# Patient Record
Sex: MALE | Race: BLACK OR AFRICAN AMERICAN | NOT HISPANIC OR LATINO | Employment: UNEMPLOYED | URBAN - METROPOLITAN AREA
[De-identification: names, ages, dates, MRNs, and addresses within clinical notes are randomized per-mention and may not be internally consistent; named-entity substitution may affect disease eponyms.]

---

## 2018-01-25 ENCOUNTER — OFFICE VISIT (OUTPATIENT)
Dept: URGENT CARE | Facility: CLINIC | Age: 28
End: 2018-01-25
Payer: MEDICAID

## 2018-01-25 VITALS
OXYGEN SATURATION: 98 % | HEART RATE: 77 BPM | TEMPERATURE: 98 F | BODY MASS INDEX: 35.55 KG/M2 | WEIGHT: 240 LBS | SYSTOLIC BLOOD PRESSURE: 142 MMHG | HEIGHT: 69 IN | DIASTOLIC BLOOD PRESSURE: 96 MMHG | RESPIRATION RATE: 20 BRPM

## 2018-01-25 DIAGNOSIS — M54.50 ACUTE RIGHT-SIDED LOW BACK PAIN WITHOUT SCIATICA: ICD-10-CM

## 2018-01-25 DIAGNOSIS — J30.1 CHRONIC SEASONAL ALLERGIC RHINITIS DUE TO POLLEN: ICD-10-CM

## 2018-01-25 DIAGNOSIS — M25.562 POSTERIOR LEFT KNEE PAIN: Primary | ICD-10-CM

## 2018-01-25 DIAGNOSIS — J06.9 URI, ACUTE: ICD-10-CM

## 2018-01-25 LAB
CTP QC/QA: YES
FLUAV AG NPH QL: NEGATIVE
FLUBV AG NPH QL: NEGATIVE

## 2018-01-25 PROCEDURE — 87804 INFLUENZA ASSAY W/OPTIC: CPT | Mod: 59,QW,S$GLB, | Performed by: FAMILY MEDICINE

## 2018-01-25 PROCEDURE — 99203 OFFICE O/P NEW LOW 30 MIN: CPT | Mod: S$GLB,,, | Performed by: FAMILY MEDICINE

## 2018-01-25 RX ORDER — CYCLOBENZAPRINE HCL 10 MG
10 TABLET ORAL 3 TIMES DAILY PRN
Qty: 30 TABLET | Refills: 0 | Status: SHIPPED | OUTPATIENT
Start: 2018-01-25 | End: 2018-02-04

## 2018-01-25 RX ORDER — DICLOFENAC SODIUM 75 MG/1
75 TABLET, DELAYED RELEASE ORAL 2 TIMES DAILY PRN
Qty: 30 TABLET | Refills: 2 | Status: SHIPPED | OUTPATIENT
Start: 2018-01-25

## 2018-01-26 NOTE — PATIENT INSTRUCTIONS
General Neck and Back Pain    Both neck and back pain are usually caused by injury to the muscles or ligaments of the spine. Sometimes the disks that separate each bone of the spine may cause pain by pressing on a nearby nerve. Back and neck pain may appear after a sudden twisting or bending force (such as in a car accident), or sometimes after a simple awkward movement. In either case, muscle spasm is often present and adds to the pain.  Acute neck and back pain usually gets better in 1 to 2 weeks. Pain related to disk disease, arthritis in the spinal joints or spinal stenosis (narrowing of the spinal canal) can become chronic and last for months or years.  Back and neck pain are common problems. Most people feel better in 1 or 2 weeks, and most of the rest in 1 to 2 months. Most people can remain active.  People experience and describe pain differently.  · Pain can be sharp, stabbing, shooting, aching, cramping, or burning  · Movement, standing, bending, lifting, sitting, or walking may worsen the pain  · Pain can be localized to one spot or area, or it can be more generalized  · Pain can spread or radiate upwards, downwards, to the front, or go down your arms  · Muscle spasm may occur.  Most of the time mechanical problems with the muscles or spine cause the pain. it is usually caused by an injury, whether known or not, to the muscles or ligaments. While illnesses can cause back pain, it is usually not caused by a serious illness. Pain is usually related to physical activity, whether sports, exercise, work, or normal activity. Sometimes it can occur without an identifiable cause. This can happen simply by stretching or moving wrong, without noting pain at the time. Other causes include:  · Overexertion, lifting, pushing, pulling incorrectly or too aggressively.  · Sudden twisting, bending or stretching from an accident (car or fall), or accidental movement.  · Poor posture  · Poor conditioning, lack of regular  exercise  · Spinal disc disease or arthritis  · Stress  · Pregnancy, or illness like appendicitis, bladder or kidney infection, pelvic infections   Home care  · For neck pain: Use a comfortable pillow that supports the head and keeps the spine in a neutral position. The position of the head should not be tilted forward or backward.  · When in bed, try to find a position of comfort. A firm mattress is best. Try lying flat on your back with pillows under your knees. You can also try lying on your side with your knees bent up towards your chest and a pillow between your knees.  · At first, do not try to stretch out the sore spots. If there is a strain, it is not like the good soreness you get after exercising without an injury. In this case, stretching may make it worse.  · Avoid prolonged sitting, long car rides or travel. This puts more stress on the lower back than standing or walking.  · During the first 24 to 72 hours after an injury, apply an ice pack to the painful area for 20 minutes and then remove it for 20 minutes over a period of 60 to 90 minutes or several times a day.   · You can alternate ice and heat therapies. Talk with your healthcare provider about the best treatment for your back or neck pain. As a safety precaution, do not use a heating pad at bedtime. Sleeping with a heating pad can lead to skin burns or tissue damage.  · Therapeutic massage can help relax the back and neck muscles without stretching them.  · Be aware of safe lifting methods and do not lift anything over 15 pounds until all the pain is gone.  Medications  Talk to your healthcare provider before using medicine, especially if you have other medical problems or are taking other medicines.  · You may use over-the-counter medicine to control pain, unless another pain medicine was prescribed. If you have chronic conditions like diabetes, liver or kidney disease, stomach ulcers,  gastrointestinal bleeding, or are taking blood thinner  medicines.  · Be careful if you are given pain medicines, narcotics, or medicine for muscle spasm. They can cause drowsiness, and can affect your coordination, reflexes, and judgment. Do not drive or operate heavy machinery.  Follow-up care  Follow up with your healthcare provider, or as advised. Physical therapy or further tests may be needed.  If X-rays were taken, you will be notified of any new findings that may affect your care.  Call 911  Seek emergency medical care if any of the following occur:  · Trouble breathing  · Confusion  · Very drowsy or trouble awakening  · Fainting or loss of consciousness  · Rapid or very slow heart rate  · Loss of bowel or bladder control  When to seek medical advice  Call your healthcare provider right away if any of these occur:  · Pain becomes worse or spreads into your arms or legs  · Weakness, numbness or pain in one or both arms or legs  · Numbness in the groin area  · Difficulty walking  · Fever of 100.4ºF (38ºC) or higher, or as directed by your healthcare provider  Date Last Reviewed: 7/1/2016 © 2000-2017 [employer]. [address] [place] [address], [place] [address]. All rights reserved. This information is not intended as a substitute for professional medical care. Always follow your healthcare professional's instructions.

## 2018-01-26 NOTE — PROGRESS NOTES
"Subjective:       Patient ID: Willian Ron is a 27 y.o. male.    Vitals:  height is 5' 9" (1.753 m) and weight is 108.9 kg (240 lb). His temperature is 97.7 °F (36.5 °C). His blood pressure is 142/96 (abnormal) and his pulse is 77. His respiration is 20 and oxygen saturation is 98%.     Chief Complaint: Back Pain; Sore Throat; and Knee Pain (lt )    Pain   This is a new problem. The current episode started in the past 7 days. The problem occurs constantly. Associated symptoms include coughing, fatigue, nausea and vomiting. Pertinent negatives include no abdominal pain, chest pain, chills, congestion, fever, headaches, myalgias or sore throat. Nothing aggravates the symptoms. He has tried nothing for the symptoms.     Review of Systems   Constitution: Positive for fatigue and malaise/fatigue. Negative for chills and fever.   HENT: Negative for congestion, ear pain, hoarse voice and sore throat.    Eyes: Negative for discharge and redness.   Cardiovascular: Negative for chest pain, dyspnea on exertion and leg swelling.   Respiratory: Positive for cough. Negative for shortness of breath, sputum production and wheezing.    Musculoskeletal: Negative for myalgias.   Gastrointestinal: Positive for nausea and vomiting. Negative for abdominal pain.   Neurological: Negative for headaches.       Objective:      Physical Exam   Constitutional: He is oriented to person, place, and time. He appears well-developed and well-nourished. He is cooperative.  Non-toxic appearance. He does not appear ill. No distress.   HENT:   Head: Normocephalic and atraumatic.   Right Ear: Hearing, tympanic membrane, external ear and ear canal normal.   Left Ear: Hearing, tympanic membrane, external ear and ear canal normal.   Nose: Nose normal. No mucosal edema, rhinorrhea or nasal deformity. No epistaxis. Right sinus exhibits no maxillary sinus tenderness and no frontal sinus tenderness. Left sinus exhibits no maxillary sinus tenderness and no " frontal sinus tenderness.   Mouth/Throat: Uvula is midline, oropharynx is clear and moist and mucous membranes are normal. No trismus in the jaw. Normal dentition. No uvula swelling. No posterior oropharyngeal erythema.   Eyes: Conjunctivae and lids are normal. Right eye exhibits no discharge. Left eye exhibits no discharge. No scleral icterus.   Sclera clear bilat   Neck: Trachea normal, normal range of motion, full passive range of motion without pain and phonation normal. Neck supple.   Cardiovascular: Normal rate, regular rhythm, normal heart sounds, intact distal pulses and normal pulses.    Pulmonary/Chest: Effort normal and breath sounds normal. He has no wheezes. He has no rales.   Abdominal: Soft. Normal appearance and bowel sounds are normal. He exhibits no distension, no pulsatile midline mass and no mass. There is no tenderness.   Musculoskeletal: Normal range of motion. He exhibits no edema or deformity.   Left knee no swelling ,   Minimal tenderness at posterior tibial area     Lymphadenopathy:     He has no cervical adenopathy.   Neurological: He is alert and oriented to person, place, and time. He exhibits normal muscle tone. Coordination normal.   Skin: Skin is warm, dry and intact. He is not diaphoretic. No pallor.   Psychiatric: He has a normal mood and affect. His speech is normal and behavior is normal. Judgment and thought content normal. Cognition and memory are normal.   Nursing note and vitals reviewed.      Assessment:       1. Posterior left knee pain    2. URI, acute    3. Acute right-sided low back pain without sciatica    4. Chronic seasonal allergic rhinitis due to pollen        Plan:         Posterior left knee pain  -     X-Ray Knee 3 View Left; Future; Expected date: 01/25/2018  -     Ambulatory referral to Orthopedics    URI, acute  -     POCT Influenza A/B    Acute right-sided low back pain without sciatica  -     cyclobenzaprine (FLEXERIL) 10 MG tablet; Take 1 tablet (10 mg  total) by mouth 3 (three) times daily as needed for Muscle spasms.  Dispense: 30 tablet; Refill: 0  -     diclofenac (VOLTAREN) 75 MG EC tablet; Take 1 tablet (75 mg total) by mouth 2 (two) times daily as needed.  Dispense: 30 tablet; Refill: 2  -     Ambulatory referral to Orthopedics    Chronic seasonal allergic rhinitis due to pollen        claritin one daily

## 2019-06-04 ENCOUNTER — HOSPITAL ENCOUNTER (EMERGENCY)
Facility: HOSPITAL | Age: 29
Discharge: HOME | End: 2019-06-05
Attending: EMERGENCY MEDICINE

## 2019-06-04 DIAGNOSIS — S61.211A LACERATION OF LEFT INDEX FINGER WITHOUT FOREIGN BODY WITHOUT DAMAGE TO NAIL, INITIAL ENCOUNTER: Primary | ICD-10-CM

## 2019-06-04 PROCEDURE — 12001 RPR S/N/AX/GEN/TRNK 2.5CM/<: CPT

## 2019-06-04 PROCEDURE — 99284 EMERGENCY DEPT VISIT MOD MDM: CPT | Mod: 25

## 2019-06-04 RX ORDER — DOXYCYCLINE HYCLATE 100 MG
100 TABLET ORAL
Status: COMPLETED | OUTPATIENT
Start: 2019-06-05 | End: 2019-06-05

## 2019-06-05 VITALS
SYSTOLIC BLOOD PRESSURE: 144 MMHG | BODY MASS INDEX: 35 KG/M2 | DIASTOLIC BLOOD PRESSURE: 71 MMHG | HEIGHT: 71 IN | HEART RATE: 66 BPM | WEIGHT: 250 LBS | RESPIRATION RATE: 18 BRPM | OXYGEN SATURATION: 99 % | TEMPERATURE: 99 F

## 2019-06-05 PROCEDURE — 25000003 PHARM REV CODE 250: Performed by: PHYSICIAN ASSISTANT

## 2019-06-05 RX ORDER — LIDOCAINE HYDROCHLORIDE 10 MG/ML
10 INJECTION INFILTRATION; PERINEURAL
Status: COMPLETED | OUTPATIENT
Start: 2019-06-05 | End: 2019-06-05

## 2019-06-05 RX ORDER — OXYCODONE AND ACETAMINOPHEN 5; 325 MG/1; MG/1
1 TABLET ORAL EVERY 6 HOURS PRN
Qty: 10 TABLET | Refills: 0 | Status: SHIPPED | OUTPATIENT
Start: 2019-06-05

## 2019-06-05 RX ORDER — DOXYCYCLINE 100 MG/1
100 CAPSULE ORAL 2 TIMES DAILY
Qty: 20 CAPSULE | Refills: 0 | Status: SHIPPED | OUTPATIENT
Start: 2019-06-05 | End: 2019-06-15

## 2019-06-05 RX ORDER — OXYCODONE AND ACETAMINOPHEN 5; 325 MG/1; MG/1
1 TABLET ORAL
Status: COMPLETED | OUTPATIENT
Start: 2019-06-05 | End: 2019-06-05

## 2019-06-05 RX ORDER — IBUPROFEN 600 MG/1
600 TABLET ORAL EVERY 6 HOURS PRN
Qty: 20 TABLET | Refills: 0 | Status: SHIPPED | OUTPATIENT
Start: 2019-06-05

## 2019-06-05 RX ORDER — BACITRACIN ZINC 500 UNIT/G
OINTMENT (GRAM) TOPICAL 2 TIMES DAILY
Qty: 14 G | Refills: 0 | Status: SHIPPED | OUTPATIENT
Start: 2019-06-05

## 2019-06-05 RX ADMIN — OXYCODONE HYDROCHLORIDE AND ACETAMINOPHEN 1 TABLET: 5; 325 TABLET ORAL at 12:06

## 2019-06-05 RX ADMIN — LIDOCAINE HYDROCHLORIDE 10 ML: 10 INJECTION, SOLUTION INFILTRATION; PERINEURAL at 12:06

## 2019-06-05 RX ADMIN — DOXYCYCLINE HYCLATE 100 MG: 100 TABLET, COATED ORAL at 12:06

## 2019-06-05 NOTE — DISCHARGE INSTRUCTIONS
Keep wound clean.  Do not soak in water.  Do not get wound wet for 24 hr.  Keep your dressing in place until tomorrow evening.  After that, you may change dressings twice daily, apply antibiotic ointment with dressing changes.  Ibuprofen for pain.  Percocet for severe pain. Sutures need to be removed in 10-14 days.  Follow-up with your primary care provider for wound re-evaluation, for suture removal.  Please return to this emergency department if you experience worsening pain, if your finger becomes red, swollen, warm, if you notice foul-smelling drainage from finger, if any other problems occur.

## 2019-06-05 NOTE — ED PROVIDER NOTES
Encounter Date: 6/4/2019       History     Chief Complaint   Patient presents with    Laceration     LAC to LEFT index finger Pt reports being at work and  accidentally cut his finger on a underwater rail with sewage water.         28yo M with chief complaint laceration to L index finger sustained approx 1030am today. Pt states was at work, moving a rusted structure with a sharp corner. He states he was cut and fears he may have gotten sewage water in the wound. No uncontrolled bleeding. No radiculopathy or paresthesia. Does not suspect foreign body. No fever. Aching pain to digit. No loss of sensation or motor control. Symptoms acute, constant, severity 5/10.     He is R handed.         Review of patient's allergies indicates:   Allergen Reactions    Grass pollen-june grass standard      History reviewed. No pertinent past medical history.  History reviewed. No pertinent surgical history.  History reviewed. No pertinent family history.  Social History     Tobacco Use    Smoking status: Current Every Day Smoker     Packs/day: 0.25     Years: 7.00     Pack years: 1.75     Types: Cigarettes    Smokeless tobacco: Never Used   Substance Use Topics    Alcohol use: No    Drug use: No     Review of Systems   Constitutional: Negative for appetite change, chills and fever.   HENT: Negative for congestion and sore throat.    Respiratory: Negative for chest tightness and shortness of breath.    Cardiovascular: Negative for chest pain.   Gastrointestinal: Negative for abdominal pain, nausea and vomiting.   Genitourinary: Negative for dysuria.   Musculoskeletal: Negative for back pain, myalgias, neck pain and neck stiffness.   Skin: Positive for wound. Negative for rash.   Neurological: Negative for dizziness, weakness and light-headedness.   Hematological: Does not bruise/bleed easily.   All other systems reviewed and are negative.      Physical Exam     Initial Vitals [06/04/19 2214]   BP Pulse Resp Temp SpO2   (!)  164/84 67 17 98.6 °F (37 °C) 99 %      MAP       --         Physical Exam    Nursing note and vitals reviewed.  Constitutional: He appears well-developed and well-nourished. He is not diaphoretic. No distress.   HENT:   Head: Normocephalic and atraumatic.   Eyes: Conjunctivae and EOM are normal. Pupils are equal, round, and reactive to light.   Neck: Normal range of motion. Neck supple.   Cardiovascular: Intact distal pulses.   Pulmonary/Chest: No respiratory distress.   Abdominal: Soft. Bowel sounds are normal. He exhibits no distension. There is no tenderness.   Musculoskeletal: Normal range of motion.   L 2nd digit with radial aspect laceration to the DIP region No foreign body. No bony deformity. Cap refill wnl all digits. No uncontrolled bleeding. Full ROM without discomfort or difficulty.    Neurological: He is alert and oriented to person, place, and time. He has normal strength.   Skin: Skin is warm and dry. Capillary refill takes less than 2 seconds. No rash and no abscess noted. No erythema.   Psychiatric: He has a normal mood and affect. His behavior is normal. Judgment and thought content normal.         ED Course   Lac Repair  Date/Time: 6/5/2019 12:40 AM  Performed by: Johnnie Murphy PA-C  Authorized by: Tabitha Tineo MD   Body area: upper extremity  Location details: left index finger  Laceration length: 1 cm  Foreign bodies: no foreign bodies  Tendon involvement: none  Nerve involvement: none  Vascular damage: no  Anesthesia: digital block    Anesthesia:  Local Anesthetic: lidocaine 1% without epinephrine  Anesthetic total: 5 mL  Patient sedated: no  Preparation: Patient was prepped and draped in the usual sterile fashion.  Irrigation solution: saline  Irrigation method: syringe  Amount of cleaning: standard  Debridement: minimal  Degree of undermining: none  Skin closure: 4-0 nylon  Number of sutures: 2  Technique: simple and horizontal mattress  Approximation: loose  Approximation  difficulty: simple  Dressing: 4x4 sterile gauze  Patient tolerance: Patient tolerated the procedure well with no immediate complications  Comments: Gaping wound, simple interrupted with loose closure, horizontal mattress given gaping nature, to support closure        Labs Reviewed - No data to display       Imaging Results          X-Ray Finger 2 or More Views Left (Final result)  Result time 06/04/19 23:52:48    Final result by Thompson Murillo MD (06/04/19 23:52:48)                 Impression:      No fracture or malalignment or foreign body seen.      Electronically signed by: Thompson Murillo  Date:    06/04/2019  Time:    23:52             Narrative:    EXAMINATION:  XR FINGER 2 OR MORE VIEWS LEFT    CLINICAL HISTORY:  Left finger injury, evaluate for foreign body;    TECHNIQUE:  Frontal, oblique and lateral views.    COMPARISON:  None    FINDINGS:  Mineralization and joint space and alignment are normal.  No foreign body or soft tissue defect or focal swelling seen.  There is overlying dressing.  A single prominent trabecular interspaces seen on frontal view at the radial base of the 2nd distal phalanx, probably normal variation                                 Medical Decision Making:   Differential Diagnosis:   Fracture, contusion, infected wound, cellulitis, open fracture  Clinical Tests:   Radiological Study: Reviewed and Ordered  ED Management:  X-ray negative for acute fracture.  I will close the wound very loosely given possible contaminated water exposure.  Tetanus up-to-date.  Wound irrigated.  Tolerated without issue.  Systemic antibiotics, PCP follow-up, return precautions given.                      Clinical Impression:       ICD-10-CM ICD-9-CM   1. Laceration of left index finger without foreign body without damage to nail, initial encounter S61.211A 883.0         Disposition:   Disposition: Discharged  Condition: Stable                        Johnnie Murphy PA-C  06/05/19 0047

## 2020-01-07 ENCOUNTER — HOSPITAL ENCOUNTER (EMERGENCY)
Facility: HOSPITAL | Age: 30
Discharge: HOME | End: 2020-01-07
Attending: EMERGENCY MEDICINE

## 2020-01-07 VITALS
WEIGHT: 240 LBS | HEART RATE: 82 BPM | OXYGEN SATURATION: 97 % | RESPIRATION RATE: 18 BRPM | HEIGHT: 71 IN | BODY MASS INDEX: 33.6 KG/M2 | SYSTOLIC BLOOD PRESSURE: 146 MMHG | DIASTOLIC BLOOD PRESSURE: 83 MMHG | TEMPERATURE: 99 F

## 2020-01-07 DIAGNOSIS — J10.1 INFLUENZA B: Primary | ICD-10-CM

## 2020-01-07 LAB
CTP QC/QA: YES
POC MOLECULAR INFLUENZA A AGN: NEGATIVE
POC MOLECULAR INFLUENZA B AGN: POSITIVE

## 2020-01-07 PROCEDURE — 99284 EMERGENCY DEPT VISIT MOD MDM: CPT | Mod: 25

## 2020-01-07 PROCEDURE — 63600175 PHARM REV CODE 636 W HCPCS: Performed by: PHYSICIAN ASSISTANT

## 2020-01-07 PROCEDURE — 25000003 PHARM REV CODE 250: Performed by: PHYSICIAN ASSISTANT

## 2020-01-07 PROCEDURE — 96372 THER/PROPH/DIAG INJ SC/IM: CPT

## 2020-01-07 PROCEDURE — 87502 INFLUENZA DNA AMP PROBE: CPT

## 2020-01-07 RX ORDER — BENZONATATE 100 MG/1
100 CAPSULE ORAL
Status: COMPLETED | OUTPATIENT
Start: 2020-01-07 | End: 2020-01-07

## 2020-01-07 RX ORDER — DEXTROMETHORPHAN HYDROBROMIDE, GUAIFENESIN 5; 100 MG/5ML; MG/5ML
650 LIQUID ORAL EVERY 8 HOURS PRN
Qty: 30 TABLET | Refills: 0 | Status: SHIPPED | OUTPATIENT
Start: 2020-01-07

## 2020-01-07 RX ORDER — BENZONATATE 100 MG/1
100 CAPSULE ORAL 3 TIMES DAILY PRN
Qty: 20 CAPSULE | Refills: 0 | Status: SHIPPED | OUTPATIENT
Start: 2020-01-07 | End: 2020-01-17

## 2020-01-07 RX ORDER — KETOROLAC TROMETHAMINE 30 MG/ML
15 INJECTION, SOLUTION INTRAMUSCULAR; INTRAVENOUS
Status: COMPLETED | OUTPATIENT
Start: 2020-01-07 | End: 2020-01-07

## 2020-01-07 RX ORDER — OSELTAMIVIR PHOSPHATE 75 MG/1
75 CAPSULE ORAL 2 TIMES DAILY
Qty: 10 CAPSULE | Refills: 0 | Status: SHIPPED | OUTPATIENT
Start: 2020-01-07 | End: 2020-01-12

## 2020-01-07 RX ORDER — IBUPROFEN 600 MG/1
600 TABLET ORAL EVERY 6 HOURS PRN
Qty: 20 TABLET | Refills: 0 | Status: SHIPPED | OUTPATIENT
Start: 2020-01-07

## 2020-01-07 RX ADMIN — KETOROLAC TROMETHAMINE 15 MG: 30 INJECTION, SOLUTION INTRAMUSCULAR at 09:01

## 2020-01-07 RX ADMIN — BENZONATATE 100 MG: 100 CAPSULE ORAL at 09:01

## 2020-01-07 NOTE — ED TRIAGE NOTES
Pt. reports coughing for the past week. Pt. Reports cough is productive and yellowish in color. Pt. Denies any fever/chills with his symptoms.

## 2020-01-07 NOTE — ED PROVIDER NOTES
Encounter Date: 1/7/2020    SCRIBE #1 NOTE: IMell, am scribing for, and in the presence of,  Elmer Paniagua PA-C. I have scribed the following portions of the note - Other sections scribed: HPI,ROS,PE.       History     Chief Complaint   Patient presents with    Cough     Pt c/o c/c/c x 1 week. Denies fever. Pt reports pain with deep breathing. Daughter with same symptoms. RR unlabored.      CC: Cough    HPI: This is a 29 y.o.male patient, with no PMHx, presenting to the ED with a complaint of a productive cough, that began 4 days ago. Patient reports shortness of breath in between coughs. Patient reports associated congestion and sore throat. He reports attempting prior Tx with Mucinex and Robitussin, with no relief. Patient denies any fever, chills, or any other associated symptoms. No alleviating or aggravating factors. Allergic to Grass pollen.    The history is provided by the patient.     Review of patient's allergies indicates:   Allergen Reactions    Grass pollen-yuri grass standard      History reviewed. No pertinent past medical history.  History reviewed. No pertinent surgical history.  History reviewed. No pertinent family history.  Social History     Tobacco Use    Smoking status: Current Every Day Smoker     Packs/day: 0.50     Years: 7.00     Pack years: 3.50     Types: Cigarettes    Smokeless tobacco: Never Used   Substance Use Topics    Alcohol use: No    Drug use: No     Review of Systems   Constitutional: Negative for chills and fever.   HENT: Positive for congestion and sore throat. Negative for ear pain and rhinorrhea.    Eyes: Negative for pain and visual disturbance.   Respiratory: Positive for cough (productive) and shortness of breath (in between coughs).    Cardiovascular: Negative for chest pain.   Gastrointestinal: Negative for abdominal pain, diarrhea, nausea and vomiting.   Genitourinary: Negative for dysuria.   Musculoskeletal: Negative for back pain and neck pain.    Skin: Negative for rash.   Neurological: Negative for headaches.       Physical Exam     Initial Vitals [01/07/20 0847]   BP Pulse Resp Temp SpO2   (!) 140/80 90 18 99.1 °F (37.3 °C) 97 %      MAP       --         Physical Exam    Nursing note and vitals reviewed.  Constitutional: He appears well-developed and well-nourished. No distress.   HENT:   Head: Normocephalic and atraumatic.   Right Ear: Tympanic membrane normal.   Left Ear: Tympanic membrane normal.   Nose: Nose normal.   Mouth/Throat: Uvula is midline, oropharynx is clear and moist and mucous membranes are normal.   Eyes: EOM are normal. Pupils are equal, round, and reactive to light.   Neck: Normal range of motion. Neck supple.   Cardiovascular: Normal rate, regular rhythm and normal heart sounds. Exam reveals no gallop and no friction rub.    No murmur heard.  Pulmonary/Chest: Effort normal and breath sounds normal. No respiratory distress. He has no wheezes. He has no rhonchi. He has no rales.   Musculoskeletal: Normal range of motion.   Neurological: He is alert and oriented to person, place, and time.   Skin: Skin is warm and dry. Capillary refill takes less than 2 seconds.   Psychiatric: He has a normal mood and affect.         ED Course   Procedures  Labs Reviewed   POCT INFLUENZA A/B MOLECULAR - Abnormal; Notable for the following components:       Result Value    POC Molecular Influenza B Ag Positive (*)     All other components within normal limits          Imaging Results    None          Medical Decision Making:   ED Management:  29-year-old obese male with history and exam consistent with viral URI.  Influenza B positive.  No evidence of meningitis, pneumonia, strep pharyngitis, or sepsis.  Patient treated with supportive medications and Tamiflu, and discharged with instructions for continued p.o. hydration and follow up with PCP.            Scribe Attestation:   Scribe #1: I performed the above scribed service and the documentation  accurately describes the services I performed. I attest to the accuracy of the note.                          Clinical Impression:       ICD-10-CM ICD-9-CM   1. Influenza B J10.1 487.1            Scribe attestation: I, Elmer Paniagua, personally performed the services described in this documentation. All medical record entries made by the scribe were at my direction and in my presence.  I have reviewed the chart and agree that the record reflects my personal performance and is accurate and complete.                 Elmer Paniagua PA-C  01/07/20 0955

## 2024-08-14 ENCOUNTER — HOSPITAL ENCOUNTER (INPATIENT)
Facility: HOSPITAL | Age: 34
LOS: 13 days | Discharge: HOME | DRG: 871 | End: 2024-08-27
Attending: EMERGENCY MEDICINE | Admitting: STUDENT IN AN ORGANIZED HEALTH CARE EDUCATION/TRAINING PROGRAM
Payer: MEDICAID

## 2024-08-14 DIAGNOSIS — R07.9 CHEST PAIN: ICD-10-CM

## 2024-08-14 DIAGNOSIS — R14.0 ABDOMINAL BLOATING: ICD-10-CM

## 2024-08-14 DIAGNOSIS — R00.0 TACHYCARDIA: ICD-10-CM

## 2024-08-14 DIAGNOSIS — K35.33 ACUTE APPENDICITIS WITH PERFORATION, LOCALIZED PERITONITIS, AND ABSCESS, UNSPECIFIED WHETHER GANGRENE PRESENT: Primary | ICD-10-CM

## 2024-08-14 DIAGNOSIS — K35.211 ACUTE APPENDICITIS WITH PERFORATION, GENERALIZED PERITONITIS, AND ABSCESS, UNSPECIFIED WHETHER GANGRENE PRESENT: ICD-10-CM

## 2024-08-14 DIAGNOSIS — Z01.818 PRE-OP EVALUATION: ICD-10-CM

## 2024-08-14 DIAGNOSIS — K35.80 ACUTE APPENDICITIS: ICD-10-CM

## 2024-08-14 PROBLEM — D72.829 LEUKOCYTOSIS: Status: ACTIVE | Noted: 2024-08-14

## 2024-08-14 PROBLEM — K76.0 HEPATIC STEATOSIS: Status: ACTIVE | Noted: 2024-08-14

## 2024-08-14 PROBLEM — A41.9 SEPSIS: Status: ACTIVE | Noted: 2024-08-14

## 2024-08-14 PROBLEM — F17.200 TOBACCO DEPENDENCY: Status: ACTIVE | Noted: 2024-08-14

## 2024-08-14 PROBLEM — K56.600 PARTIAL SMALL BOWEL OBSTRUCTION: Status: ACTIVE | Noted: 2024-08-14

## 2024-08-14 PROBLEM — F12.90 MARIJUANA USE: Status: ACTIVE | Noted: 2024-08-14

## 2024-08-14 PROBLEM — E87.1 HYPONATREMIA: Status: ACTIVE | Noted: 2024-08-14

## 2024-08-14 LAB
ALBUMIN SERPL BCP-MCNC: 4 G/DL (ref 3.5–5.2)
ALP SERPL-CCNC: 85 U/L (ref 55–135)
ALT SERPL W/O P-5'-P-CCNC: 23 U/L (ref 10–44)
ANION GAP SERPL CALC-SCNC: 14 MMOL/L (ref 8–16)
AST SERPL-CCNC: 15 U/L (ref 10–40)
BASOPHILS # BLD AUTO: 0.04 K/UL (ref 0–0.2)
BASOPHILS NFR BLD: 0.2 % (ref 0–1.9)
BILIRUB SERPL-MCNC: 1.1 MG/DL (ref 0.1–1)
BILIRUB UR QL STRIP: NEGATIVE
BNP SERPL-MCNC: 49 PG/ML (ref 0–99)
BUN SERPL-MCNC: 10 MG/DL (ref 6–20)
CALCIUM SERPL-MCNC: 9.7 MG/DL (ref 8.7–10.5)
CHLORIDE SERPL-SCNC: 99 MMOL/L (ref 95–110)
CLARITY UR: CLEAR
CO2 SERPL-SCNC: 22 MMOL/L (ref 23–29)
COLOR UR: YELLOW
CREAT SERPL-MCNC: 1.1 MG/DL (ref 0.5–1.4)
DIFFERENTIAL METHOD BLD: ABNORMAL
EOSINOPHIL # BLD AUTO: 0 K/UL (ref 0–0.5)
EOSINOPHIL NFR BLD: 0 % (ref 0–8)
ERYTHROCYTE [DISTWIDTH] IN BLOOD BY AUTOMATED COUNT: 13 % (ref 11.5–14.5)
EST. GFR  (NO RACE VARIABLE): >60 ML/MIN/1.73 M^2
GLUCOSE SERPL-MCNC: 141 MG/DL (ref 70–110)
GLUCOSE UR QL STRIP: NEGATIVE
HCT VFR BLD AUTO: 50 % (ref 40–54)
HGB BLD-MCNC: 16.9 G/DL (ref 14–18)
HGB UR QL STRIP: NEGATIVE
IMM GRANULOCYTES # BLD AUTO: 0.16 K/UL (ref 0–0.04)
IMM GRANULOCYTES NFR BLD AUTO: 0.9 % (ref 0–0.5)
KETONES UR QL STRIP: ABNORMAL
LACTATE SERPL-SCNC: 1.9 MMOL/L (ref 0.5–2.2)
LEUKOCYTE ESTERASE UR QL STRIP: NEGATIVE
LIPASE SERPL-CCNC: 4 U/L (ref 4–60)
LYMPHOCYTES # BLD AUTO: 1.2 K/UL (ref 1–4.8)
LYMPHOCYTES NFR BLD: 6.4 % (ref 18–48)
MAGNESIUM SERPL-MCNC: 1.6 MG/DL (ref 1.6–2.6)
MCH RBC QN AUTO: 28.6 PG (ref 27–31)
MCHC RBC AUTO-ENTMCNC: 33.8 G/DL (ref 32–36)
MCV RBC AUTO: 85 FL (ref 82–98)
MONOCYTES # BLD AUTO: 2.2 K/UL (ref 0.3–1)
MONOCYTES NFR BLD: 12.1 % (ref 4–15)
NEUTROPHILS # BLD AUTO: 14.4 K/UL (ref 1.8–7.7)
NEUTROPHILS NFR BLD: 80.4 % (ref 38–73)
NITRITE UR QL STRIP: NEGATIVE
NRBC BLD-RTO: 0 /100 WBC
PH UR STRIP: 6 [PH] (ref 5–8)
PLATELET # BLD AUTO: 258 K/UL (ref 150–450)
PMV BLD AUTO: 11.2 FL (ref 9.2–12.9)
POTASSIUM SERPL-SCNC: 3.5 MMOL/L (ref 3.5–5.1)
PROT SERPL-MCNC: 7.6 G/DL (ref 6–8.4)
PROT UR QL STRIP: ABNORMAL
RBC # BLD AUTO: 5.9 M/UL (ref 4.6–6.2)
SODIUM SERPL-SCNC: 135 MMOL/L (ref 136–145)
SP GR UR STRIP: >1.03 (ref 1–1.03)
TROPONIN I SERPL DL<=0.01 NG/ML-MCNC: <0.006 NG/ML (ref 0–0.03)
URN SPEC COLLECT METH UR: ABNORMAL
UROBILINOGEN UR STRIP-ACNC: NEGATIVE EU/DL
WBC # BLD AUTO: 17.92 K/UL (ref 3.9–12.7)

## 2024-08-14 PROCEDURE — 93005 ELECTROCARDIOGRAM TRACING: CPT

## 2024-08-14 PROCEDURE — 99285 EMERGENCY DEPT VISIT HI MDM: CPT | Mod: 25

## 2024-08-14 PROCEDURE — 63600175 PHARM REV CODE 636 W HCPCS

## 2024-08-14 PROCEDURE — 85025 COMPLETE CBC W/AUTO DIFF WBC: CPT

## 2024-08-14 PROCEDURE — 81003 URINALYSIS AUTO W/O SCOPE: CPT

## 2024-08-14 PROCEDURE — 11000001 HC ACUTE MED/SURG PRIVATE ROOM

## 2024-08-14 PROCEDURE — 25500020 PHARM REV CODE 255: Performed by: EMERGENCY MEDICINE

## 2024-08-14 PROCEDURE — 83880 ASSAY OF NATRIURETIC PEPTIDE: CPT

## 2024-08-14 PROCEDURE — 96375 TX/PRO/DX INJ NEW DRUG ADDON: CPT

## 2024-08-14 PROCEDURE — 96361 HYDRATE IV INFUSION ADD-ON: CPT

## 2024-08-14 PROCEDURE — 63600175 PHARM REV CODE 636 W HCPCS: Performed by: STUDENT IN AN ORGANIZED HEALTH CARE EDUCATION/TRAINING PROGRAM

## 2024-08-14 PROCEDURE — 25000003 PHARM REV CODE 250

## 2024-08-14 PROCEDURE — 83690 ASSAY OF LIPASE: CPT

## 2024-08-14 PROCEDURE — 83605 ASSAY OF LACTIC ACID: CPT

## 2024-08-14 PROCEDURE — 87040 BLOOD CULTURE FOR BACTERIA: CPT | Mod: 59

## 2024-08-14 PROCEDURE — 83735 ASSAY OF MAGNESIUM: CPT

## 2024-08-14 PROCEDURE — 80053 COMPREHEN METABOLIC PANEL: CPT

## 2024-08-14 PROCEDURE — 84484 ASSAY OF TROPONIN QUANT: CPT

## 2024-08-14 PROCEDURE — 93010 ELECTROCARDIOGRAM REPORT: CPT | Mod: ,,, | Performed by: INTERNAL MEDICINE

## 2024-08-14 PROCEDURE — 96365 THER/PROPH/DIAG IV INF INIT: CPT

## 2024-08-14 PROCEDURE — 25000003 PHARM REV CODE 250: Performed by: STUDENT IN AN ORGANIZED HEALTH CARE EDUCATION/TRAINING PROGRAM

## 2024-08-14 RX ORDER — MORPHINE SULFATE 4 MG/ML
6 INJECTION, SOLUTION INTRAMUSCULAR; INTRAVENOUS
Status: COMPLETED | OUTPATIENT
Start: 2024-08-14 | End: 2024-08-14

## 2024-08-14 RX ORDER — ACETAMINOPHEN 325 MG/1
650 TABLET ORAL EVERY 8 HOURS PRN
Status: DISCONTINUED | OUTPATIENT
Start: 2024-08-14 | End: 2024-08-27 | Stop reason: HOSPADM

## 2024-08-14 RX ORDER — SODIUM CHLORIDE, SODIUM LACTATE, POTASSIUM CHLORIDE, CALCIUM CHLORIDE 600; 310; 30; 20 MG/100ML; MG/100ML; MG/100ML; MG/100ML
INJECTION, SOLUTION INTRAVENOUS CONTINUOUS
Status: DISCONTINUED | OUTPATIENT
Start: 2024-08-14 | End: 2024-08-21

## 2024-08-14 RX ORDER — ONDANSETRON HYDROCHLORIDE 2 MG/ML
4 INJECTION, SOLUTION INTRAVENOUS
Status: COMPLETED | OUTPATIENT
Start: 2024-08-14 | End: 2024-08-14

## 2024-08-14 RX ORDER — HYDROMORPHONE HYDROCHLORIDE 1 MG/ML
1 INJECTION, SOLUTION INTRAMUSCULAR; INTRAVENOUS; SUBCUTANEOUS EVERY 4 HOURS PRN
Status: DISCONTINUED | OUTPATIENT
Start: 2024-08-14 | End: 2024-08-27 | Stop reason: HOSPADM

## 2024-08-14 RX ORDER — ACETAMINOPHEN 325 MG/1
650 TABLET ORAL EVERY 4 HOURS PRN
Status: DISCONTINUED | OUTPATIENT
Start: 2024-08-14 | End: 2024-08-27 | Stop reason: HOSPADM

## 2024-08-14 RX ORDER — IBUPROFEN 200 MG
16 TABLET ORAL
Status: DISCONTINUED | OUTPATIENT
Start: 2024-08-14 | End: 2024-08-27 | Stop reason: HOSPADM

## 2024-08-14 RX ORDER — IBUPROFEN 200 MG
24 TABLET ORAL
Status: DISCONTINUED | OUTPATIENT
Start: 2024-08-14 | End: 2024-08-27 | Stop reason: HOSPADM

## 2024-08-14 RX ORDER — NALOXONE HCL 0.4 MG/ML
0.02 VIAL (ML) INJECTION
Status: DISCONTINUED | OUTPATIENT
Start: 2024-08-14 | End: 2024-08-27 | Stop reason: HOSPADM

## 2024-08-14 RX ORDER — MORPHINE SULFATE 4 MG/ML
8 INJECTION, SOLUTION INTRAMUSCULAR; INTRAVENOUS
Status: COMPLETED | OUTPATIENT
Start: 2024-08-14 | End: 2024-08-14

## 2024-08-14 RX ORDER — ONDANSETRON HYDROCHLORIDE 2 MG/ML
4 INJECTION, SOLUTION INTRAVENOUS EVERY 8 HOURS PRN
Status: DISCONTINUED | OUTPATIENT
Start: 2024-08-14 | End: 2024-08-27 | Stop reason: HOSPADM

## 2024-08-14 RX ORDER — SODIUM CHLORIDE 0.9 % (FLUSH) 0.9 %
10 SYRINGE (ML) INJECTION EVERY 12 HOURS PRN
Status: DISCONTINUED | OUTPATIENT
Start: 2024-08-14 | End: 2024-08-27 | Stop reason: HOSPADM

## 2024-08-14 RX ORDER — OXYCODONE HYDROCHLORIDE 5 MG/1
5 TABLET ORAL EVERY 6 HOURS PRN
Status: DISCONTINUED | OUTPATIENT
Start: 2024-08-14 | End: 2024-08-27 | Stop reason: HOSPADM

## 2024-08-14 RX ORDER — GLUCAGON 1 MG
1 KIT INJECTION
Status: DISCONTINUED | OUTPATIENT
Start: 2024-08-14 | End: 2024-08-27 | Stop reason: HOSPADM

## 2024-08-14 RX ORDER — ACETAMINOPHEN 500 MG
1000 TABLET ORAL
Status: COMPLETED | OUTPATIENT
Start: 2024-08-14 | End: 2024-08-14

## 2024-08-14 RX ADMIN — MORPHINE SULFATE 6 MG: 4 INJECTION, SOLUTION INTRAMUSCULAR; INTRAVENOUS at 05:08

## 2024-08-14 RX ADMIN — SODIUM CHLORIDE 1000 ML: 9 INJECTION, SOLUTION INTRAVENOUS at 03:08

## 2024-08-14 RX ADMIN — SODIUM CHLORIDE, POTASSIUM CHLORIDE, SODIUM LACTATE AND CALCIUM CHLORIDE: 600; 310; 30; 20 INJECTION, SOLUTION INTRAVENOUS at 07:08

## 2024-08-14 RX ADMIN — HYDROMORPHONE HYDROCHLORIDE 1 MG: 1 INJECTION, SOLUTION INTRAMUSCULAR; INTRAVENOUS; SUBCUTANEOUS at 11:08

## 2024-08-14 RX ADMIN — PIPERACILLIN AND TAZOBACTAM 4.5 G: 4; .5 INJECTION, POWDER, LYOPHILIZED, FOR SOLUTION INTRAVENOUS; PARENTERAL at 04:08

## 2024-08-14 RX ADMIN — PIPERACILLIN AND TAZOBACTAM 4.5 G: 4; .5 INJECTION, POWDER, LYOPHILIZED, FOR SOLUTION INTRAVENOUS; PARENTERAL at 11:08

## 2024-08-14 RX ADMIN — ACETAMINOPHEN 1000 MG: 500 TABLET ORAL at 06:08

## 2024-08-14 RX ADMIN — ONDANSETRON 4 MG: 2 INJECTION INTRAMUSCULAR; INTRAVENOUS at 03:08

## 2024-08-14 RX ADMIN — MORPHINE SULFATE 8 MG: 4 INJECTION, SOLUTION INTRAMUSCULAR; INTRAVENOUS at 03:08

## 2024-08-14 RX ADMIN — HYDROMORPHONE HYDROCHLORIDE 1 MG: 1 INJECTION, SOLUTION INTRAMUSCULAR; INTRAVENOUS; SUBCUTANEOUS at 07:08

## 2024-08-14 RX ADMIN — IOHEXOL 100 ML: 350 INJECTION, SOLUTION INTRAVENOUS at 03:08

## 2024-08-14 NOTE — HPI
Patient is a 34-year-old male with no PMH except for marijauna use who presented to  [facility] ER on 08/14/24 for further evaluation of generalised abdominal pain and subjective fever x 4 days associated with nausea and non bilious non bloody vomiting Last BM  days ago Unable to tolerate PO intake. Denied CP/SOB    During evaluation in the ER, patient was found to be hypertensive (182/100), tachycardic (126), T-max 100.5° F. labs revealed WBC 17.92, sodium 135, bicarb 22, creatinine 1.1, total bilirubin 1.1.  Lactic acid 1.9.  X-ray abdomen negative.  CT abdomen revealed findings concerning for perforated appendicitis with developing adjacent abscess.  Concern for partial SBO.  Hepatic steatosis.  Patient was given 1 L NS, 4 mg Zofran, 8 mg morphine, IV Zosyn.  General surgery was consulted in ED. admitted to hospital medicine for further management

## 2024-08-14 NOTE — ED PROVIDER NOTES
"Encounter Date: 8/14/2024    SCRIBE #1 NOTE: I, Margarito Blake, am scribing for, and in the presence of,  Deep Barraza PA-C.       History     Chief Complaint   Patient presents with    Vomiting     Pt c/o generalized abd pain and vomiting for "a couple of days" Denies fever. Clammy to touch      Willian Ron is a 34 y.o. male, with no past medical history, who presents to the emergency department with generalized abdominal pain that radiates to groin for x4 days. Patient reports associated diaphoresis, nausea, and emesis. Patient reports that he can not tolerate food by mouth stating he has not eaten in x3 days. He also reports that he has not had a bowel movement. He endorses contact with his wife who had similar symptoms. No other exacerbating or alleviating factors. Denies fever, diarrhea, urinary frequency, urinary urgency, dysuria, hematuria, penile swelling or other associated symptoms. Patient reports use of medical marijuana but states he has never experienced these symptoms previously. Denies use of daily medications.     The history is provided by the patient. No  was used.     Review of patient's allergies indicates:   Allergen Reactions    Grass pollen-june grass standard      History reviewed. No pertinent past medical history.  History reviewed. No pertinent surgical history.  No family history on file.  Social History     Tobacco Use    Smoking status: Every Day     Current packs/day: 0.50     Average packs/day: 0.5 packs/day for 7.0 years (3.5 ttl pk-yrs)     Types: Cigarettes    Smokeless tobacco: Never   Substance Use Topics    Alcohol use: No    Drug use: No     Review of Systems   Constitutional:  Positive for diaphoresis. Negative for fatigue and unexpected weight change.   HENT:  Negative for sinus pain and sore throat.    Eyes:  Negative for pain, redness and visual disturbance.   Respiratory:  Negative for cough, chest tightness, shortness of breath and " wheezing.    Cardiovascular:  Negative for chest pain and palpitations.   Gastrointestinal:  Positive for abdominal pain (epigastric), nausea and vomiting. Negative for blood in stool and diarrhea.   Endocrine: Negative for polydipsia, polyphagia and polyuria.   Genitourinary:  Negative for dysuria, frequency, penile swelling and urgency.   Musculoskeletal:  Negative for arthralgias, back pain and myalgias.   Skin:  Negative for rash.   Allergic/Immunologic: Negative for environmental allergies.   Neurological:  Negative for dizziness, syncope and headaches.   Psychiatric/Behavioral:  Negative for suicidal ideas.        Physical Exam     Initial Vitals [08/14/24 1347]   BP Pulse Resp Temp SpO2   (!) 182/100 (!) 126 18 98 °F (36.7 °C) 98 %      MAP       --         Physical Exam    Nursing note and vitals reviewed.  Constitutional: Vital signs are normal. He appears well-developed and well-nourished. He is cooperative. He appears ill. He appears distressed.   Appears moderately ill.  Moderate distress.   HENT:   Head: Normocephalic and atraumatic.   Right Ear: External ear normal.   Left Ear: External ear normal.   Nose: Nose normal.   Eyes: Conjunctivae and EOM are normal.   Neck: Phonation normal.   Normal range of motion.  Cardiovascular:  Normal rate and regular rhythm.           No murmur heard.  Tachycardic.  No murmur or friction rub.   Pulmonary/Chest: Effort normal. No respiratory distress.   Abdominal: Abdomen is flat. Bowel sounds are normal. He exhibits distension. There is generalized abdominal tenderness.   Mild abdominal distention.  Firm but not rigid to palpation.  Generalized tenderness, no focal tenderness.  Mildly tender to percussion.  Surgical abdomen. There is rebound and guarding.   Musculoskeletal:      Cervical back: Normal range of motion.     Neurological: He is alert and oriented to person, place, and time. GCS eye subscore is 4. GCS verbal subscore is 5. GCS motor subscore is 6.   Skin:  Skin is warm and dry. Capillary refill takes less than 2 seconds. No rash noted.   Skin is warm clammy.         ED Course   Critical Care    Date/Time: 8/14/2024 4:38 PM    Performed by: Deep Barraza PA-C  Authorized by: Natalie Martinez MD  Direct patient critical care time: 7 minutes  Additional history critical care time: 7 minutes  Ordering / reviewing critical care time: 7 minutes  Documentation critical care time: 7 minutes  Consulting other physicians critical care time: 7 minutes  Consult with family critical care time: 3 minutes  Other critical care time: 7 minutes  Total critical care time (exclusive of procedural time) : 45 minutes  Critical care time was exclusive of separately billable procedures and treating other patients and teaching time.  Critical care was necessary to treat or prevent imminent or life-threatening deterioration of the following conditions: sepsis.  Critical care was time spent personally by me on the following activities: evaluation of patient's response to treatment, examination of patient, discussions with consultants, obtaining history from patient or surrogate, ordering and performing treatments and interventions, ordering and review of laboratory studies, ordering and review of radiographic studies and re-evaluation of patient's condition.        Labs Reviewed   CBC W/ AUTO DIFFERENTIAL - Abnormal       Result Value    WBC 17.92 (*)     RBC 5.90      Hemoglobin 16.9      Hematocrit 50.0      MCV 85      MCH 28.6      MCHC 33.8      RDW 13.0      Platelets 258      MPV 11.2      Immature Granulocytes 0.9 (*)     Gran # (ANC) 14.4 (*)     Immature Grans (Abs) 0.16 (*)     Lymph # 1.2      Mono # 2.2 (*)     Eos # 0.0      Baso # 0.04      nRBC 0      Gran % 80.4 (*)     Lymph % 6.4 (*)     Mono % 12.1      Eosinophil % 0.0      Basophil % 0.2      Differential Method Automated     COMPREHENSIVE METABOLIC PANEL - Abnormal    Sodium 135 (*)     Potassium 3.5      Chloride  99      CO2 22 (*)     Glucose 141 (*)     BUN 10      Creatinine 1.1      Calcium 9.7      Total Protein 7.6      Albumin 4.0      Total Bilirubin 1.1 (*)     Alkaline Phosphatase 85      AST 15      ALT 23      eGFR >60      Anion Gap 14     URINALYSIS, REFLEX TO URINE CULTURE - Abnormal    Specimen UA Urine, Clean Catch      Color, UA Yellow      Appearance, UA Clear      pH, UA 6.0      Specific Gravity, UA >1.030 (*)     Protein, UA Trace (*)     Glucose, UA Negative      Ketones, UA Trace (*)     Bilirubin (UA) Negative      Occult Blood UA Negative      Nitrite, UA Negative      Urobilinogen, UA Negative      Leukocytes, UA Negative      Narrative:     Specimen Source->Urine   CULTURE, BLOOD   CULTURE, BLOOD   LIPASE    Lipase 4     MAGNESIUM    Magnesium 1.6     LACTIC ACID, PLASMA    Lactate (Lactic Acid) 1.9     TROPONIN I   B-TYPE NATRIURETIC PEPTIDE   DRUG SCREEN PANEL, URINE EMERGENCY   POCT GLUCOSE MONITORING CONTINUOUS   ISTAT CHEM8   POCT GLUCOSE MONITORING CONTINUOUS          Imaging Results              X-Ray Chest AP Portable (In process)                       CT Abdomen Pelvis With IV Contrast NO Oral Contrast (Final result)  Result time 08/14/24 16:10:25      Final result by Otis Eddy MD (08/14/24 16:10:25)                   Impression:      This report was flagged in Epic as abnormal.    1. Findings most concerning for perforated appendicitis noting be developing adjacent abscess.  Correlation is advised.  Surgical consultation recommended.  2. Inflammation associated with the appendiceal process results in tethering of a few adjacent small bowel loops concerning for at least partial small bowel obstruction.  Liquid stool throughout the large bowel suggests possible diarrheal component.  Correlation and follow-up is advised.  3. Findings suggest possible hepatic steatosis, correlation with LFTs recommended.  4. Please see above for several additional findings.      Electronically  signed by: Otis Eddy MD  Date:    08/14/2024  Time:    16:10               Narrative:    EXAMINATION:  CT ABDOMEN PELVIS WITH IV CONTRAST    CLINICAL HISTORY:  Abdominal pain, acute, nonlocalized;    TECHNIQUE:  Low dose axial images, sagittal and coronal reformations were obtained from the lung bases to the pubic symphysis following the IV administration of 100 mL of Omnipaque 350 .  Oral contrast was not given.    COMPARISON:  Radiograph 08/14/2024    FINDINGS:  Images of the lower thorax are remarkable for bilateral dependent atelectasis, mild.    The liver is somewhat hypoattenuating, possibly reflecting steatosis, correlation with LFTs recommended.  The spleen, pancreas, gallbladder and adrenal glands have a grossly unremarkable appearance.  The stomach is decompressed without wall thickening.  The portal vein, splenic vein, SMV, celiac axis and SMA all are patent.  No significant abdominal lymphadenopathy.    The kidneys enhance symmetrically without hydronephrosis.  There is left nonobstructive nephrolithiasis.  The bilateral ureters are unremarkable without calculi seen.  The urinary bladder is unremarkable.  The prostate is not enlarged.  There is a small amount of fluid in the pelvis.    There is liquid stool throughout the large bowel.  The terminal ileum is unremarkable.  There is wall thickening and distention of the appendix, the appendix measures up to 1.1 cm.  Along the posterior margin of the appendix, there is a ill-defined focus strand-like fluid and multiple punctate foci of free air.  There is tethering of a few adjacent bowel loops in the region.  Along the left lateral aspect of the region, there is a more focal fluid collection measuring 2.5 x 2.6 cm also with a small focus of air in the lumen.  There is slow flow through several distal small bowel loops.  There are a few scattered prominent mesenteric lymph nodes in the region.    There are degenerative changes of the spine.  No  significant inguinal lymphadenopathy.                                       X-Ray Abdomen Flat And Erect (Final result)  Result time 08/14/24 14:46:19      Final result by Terence Ivory III, MD (08/14/24 14:46:19)                   Impression:      No acute process seen.      Electronically signed by: Terence Ivory MD  Date:    08/14/2024  Time:    14:46               Narrative:    EXAMINATION:  XR ABDOMEN FLAT AND ERECT    CLINICAL HISTORY:  Abdominal distension (gaseous)    FINDINGS:  Lung bases are clear.  No obstruction, ileus, or perforation seen.                                       Medications   piperacillin-tazobactam (ZOSYN) 4.5 g in D5W 100 mL IVPB (MB+) (4.5 g Intravenous New Bag 8/14/24 1646)   morphine injection 6 mg (has no administration in time range)   acetaminophen tablet 1,000 mg (has no administration in time range)   piperacillin-tazobactam (ZOSYN) 4.5 g in D5W 100 mL IVPB (MB+) (has no administration in time range)   lactated ringers infusion (has no administration in time range)   sodium chloride 0.9% flush 10 mL (has no administration in time range)   naloxone 0.4 mg/mL injection 0.02 mg (has no administration in time range)   glucose chewable tablet 16 g (has no administration in time range)   glucose chewable tablet 24 g (has no administration in time range)   glucagon (human recombinant) injection 1 mg (has no administration in time range)   oxyCODONE immediate release tablet 5 mg (has no administration in time range)   acetaminophen tablet 650 mg (has no administration in time range)   HYDROmorphone injection 1 mg (has no administration in time range)   ondansetron injection 4 mg (has no administration in time range)   acetaminophen tablet 650 mg (has no administration in time range)   dextrose 10% bolus 125 mL 125 mL (has no administration in time range)   dextrose 10% bolus 250 mL 250 mL (has no administration in time range)   sodium chloride 0.9% bolus 1,000 mL 1,000 mL (0 mLs  Intravenous Stopped 8/14/24 1602)   ondansetron injection 4 mg (4 mg Intravenous Given 8/14/24 1506)   morphine injection 8 mg (8 mg Intravenous Given 8/14/24 1505)   iohexoL (OMNIPAQUE 350) injection 100 mL (100 mLs Intravenous Given 8/14/24 1548)     Medical Decision Making  34-year-old male presenting to the emergency department with a 3 day history of abdominal pain.  Started periumbilical, is now generalized.  Associated anorexia, nausea, vomiting, constipation.  Denies fever, urinary symptoms, chest pain, shortness of breath.  On exam, appears uncomfortable.  He was in some distress.  Afebrile, tachycardic.  Abdomen is firm to palpation, tender to percussion, generalized tenderness.  Cardiac and lung exams within normal limits.    Differential diagnosis is broad and includes but is not limited to gastritis, gastroenteritis, dehydration, appendicitis, food poisoning, pancreatitis, cholecystitis, diverticulitis, peritonitis, small-bowel obstruction, epiploic appendagitis, constipation, urinary tract infection including cystitis or pyelonephritis, musculoskeletal pain.     Examination concerning for surgical abdomen.  CBC with a leukocytosis.  CMP with no gross abnormalities.  Mag and lipase within normal limits.  Lactic 1.9.  UA high specific gravity, otherwise no gross abnormalities.  Blood cultures pending.  CT of the abdomen concerning for perforated appendicitis with likely developing adjacent abscess.  Also some findings concerning for partial SBO.  Surgery was contacted.  Patient will be admitted for appendectomy.    Acute management in the emergency department with IV fluids, Zofran, morphine.  Zosyn for empiric intra-abdominal infection coverage.  On reassessment, patient was resting comfortably.  He was changed into a gown.  EKG and chest x-ray obtained for preop clearance.  Case briefly discussed with Dr. Natalie Martinez who agrees with this plan.    Admitted to Medicine, Dr. Altman.    Amount and/or  Complexity of Data Reviewed  Labs: ordered. Decision-making details documented in ED Course.  Radiology: ordered. Decision-making details documented in ED Course.  ECG/medicine tests: ordered and independent interpretation performed. Decision-making details documented in ED Course.    Risk  Prescription drug management.  Decision regarding hospitalization.  Emergency major surgery.    Critical Care  Total time providing critical care: 45 minutes            Scribe Attestation:   Scribe #1: I performed the above scribed service and the documentation accurately describes the services I performed. I attest to the accuracy of the note.                               Clinical Impression:  Final diagnoses:  [R00.0] Tachycardia  [R14.0] Abdominal bloating  [Z01.818] Pre-op evaluation  [K35.211] Acute appendicitis with perforation, generalized peritonitis, and abscess, unspecified whether gangrene present (Primary)          ED Disposition Condition    Admit Stable               I, Deep Barraza PA-C, personally performed the services described in this documentation. All medical record entries made by the scribe were at my direction and in my presence. I have reviewed the chart and agree that the record reflects my personal performance and is accurate and complete.       Deep Barraza PA-C  08/14/24 0022       Deep Barraza PA-C  08/14/24 [id number]

## 2024-08-14 NOTE — ASSESSMENT & PLAN NOTE
Hyponatremia is likely due to Dehydration/hypovolemia. The patient's most recent sodium results are listed below.  Recent Labs     08/14/24  1453   *     Plan  Monitor with IVF

## 2024-08-14 NOTE — ASSESSMENT & PLAN NOTE
This patient does have evidence of infective focus  My overall impression is sepsis.  Source: Abdominal  Antibiotics given-   Antibiotics (72h ago, onward)      Start     Stop Route Frequency Ordered    08/15/24 0030  piperacillin-tazobactam (ZOSYN) 4.5 g in D5W 100 mL IVPB (MB+)         -- IV Every 8 hours (non-standard times) 08/14/24 1702    08/14/24 1630  piperacillin-tazobactam (ZOSYN) 4.5 g in D5W 100 mL IVPB (MB+)         08/15/24 0429 IV ED 1 Time 08/14/24 1616          Latest lactate reviewed-  Recent Labs   Lab 08/14/24  1453   LACTATE 1.9         Will Not start Pressors- Levophed for MAP of 65  Source control achieved by: Abx IVF

## 2024-08-14 NOTE — SUBJECTIVE & OBJECTIVE
History reviewed. No pertinent past medical history.    History reviewed. No pertinent surgical history.    Review of patient's allergies indicates:   Allergen Reactions    Grass pollen-june grass standard        No current facility-administered medications on file prior to encounter.     Current Outpatient Medications on File Prior to Encounter   Medication Sig    acetaminophen (TYLENOL) 650 MG TbSR Take 1 tablet (650 mg total) by mouth every 8 (eight) hours as needed.    bacitracin 500 unit/gram Oint Apply topically 2 (two) times daily.    diclofenac (VOLTAREN) 75 MG EC tablet Take 1 tablet (75 mg total) by mouth 2 (two) times daily as needed.    doxylamine-DM-acetaminophen (VICKS NYQUIL NIGHTTIME RELIEF) 6.25- mg/15 mL Liqd Take by mouth.    ibuprofen (ADVIL,MOTRIN) 600 MG tablet Take 1 tablet (600 mg total) by mouth every 6 (six) hours as needed for Pain.    ibuprofen (ADVIL,MOTRIN) 600 MG tablet Take 1 tablet (600 mg total) by mouth every 6 (six) hours as needed for Pain.    ondansetron (ZOFRAN) 4 MG tablet Take 1 tablet (4 mg total) by mouth every 6 (six) hours.    oxyCODONE-acetaminophen (PERCOCET) 5-325 mg per tablet Take 1 tablet by mouth every 6 (six) hours as needed (severe pain).     Family History    None       Tobacco Use    Smoking status: Every Day     Current packs/day: 0.50     Average packs/day: 0.5 packs/day for 7.0 years (3.5 ttl pk-yrs)     Types: Cigarettes    Smokeless tobacco: Never   Substance and Sexual Activity    Alcohol use: No    Drug use: No    Sexual activity: Yes     Partners: Female     Birth control/protection: Condom     Review of Systems   Constitutional:  Positive for fever.   Respiratory: Negative.     Cardiovascular: Negative.    Gastrointestinal:  Positive for abdominal distention, nausea and vomiting.   Genitourinary: Negative.    Neurological: Negative.      Objective:     Vital Signs (Most Recent):  Temp: (!) 100.5 °F (38.1 °C) (08/14/24 1623)  Pulse: 109 (08/14/24  1623)  Resp: 18 (08/14/24 1623)  BP: (!) 144/89 (08/14/24 1623)  SpO2: 97 % (08/14/24 1623) Vital Signs (24h Range):  Temp:  [98 °F (36.7 °C)-100.5 °F (38.1 °C)] 100.5 °F (38.1 °C)  Pulse:  [109-126] 109  Resp:  [18-20] 18  SpO2:  [97 %-98 %] 97 %  BP: (144-182)/() 144/89     Weight: 117.9 kg (260 lb)  Body mass index is 36.26 kg/m².     Physical Exam  Constitutional:       General: He is not in acute distress.     Appearance: He is normal weight. He is ill-appearing.   Cardiovascular:      Rate and Rhythm: Tachycardia present.      Pulses: Normal pulses.   Pulmonary:      Effort: No respiratory distress.      Breath sounds: No wheezing.   Abdominal:      General: There is distension.      Tenderness: There is abdominal tenderness.   Musculoskeletal:      Right lower leg: No edema.      Left lower leg: No edema.   Skin:     General: Skin is warm.   Neurological:      Mental Status: He is alert and oriented to person, place, and time. Mental status is at baseline.   Psychiatric:         Mood and Affect: Mood normal.                Significant Labs: All pertinent labs within the past 24 hours have been reviewed.    Significant Imaging: I have reviewed all pertinent imaging results/findings within the past 24 hours.

## 2024-08-14 NOTE — LETTER
August 22, 2024         Dimitri TREJO  [facility]  [place] [address]  Phone: [phone]  Fax: [phone]       Patient: Willian Ron   YOB: 1990  Date of Visit: 08/14/2025 - currently     To Whom It May Concern:    Neetu Ron  was admitted inpatient to [facility] on 08/14/2025. The patient  remains in hospital . If you have any questions or concerns, or if I can be of further assistance, please do not hesitate to contact me.    Sincerely,    ERIKA Breen-Anna T. DO [place] RN

## 2024-08-14 NOTE — Clinical Note
Right: Back.   Scrubbed with Chlorhexidine/Alcohol.    Hair: N/A.  Skin prep dry before draping.  Prepped by: Anderson Medina MD 8/15/2024 4:39 PM.

## 2024-08-14 NOTE — CONSULTS
[facility] - Emergency Dept  General Surgery  Consult Note    Inpatient consult to General Surgery  Consult performed by: Obed Ding MD  Consult ordered by: Viki Atlman MD      Subjective:     Chief Complaint/Reason for Admission: Acute abdominal pain    History of Present Illness:   Willian Ron is a 34 y.o. male with no PMHx, presenting to the hospital with acute abdominal pain, fever, nausea/vomiting onset 4-5 days ago. Pain initially generalized, progressively worsened with associated nausea and vomiting, subjective fever. Denies diarrhea, constipation, blood in stool/vomit, CP or SOB, or any other symptoms. CT abdomen with findings concerning for perforated appendicitis with developing adjacent abscess, partial SBO, hepatic steatosis. Started on IV Zosyn and admitted to medicine.    No surgical history. Does not take blood thinners. No known cardiac, lung or renal history. NKDA.    No current facility-administered medications on file prior to encounter.     Current Outpatient Medications on File Prior to Encounter   Medication Sig    [DISCONTINUED] acetaminophen (TYLENOL) 650 MG TbSR Take 1 tablet (650 mg total) by mouth every 8 (eight) hours as needed.    [DISCONTINUED] bacitracin 500 unit/gram Oint Apply topically 2 (two) times daily.    [DISCONTINUED] diclofenac (VOLTAREN) 75 MG EC tablet Take 1 tablet (75 mg total) by mouth 2 (two) times daily as needed.    [DISCONTINUED] doxylamine-DM-acetaminophen (VICKS NYQUIL NIGHTTIME RELIEF) 6.25- mg/15 mL Liqd Take by mouth.    [DISCONTINUED] ibuprofen (ADVIL,MOTRIN) 600 MG tablet Take 1 tablet (600 mg total) by mouth every 6 (six) hours as needed for Pain.    [DISCONTINUED] ibuprofen (ADVIL,MOTRIN) 600 MG tablet Take 1 tablet (600 mg total) by mouth every 6 (six) hours as needed for Pain.    [DISCONTINUED] ondansetron (ZOFRAN) 4 MG tablet Take 1 tablet (4 mg total) by mouth every 6 (six) hours.    [DISCONTINUED] oxyCODONE-acetaminophen  (PERCOCET) 5-325 mg per tablet Take 1 tablet by mouth every 6 (six) hours as needed (severe pain).       Review of patient's allergies indicates:   Allergen Reactions    Grass pollen-june grass standard        History reviewed. No pertinent past medical history.  History reviewed. No pertinent surgical history.  Family History    None       Tobacco Use    Smoking status: Every Day     Current packs/day: 0.50     Average packs/day: 0.5 packs/day for 7.0 years (3.5 ttl pk-yrs)     Types: Cigarettes    Smokeless tobacco: Never   Substance and Sexual Activity    Alcohol use: No    Drug use: No    Sexual activity: Yes     Partners: Female     Birth control/protection: Condom     Review of Systems  10 point ROS negative except noted in HPI    Objective:     Vital Signs (Most Recent):  Temp: (!) 100.5 °F (38.1 °C) (08/14/24 1623)  Pulse: 109 (08/14/24 1623)  Resp: 18 (08/14/24 1623)  BP: (!) 144/89 (08/14/24 1623)  SpO2: 97 % (08/14/24 1623) Vital Signs (24h Range):  Temp:  [98 °F (36.7 °C)-100.5 °F (38.1 °C)] 100.5 °F (38.1 °C)  Pulse:  [109-126] 109  Resp:  [18-20] 18  SpO2:  [97 %-98 %] 97 %  BP: (144-182)/() 144/89     Weight: 117.9 kg (260 lb)  Body mass index is 36.26 kg/m².    No intake or output data in the 24 hours ending 08/14/24 1712    Physical Exam  General: Awake and alert, in no acute distress, appears stated age, well-nourished.   HEENT: Atraumatic, normocephalic, MMM  Cardiac: Regular rate on monitor, well-perfused  Pulm: Breathing comfortably, symmetric chest rise, no respiratory distress. Satting well on RA  Abdomen: Soft, RLQ with severe point tenderness to palpation, +McBurney's point, negative Rosving, non-distended, no guarding or rebound  MSK: moving all extremities appropriately  Neuro: Aox4, CN 2-12 grossly intact    Significant Labs:  CBC:   Recent Labs   Lab 08/14/24  1453   WBC 17.92*   RBC 5.90   HGB 16.9   HCT 50.0      MCV 85   MCH 28.6   MCHC 33.8     CMP:   Recent Labs   Lab  08/14/24  1453   *   CALCIUM 9.7   ALBUMIN 4.0   PROT 7.6   *   K 3.5   CO2 22*   CL 99   BUN 10   CREATININE 1.1   ALKPHOS 85   ALT 23   AST 15   BILITOT 1.1*     Lactic Acid:   Recent Labs   Lab 08/14/24  1453   LACTATE 1.9     Lipase:   Recent Labs   Lab 08/14/24  1453   LIPASE 4       Significant Diagnostics:  CT A/P  Impression:     This report was flagged in Epic as abnormal.     1. Findings most concerning for perforated appendicitis noting be developing adjacent abscess.  Correlation is advised.  Surgical consultation recommended.  2. Inflammation associated with the appendiceal process results in tethering of a few adjacent small bowel loops concerning for at least partial small bowel obstruction.  Liquid stool throughout the large bowel suggests possible diarrheal component.  Correlation and follow-up is advised.  3. Findings suggest possible hepatic steatosis, correlation with LFTs recommended.  4. Please see above for several additional findings.    Assessment/Plan:     Active Diagnoses:    Diagnosis Date Noted POA    PRINCIPAL PROBLEM:  Acute appendicitis [K35.80] 08/14/2024 Yes    Leukocytosis [D72.829] 08/14/2024 Yes    Hyponatremia [E87.1] 08/14/2024 Yes    Partial small bowel obstruction [K56.600] 08/14/2024 Yes    Hepatic steatosis [K76.0] 08/14/2024 Yes    Preoperative clearance [Z01.818] 08/14/2024 Not Applicable    Marijuana use [F12.90] 08/14/2024 No    Sepsis [A41.9] 08/14/2024 Yes      Problems Resolved During this Admission:     Willian Ron is a 34 y.o. male with no PMHx, presenting to the hospital with acute appendicitis with perforation/local abscess formation. HDS, febrile, WBC elevated 17.92. CT abdomen with findings concerning for perforated appendicitis with developing adjacent abscess, partial SBO, hepatic steatosis. Since admission, pain with some improvement, no nausea/vomiting. Exam with localized tenderness, no peritonitis.    - Patient HDS. IR consult for perc  drain. If unable to place drain, will discuss surgical intervention  - Daily CBC/BMP  - Replete lytes prn  - MMPC per primary  - Diet: NPO at MN  - PPx: SCDs  - Abx: IV Zosyn  - Will continue to follow    Patient seen and case discussed with attending staff, Dr. Kinsey. Attestation to follow.    Obed Ding MD  General Surgery Resident  [facility]

## 2024-08-14 NOTE — LETTER
August 22, 2024         Dimitri TREJO  [facility]  [place] [address]  Phone: [phone]  Fax: [phone]       Patient: Willian Ron   YOB: 1990  Date of Visit: 08/14/2025 and remains in hospital today 08/22/2025    To Whom It May Concern:    Neetu Ron  was at [facility] on 08/14/2025 thru 08/22/2025 The patient  is currently in our facility. If you have any questions or concerns, or if I can be of further assistance, please do not hesitate to contact me.    Sincerely, Dr Madison Oneill, RN

## 2024-08-14 NOTE — ASSESSMENT & PLAN NOTE
CT revealed perforated appendicitis noting be developing adjacent abscess   Keep patient NPO  Initiate on IVF and empiric IV Zosyn.  Follow up on blood cultures  General surgery was consulted.  Pending further recommendations

## 2024-08-14 NOTE — H&P
"  [facility] Medicine  History & Physical    Patient Name: Willian Ron  MRN: [id number]  Patient Class: Emergency  Admission Date: 8/14/2024  Attending Physician: Natalie Martinez MD   Primary Care Provider: Mariah Fernandes NP         Patient information was obtained from patient, past medical records, and ER records.     Subjective:     Principal Problem:Acute appendicitis    Chief Complaint:   Chief Complaint   Patient presents with    Vomiting     Pt c/o generalized abd pain and vomiting for "a couple of days" Denies fever. Clammy to touch         HPI: Patient is a 34-year-old male with no PMH except for marijauna use who presented to  [facility] ER on 08/14/24 for further evaluation of generalised abdominal pain and subjective fever x 4 days associated with nausea and non bilious non bloody vomiting Last BM  days ago Unable to tolerate PO intake. Denied CP/SOB    During evaluation in the ER, patient was found to be hypertensive (182/100), tachycardic (126), T-max 100.5° F. labs revealed WBC 17.92, sodium 135, bicarb 22, creatinine 1.1, total bilirubin 1.1.  Lactic acid 1.9.  X-ray abdomen negative.  CT abdomen revealed findings concerning for perforated appendicitis with developing adjacent abscess.  Concern for partial SBO.  Hepatic steatosis.  Patient was given 1 L NS, 4 mg Zofran, 8 mg morphine, IV Zosyn.  General surgery was consulted in ED. admitted to hospital medicine for further management    History reviewed. No pertinent past medical history.    History reviewed. No pertinent surgical history.    Review of patient's allergies indicates:   Allergen Reactions    Grass pollen-june grass standard        No current facility-administered medications on file prior to encounter.     Current Outpatient Medications on File Prior to Encounter   Medication Sig    acetaminophen (TYLENOL) 650 MG TbSR Take 1 tablet (650 mg total) by mouth every 8 (eight) hours as needed.    bacitracin 500 " unit/gram Oint Apply topically 2 (two) times daily.    diclofenac (VOLTAREN) 75 MG EC tablet Take 1 tablet (75 mg total) by mouth 2 (two) times daily as needed.    doxylamine-DM-acetaminophen (VICKS NYQUIL NIGHTTIME RELIEF) 6.25- mg/15 mL Liqd Take by mouth.    ibuprofen (ADVIL,MOTRIN) 600 MG tablet Take 1 tablet (600 mg total) by mouth every 6 (six) hours as needed for Pain.    ibuprofen (ADVIL,MOTRIN) 600 MG tablet Take 1 tablet (600 mg total) by mouth every 6 (six) hours as needed for Pain.    ondansetron (ZOFRAN) 4 MG tablet Take 1 tablet (4 mg total) by mouth every 6 (six) hours.    oxyCODONE-acetaminophen (PERCOCET) 5-325 mg per tablet Take 1 tablet by mouth every 6 (six) hours as needed (severe pain).     Family History    None       Tobacco Use    Smoking status: Every Day     Current packs/day: 0.50     Average packs/day: 0.5 packs/day for 7.0 years (3.5 ttl pk-yrs)     Types: Cigarettes    Smokeless tobacco: Never   Substance and Sexual Activity    Alcohol use: No    Drug use: No    Sexual activity: Yes     Partners: Female     Birth control/protection: Condom     Review of Systems   Constitutional:  Positive for fever.   Respiratory: Negative.     Cardiovascular: Negative.    Gastrointestinal:  Positive for abdominal distention, nausea and vomiting.   Genitourinary: Negative.    Neurological: Negative.      Objective:     Vital Signs (Most Recent):  Temp: (!) 100.5 °F (38.1 °C) (08/14/24 1623)  Pulse: 109 (08/14/24 1623)  Resp: 18 (08/14/24 1623)  BP: (!) 144/89 (08/14/24 1623)  SpO2: 97 % (08/14/24 1623) Vital Signs (24h Range):  Temp:  [98 °F (36.7 °C)-100.5 °F (38.1 °C)] 100.5 °F (38.1 °C)  Pulse:  [109-126] 109  Resp:  [18-20] 18  SpO2:  [97 %-98 %] 97 %  BP: (144-182)/() 144/89     Weight: 117.9 kg (260 lb)  Body mass index is 36.26 kg/m².     Physical Exam  Constitutional:       General: He is not in acute distress.     Appearance: He is normal weight. He is ill-appearing.    Cardiovascular:      Rate and Rhythm: Tachycardia present.      Pulses: Normal pulses.   Pulmonary:      Effort: No respiratory distress.      Breath sounds: No wheezing.   Abdominal:      General: There is distension.      Tenderness: There is abdominal tenderness.   Musculoskeletal:      Right lower leg: No edema.      Left lower leg: No edema.   Skin:     General: Skin is warm.   Neurological:      Mental Status: He is alert and oriented to person, place, and time. Mental status is at baseline.   Psychiatric:         Mood and Affect: Mood normal.                Significant Labs: All pertinent labs within the past 24 hours have been reviewed.    Significant Imaging: I have reviewed all pertinent imaging results/findings within the past 24 hours.    Assessment/Plan:     * Acute appendicitis    CT revealed perforated appendicitis noting be developing adjacent abscess   Keep patient NPO  Initiate on IVF and empiric IV Zosyn.  Follow up on blood cultures  General surgery was consulted.  Pending further recommendations    Marijuana use    Counselled on cessation    Preoperative clearance  Modify RCRI index of 0 with class I risk and 3.9% risk of death/MI/cardiac arrest      Hepatic steatosis  CT evidence of hepatic steatosis.  LFTs WNL.    Partial small bowel obstruction    CT with possible SBO.  Keep patient NPO.  General surgery on board    Hyponatremia  Hyponatremia is likely due to Dehydration/hypovolemia. The patient's most recent sodium results are listed below.  Recent Labs     08/14/24  1453   *     Plan  Monitor with IVF    Leukocytosis    Monitor      VTE Risk Mitigation (From admission, onward)      None                            Viki Altman MD  Department of Hospital Medicine  [facility] - Emergency Dept

## 2024-08-15 PROBLEM — K56.600 PARTIAL SMALL BOWEL OBSTRUCTION: Status: RESOLVED | Noted: 2024-08-14 | Resolved: 2024-08-15

## 2024-08-15 LAB
ALBUMIN SERPL BCP-MCNC: 3.2 G/DL (ref 3.5–5.2)
ALP SERPL-CCNC: 73 U/L (ref 55–135)
ALT SERPL W/O P-5'-P-CCNC: 20 U/L (ref 10–44)
AMPHET+METHAMPHET UR QL: NEGATIVE
ANION GAP SERPL CALC-SCNC: 9 MMOL/L (ref 8–16)
AST SERPL-CCNC: 11 U/L (ref 10–40)
BARBITURATES UR QL SCN>200 NG/ML: NEGATIVE
BASOPHILS # BLD AUTO: 0.04 K/UL (ref 0–0.2)
BASOPHILS NFR BLD: 0.2 % (ref 0–1.9)
BENZODIAZ UR QL SCN>200 NG/ML: NEGATIVE
BILIRUB SERPL-MCNC: 1.4 MG/DL (ref 0.1–1)
BUN SERPL-MCNC: 9 MG/DL (ref 6–20)
BZE UR QL SCN: NEGATIVE
CALCIUM SERPL-MCNC: 9.3 MG/DL (ref 8.7–10.5)
CANNABINOIDS UR QL SCN: ABNORMAL
CHLORIDE SERPL-SCNC: 99 MMOL/L (ref 95–110)
CO2 SERPL-SCNC: 26 MMOL/L (ref 23–29)
CREAT SERPL-MCNC: 1 MG/DL (ref 0.5–1.4)
CREAT UR-MCNC: 308.3 MG/DL (ref 23–375)
DIFFERENTIAL METHOD BLD: ABNORMAL
EOSINOPHIL # BLD AUTO: 0 K/UL (ref 0–0.5)
EOSINOPHIL NFR BLD: 0 % (ref 0–8)
ERYTHROCYTE [DISTWIDTH] IN BLOOD BY AUTOMATED COUNT: 13.4 % (ref 11.5–14.5)
EST. GFR  (NO RACE VARIABLE): >60 ML/MIN/1.73 M^2
GLUCOSE SERPL-MCNC: 102 MG/DL (ref 70–110)
HCT VFR BLD AUTO: 46.8 % (ref 40–54)
HGB BLD-MCNC: 15.2 G/DL (ref 14–18)
IMM GRANULOCYTES # BLD AUTO: 0.15 K/UL (ref 0–0.04)
IMM GRANULOCYTES NFR BLD AUTO: 0.9 % (ref 0–0.5)
INR PPP: 1.2 (ref 0.8–1.2)
LYMPHOCYTES # BLD AUTO: 1.5 K/UL (ref 1–4.8)
LYMPHOCYTES NFR BLD: 8.8 % (ref 18–48)
MCH RBC QN AUTO: 28.4 PG (ref 27–31)
MCHC RBC AUTO-ENTMCNC: 32.5 G/DL (ref 32–36)
MCV RBC AUTO: 87 FL (ref 82–98)
METHADONE UR QL SCN>300 NG/ML: NEGATIVE
MONOCYTES # BLD AUTO: 2.2 K/UL (ref 0.3–1)
MONOCYTES NFR BLD: 13.3 % (ref 4–15)
NEUTROPHILS # BLD AUTO: 12.8 K/UL (ref 1.8–7.7)
NEUTROPHILS NFR BLD: 76.8 % (ref 38–73)
NRBC BLD-RTO: 0 /100 WBC
OHS QRS DURATION: 84 MS
OHS QTC CALCULATION: 435 MS
OPIATES UR QL SCN: ABNORMAL
PCP UR QL SCN>25 NG/ML: NEGATIVE
PLATELET # BLD AUTO: 204 K/UL (ref 150–450)
PMV BLD AUTO: 11.2 FL (ref 9.2–12.9)
POCT GLUCOSE: 93 MG/DL (ref 70–110)
POCT GLUCOSE: 94 MG/DL (ref 70–110)
POTASSIUM SERPL-SCNC: 3.5 MMOL/L (ref 3.5–5.1)
PROT SERPL-MCNC: 6.5 G/DL (ref 6–8.4)
PROTHROMBIN TIME: 13.2 SEC (ref 9–12.5)
RBC # BLD AUTO: 5.36 M/UL (ref 4.6–6.2)
SODIUM SERPL-SCNC: 134 MMOL/L (ref 136–145)
TOXICOLOGY INFORMATION: ABNORMAL
WBC # BLD AUTO: 16.72 K/UL (ref 3.9–12.7)

## 2024-08-15 PROCEDURE — 25000003 PHARM REV CODE 250: Performed by: STUDENT IN AN ORGANIZED HEALTH CARE EDUCATION/TRAINING PROGRAM

## 2024-08-15 PROCEDURE — 11000001 HC ACUTE MED/SURG PRIVATE ROOM

## 2024-08-15 PROCEDURE — 87075 CULTR BACTERIA EXCEPT BLOOD: CPT | Performed by: STUDENT IN AN ORGANIZED HEALTH CARE EDUCATION/TRAINING PROGRAM

## 2024-08-15 PROCEDURE — 63600175 PHARM REV CODE 636 W HCPCS: Performed by: STUDENT IN AN ORGANIZED HEALTH CARE EDUCATION/TRAINING PROGRAM

## 2024-08-15 PROCEDURE — 87186 SC STD MICRODIL/AGAR DIL: CPT | Performed by: STUDENT IN AN ORGANIZED HEALTH CARE EDUCATION/TRAINING PROGRAM

## 2024-08-15 PROCEDURE — 87076 CULTURE ANAEROBE IDENT EACH: CPT | Performed by: STUDENT IN AN ORGANIZED HEALTH CARE EDUCATION/TRAINING PROGRAM

## 2024-08-15 PROCEDURE — 36415 COLL VENOUS BLD VENIPUNCTURE: CPT | Performed by: STUDENT IN AN ORGANIZED HEALTH CARE EDUCATION/TRAINING PROGRAM

## 2024-08-15 PROCEDURE — 36415 COLL VENOUS BLD VENIPUNCTURE: CPT | Performed by: RADIOLOGY

## 2024-08-15 PROCEDURE — 80053 COMPREHEN METABOLIC PANEL: CPT | Performed by: STUDENT IN AN ORGANIZED HEALTH CARE EDUCATION/TRAINING PROGRAM

## 2024-08-15 PROCEDURE — 80307 DRUG TEST PRSMV CHEM ANLYZR: CPT | Performed by: STUDENT IN AN ORGANIZED HEALTH CARE EDUCATION/TRAINING PROGRAM

## 2024-08-15 PROCEDURE — 99223 1ST HOSP IP/OBS HIGH 75: CPT | Mod: ,,, | Performed by: SURGERY

## 2024-08-15 PROCEDURE — 85610 PROTHROMBIN TIME: CPT | Performed by: RADIOLOGY

## 2024-08-15 PROCEDURE — 87070 CULTURE OTHR SPECIMN AEROBIC: CPT | Performed by: STUDENT IN AN ORGANIZED HEALTH CARE EDUCATION/TRAINING PROGRAM

## 2024-08-15 PROCEDURE — 87205 SMEAR GRAM STAIN: CPT | Performed by: STUDENT IN AN ORGANIZED HEALTH CARE EDUCATION/TRAINING PROGRAM

## 2024-08-15 PROCEDURE — 99223 1ST HOSP IP/OBS HIGH 75: CPT | Mod: 25,,, | Performed by: RADIOLOGY

## 2024-08-15 PROCEDURE — 63600175 PHARM REV CODE 636 W HCPCS: Performed by: RADIOLOGY

## 2024-08-15 PROCEDURE — 0D9J3ZZ DRAINAGE OF APPENDIX, PERCUTANEOUS APPROACH: ICD-10-PCS | Performed by: STUDENT IN AN ORGANIZED HEALTH CARE EDUCATION/TRAINING PROGRAM

## 2024-08-15 PROCEDURE — 85025 COMPLETE CBC W/AUTO DIFF WBC: CPT | Performed by: STUDENT IN AN ORGANIZED HEALTH CARE EDUCATION/TRAINING PROGRAM

## 2024-08-15 PROCEDURE — 87102 FUNGUS ISOLATION CULTURE: CPT | Performed by: STUDENT IN AN ORGANIZED HEALTH CARE EDUCATION/TRAINING PROGRAM

## 2024-08-15 PROCEDURE — 25000003 PHARM REV CODE 250: Performed by: RADIOLOGY

## 2024-08-15 RX ORDER — MIDAZOLAM HYDROCHLORIDE 1 MG/ML
INJECTION, SOLUTION INTRAMUSCULAR; INTRAVENOUS
Status: COMPLETED | OUTPATIENT
Start: 2024-08-15 | End: 2024-08-15

## 2024-08-15 RX ORDER — LIDOCAINE HYDROCHLORIDE 10 MG/ML
INJECTION, SOLUTION INFILTRATION; PERINEURAL
Status: COMPLETED | OUTPATIENT
Start: 2024-08-15 | End: 2024-08-15

## 2024-08-15 RX ORDER — FENTANYL CITRATE 50 UG/ML
INJECTION, SOLUTION INTRAMUSCULAR; INTRAVENOUS
Status: COMPLETED | OUTPATIENT
Start: 2024-08-15 | End: 2024-08-15

## 2024-08-15 RX ADMIN — HYDROMORPHONE HYDROCHLORIDE 1 MG: 1 INJECTION, SOLUTION INTRAMUSCULAR; INTRAVENOUS; SUBCUTANEOUS at 05:08

## 2024-08-15 RX ADMIN — MIDAZOLAM 2 MG: 1 INJECTION INTRAMUSCULAR; INTRAVENOUS at 04:08

## 2024-08-15 RX ADMIN — PIPERACILLIN AND TAZOBACTAM 4.5 G: 4; .5 INJECTION, POWDER, LYOPHILIZED, FOR SOLUTION INTRAVENOUS; PARENTERAL at 06:08

## 2024-08-15 RX ADMIN — SODIUM CHLORIDE, POTASSIUM CHLORIDE, SODIUM LACTATE AND CALCIUM CHLORIDE: 600; 310; 30; 20 INJECTION, SOLUTION INTRAVENOUS at 02:08

## 2024-08-15 RX ADMIN — ACETAMINOPHEN 650 MG: 325 TABLET ORAL at 04:08

## 2024-08-15 RX ADMIN — PIPERACILLIN AND TAZOBACTAM 4.5 G: 4; .5 INJECTION, POWDER, LYOPHILIZED, FOR SOLUTION INTRAVENOUS; PARENTERAL at 08:08

## 2024-08-15 RX ADMIN — HYDROMORPHONE HYDROCHLORIDE 1 MG: 1 INJECTION, SOLUTION INTRAMUSCULAR; INTRAVENOUS; SUBCUTANEOUS at 09:08

## 2024-08-15 RX ADMIN — FENTANYL CITRATE 50 MCG: 50 INJECTION INTRAMUSCULAR; INTRAVENOUS at 04:08

## 2024-08-15 RX ADMIN — LIDOCAINE HYDROCHLORIDE 10 ML: 10 INJECTION, SOLUTION INFILTRATION; PERINEURAL at 04:08

## 2024-08-15 RX ADMIN — HYDROMORPHONE HYDROCHLORIDE 1 MG: 1 INJECTION, SOLUTION INTRAMUSCULAR; INTRAVENOUS; SUBCUTANEOUS at 01:08

## 2024-08-15 RX ADMIN — ACETAMINOPHEN 650 MG: 325 TABLET ORAL at 11:08

## 2024-08-15 RX ADMIN — HYDROMORPHONE HYDROCHLORIDE 1 MG: 1 INJECTION, SOLUTION INTRAMUSCULAR; INTRAVENOUS; SUBCUTANEOUS at 07:08

## 2024-08-15 RX ADMIN — HYDROMORPHONE HYDROCHLORIDE 1 MG: 1 INJECTION, SOLUTION INTRAMUSCULAR; INTRAVENOUS; SUBCUTANEOUS at 03:08

## 2024-08-15 NOTE — NURSING
[facility]  [place]urses [place] -- 4 Eyes      8/15/2024       Skin assessed on: Q Shift      [x] No Pressure Injuries Present    []Prevention Measures Documented    [] Yes LDA  for Pressure Injury Previously documented     [] Yes New Pressure Injury Discovered   [] LDA for New Pressure Injury Added      Attending RN:  Loida Munguia RN     Second RN:  BETH Schaffer

## 2024-08-15 NOTE — PROGRESS NOTES
[facility] Medicine  Progress Note    Patient Name: Willian Ron  MRN: [id number]  Patient Class: IP- Inpatient   Admission Date: 8/14/2024  Length of Stay: 1 days  Attending Physician: Viki Altman,*  Primary Care Provider: Mariah Fernandes NP        Subjective:     Principal Problem:Acute appendicitis        HPI:  Patient is a 34-year-old male with no PMH except for marijauna use who presented to  [facility] ER on 08/14/24 for further evaluation of generalised abdominal pain and subjective fever x 4 days associated with nausea and non bilious non bloody vomiting Last BM  days ago Unable to tolerate PO intake. Denied CP/SOB    During evaluation in the ER, patient was found to be hypertensive (182/100), tachycardic (126), T-max 100.5° F. labs revealed WBC 17.92, sodium 135, bicarb 22, creatinine 1.1, total bilirubin 1.1.  Lactic acid 1.9.  X-ray abdomen negative.  CT abdomen revealed findings concerning for perforated appendicitis with developing adjacent abscess.  Concern for partial SBO.  Hepatic steatosis.  Patient was given 1 L NS, 4 mg Zofran, 8 mg morphine, IV Zosyn.  General surgery was consulted in ED. admitted to hospital medicine for further management    Overview/Hospital Course:  34-year-old male with no PMH except for marijauna use who was admitted for sepsis secondary to acute perforated appendicitis with developing adjacent abscess as evidenced on CT.  Initiated on IV Zosyn.  General surgery on board.  Recommended IR guided drainage.  Consulted IR .  Blood cultures pending.  Leukocytosis improving    Interval History:       Review of Systems   Constitutional: Negative.    Respiratory: Negative.     Cardiovascular: Negative.    Gastrointestinal:  Positive for abdominal pain. Negative for nausea and vomiting.   Genitourinary: Negative.    Musculoskeletal: Negative.    Neurological:  Positive for weakness.     Objective:     Vital Signs (Most Recent):  Temp: 100.3 °F (37.9  °C) (08/15/24 1215)  Pulse: 106 (08/15/24 1114)  Resp: 18 (08/15/24 1114)  BP: (!) 143/67 (08/15/24 1114)  SpO2: 95 % (08/15/24 1114) Vital Signs (24h Range):  Temp:  [98 °F (36.7 °C)-102.7 °F (39.3 °C)] 100.3 °F (37.9 °C)  Pulse:  [101-126] 106  Resp:  [18-20] 18  SpO2:  [91 %-98 %] 95 %  BP: (129-182)/() 143/67     Weight: 120.7 kg (266 lb 1.5 oz)  Body mass index is 37.11 kg/m².    Intake/Output Summary (Last 24 hours) at 8/15/2024 1308  Last data filed at 8/15/2024 1223  Gross per 24 hour   Intake 1348.52 ml   Output 200 ml   Net 1148.52 ml         Physical Exam  Constitutional:       Appearance: He is obese. He is ill-appearing.   Cardiovascular:      Rate and Rhythm: Tachycardia present.      Pulses: Normal pulses.   Pulmonary:      Effort: No respiratory distress.      Breath sounds: Normal breath sounds. No wheezing.   Abdominal:      General: Bowel sounds are normal. There is no distension.      Palpations: Abdomen is soft.      Tenderness: There is abdominal tenderness.   Musculoskeletal:      Right lower leg: No edema.      Left lower leg: No edema.   Skin:     General: Skin is warm.   Neurological:      Mental Status: He is alert and oriented to person, place, and time. Mental status is at baseline.   Psychiatric:         Mood and Affect: Mood normal.             Significant Labs: All pertinent labs within the past 24 hours have been reviewed.    Significant Imaging: I have reviewed all pertinent imaging results/findings within the past 24 hours.    Assessment/Plan:      * Acute appendicitis  CT revealed perforated appendicitis noting be developing adjacent abscess   Initiate on IVF and empiric IV Zosyn.  Follow up on blood cultures  General surgery was consulted.  Consulted IR for abscess drainage.  Keep patient NPO    Sepsis  This patient does have evidence of infective focus  My overall impression is sepsis.  Source: Abdominal  Antibiotics given-   Antibiotics (72h ago, onward)      Start      Stop Route Frequency Ordered    08/15/24 0030  piperacillin-tazobactam (ZOSYN) 4.5 g in D5W 100 mL IVPB (MB+)         -- IV Every 8 hours (non-standard times) 08/14/24 1702    08/14/24 1630  piperacillin-tazobactam (ZOSYN) 4.5 g in D5W 100 mL IVPB (MB+)         08/15/24 0429 IV ED 1 Time 08/14/24 1616          Latest lactate reviewed-  Recent Labs   Lab 08/14/24  1453   LACTATE 1.9         Will Not start Pressors- Levophed for MAP of 65  Source control achieved by: Abx IVF    Marijuana use    Counselled on cessation    Preoperative clearance  Modify RCRI index of 0 with class I risk and 3.9% risk of death/MI/cardiac arrest      Hepatic steatosis  CT evidence of hepatic steatosis.  LFTs WNL.    Hyponatremia  Hyponatremia is likely due to Dehydration/hypovolemia. The patient's most recent sodium results are listed below.  Recent Labs     08/14/24  1453   *     Plan  Monitor with IVF    Leukocytosis    Monitor      VTE Risk Mitigation (From admission, onward)           Ordered     IP VTE HIGH RISK PATIENT  Once         08/14/24 1704     Place sequential compression device  Until discontinued         08/14/24 1704                    Discharge Planning   JANN:      Code Status: Full Code   Is the patient medically ready for discharge?:     Reason for patient still in hospital (select all that apply): Patient trending condition and Treatment                     Viki Altman MD  Department of Hospital Medicine   [facility] Surg

## 2024-08-15 NOTE — NURSING
[facility], [facility]  Nurses Note -- 4 Eyes      8/14/2024      Skin assessed on: Admit      [x] No Pressure Injuries Present    []Prevention Measures Documented    [] Yes LDA  for Pressure Injury Previously documented     [] Yes New Pressure Injury Discovered   [] LDA for New Pressure Injury Added      Attending RN:  Karime Dela Cruz RN     Second RN:  stacy hand

## 2024-08-15 NOTE — PLAN OF CARE
Case Management Assessment     PCP: No primary care provider on file.      Pharmacy:   [id number] [address] [facility] [address] [address]  Phone: [phone] Fax: [phone]      Patient Arrived From: Home  Existing Help at Home: Patient's mom    Barriers to Discharge: None    Discharge Plan:    A. Home with family   B. Home with family  Spoke with patient at bedside with his mother present.  Patient stated he is independent but lives with his mother who is able to assist him at home as needed.  Patient stated he currently does not have a PCP due to not having insurance; CM discussed with patient [facility], to which is was open to.  Patient also agreeable to being screened for Medicaid; CM to sent email to [facility].  Patient and his mother expressed concern about being discharged prior to receiving care due to patient not having insurance, claiming that he will not be seen because of it.  Assured patient and his mother that all patients are treated equally and to the fullest necessary extent, regardless of being insured or type of insurance a patient has.  Assured family and patient that he will not be discharged until medically stable to do so and required follow ups/procedures are scheduled or completed. CM to continue to assess for and until discharge.    08/15/24 1507   Discharge Assessment   Assessment Type Discharge Planning Assessment   Confirmed/corrected address, phone number and insurance Yes   Confirmed Demographics Correct on Facesheet   Source of Information patient;family   Does patient/caregiver understand observation status Yes   Communicated JANN with patient/caregiver Date not available/Unable to determine   Reason For Admission Acute appendicitis   People in Home parent(s)   Facility Arrived From: Home   Do you expect to return to your current living situation? Yes   Do you have help at home  or someone to help you manage your care at home? Yes   Who are your caregiver(s) and their phone number(s)? Pt's mom   Prior to hospitilization cognitive status: Unable to Assess   Current cognitive status: Alert/Oriented   Walking or Climbing Stairs Difficulty no   Dressing/Bathing Difficulty no   Equipment Currently Used at Home none   Readmission within 30 days? No   Patient currently being followed by outpatient case management? No   Do you currently have service(s) that help you manage your care at home? No   Do you take prescription medications? Yes   Do you have prescription coverage? No   Do you have any problems affording any of your prescribed medications? TBD   Is the patient taking medications as prescribed? yes   Who is going to help you get home at discharge? Pt's mom   How do you get to doctors appointments? car, drives self   Are you on dialysis? No   Do you take coumadin? No   Discharge Plan A Home with family   DME Needed Upon Discharge  other (see comments)  (TBD)   Discharge Plan discussed with: Patient;Parent(s)   Transition of Care Barriers None

## 2024-08-15 NOTE — PLAN OF CARE
Problem: Adult Inpatient Plan of Care  Goal: Plan of Care Review  Outcome: Progressing  Flowsheets (Taken 8/15/2024 1445)  Plan of Care Reviewed With:   patient   parent  Goal: Absence of Hospital-Acquired Illness or Injury  Outcome: Progressing  Intervention: Identify and Manage Fall Risk  Flowsheets (Taken 8/15/2024 1500)  Safety Promotion/Fall Prevention:   assistive device/personal item within reach   instructed to call staff for mobility   side rails raised x 2   Fall Risk reviewed with patient/family   medications reviewed  Goal: Optimal Comfort and Wellbeing  Outcome: Progressing  Intervention: Monitor Pain and Promote Comfort  Flowsheets (Taken 8/15/2024 1500)  Pain Management Interventions:   care clustered   pain management plan reviewed with patient/caregiver   quiet environment facilitated   relaxation techniques promoted

## 2024-08-15 NOTE — ASSESSMENT & PLAN NOTE
CT revealed perforated appendicitis noting be developing adjacent abscess   Initiate on IVF and empiric IV Zosyn.  Follow up on blood cultures  General surgery was consulted.  Consulted IR for abscess drainage.  Keep patient NPO

## 2024-08-15 NOTE — H&P
"Radiology History & Physical      SUBJECTIVE:     Chief Complaint: 1. Cathy-appendiceal abscess    History of Present Illness:  Willian Ron is a 34 y.o. male with no pertinent PMHx who presents with acute on-set of abdominal pain, fever and N/V x 5 days with new imaging consistent with acute appendicitis c/b perforation and formation of a 2.5 AP x 3.0 TV x 2.5 CC-cm periappendiceal air-fluid collection in the central, lower abdomen.     A new inpatient IR consult received for CT-guided aspiration of the cathy-appendiceal abscess.    History reviewed. No pertinent past medical history.  History reviewed. No pertinent surgical history.    Home Meds:   Prior to Admission medications    Not on File     Anticoagulants/Antiplatelets: no anticoagulation    Allergies:   Review of patient's allergies indicates:   Allergen Reactions    Grass pollen-june grass standard      Sedation History:  no adverse reactions    Review of Systems:   Hematological: no known coagulopathies  Respiratory: no cough, shortness of breath, or wheezing  Cardiovascular: no chest pain or dyspnea on exertion  Gastrointestinal: positive for - abdominal pain, change in bowel habits, constipation, and nausea/vomiting  Genito-Urinary: no dysuria, trouble voiding, or hematuria  Musculoskeletal: negative  Neurological: no TIA or stroke symptoms       OBJECTIVE:     Vital Signs (Most Recent)  Temp: (!) 100.5 °F (38.1 °C) (08/15/24 0739)  Pulse: (!) 111 (08/15/24 0739)  Resp: 18 (08/15/24 0739)  BP: 139/73 (08/15/24 0739)  SpO2: (!) 93 % (08/15/24 0739)    Physical Exam:  ASA: II  Mallampati: II    General: no acute distress  Mental Status: alert and oriented to person, place and time  HEENT: normocephalic, atraumatic  Chest: unlabored breathing  Heart: regular heart rate  Abdomen: nondistended  Extremity: moves all extremities    Laboratory  No results found for: "INR", "PT", "PTT"    Lab Results   Component Value Date    WBC 16.72 (H) 08/15/2024    HGB " 15.2 08/15/2024    HCT 46.8 08/15/2024    MCV 87 08/15/2024     08/15/2024      Lab Results   Component Value Date     08/15/2024     (L) 08/15/2024    K 3.5 08/15/2024    CL 99 08/15/2024    CO2 26 08/15/2024    BUN 9 08/15/2024    CREATININE 1.0 08/15/2024    CALCIUM 9.3 08/15/2024    MG 1.6 08/14/2024    ALT 20 08/15/2024    AST 11 08/15/2024    ALBUMIN 3.2 (L) 08/15/2024    BILITOT 1.4 (H) 08/15/2024     ASSESSMENT/PLAN:     34 y.o. male with acute appendicitis c/b perforation and formation of a 2.5 AP x 3.0 TV x 2.5 CC-cm periappendiceal air-fluid collection in the central, lower abdomen.     1. Cathy-appendiceal abscess - Collection is likely too small to safely accept a drainage catheter without risk of rupture. Will attempt CT-guided aspiration of the cathy-appendiceal abscess with local anesthetic and moderate conscious sedation.    Please place all pertinent fluid studies in epic prior to the procedure to ensure that the studies the ordering provider/team deem appropriate are performed.    Please continue to hold all non-essential anti-platelets, anti-coagulants and keep pt NPO.    Risks (including, but not limited to, pain, bleeding, infection, damage to nearby structures, failure to obtain sufficient material for a diagnosis, the need for additional procedures, and death), benefits, and alternatives were discussed with the patient. All questions were answered to the best of my abilities. The patient wishes to proceed with the procedure. Written informed consent was obtained.    Thank you for considering IR for the care of your patient.     Anderson Medina MD  Interventional Radiology

## 2024-08-15 NOTE — CONSULTS
"Radiology History & Physical      SUBJECTIVE:     Chief Complaint: 1. Cathy-appendiceal abscess    History of Present Illness:  Willian Ron is a 34 y.o. male with no pertinent PMHx who presents with acute on-set of abdominal pain, fever and N/V x 5 days with new imaging consistent with acute appendicitis c/b perforation and formation of a 2.5 AP x 3.0 TV x 2.5 CC-cm periappendiceal air-fluid collection in the central, lower abdomen.     A new inpatient IR consult received for CT-guided aspiration of the cathy-appendiceal abscess.    History reviewed. No pertinent past medical history.  History reviewed. No pertinent surgical history.    Home Meds:   Prior to Admission medications    Not on File     Anticoagulants/Antiplatelets: no anticoagulation    Allergies:   Review of patient's allergies indicates:   Allergen Reactions    Grass pollen-june grass standard      Sedation History:  no adverse reactions    Review of Systems:   Hematological: no known coagulopathies  Respiratory: no cough, shortness of breath, or wheezing  Cardiovascular: no chest pain or dyspnea on exertion  Gastrointestinal: positive for - abdominal pain, change in bowel habits, constipation, and nausea/vomiting  Genito-Urinary: no dysuria, trouble voiding, or hematuria  Musculoskeletal: negative  Neurological: no TIA or stroke symptoms       OBJECTIVE:     Vital Signs (Most Recent)  Temp: (!) 100.5 °F (38.1 °C) (08/15/24 0739)  Pulse: (!) 111 (08/15/24 0739)  Resp: 18 (08/15/24 0739)  BP: 139/73 (08/15/24 0739)  SpO2: (!) 93 % (08/15/24 0739)    Physical Exam:  ASA: II  Mallampati: II    General: no acute distress  Mental Status: alert and oriented to person, place and time  HEENT: normocephalic, atraumatic  Chest: unlabored breathing  Heart: regular heart rate  Abdomen: nondistended  Extremity: moves all extremities    Laboratory  No results found for: "INR", "PT", "PTT"    Lab Results   Component Value Date    WBC 16.72 (H) 08/15/2024    HGB " 15.2 08/15/2024    HCT 46.8 08/15/2024    MCV 87 08/15/2024     08/15/2024      Lab Results   Component Value Date     08/15/2024     (L) 08/15/2024    K 3.5 08/15/2024    CL 99 08/15/2024    CO2 26 08/15/2024    BUN 9 08/15/2024    CREATININE 1.0 08/15/2024    CALCIUM 9.3 08/15/2024    MG 1.6 08/14/2024    ALT 20 08/15/2024    AST 11 08/15/2024    ALBUMIN 3.2 (L) 08/15/2024    BILITOT 1.4 (H) 08/15/2024     ASSESSMENT/PLAN:     34 y.o. male with acute appendicitis c/b perforation and formation of a 2.5 AP x 3.0 TV x 2.5 CC-cm periappendiceal air-fluid collection in the central, lower abdomen.     1. Cathy-appendiceal abscess - Collection is likely too small to safely accept a drainage catheter without risk of rupture. Will attempt CT-guided aspiration of the cathy-appendiceal abscess with local anesthetic and moderate conscious sedation.    Please place all pertinent fluid studies in epic prior to the procedure to ensure that the studies the ordering provider/team deem appropriate are performed.    Please continue to hold all non-essential anti-platelets, anti-coagulants and keep pt NPO.    Thank you for considering IR for the care of your patient.     Anderson Medina MD  Interventional Radiology

## 2024-08-15 NOTE — NURSING
Patient arrived to floor from ED. Patient alert and oriented. Oriented patient to room and call light. Current orders reviewed with patient.

## 2024-08-15 NOTE — BRIEF OP NOTE
Radiology Post-Procedure Note    Pre Op Diagnosis: 1. Cathy-appendiceal abscess  Post Op Diagnosis: Same    Procedure: 1. CT-guided aspiration of the cathy-appendiceal abscess    Procedure performed by: Anderson Medina MD    Written Informed Consent Obtained: Yes  Specimen Removed: YES, 15-cc's of moderately thick, malodorous purulent and fecal appearing fluid  Estimated Blood Loss: Minimal    Findings:   Successful CT-guided aspiration of the cathy-appendiceal abscess with local anesthetic and moderate conscious sedation. Patient tolerated the procedure well. No immediate post-procedural complications noted.     Thank you for considering IR for the care of your patient.     Anderson Medina MD  Interventional Radiology

## 2024-08-15 NOTE — SUBJECTIVE & OBJECTIVE
Interval History:       Review of Systems   Constitutional: Negative.    Respiratory: Negative.     Cardiovascular: Negative.    Gastrointestinal:  Positive for abdominal pain. Negative for nausea and vomiting.   Genitourinary: Negative.    Musculoskeletal: Negative.    Neurological:  Positive for weakness.     Objective:     Vital Signs (Most Recent):  Temp: 100.3 °F (37.9 °C) (08/15/24 1215)  Pulse: 106 (08/15/24 1114)  Resp: 18 (08/15/24 1114)  BP: (!) 143/67 (08/15/24 1114)  SpO2: 95 % (08/15/24 1114) Vital Signs (24h Range):  Temp:  [98 °F (36.7 °C)-102.7 °F (39.3 °C)] 100.3 °F (37.9 °C)  Pulse:  [101-126] 106  Resp:  [18-20] 18  SpO2:  [91 %-98 %] 95 %  BP: (129-182)/() 143/67     Weight: 120.7 kg (266 lb 1.5 oz)  Body mass index is 37.11 kg/m².    Intake/Output Summary (Last 24 hours) at 8/15/2024 1308  Last data filed at 8/15/2024 1223  Gross per 24 hour   Intake 1348.52 ml   Output 200 ml   Net 1148.52 ml         Physical Exam  Constitutional:       Appearance: He is obese. He is ill-appearing.   Cardiovascular:      Rate and Rhythm: Tachycardia present.      Pulses: Normal pulses.   Pulmonary:      Effort: No respiratory distress.      Breath sounds: Normal breath sounds. No wheezing.   Abdominal:      General: Bowel sounds are normal. There is no distension.      Palpations: Abdomen is soft.      Tenderness: There is abdominal tenderness.   Musculoskeletal:      Right lower leg: No edema.      Left lower leg: No edema.   Skin:     General: Skin is warm.   Neurological:      Mental Status: He is alert and oriented to person, place, and time. Mental status is at baseline.   Psychiatric:         Mood and Affect: Mood normal.             Significant Labs: All pertinent labs within the past 24 hours have been reviewed.    Significant Imaging: I have reviewed all pertinent imaging results/findings within the past 24 hours.

## 2024-08-15 NOTE — PROGRESS NOTES
[facility] Medicine  Progress Note    Patient Name: Willian Ron  MRN: [id number]  Patient Class: IP- Inpatient   Admission Date: 8/14/2024  Length of Stay: 1 days  Attending Physician: Viki Altman,*  Primary Care Provider: Mariah Fernandes NP        Subjective:     Principal Problem:Acute appendicitis        HPI:  Patient is a 34-year-old male with no PMH except for marijauna use who presented to  [facility] on 08/14/24 for further evaluation of generalised abdominal pain and subjective fever x 4 days associated with nausea and non bilious non bloody vomiting Last BM  days ago Unable to tolerate PO intake. Denied CP/SOB    During evaluation in the ER, patient was found to be hypertensive (182/100), tachycardic (126), T-max 100.5° F. labs revealed WBC 17.92, sodium 135, bicarb 22, creatinine 1.1, total bilirubin 1.1.  Lactic acid 1.9.  X-ray abdomen negative.  CT abdomen revealed findings concerning for perforated appendicitis with developing adjacent abscess.  Concern for partial SBO.  Hepatic steatosis.  Patient was given 1 L NS, 4 mg Zofran, 8 mg morphine, IV Zosyn.  General surgery was consulted in ED. admitted to hospital medicine for further management    Overview/Hospital Course:  34-year-old male with no PMH except for marijauna use who was admitted for sepsis secondary to acute perforated appendicitis with developing adjacent abscess as evidenced on CT.  Initiated on IV Zosyn.  General surgery on board.  Recommended IR guided drainage.  Consulted IR .  Blood cultures pending.  Leukocytosis improving    No new subjective & objective note has been filed under this hospital service since the last note was generated.      Assessment/Plan:      * Acute appendicitis  CT revealed perforated appendicitis noting be developing adjacent abscess   Initiate on IVF and empiric IV Zosyn.  Follow up on blood cultures  General surgery was consulted.  Consulted IR for abscess drainage.  Keep  patient NPO    Sepsis  This patient does have evidence of infective focus  My overall impression is sepsis.  Source: Abdominal  Antibiotics given-   Antibiotics (72h ago, onward)      Start     Stop Route Frequency Ordered    08/15/24 0030  piperacillin-tazobactam (ZOSYN) 4.5 g in D5W 100 mL IVPB (MB+)         -- IV Every 8 hours (non-standard times) 08/14/24 1702    08/14/24 1630  piperacillin-tazobactam (ZOSYN) 4.5 g in D5W 100 mL IVPB (MB+)         08/15/24 0429 IV ED 1 Time 08/14/24 1616          Latest lactate reviewed-  Recent Labs   Lab 08/14/24  1453   LACTATE 1.9         Will Not start Pressors- Levophed for MAP of 65  Source control achieved by: Abx IVF    Marijuana use    Counselled on cessation    Preoperative clearance  Modify RCRI index of 0 with class I risk and 3.9% risk of death/MI/cardiac arrest      Hepatic steatosis  CT evidence of hepatic steatosis.  LFTs WNL.    Hyponatremia  Hyponatremia is likely due to Dehydration/hypovolemia. The patient's most recent sodium results are listed below.  Recent Labs     08/14/24  1453   *     Plan  Monitor with IVF    Leukocytosis    Monitor      VTE Risk Mitigation (From admission, onward)           Ordered     IP VTE HIGH RISK PATIENT  Once         08/14/24 1704     Place sequential compression device  Until discontinued         08/14/24 1704                    Discharge Planning   JANN:      Code Status: Full Code   Is the patient medically ready for discharge?:     Reason for patient still in hospital (select all that apply): Patient trending condition and Treatment                     Viki Altman MD  Department of Hospital Medicine   [facility]

## 2024-08-15 NOTE — PLAN OF CARE
Problem: Adult Inpatient Plan of Care  Goal: Plan of Care Review  Outcome: Progressing  Flowsheets (Taken 8/15/2024 9827)  Plan of Care Reviewed With: patient    Patient remains free from injury and falls this shift. Pain controlled with PRN analgesics. Remains NPO. IVF infusing. IV antibiotic administered as ordered. Patient able to make needs known. Current plan of care continued.

## 2024-08-16 PROBLEM — R11.2 NAUSEA & VOMITING: Status: ACTIVE | Noted: 2024-08-16

## 2024-08-16 LAB
ALBUMIN SERPL BCP-MCNC: 3.2 G/DL (ref 3.5–5.2)
ALP SERPL-CCNC: 85 U/L (ref 55–135)
ALT SERPL W/O P-5'-P-CCNC: 20 U/L (ref 10–44)
ANION GAP SERPL CALC-SCNC: 13 MMOL/L (ref 8–16)
AST SERPL-CCNC: 16 U/L (ref 10–40)
BASOPHILS # BLD AUTO: 0.06 K/UL (ref 0–0.2)
BASOPHILS NFR BLD: 0.3 % (ref 0–1.9)
BILIRUB SERPL-MCNC: 1 MG/DL (ref 0.1–1)
BUN SERPL-MCNC: 9 MG/DL (ref 6–20)
CALCIUM SERPL-MCNC: 9.7 MG/DL (ref 8.7–10.5)
CHLORIDE SERPL-SCNC: 96 MMOL/L (ref 95–110)
CO2 SERPL-SCNC: 26 MMOL/L (ref 23–29)
CREAT SERPL-MCNC: 0.9 MG/DL (ref 0.5–1.4)
DIFFERENTIAL METHOD BLD: ABNORMAL
EOSINOPHIL # BLD AUTO: 0 K/UL (ref 0–0.5)
EOSINOPHIL NFR BLD: 0 % (ref 0–8)
ERYTHROCYTE [DISTWIDTH] IN BLOOD BY AUTOMATED COUNT: 13.5 % (ref 11.5–14.5)
EST. GFR  (NO RACE VARIABLE): >60 ML/MIN/1.73 M^2
GLUCOSE SERPL-MCNC: 97 MG/DL (ref 70–110)
HCT VFR BLD AUTO: 48.1 % (ref 40–54)
HCT VFR BLD AUTO: 49.7 % (ref 40–54)
HGB BLD-MCNC: 15.6 G/DL (ref 14–18)
HGB BLD-MCNC: 16.2 G/DL (ref 14–18)
IMM GRANULOCYTES # BLD AUTO: 0.22 K/UL (ref 0–0.04)
IMM GRANULOCYTES NFR BLD AUTO: 1.1 % (ref 0–0.5)
LYMPHOCYTES # BLD AUTO: 2.1 K/UL (ref 1–4.8)
LYMPHOCYTES NFR BLD: 10.7 % (ref 18–48)
MCH RBC QN AUTO: 27.9 PG (ref 27–31)
MCHC RBC AUTO-ENTMCNC: 32.6 G/DL (ref 32–36)
MCV RBC AUTO: 86 FL (ref 82–98)
MONOCYTES # BLD AUTO: 2.6 K/UL (ref 0.3–1)
MONOCYTES NFR BLD: 13.2 % (ref 4–15)
NEUTROPHILS # BLD AUTO: 14.5 K/UL (ref 1.8–7.7)
NEUTROPHILS NFR BLD: 74.7 % (ref 38–73)
NRBC BLD-RTO: 0 /100 WBC
PLATELET # BLD AUTO: 198 K/UL (ref 150–450)
PMV BLD AUTO: 11.4 FL (ref 9.2–12.9)
POTASSIUM SERPL-SCNC: 3.7 MMOL/L (ref 3.5–5.1)
PROT SERPL-MCNC: 7.2 G/DL (ref 6–8.4)
RBC # BLD AUTO: 5.8 M/UL (ref 4.6–6.2)
SODIUM SERPL-SCNC: 135 MMOL/L (ref 136–145)
WBC # BLD AUTO: 19.42 K/UL (ref 3.9–12.7)

## 2024-08-16 PROCEDURE — 11000001 HC ACUTE MED/SURG PRIVATE ROOM

## 2024-08-16 PROCEDURE — 85025 COMPLETE CBC W/AUTO DIFF WBC: CPT | Performed by: STUDENT IN AN ORGANIZED HEALTH CARE EDUCATION/TRAINING PROGRAM

## 2024-08-16 PROCEDURE — 63600175 PHARM REV CODE 636 W HCPCS: Performed by: STUDENT IN AN ORGANIZED HEALTH CARE EDUCATION/TRAINING PROGRAM

## 2024-08-16 PROCEDURE — 85018 HEMOGLOBIN: CPT | Performed by: STUDENT IN AN ORGANIZED HEALTH CARE EDUCATION/TRAINING PROGRAM

## 2024-08-16 PROCEDURE — 85014 HEMATOCRIT: CPT | Performed by: STUDENT IN AN ORGANIZED HEALTH CARE EDUCATION/TRAINING PROGRAM

## 2024-08-16 PROCEDURE — 99223 1ST HOSP IP/OBS HIGH 75: CPT | Mod: ,,, | Performed by: NURSE PRACTITIONER

## 2024-08-16 PROCEDURE — 99233 SBSQ HOSP IP/OBS HIGH 50: CPT | Mod: ,,, | Performed by: SURGERY

## 2024-08-16 PROCEDURE — 80053 COMPREHEN METABOLIC PANEL: CPT | Performed by: STUDENT IN AN ORGANIZED HEALTH CARE EDUCATION/TRAINING PROGRAM

## 2024-08-16 PROCEDURE — 25000003 PHARM REV CODE 250: Performed by: STUDENT IN AN ORGANIZED HEALTH CARE EDUCATION/TRAINING PROGRAM

## 2024-08-16 PROCEDURE — C1751 CATH, INF, PER/CENT/MIDLINE: HCPCS

## 2024-08-16 PROCEDURE — 25000003 PHARM REV CODE 250: Performed by: NURSE PRACTITIONER

## 2024-08-16 PROCEDURE — 36415 COLL VENOUS BLD VENIPUNCTURE: CPT | Performed by: STUDENT IN AN ORGANIZED HEALTH CARE EDUCATION/TRAINING PROGRAM

## 2024-08-16 PROCEDURE — 36406 VNPNXR<3YRS PHY/QHP OTHER VN: CPT

## 2024-08-16 RX ORDER — PANTOPRAZOLE SODIUM 40 MG/10ML
40 INJECTION, POWDER, LYOPHILIZED, FOR SOLUTION INTRAVENOUS 2 TIMES DAILY
Status: DISCONTINUED | OUTPATIENT
Start: 2024-08-16 | End: 2024-08-27 | Stop reason: HOSPADM

## 2024-08-16 RX ORDER — SUCRALFATE 1 G/10ML
1 SUSPENSION ORAL EVERY 6 HOURS
Status: DISCONTINUED | OUTPATIENT
Start: 2024-08-16 | End: 2024-08-27 | Stop reason: HOSPADM

## 2024-08-16 RX ORDER — SODIUM CHLORIDE 0.9 % (FLUSH) 0.9 %
10 SYRINGE (ML) INJECTION
Status: DISCONTINUED | OUTPATIENT
Start: 2024-08-16 | End: 2024-08-27 | Stop reason: HOSPADM

## 2024-08-16 RX ORDER — SODIUM CHLORIDE 0.9 % (FLUSH) 0.9 %
10 SYRINGE (ML) INJECTION EVERY 6 HOURS
Status: DISCONTINUED | OUTPATIENT
Start: 2024-08-17 | End: 2024-08-27 | Stop reason: HOSPADM

## 2024-08-16 RX ADMIN — HYDROMORPHONE HYDROCHLORIDE 1 MG: 1 INJECTION, SOLUTION INTRAMUSCULAR; INTRAVENOUS; SUBCUTANEOUS at 04:08

## 2024-08-16 RX ADMIN — HYDROMORPHONE HYDROCHLORIDE 1 MG: 1 INJECTION, SOLUTION INTRAMUSCULAR; INTRAVENOUS; SUBCUTANEOUS at 09:08

## 2024-08-16 RX ADMIN — SODIUM CHLORIDE, POTASSIUM CHLORIDE, SODIUM LACTATE AND CALCIUM CHLORIDE: 600; 310; 30; 20 INJECTION, SOLUTION INTRAVENOUS at 04:08

## 2024-08-16 RX ADMIN — SUCRALFATE 1 G: 1 SUSPENSION ORAL at 12:08

## 2024-08-16 RX ADMIN — PIPERACILLIN AND TAZOBACTAM 4.5 G: 4; .5 INJECTION, POWDER, LYOPHILIZED, FOR SOLUTION INTRAVENOUS; PARENTERAL at 09:08

## 2024-08-16 RX ADMIN — OXYCODONE 5 MG: 5 TABLET ORAL at 08:08

## 2024-08-16 RX ADMIN — OXYCODONE 5 MG: 5 TABLET ORAL at 07:08

## 2024-08-16 RX ADMIN — PANTOPRAZOLE SODIUM 40 MG: 40 INJECTION, POWDER, FOR SOLUTION INTRAVENOUS at 10:08

## 2024-08-16 RX ADMIN — PIPERACILLIN AND TAZOBACTAM 4.5 G: 4; .5 INJECTION, POWDER, LYOPHILIZED, FOR SOLUTION INTRAVENOUS; PARENTERAL at 02:08

## 2024-08-16 RX ADMIN — SODIUM CHLORIDE, POTASSIUM CHLORIDE, SODIUM LACTATE AND CALCIUM CHLORIDE: 600; 310; 30; 20 INJECTION, SOLUTION INTRAVENOUS at 06:08

## 2024-08-16 RX ADMIN — PIPERACILLIN AND TAZOBACTAM 4.5 G: 4; .5 INJECTION, POWDER, LYOPHILIZED, FOR SOLUTION INTRAVENOUS; PARENTERAL at 05:08

## 2024-08-16 RX ADMIN — SUCRALFATE 1 G: 1 SUSPENSION ORAL at 05:08

## 2024-08-16 NOTE — SUBJECTIVE & OBJECTIVE
Interval History:  Had BM today.  Noted to have dark red emesis this morning.  Monitor hemoglobin.  IV Protonix and Carafate    Review of Systems   Constitutional: Negative.    Respiratory: Negative.     Cardiovascular: Negative.    Gastrointestinal:  Positive for abdominal distention, abdominal pain, nausea and vomiting.   Genitourinary: Negative.    Neurological: Negative.      Objective:     Vital Signs (Most Recent):  Temp: 98.5 °F (36.9 °C) (08/16/24 1059)  Pulse: 107 (08/16/24 1059)  Resp: 16 (08/16/24 1059)  BP: (!) 174/95 (08/16/24 1059)  SpO2: 95 % (08/16/24 1059) Vital Signs (24h Range):  Temp:  [98.2 °F (36.8 °C)-99.5 °F (37.5 °C)] 98.5 °F (36.9 °C)  Pulse:  [102-114] 107  Resp:  [12-20] 16  SpO2:  [94 %-100 %] 95 %  BP: (122-186)/() 174/95     Weight: 120.7 kg (266 lb 1.5 oz)  Body mass index is 37.11 kg/m².    Intake/Output Summary (Last 24 hours) at 8/16/2024 1608  Last data filed at 8/16/2024 1434  Gross per 24 hour   Intake 645.97 ml   Output 15 ml   Net 630.97 ml         Physical Exam  Constitutional:       Appearance: He is obese. He is ill-appearing.   Cardiovascular:      Rate and Rhythm: Tachycardia present.      Pulses: Normal pulses.   Pulmonary:      Effort: No respiratory distress.      Breath sounds: Normal breath sounds. No wheezing.   Abdominal:      General: There is distension.      Tenderness: There is abdominal tenderness.   Musculoskeletal:      Right lower leg: No edema.      Left lower leg: No edema.   Skin:     General: Skin is warm.   Neurological:      Mental Status: He is alert and oriented to person, place, and time. Mental status is at baseline.             Significant Labs: All pertinent labs within the past 24 hours have been reviewed.    Significant Imaging: I have reviewed all pertinent imaging results/findings within the past 24 hours.

## 2024-08-16 NOTE — CONSULTS
[facility] Consultation Note    Patient Complaint: vomiting, abdominal pain    PCP:   No primary care provider on file.       LOS: 2        Initial History of Present Illness (HPI):  This is a 34 y.o. male consulted to GI service for hematemesis. No pertinent PMH. Patient complaint of acute onset of abdominal pain with associated symptoms of  vomiting, throat burning and constipation that began on yesterday. Denies sob, choking, dysphagia, BRBPR or melena. Denies blood thinner or nsaid use.  Denies smoking cigarettes and drinks alcohol occasionally.       Imaging notes acute appendicitis with abscess formation      Medical History:  has no past medical history on file.    Surgical History:  has no past surgical history on file.      Objective Findings:    Vital Signs:  Temp:  [98.2 °F (36.8 °C)-102.4 °F (39.1 °C)]   Pulse:  [102-114]   Resp:  [12-20]   BP: (122-186)/()   SpO2:  [94 %-100 %]   Body mass index is 37.11 kg/m².      Physical Exam  Vitals and nursing note reviewed.   Constitutional:       Appearance: He is obese. He is ill-appearing.   HENT:      Head: Normocephalic.   Pulmonary:      Effort: Pulmonary effort is normal.   Abdominal:      General: Bowel sounds are normal. There is distension.      Palpations: Abdomen is soft.   Skin:     General: Skin is warm and dry.   Neurological:      Mental Status: He is alert and oriented to person, place, and time.   Psychiatric:         Mood and Affect: Mood normal.         Behavior: Behavior normal.         Thought Content: Thought content normal.         Judgment: Judgment normal.               Labs:  Lab Results   Component Value Date    WBC 19.42 (H) 08/16/2024    HGB 16.2 08/16/2024    HCT 49.7 08/16/2024     08/16/2024    ALT 20 08/16/2024    AST 16 08/16/2024     (L) 08/16/2024    K 3.7 08/16/2024    CL 96 08/16/2024    CREATININE 0.9 08/16/2024    BUN 9 08/16/2024    CO2 26 08/16/2024    INR 1.2 08/15/2024            Imagin/14 CT abdomen pelvis-  Findings most concerning for perforated appendicitis noting be developing adjacent abscess.  Correlation is advised.  Surgical consultation recommended.   Inflammation associated with the appendiceal process results in tethering of a few adjacent small bowel loops concerning for at least partial small bowel obstruction.  Liquid stool throughout the large bowel suggests possible diarrheal component.  Correlation and follow-up is advised.   Findings suggest possible hepatic steatosis, correlation with LFTs recommended.      I have independently reviewed and interpreted the imaging above           Persistent nausea and vomiting acute. Appendicitis. abdominal pain. constipation  Plan/ Recommendations:  1.  Continues with n/v, abd xray notes possible illeus vs partial obstruction. Gen Surg following and agree with recs for NGT. Hgb is normal and emesis more resembling of bile. Agree with ppi bid and ok to add carafe as patient c/o of throat and stomach burning. No plan for urgent inpatient endoscopy at this time.     2. Patient and family reports no stool in 6 days, which bouts of diarrhea prior. Agree with NGT for decompression. May consider enema to relieve patient of any low hard stool that may be present.    Thank you so much for allowing us to participate in the care of Willian ADAN Ron . Please contact us if you have any additional questions.    Lorena White NP  Gastroenterology  [facility] - Med Surg

## 2024-08-16 NOTE — PLAN OF CARE
Problem: Adult Inpatient Plan of Care  Goal: Plan of Care Review  Outcome: Progressing  Flowsheets (Taken 8/16/2024 1517)  Plan of Care Reviewed With:   patient   parent  Goal: Absence of Hospital-Acquired Illness or Injury  Outcome: Progressing  Intervention: Identify and Manage Fall Risk  Flowsheets (Taken 8/16/2024 1517)  Safety Promotion/Fall Prevention:   assistive device/personal item within reach   instructed to call staff for mobility   side rails raised x 2   room near unit station     Problem: Sepsis/Septic Shock  Goal: Optimal Coping  Outcome: Progressing  Intervention: Optimize Psychosocial Adjustment to Illness  Flowsheets (Taken 8/16/2024 1517)  Supportive Measures:   active listening utilized   decision-making supported   positive reinforcement provided   relaxation techniques promoted   self-care encouraged   verbalization of feelings encouraged   self-responsibility promoted  Family/Support System Care:   self-care encouraged   support provided

## 2024-08-16 NOTE — ASSESSMENT & PLAN NOTE
CT revealed perforated appendicitis noting be developing adjacent abscess   Initiate on IVF and empiric IV Zosyn.  Follow up on blood cultures  General surgery was consulted.  Status post IR guided drainage of abscess on 08/15.  Fluid cultures with presumptive E coli

## 2024-08-16 NOTE — PROGRESS NOTES
[facility] Medicine  Progress Note    Patient Name: Willian Ron  MRN: [id number]  Patient Class: IP- Inpatient   Admission Date: 8/14/2024  Length of Stay: 2 days  Attending Physician: Viki Altman,*  Primary Care Provider: No primary care provider on file.        Subjective:     Principal Problem:Acute appendicitis        HPI:  Patient is a 34-year-old male with no PMH except for marijauna use who presented to  [facility] ER on 08/14/24 for further evaluation of generalised abdominal pain and subjective fever x 4 days associated with nausea and non bilious non bloody vomiting Last BM  days ago Unable to tolerate PO intake. Denied CP/SOB    During evaluation in the ER, patient was found to be hypertensive (182/100), tachycardic (126), T-max 100.5° F. labs revealed WBC 17.92, sodium 135, bicarb 22, creatinine 1.1, total bilirubin 1.1.  Lactic acid 1.9.  X-ray abdomen negative.  CT abdomen revealed findings concerning for perforated appendicitis with developing adjacent abscess.  Concern for partial SBO.  Hepatic steatosis.  Patient was given 1 L NS, 4 mg Zofran, 8 mg morphine, IV Zosyn.  General surgery was consulted in ED. admitted to hospital medicine for further management    Overview/Hospital Course:  34-year-old male with no PMH except for marijauna use who was admitted for sepsis secondary to acute perforated appendicitis with developing adjacent abscess as evidenced on CT.  Initiated on IV Zosyn.  General surgery/GI on board.  Status post IR guided drainage of abscess on 08/15 Blood cultures pending.  Fluid cultures with presumptive E coli.  Patient was noted to have dark red emesis this morning.  Initiated on IV Protonix.  Hemoglobin at 15.6.  X-ray KUB today.  Had BM today    Interval History:  Had BM today.  Noted to have dark red emesis this morning.  Monitor hemoglobin.  IV Protonix and Carafate    Review of Systems   Constitutional: Negative.    Respiratory: Negative.      Cardiovascular: Negative.    Gastrointestinal:  Positive for abdominal distention, abdominal pain, nausea and vomiting.   Genitourinary: Negative.    Neurological: Negative.      Objective:     Vital Signs (Most Recent):  Temp: 98.5 °F (36.9 °C) (08/16/24 1059)  Pulse: 107 (08/16/24 1059)  Resp: 16 (08/16/24 1059)  BP: (!) 174/95 (08/16/24 1059)  SpO2: 95 % (08/16/24 1059) Vital Signs (24h Range):  Temp:  [98.2 °F (36.8 °C)-99.5 °F (37.5 °C)] 98.5 °F (36.9 °C)  Pulse:  [102-114] 107  Resp:  [12-20] 16  SpO2:  [94 %-100 %] 95 %  BP: (122-186)/() 174/95     Weight: 120.7 kg (266 lb 1.5 oz)  Body mass index is 37.11 kg/m².    Intake/Output Summary (Last 24 hours) at 8/16/2024 1608  Last data filed at 8/16/2024 1434  Gross per 24 hour   Intake 645.97 ml   Output 15 ml   Net 630.97 ml         Physical Exam  Constitutional:       Appearance: He is obese. He is ill-appearing.   Cardiovascular:      Rate and Rhythm: Tachycardia present.      Pulses: Normal pulses.   Pulmonary:      Effort: No respiratory distress.      Breath sounds: Normal breath sounds. No wheezing.   Abdominal:      General: There is distension.      Tenderness: There is abdominal tenderness.   Musculoskeletal:      Right lower leg: No edema.      Left lower leg: No edema.   Skin:     General: Skin is warm.   Neurological:      Mental Status: He is alert and oriented to person, place, and time. Mental status is at baseline.             Significant Labs: All pertinent labs within the past 24 hours have been reviewed.    Significant Imaging: I have reviewed all pertinent imaging results/findings within the past 24 hours.      Assessment/Plan:      * Acute appendicitis  CT revealed perforated appendicitis noting be developing adjacent abscess   Initiate on IVF and empiric IV Zosyn.  Follow up on blood cultures  General surgery was consulted.  Status post IR guided drainage of abscess on 08/15.  Fluid cultures with presumptive E coli    Nausea &  vomiting    Patient was noted to have dark red emesis this morning.  Initiate on Protonix/Carafate monitor H/H.  GI on board.  No plans for inpatient endoscopy    Sepsis  This patient does have evidence of infective focus  My overall impression is sepsis.  Source: Abdominal  Antibiotics given-   Antibiotics (72h ago, onward)      Start     Stop Route Frequency Ordered    08/15/24 0030  piperacillin-tazobactam (ZOSYN) 4.5 g in D5W 100 mL IVPB (MB+)         -- IV Every 8 hours (non-standard times) 08/14/24 1702    08/14/24 1630  piperacillin-tazobactam (ZOSYN) 4.5 g in D5W 100 mL IVPB (MB+)         08/15/24 0429 IV ED 1 Time 08/14/24 1616          Latest lactate reviewed-  Recent Labs   Lab 08/14/24  1453   LACTATE 1.9         Will Not start Pressors- Levophed for MAP of 65  Source control achieved by: Abx IVF    Marijuana use    Counselled on cessation    Preoperative clearance  Modify RCRI index of 0 with class I risk and 3.9% risk of death/MI/cardiac arrest      Hepatic steatosis  CT evidence of hepatic steatosis.  LFTs WNL.    Hyponatremia  Hyponatremia is likely due to Dehydration/hypovolemia. The patient's most recent sodium results are listed below.  Recent Labs     08/14/24  1453   *     Plan  Monitor with IVF    Leukocytosis    Monitor      VTE Risk Mitigation (From admission, onward)           Ordered     IP VTE HIGH RISK PATIENT  Once         08/14/24 1704     Place sequential compression device  Until discontinued         08/14/24 1704                    Discharge Planning   JANN:      Code Status: Full Code   Is the patient medically ready for discharge?:     Reason for patient still in hospital (select all that apply): Patient trending condition and Treatment  Discharge Plan A: Home with family                  Viki Altman MD  Department of Hospital Medicine   [facility]

## 2024-08-16 NOTE — NURSING
Received report from off going nurse, Loida. Patient received AAO X 3. Resp even and unlabored on room air. Bed locked in the lowest position with SR up X 2. Call light within reach. 20G SL note to the pts RAC.  [facility], [facility]  Nurses Note -- 4 Eyes      8/15/2024       Skin assessed on: Q Shift      [x] No Pressure Injuries Present    []Prevention Measures Documented    [] Yes LDA  for Pressure Injury Previously documented     [] Yes New Pressure Injury Discovered   [] LDA for New Pressure Injury Added      Attending RN:  Ashvin Kulkarni, RN     Second RN:  Loida

## 2024-08-16 NOTE — PLAN OF CARE
Problem: Pain Acute  Goal: Optimal Pain Control and Function  Outcome: Progressing  Intervention: Develop Pain Management Plan  Flowsheets (Taken 8/16/2024 3752)  Pain Management Interventions:   around-the-clock dosing utilized   care clustered   medication offered

## 2024-08-16 NOTE — PROGRESS NOTES
Surgery Progress Note    Willian Ron is a 34 y.o. year old male in hospital for acute appendicitis w abscess now s/p IR drainage aspiration (too small to place a catheter).  He feels the same as yesterday which he reports as 10/10 if he is moving around.  He has been coughing/vomiting up some dark bilious fluid, reported as blood, but on my exam looks like dark bile mixed with gastric contents, no jamie or bright blood.  No flatus.  Discussed plan of care with family. Will see how he does with this non op treatment for now with aspiration and antibiotics.    ROS:  Negative except above    PE:  Vitals:    08/16/24 0751 08/16/24 0840 08/16/24 0916 08/16/24 0925   BP:  (!) 167/91 (!) 174/103    BP Location:  Right arm     Patient Position:  Lying Lying    Pulse:  104     Resp:  16  18   Temp:  99.2 °F (37.3 °C) 99.2 °F (37.3 °C)    TempSrc:  Oral Oral    SpO2: (!) 94% 95%     Weight:       Height:         NAD  Calm when lying flat but grimaces when moving around in bed  No belabored breathing  No skin changes  Abd distended. No peritonitis, just generally uncomfortable during exam.    Significant Diagnostic Studies: Labs: BMP:   Recent Labs   Lab 08/14/24  1453 08/15/24  0447 08/16/24  0716   * 102 97   * 134* 135*   K 3.5 3.5 3.7   CL 99 99 96   CO2 22* 26 26   BUN 10 9 9   CREATININE 1.1 1.0 0.9   CALCIUM 9.7 9.3 9.7   MG 1.6  --   --     and CBC   Recent Labs   Lab 08/14/24  1453 08/15/24  0447 08/16/24  0716   WBC 17.92* 16.72* 19.42*   HGB 16.9 15.2 16.2   HCT 50.0 46.8 49.7    204 198     A/P:  Willian Ron is a 34 y.o. year old male  with acute appendicitis w abscess s/p IR drainage    -KUB to eval if there is significant gastric distention  -ppi  -npo. Ok for ice chips. Will check on KUB to see if NGT will be of benefit  -ivf  -zosyn  -trend cbc. If wbc continue to elevate and or he has fevers may end up needing surgery but for now progressing with non op treatment (IR aspiration,  antibiotics), and could plan for interval appendectomy in the future.      Phil Mancia  General Surgery - [facility]  8/16/2024

## 2024-08-16 NOTE — ASSESSMENT & PLAN NOTE
Patient was noted to have dark red emesis this morning.  Initiate on Protonix/Carafate monitor H/H.  GI on board.  No plans for inpatient endoscopy

## 2024-08-17 PROBLEM — F17.200 TOBACCO DEPENDENCY: Status: RESOLVED | Noted: 2024-08-14 | Resolved: 2024-08-17

## 2024-08-17 PROBLEM — R11.2 NAUSEA & VOMITING: Status: RESOLVED | Noted: 2024-08-16 | Resolved: 2024-08-17

## 2024-08-17 LAB
ALBUMIN SERPL BCP-MCNC: 3.1 G/DL (ref 3.5–5.2)
ALP SERPL-CCNC: 131 U/L (ref 55–135)
ALT SERPL W/O P-5'-P-CCNC: 22 U/L (ref 10–44)
ANION GAP SERPL CALC-SCNC: 15 MMOL/L (ref 8–16)
AST SERPL-CCNC: 18 U/L (ref 10–40)
BACTERIA FLD AEROBE CULT: ABNORMAL
BASOPHILS # BLD AUTO: 0.11 K/UL (ref 0–0.2)
BASOPHILS NFR BLD: 0.5 % (ref 0–1.9)
BILIRUB SERPL-MCNC: 0.7 MG/DL (ref 0.1–1)
BUN SERPL-MCNC: 11 MG/DL (ref 6–20)
CALCIUM SERPL-MCNC: 9.6 MG/DL (ref 8.7–10.5)
CHLORIDE SERPL-SCNC: 95 MMOL/L (ref 95–110)
CO2 SERPL-SCNC: 27 MMOL/L (ref 23–29)
CREAT SERPL-MCNC: 0.9 MG/DL (ref 0.5–1.4)
DIFFERENTIAL METHOD BLD: ABNORMAL
EOSINOPHIL # BLD AUTO: 0.1 K/UL (ref 0–0.5)
EOSINOPHIL NFR BLD: 0.3 % (ref 0–8)
ERYTHROCYTE [DISTWIDTH] IN BLOOD BY AUTOMATED COUNT: 13.9 % (ref 11.5–14.5)
EST. GFR  (NO RACE VARIABLE): >60 ML/MIN/1.73 M^2
GLUCOSE SERPL-MCNC: 94 MG/DL (ref 70–110)
GRAM STN SPEC: ABNORMAL
HCT VFR BLD AUTO: 48.2 % (ref 40–54)
HGB BLD-MCNC: 15.5 G/DL (ref 14–18)
IMM GRANULOCYTES # BLD AUTO: 0.27 K/UL (ref 0–0.04)
IMM GRANULOCYTES NFR BLD AUTO: 1.3 % (ref 0–0.5)
LYMPHOCYTES # BLD AUTO: 2.4 K/UL (ref 1–4.8)
LYMPHOCYTES NFR BLD: 11.4 % (ref 18–48)
MCH RBC QN AUTO: 28 PG (ref 27–31)
MCHC RBC AUTO-ENTMCNC: 32.2 G/DL (ref 32–36)
MCV RBC AUTO: 87 FL (ref 82–98)
MONOCYTES # BLD AUTO: 2.5 K/UL (ref 0.3–1)
MONOCYTES NFR BLD: 12.1 % (ref 4–15)
NEUTROPHILS # BLD AUTO: 15.5 K/UL (ref 1.8–7.7)
NEUTROPHILS NFR BLD: 74.4 % (ref 38–73)
NRBC BLD-RTO: 0 /100 WBC
PLATELET # BLD AUTO: 278 K/UL (ref 150–450)
PLATELET BLD QL SMEAR: ABNORMAL
PMV BLD AUTO: 11.3 FL (ref 9.2–12.9)
POTASSIUM SERPL-SCNC: 3.1 MMOL/L (ref 3.5–5.1)
PROT SERPL-MCNC: 7 G/DL (ref 6–8.4)
RBC # BLD AUTO: 5.53 M/UL (ref 4.6–6.2)
SODIUM SERPL-SCNC: 137 MMOL/L (ref 136–145)
WBC # BLD AUTO: 20.83 K/UL (ref 3.9–12.7)

## 2024-08-17 PROCEDURE — 36415 COLL VENOUS BLD VENIPUNCTURE: CPT | Performed by: STUDENT IN AN ORGANIZED HEALTH CARE EDUCATION/TRAINING PROGRAM

## 2024-08-17 PROCEDURE — C1765 ADHESION BARRIER: HCPCS

## 2024-08-17 PROCEDURE — 80053 COMPREHEN METABOLIC PANEL: CPT | Performed by: STUDENT IN AN ORGANIZED HEALTH CARE EDUCATION/TRAINING PROGRAM

## 2024-08-17 PROCEDURE — 25000003 PHARM REV CODE 250: Performed by: STUDENT IN AN ORGANIZED HEALTH CARE EDUCATION/TRAINING PROGRAM

## 2024-08-17 PROCEDURE — 27201477 HC KIT, HEMOSTATIC MATRIX

## 2024-08-17 PROCEDURE — 25000003 PHARM REV CODE 250: Performed by: NURSE PRACTITIONER

## 2024-08-17 PROCEDURE — 63600175 PHARM REV CODE 636 W HCPCS: Performed by: STUDENT IN AN ORGANIZED HEALTH CARE EDUCATION/TRAINING PROGRAM

## 2024-08-17 PROCEDURE — A4216 STERILE WATER/SALINE, 10 ML: HCPCS | Performed by: STUDENT IN AN ORGANIZED HEALTH CARE EDUCATION/TRAINING PROGRAM

## 2024-08-17 PROCEDURE — 99232 SBSQ HOSP IP/OBS MODERATE 35: CPT | Mod: ,,, | Performed by: SURGERY

## 2024-08-17 PROCEDURE — 11000001 HC ACUTE MED/SURG PRIVATE ROOM

## 2024-08-17 PROCEDURE — 85025 COMPLETE CBC W/AUTO DIFF WBC: CPT | Performed by: STUDENT IN AN ORGANIZED HEALTH CARE EDUCATION/TRAINING PROGRAM

## 2024-08-17 RX ADMIN — PIPERACILLIN AND TAZOBACTAM 4.5 G: 4; .5 INJECTION, POWDER, LYOPHILIZED, FOR SOLUTION INTRAVENOUS; PARENTERAL at 08:08

## 2024-08-17 RX ADMIN — SODIUM CHLORIDE, POTASSIUM CHLORIDE, SODIUM LACTATE AND CALCIUM CHLORIDE: 600; 310; 30; 20 INJECTION, SOLUTION INTRAVENOUS at 06:08

## 2024-08-17 RX ADMIN — SODIUM CHLORIDE, POTASSIUM CHLORIDE, SODIUM LACTATE AND CALCIUM CHLORIDE: 600; 310; 30; 20 INJECTION, SOLUTION INTRAVENOUS at 04:08

## 2024-08-17 RX ADMIN — PANTOPRAZOLE SODIUM 40 MG: 40 INJECTION, POWDER, FOR SOLUTION INTRAVENOUS at 09:08

## 2024-08-17 RX ADMIN — SUCRALFATE 1 G: 1 SUSPENSION ORAL at 11:08

## 2024-08-17 RX ADMIN — PIPERACILLIN AND TAZOBACTAM 4.5 G: 4; .5 INJECTION, POWDER, LYOPHILIZED, FOR SOLUTION INTRAVENOUS; PARENTERAL at 11:08

## 2024-08-17 RX ADMIN — OXYCODONE 5 MG: 5 TABLET ORAL at 05:08

## 2024-08-17 RX ADMIN — Medication 10 ML: at 05:08

## 2024-08-17 RX ADMIN — HYDROMORPHONE HYDROCHLORIDE 1 MG: 1 INJECTION, SOLUTION INTRAMUSCULAR; INTRAVENOUS; SUBCUTANEOUS at 04:08

## 2024-08-17 RX ADMIN — SUCRALFATE 1 G: 1 SUSPENSION ORAL at 12:08

## 2024-08-17 RX ADMIN — PANTOPRAZOLE SODIUM 40 MG: 40 INJECTION, POWDER, FOR SOLUTION INTRAVENOUS at 08:08

## 2024-08-17 RX ADMIN — Medication 10 ML: at 12:08

## 2024-08-17 RX ADMIN — OXYCODONE 5 MG: 5 TABLET ORAL at 11:08

## 2024-08-17 RX ADMIN — OXYCODONE 5 MG: 5 TABLET ORAL at 06:08

## 2024-08-17 RX ADMIN — SUCRALFATE 1 G: 1 SUSPENSION ORAL at 05:08

## 2024-08-17 RX ADMIN — HYDROMORPHONE HYDROCHLORIDE 1 MG: 1 INJECTION, SOLUTION INTRAMUSCULAR; INTRAVENOUS; SUBCUTANEOUS at 08:08

## 2024-08-17 RX ADMIN — PIPERACILLIN AND TAZOBACTAM 4.5 G: 4; .5 INJECTION, POWDER, LYOPHILIZED, FOR SOLUTION INTRAVENOUS; PARENTERAL at 02:08

## 2024-08-17 NOTE — ASSESSMENT & PLAN NOTE
Hyponatremia is likely due to Dehydration/hypovolemia. The patient's most recent sodium results are listed below.  Recent Labs     08/15/24  0447 08/16/24  0716 08/17/24  0501   * 135* 137       Plan  Hyponatremia likely due to hypovolemic hyponatremia   Currently improved.  Continue monitoring daily.

## 2024-08-17 NOTE — NURSING
[facility], [facility]  Nurses Note -- 4 Eyes      8/17/2024       Skin assessed on: Q Shift      [x] No Pressure Injuries Present    []Prevention Measures Documented    [] Yes LDA  for Pressure Injury Previously documented     [] Yes New Pressure Injury Discovered   [] LDA for New Pressure Injury Added      Attending RN:  Mireya Malloy RN     Second RN:  Ashvin

## 2024-08-17 NOTE — PROGRESS NOTES
Progress Note  General Surgery    Admit Date: 8/14/2024  S/P: * No surgery found *    Post-operative Day:      Hospital Day: 4    SUBJECTIVE:     No acute events overnight. Wants liquids, CBC 20 ( 19)  OBJECTIVE:     Vital Signs (Most Recent)  Temp: 98.5 °F (36.9 °C) (08/17/24 1121)  Pulse: 101 (08/17/24 1121)  Resp: 18 (08/17/24 1121)  BP: (!) 140/81 (08/17/24 1121)  SpO2: 95 % (08/17/24 1121)    Vital Signs Range (Last 24H):  Temp:  [98.5 °F (36.9 °C)-99.9 °F (37.7 °C)]   Pulse:  []   Resp:  [16-19]   BP: (136-169)/(79-93)   SpO2:  [93 %-97 %]     I & O (Last 24H):  Intake/Output Summary (Last 24 hours) at 8/17/2024 1311  Last data filed at 8/16/2024 1940  Gross per 24 hour   Intake 405.97 ml   Output 100 ml   Net 305.97 ml       Physical Exam:  Gen: NAD   HEENT: NCAT  Pulm: unlabored, symmetrical   Abd: Soft, leticia TTP. No rebound, guarding.       Laboratory:  Labs within the past 24 hours have been reviewed.    ASSESSMENT/PLAN:     Assessment/Plan:  Continue IV abx, ok for clears    Hussain Agarwal M.D., F.A.C.S.  [facility] and General Surgery  [facility]           

## 2024-08-17 NOTE — ASSESSMENT & PLAN NOTE
Likely due to acute appendicitis  Currently on antibiotics   Expect to improve with improvement in infection

## 2024-08-17 NOTE — ASSESSMENT & PLAN NOTE
CT evidence of hepatic steatosis.  LFTs WNL.  Continue lifestyle modification   Weight loss may be helpful.

## 2024-08-17 NOTE — ASSESSMENT & PLAN NOTE
"This patient does have evidence of infective focus  My overall impression is sepsis.  Source: Abdominal  Antibiotics given-   Antibiotics (72h ago, onward)    Start     Stop Route Frequency Ordered    08/15/24 0030  piperacillin-tazobactam (ZOSYN) 4.5 g in D5W 100 mL IVPB (MB+)         -- IV Every 8 hours (non-standard times) 08/14/24 1702        Latest lactate reviewed-  No results for input(s): "LACTATE", "POCLAC" in the last 72 hours.        Will Not start Pressors- Levophed for MAP of 65  Source control achieved by: Abx IVF  " Mastoid Interpolation Flap Text: A decision was made to reconstruct the defect utilizing an interpolation axial flap and a staged reconstruction.  A telfa template was made of the defect.  This telfa template was then used to outline the mastoid interpolation flap.  The donor area for the pedicle flap was then injected with anesthesia.  The flap was excised through the skin and subcutaneous tissue down to the layer of the underlying musculature.  The pedicle flap was carefully excised within this deep plane to maintain its blood supply.  The edges of the donor site were undermined.   The donor site was closed in a primary fashion.  The pedicle was then rotated into position and sutured.  Once the tube was sutured into place, adequate blood supply was confirmed with blanching and refill.  The pedicle was then wrapped with xeroform gauze and dressed appropriately with a telfa and gauze bandage to ensure continued blood supply and protect the attached pedicle.

## 2024-08-17 NOTE — PROGRESS NOTES
[facility] Medicine  Progress Note    Patient Name: Willian Ron  MRN: [id number]  Patient Class: IP- Inpatient   Admission Date: 8/14/2024  Length of Stay: 3 days  Attending Physician: Perico Gardner MD  Primary Care Provider: No primary care provider on file.        Subjective:     Principal Problem:Acute appendicitis        HPI:  Patient is a 34-year-old male with no PMH except for marijauna use who presented to  [facility] ER on 08/14/24 for further evaluation of generalised abdominal pain and subjective fever x 4 days associated with nausea and non bilious non bloody vomiting Last BM  days ago Unable to tolerate PO intake. Denied CP/SOB    During evaluation in the ER, patient was found to be hypertensive (182/100), tachycardic (126), T-max 100.5° F. labs revealed WBC 17.92, sodium 135, bicarb 22, creatinine 1.1, total bilirubin 1.1.  Lactic acid 1.9.  X-ray abdomen negative.  CT abdomen revealed findings concerning for perforated appendicitis with developing adjacent abscess.  Concern for partial SBO.  Hepatic steatosis.  Patient was given 1 L NS, 4 mg Zofran, 8 mg morphine, IV Zosyn.  General surgery was consulted in ED. admitted to hospital medicine for further management    Overview/Hospital Course:  34-year-old male with no PMH except for marijauna use who was admitted for sepsis secondary to acute perforated appendicitis with developing adjacent abscess as evidenced on CT.  Initiated on IV Zosyn.  General surgery/GI on board.  Status post IR guided drainage of abscess on 08/15 Blood cultures pending.  Fluid cultures with presumptive E coli.  Patient was noted to have dark red emesis this morning.  Initiated on IV Protonix.  Hemoglobin at 15.6.  X-ray KUB today.    08/17/2024  Bowel movement said to be loose times 5 episodes in the last 24 hours, still having abdominal pain in the right lower quadrant.  No fever, chills or rigors reported.    Interval History: Bowel movement said to  be loose, had 5 episodes in the last 24 hours, still having abdominal pain in the right lower quadrant.  No fever, chills or rigors reported.    Review of Systems   Constitutional:  Positive for appetite change. Negative for chills, diaphoresis and fatigue.   HENT:  Negative for congestion, sore throat and tinnitus.    Eyes:  Negative for pain, discharge and itching.   Respiratory:  Negative for cough, chest tightness, shortness of breath and wheezing.    Cardiovascular:  Negative for chest pain, palpitations and leg swelling.   Gastrointestinal:  Positive for abdominal distention, abdominal pain, diarrhea and nausea. Negative for blood in stool and vomiting.   Endocrine: Negative for cold intolerance, heat intolerance and polydipsia.   Genitourinary:  Negative for decreased urine volume, difficulty urinating, dysuria, flank pain, frequency, hematuria and urgency.   Musculoskeletal:  Negative for arthralgias and back pain.   Skin:  Negative for color change, pallor, rash and wound.   Neurological:  Negative for dizziness, tremors, seizures, facial asymmetry, weakness, light-headedness, numbness and headaches.   Psychiatric/Behavioral:  Negative for agitation, confusion, hallucinations and self-injury. The patient is not nervous/anxious and is not hyperactive.      Objective:     Vital Signs (Most Recent):  Temp: 99.5 °F (37.5 °C) (08/17/24 1712)  Pulse: 103 (08/17/24 1712)  Resp: 19 (08/17/24 1712)  BP: (!) 144/79 (08/17/24 1712)  SpO2: 96 % (08/17/24 1712) Vital Signs (24h Range):  Temp:  [98.5 °F (36.9 °C)-99.9 °F (37.7 °C)] 99.5 °F (37.5 °C)  Pulse:  [100-105] 103  Resp:  [17-19] 19  SpO2:  [95 %-97 %] 96 %  BP: (136-169)/(79-93) 144/79     Weight: 120.7 kg (266 lb 1.5 oz)  Body mass index is 37.11 kg/m².    Intake/Output Summary (Last 24 hours) at 8/17/2024 1733  Last data filed at 8/17/2024 1611  Gross per 24 hour   Intake 495.28 ml   Output 100 ml   Net 395.28 ml         Physical Exam  Constitutional:        General: He is in acute distress (painful distress).      Appearance: Normal appearance. He is obese. He is ill-appearing. He is not toxic-appearing or diaphoretic.   HENT:      Head: Normocephalic and atraumatic.      Nose: Nose normal. No congestion or rhinorrhea.      Mouth/Throat:      Mouth: Mucous membranes are dry.   Eyes:      General: No scleral icterus.     Extraocular Movements: Extraocular movements intact.      Conjunctiva/sclera: Conjunctivae normal.      Pupils: Pupils are equal, round, and reactive to light.   Cardiovascular:      Rate and Rhythm: Regular rhythm. Tachycardia present.      Pulses: Normal pulses.      Heart sounds: Normal heart sounds.   Pulmonary:      Effort: Pulmonary effort is normal. No respiratory distress.      Breath sounds: Normal breath sounds. No wheezing or rales.   Abdominal:      General: Abdomen is flat. Bowel sounds are normal. There is distension.      Palpations: Abdomen is soft.      Tenderness: There is abdominal tenderness (right lower quadrant). There is no right CVA tenderness, left CVA tenderness, guarding or rebound.   Musculoskeletal:         General: No swelling, tenderness, deformity or signs of injury.      Cervical back: Normal range of motion and neck supple. No rigidity or tenderness.      Right lower leg: No edema.      Left lower leg: No edema.   Skin:     General: Skin is warm and dry.      Coloration: Skin is not jaundiced.      Findings: No lesion or rash.   Neurological:      General: No focal deficit present.      Mental Status: He is alert and oriented to person, place, and time. Mental status is at baseline.      Cranial Nerves: No cranial nerve deficit.      Sensory: No sensory deficit.   Psychiatric:         Mood and Affect: Mood normal.         Behavior: Behavior normal.         Thought Content: Thought content normal.         Judgment: Judgment normal.             Significant Labs: All pertinent labs within the past 24 hours have been  "reviewed.    Significant Imaging: I have reviewed all pertinent imaging results/findings within the past 24 hours.    Assessment/Plan:      * Acute appendicitis  CT revealed perforated appendicitis noting be developing adjacent abscess   Initiate on IVF and empiric IV Zosyn.  Follow up on blood cultures  General surgery on board  Status post IR guided drainage of abscess on 08/15.    Fluid cultures with presumptive E coli    Sepsis  This patient does have evidence of infective focus  My overall impression is sepsis.  Source: Abdominal  Antibiotics given-   Antibiotics (72h ago, onward)      Start     Stop Route Frequency Ordered    08/15/24 0030  piperacillin-tazobactam (ZOSYN) 4.5 g in D5W 100 mL IVPB (MB+)         -- IV Every 8 hours (non-standard times) 08/14/24 1702          Latest lactate reviewed-  No results for input(s): "LACTATE", "POCLAC" in the last 72 hours.        Will Not start Pressors- Levophed for MAP of 65  Source control achieved by: Abx IVF    Marijuana use  Lifestyle modification  Counselled on cessation    Preoperative clearance  Modify RCRI index of 0 with class I risk and 3.9% risk of death/MI/cardiac arrest      Hepatic steatosis  CT evidence of hepatic steatosis.  LFTs WNL.  Continue lifestyle modification   Weight loss may be helpful.    Hyponatremia  Hyponatremia is likely due to Dehydration/hypovolemia. The patient's most recent sodium results are listed below.  Recent Labs     08/15/24  0447 08/16/24  0716 08/17/24  0501   * 135* 137       Plan  Hyponatremia likely due to hypovolemic hyponatremia   Currently improved.  Continue monitoring daily.    Leukocytosis  Likely due to acute appendicitis  Currently on antibiotics   Expect to improve with improvement in infection      VTE Risk Mitigation (From admission, onward)           Ordered     IP VTE HIGH RISK PATIENT  Once         08/14/24 1704     Place sequential compression device  Until discontinued         08/14/24 1704         "            Discharge Planning   JANN:      Code Status: Full Code   Is the patient medically ready for discharge?:     Reason for patient still in hospital (select all that apply): Patient unstable, Treatment, and Consult recommendations  Discharge Plan A: Home with family                  Perico Gardner MD  Department of Hospital Medicine   [facility]

## 2024-08-17 NOTE — SUBJECTIVE & OBJECTIVE
Interval History: Bowel movement said to be loose, had 5 episodes in the last 24 hours, still having abdominal pain in the right lower quadrant.  No fever, chills or rigors reported.    Review of Systems   Constitutional:  Positive for appetite change. Negative for chills, diaphoresis and fatigue.   HENT:  Negative for congestion, sore throat and tinnitus.    Eyes:  Negative for pain, discharge and itching.   Respiratory:  Negative for cough, chest tightness, shortness of breath and wheezing.    Cardiovascular:  Negative for chest pain, palpitations and leg swelling.   Gastrointestinal:  Positive for abdominal distention, abdominal pain, diarrhea and nausea. Negative for blood in stool and vomiting.   Endocrine: Negative for cold intolerance, heat intolerance and polydipsia.   Genitourinary:  Negative for decreased urine volume, difficulty urinating, dysuria, flank pain, frequency, hematuria and urgency.   Musculoskeletal:  Negative for arthralgias and back pain.   Skin:  Negative for color change, pallor, rash and wound.   Neurological:  Negative for dizziness, tremors, seizures, facial asymmetry, weakness, light-headedness, numbness and headaches.   Psychiatric/Behavioral:  Negative for agitation, confusion, hallucinations and self-injury. The patient is not nervous/anxious and is not hyperactive.      Objective:     Vital Signs (Most Recent):  Temp: 99.5 °F (37.5 °C) (08/17/24 1712)  Pulse: 103 (08/17/24 1712)  Resp: 19 (08/17/24 1712)  BP: (!) 144/79 (08/17/24 1712)  SpO2: 96 % (08/17/24 1712) Vital Signs (24h Range):  Temp:  [98.5 °F (36.9 °C)-99.9 °F (37.7 °C)] 99.5 °F (37.5 °C)  Pulse:  [100-105] 103  Resp:  [17-19] 19  SpO2:  [95 %-97 %] 96 %  BP: (136-169)/(79-93) 144/79     Weight: 120.7 kg (266 lb 1.5 oz)  Body mass index is 37.11 kg/m².    Intake/Output Summary (Last 24 hours) at 8/17/2024 1733  Last data filed at 8/17/2024 1611  Gross per 24 hour   Intake 495.28 ml   Output 100 ml   Net 395.28 ml          Physical Exam  Constitutional:       General: He is in acute distress (painful distress).      Appearance: Normal appearance. He is obese. He is ill-appearing. He is not toxic-appearing or diaphoretic.   HENT:      Head: Normocephalic and atraumatic.      Nose: Nose normal. No congestion or rhinorrhea.      Mouth/Throat:      Mouth: Mucous membranes are dry.   Eyes:      General: No scleral icterus.     Extraocular Movements: Extraocular movements intact.      Conjunctiva/sclera: Conjunctivae normal.      Pupils: Pupils are equal, round, and reactive to light.   Cardiovascular:      Rate and Rhythm: Regular rhythm. Tachycardia present.      Pulses: Normal pulses.      Heart sounds: Normal heart sounds.   Pulmonary:      Effort: Pulmonary effort is normal. No respiratory distress.      Breath sounds: Normal breath sounds. No wheezing or rales.   Abdominal:      General: Abdomen is flat. Bowel sounds are normal. There is distension.      Palpations: Abdomen is soft.      Tenderness: There is abdominal tenderness (right lower quadrant). There is no right CVA tenderness, left CVA tenderness, guarding or rebound.   Musculoskeletal:         General: No swelling, tenderness, deformity or signs of injury.      Cervical back: Normal range of motion and neck supple. No rigidity or tenderness.      Right lower leg: No edema.      Left lower leg: No edema.   Skin:     General: Skin is warm and dry.      Coloration: Skin is not jaundiced.      Findings: No lesion or rash.   Neurological:      General: No focal deficit present.      Mental Status: He is alert and oriented to person, place, and time. Mental status is at baseline.      Cranial Nerves: No cranial nerve deficit.      Sensory: No sensory deficit.   Psychiatric:         Mood and Affect: Mood normal.         Behavior: Behavior normal.         Thought Content: Thought content normal.         Judgment: Judgment normal.             Significant Labs: All pertinent labs  within the past 24 hours have been reviewed.    Significant Imaging: I have reviewed all pertinent imaging results/findings within the past 24 hours.

## 2024-08-17 NOTE — PLAN OF CARE
Problem: Adult Inpatient Plan of Care  Goal: Plan of Care Review  Outcome: Progressing  Flowsheets (Taken 8/17/2024 0815)  Plan of Care Reviewed With:   patient   parent  Goal: Patient-Specific Goal (Individualized)  Outcome: Progressing     Problem: Adult Inpatient Plan of Care  Goal: Plan of Care Review  Outcome: Progressing  Flowsheets (Taken 8/17/2024 0815)  Plan of Care Reviewed With:   patient   parent     Problem: Adult Inpatient Plan of Care  Goal: Plan of Care Review  Outcome: Progressing  Flowsheets (Taken 8/17/2024 0815)  Plan of Care Reviewed With:   patient   parent     Problem: Adult Inpatient Plan of Care  Goal: Patient-Specific Goal (Individualized)  Outcome: Progressing     Problem: Adult Inpatient Plan of Care  Goal: Patient-Specific Goal (Individualized)  Outcome: Progressing

## 2024-08-17 NOTE — NURSING
[facility]  Nurses Note -- 4 Eyes      8/16/2024       Skin assessed on: Q Shift      [x] No Pressure Injuries Present    []Prevention Measures Documented    [] Yes LDA  for Pressure Injury Previously documented     [] Yes New Pressure Injury Discovered   [] LDA for New Pressure Injury Added      Attending RN:  Ashvin Kulkarni, RN     Second RN:  Loida    Received report from off going nurse, Loida. Patient received AAO X 3. Resp even and unlabored on room air. Bed locked in the lowest position with SR up X 2. Call light within reach. 20G SL note to the pts RAC. LR infusing. IV infiltrated and discontinued.

## 2024-08-17 NOTE — ASSESSMENT & PLAN NOTE
CT revealed perforated appendicitis noting be developing adjacent abscess   Initiate on IVF and empiric IV Zosyn.  Follow up on blood cultures  General surgery on board  Status post IR guided drainage of abscess on 08/15.    Fluid cultures with presumptive E coli

## 2024-08-18 PROBLEM — F17.200 TOBACCO DEPENDENCY: Status: ACTIVE | Noted: 2024-08-18

## 2024-08-18 LAB
BACTERIA BLD CULT: NORMAL
BACTERIA BLD CULT: NORMAL
BACTERIA SPEC ANAEROBE CULT: ABNORMAL

## 2024-08-18 PROCEDURE — 25000003 PHARM REV CODE 250: Performed by: STUDENT IN AN ORGANIZED HEALTH CARE EDUCATION/TRAINING PROGRAM

## 2024-08-18 PROCEDURE — 63600175 PHARM REV CODE 636 W HCPCS: Performed by: STUDENT IN AN ORGANIZED HEALTH CARE EDUCATION/TRAINING PROGRAM

## 2024-08-18 PROCEDURE — 25000003 PHARM REV CODE 250: Performed by: NURSE PRACTITIONER

## 2024-08-18 PROCEDURE — 11000001 HC ACUTE MED/SURG PRIVATE ROOM

## 2024-08-18 PROCEDURE — 99232 SBSQ HOSP IP/OBS MODERATE 35: CPT | Mod: ,,, | Performed by: SURGERY

## 2024-08-18 RX ADMIN — OXYCODONE 5 MG: 5 TABLET ORAL at 08:08

## 2024-08-18 RX ADMIN — SODIUM CHLORIDE, POTASSIUM CHLORIDE, SODIUM LACTATE AND CALCIUM CHLORIDE: 600; 310; 30; 20 INJECTION, SOLUTION INTRAVENOUS at 09:08

## 2024-08-18 RX ADMIN — PIPERACILLIN AND TAZOBACTAM 4.5 G: 4; .5 INJECTION, POWDER, LYOPHILIZED, FOR SOLUTION INTRAVENOUS; PARENTERAL at 04:08

## 2024-08-18 RX ADMIN — PANTOPRAZOLE SODIUM 40 MG: 40 INJECTION, POWDER, FOR SOLUTION INTRAVENOUS at 09:08

## 2024-08-18 RX ADMIN — OXYCODONE 5 MG: 5 TABLET ORAL at 05:08

## 2024-08-18 RX ADMIN — SODIUM CHLORIDE, POTASSIUM CHLORIDE, SODIUM LACTATE AND CALCIUM CHLORIDE: 600; 310; 30; 20 INJECTION, SOLUTION INTRAVENOUS at 05:08

## 2024-08-18 RX ADMIN — SODIUM CHLORIDE, POTASSIUM CHLORIDE, SODIUM LACTATE AND CALCIUM CHLORIDE: 600; 310; 30; 20 INJECTION, SOLUTION INTRAVENOUS at 10:08

## 2024-08-18 RX ADMIN — PIPERACILLIN AND TAZOBACTAM 4.5 G: 4; .5 INJECTION, POWDER, LYOPHILIZED, FOR SOLUTION INTRAVENOUS; PARENTERAL at 09:08

## 2024-08-18 RX ADMIN — HYDROMORPHONE HYDROCHLORIDE 1 MG: 1 INJECTION, SOLUTION INTRAMUSCULAR; INTRAVENOUS; SUBCUTANEOUS at 09:08

## 2024-08-18 RX ADMIN — SODIUM CHLORIDE, POTASSIUM CHLORIDE, SODIUM LACTATE AND CALCIUM CHLORIDE: 600; 310; 30; 20 INJECTION, SOLUTION INTRAVENOUS at 01:08

## 2024-08-18 RX ADMIN — HYDROMORPHONE HYDROCHLORIDE 1 MG: 1 INJECTION, SOLUTION INTRAMUSCULAR; INTRAVENOUS; SUBCUTANEOUS at 12:08

## 2024-08-18 RX ADMIN — HYDROMORPHONE HYDROCHLORIDE 1 MG: 1 INJECTION, SOLUTION INTRAMUSCULAR; INTRAVENOUS; SUBCUTANEOUS at 10:08

## 2024-08-18 RX ADMIN — PANTOPRAZOLE SODIUM 40 MG: 40 INJECTION, POWDER, FOR SOLUTION INTRAVENOUS at 08:08

## 2024-08-18 RX ADMIN — HYDROMORPHONE HYDROCHLORIDE 1 MG: 1 INJECTION, SOLUTION INTRAMUSCULAR; INTRAVENOUS; SUBCUTANEOUS at 04:08

## 2024-08-18 RX ADMIN — SUCRALFATE 1 G: 1 SUSPENSION ORAL at 12:08

## 2024-08-18 RX ADMIN — PIPERACILLIN AND TAZOBACTAM 4.5 G: 4; .5 INJECTION, POWDER, LYOPHILIZED, FOR SOLUTION INTRAVENOUS; PARENTERAL at 12:08

## 2024-08-18 RX ADMIN — SUCRALFATE 1 G: 1 SUSPENSION ORAL at 06:08

## 2024-08-18 RX ADMIN — SUCRALFATE 1 G: 1 SUSPENSION ORAL at 05:08

## 2024-08-18 NOTE — ASSESSMENT & PLAN NOTE
CT revealed perforated appendicitis noting be developing an adjacent abscess   Initiate on IVF and empiric IV Zosyn.  Blood culture w/ NGTD  General surgery on board  Pt Status post IR guided drainage of abscess on 08/15  Fluid culture growing E-coli & Prevotella  Cont IV Zosyn for now

## 2024-08-18 NOTE — SUBJECTIVE & OBJECTIVE
Interval History: Pt continues to endorse having significant abdominal pain in his right lower quadrant area, gets worse w/ movement and deep breathing.  No fever, chills or rigors reported. Tolerating his clear liquids well w/o issues.    Review of Systems   Constitutional:  Positive for appetite change. Negative for chills, diaphoresis and fatigue.   Respiratory:  Negative for cough, chest tightness, shortness of breath and wheezing.    Cardiovascular:  Negative for chest pain, palpitations and leg swelling.   Gastrointestinal:  Positive for abdominal distention, abdominal pain, diarrhea and nausea. Negative for blood in stool and vomiting.   Genitourinary:  Negative for decreased urine volume, difficulty urinating, dysuria, flank pain, frequency, hematuria and urgency.   Musculoskeletal:  Negative for back pain.   Skin:  Negative for color change, pallor, rash and wound.   Neurological:  Negative for light-headedness.   Psychiatric/Behavioral:  Negative for agitation and confusion.      Objective:     Vital Signs (Most Recent):  Temp: 98.4 °F (36.9 °C) (08/18/24 1128)  Pulse: 101 (08/18/24 1128)  Resp: 19 (08/18/24 1128)  BP: (!) 166/90 (08/18/24 1128)  SpO2: 96 % (08/18/24 1128) Vital Signs (24h Range):  Temp:  [97.6 °F (36.4 °C)-99.9 °F (37.7 °C)] 98.4 °F (36.9 °C)  Pulse:  [] 101  Resp:  [18-20] 19  SpO2:  [95 %-100 %] 96 %  BP: (144-169)/(79-96) 166/90     Weight: 120.7 kg (266 lb 1.5 oz)  Body mass index is 37.11 kg/m².    Intake/Output Summary (Last 24 hours) at 8/18/2024 1311  Last data filed at 8/18/2024 0920  Gross per 24 hour   Intake 1912.95 ml   Output 400 ml   Net 1512.95 ml         Physical Exam  Constitutional:       General: He is not in acute distress.     Appearance: Normal appearance. He is obese. He is ill-appearing. He is not toxic-appearing or diaphoretic.   HENT:      Head: Normocephalic and atraumatic.      Nose: Nose normal. No congestion or rhinorrhea.      Mouth/Throat:       Mouth: Mucous membranes are dry.   Eyes:      General: No scleral icterus.     Extraocular Movements: Extraocular movements intact.      Conjunctiva/sclera: Conjunctivae normal.      Pupils: Pupils are equal, round, and reactive to light.   Cardiovascular:      Rate and Rhythm: Regular rhythm. Tachycardia present.      Pulses: Normal pulses.      Heart sounds: Normal heart sounds.   Pulmonary:      Effort: Pulmonary effort is normal. No respiratory distress.      Breath sounds: Normal breath sounds. No wheezing or rales.   Abdominal:      General: Abdomen is flat. Bowel sounds are normal. There is no distension.      Palpations: Abdomen is soft.      Tenderness: There is abdominal tenderness (right lower quadrant). There is no right CVA tenderness, left CVA tenderness, guarding or rebound.   Musculoskeletal:         General: No swelling, tenderness, deformity or signs of injury.      Cervical back: Normal range of motion and neck supple. No rigidity or tenderness.      Right lower leg: No edema.      Left lower leg: No edema.   Skin:     General: Skin is warm and dry.      Coloration: Skin is not jaundiced.      Findings: No lesion or rash.   Neurological:      General: No focal deficit present.      Mental Status: He is alert and oriented to person, place, and time. Mental status is at baseline.      Cranial Nerves: No cranial nerve deficit.      Sensory: No sensory deficit.   Psychiatric:         Mood and Affect: Mood normal.         Behavior: Behavior normal.          Significant Labs: All pertinent labs within the past 24 hours have been reviewed.    Significant Imaging: I have reviewed all pertinent imaging results/findings within the past 24 hours.

## 2024-08-18 NOTE — PROGRESS NOTES
[facility]  Progress Note    Patient Name: Willian Ron  MRN: [id number]  Patient Class: IP- Inpatient   Admission Date: 8/14/2024  Length of Stay: 4 days  Attending Physician: Dixon Jain MD  Primary Care Provider: No primary care provider on file.        Subjective:     Principal Problem:Acute appendicitis      HPI:  Patient is a 34-year-old male with no PMH except for marijauna use who presented to  [facility] on 08/14/24 for further evaluation of generalised abdominal pain and subjective fever x 4 days associated with nausea and non bilious non bloody vomiting Last BM  days ago Unable to tolerate PO intake. Denied CP/SOB    During evaluation in the ER, patient was found to be hypertensive (182/100), tachycardic (126), T-max 100.5° F. labs revealed WBC 17.92, sodium 135, bicarb 22, creatinine 1.1, total bilirubin 1.1.  Lactic acid 1.9.  X-ray abdomen negative.  CT abdomen revealed findings concerning for perforated appendicitis with developing adjacent abscess.  Concern for partial SBO.  Hepatic steatosis.  Patient was given 1 L NS, 4 mg Zofran, 8 mg morphine, IV Zosyn.  General surgery was consulted in ED. admitted to hospital medicine for further management    Overview/Hospital Course:  34-year-old male with no PMH except for marijauna use who was admitted for sepsis secondary to acute perforated appendicitis with developing adjacent abscess as evidenced on CT. Initiated on IV Zosyn.  General surgery/GI on board.  Status post IR guided drainage of abscess on 08/15. Blood cultures w/ NGTD.  Fluid cultures grew E coli and Prevotella.  Patient was noted to have dark red emesis 08/17, initiated on IV Protonix.  Hemoglobin at 15.6.  X-ray KUB unremarkable.  Bowel movement said to be loose times 5 episodes in the last 24 hours, still having abdominal pain in the right lower quadrant.  No fever, chills or rigors reported. Currently receiving IV Abx coverage w/ IV Zosyn, advanced  diet to full liquids 08/18.    Interval History: Pt continues to endorse having significant abdominal pain in his right lower quadrant area, gets worse w/ movement and deep breathing.  No fever, chills or rigors reported. Tolerating his clear liquids well w/o issues.    Review of Systems   Constitutional:  Positive for appetite change. Negative for chills, diaphoresis and fatigue.   Respiratory:  Negative for cough, chest tightness, shortness of breath and wheezing.    Cardiovascular:  Negative for chest pain, palpitations and leg swelling.   Gastrointestinal:  Positive for abdominal distention, abdominal pain, diarrhea and nausea. Negative for blood in stool and vomiting.   Genitourinary:  Negative for decreased urine volume, difficulty urinating, dysuria, flank pain, frequency, hematuria and urgency.   Musculoskeletal:  Negative for back pain.   Skin:  Negative for color change, pallor, rash and wound.   Neurological:  Negative for light-headedness.   Psychiatric/Behavioral:  Negative for agitation and confusion.      Objective:     Vital Signs (Most Recent):  Temp: 98.4 °F (36.9 °C) (08/18/24 1128)  Pulse: 101 (08/18/24 1128)  Resp: 19 (08/18/24 1128)  BP: (!) 166/90 (08/18/24 1128)  SpO2: 96 % (08/18/24 1128) Vital Signs (24h Range):  Temp:  [97.6 °F (36.4 °C)-99.9 °F (37.7 °C)] 98.4 °F (36.9 °C)  Pulse:  [] 101  Resp:  [18-20] 19  SpO2:  [95 %-100 %] 96 %  BP: (144-169)/(79-96) 166/90     Weight: 120.7 kg (266 lb 1.5 oz)  Body mass index is 37.11 kg/m².    Intake/Output Summary (Last 24 hours) at 8/18/2024 1311  Last data filed at 8/18/2024 0920  Gross per 24 hour   Intake 1912.95 ml   Output 400 ml   Net 1512.95 ml         Physical Exam  Constitutional:       General: He is not in acute distress.     Appearance: Normal appearance. He is obese. He is ill-appearing. He is not toxic-appearing or diaphoretic.   HENT:      Head: Normocephalic and atraumatic.      Nose: Nose normal. No congestion or  rhinorrhea.      Mouth/Throat:      Mouth: Mucous membranes are dry.   Eyes:      General: No scleral icterus.     Extraocular Movements: Extraocular movements intact.      Conjunctiva/sclera: Conjunctivae normal.      Pupils: Pupils are equal, round, and reactive to light.   Cardiovascular:      Rate and Rhythm: Regular rhythm. Tachycardia present.      Pulses: Normal pulses.      Heart sounds: Normal heart sounds.   Pulmonary:      Effort: Pulmonary effort is normal. No respiratory distress.      Breath sounds: Normal breath sounds. No wheezing or rales.   Abdominal:      General: Abdomen is flat. Bowel sounds are normal. There is no distension.      Palpations: Abdomen is soft.      Tenderness: There is abdominal tenderness (right lower quadrant). There is no right CVA tenderness, left CVA tenderness, guarding or rebound.   Musculoskeletal:         General: No swelling, tenderness, deformity or signs of injury.      Cervical back: Normal range of motion and neck supple. No rigidity or tenderness.      Right lower leg: No edema.      Left lower leg: No edema.   Skin:     General: Skin is warm and dry.      Coloration: Skin is not jaundiced.      Findings: No lesion or rash.   Neurological:      General: No focal deficit present.      Mental Status: He is alert and oriented to person, place, and time. Mental status is at baseline.      Cranial Nerves: No cranial nerve deficit.      Sensory: No sensory deficit.   Psychiatric:         Mood and Affect: Mood normal.         Behavior: Behavior normal.          Significant Labs: All pertinent labs within the past 24 hours have been reviewed.    Significant Imaging: I have reviewed all pertinent imaging results/findings within the past 24 hours.    Assessment/Plan:      * Acute appendicitis  CT revealed perforated appendicitis noting be developing an adjacent abscess   Initiate on IVF and empiric IV Zosyn.  Blood culture w/ NGTD  General surgery on board  Pt Status post  IR guided drainage of abscess on 08/15  Fluid culture growing E-coli & Prevotella  Cont IV Zosyn for now      Tobacco dependency  Dangers of cigarette smoking were reviewed with patient in detail. Patient was Counseled for 3-10 minutes.    Sepsis  This patient does have evidence of infective focus (perforated appendix)  My overall impression is sepsis.  Source: Abdominal  Antibiotics given-   Antibiotics (72h ago, onward)      Start     Stop Route Frequency Ordered    08/15/24 0030  piperacillin-tazobactam (ZOSYN) 4.5 g in D5W 100 mL IVPB (MB+)         -- IV Every 8 hours (non-standard times) 08/14/24 1702              Will Not start Pressors- Levophed for MAP of 65  Source control achieved by: Abx IVF    Marijuana use  Lifestyle modification  Counselled on cessation    Preoperative clearance  Modify RCRI index of 0 with class I risk and 3.9% risk of death/MI/cardiac arrest      Hepatic steatosis  CT evidence of hepatic steatosis.  LFTs WNL.  Continue lifestyle modification   Weight loss may be helpful.    Hyponatremia  Hyponatremia is likely due to Dehydration/hypovolemia. The patient's most recent sodium results are listed below.  Recent Labs     08/16/24  0716 08/17/24  0501   * 137       Plan  Hyponatremia likely due to hypovolemic hyponatremia   Currently improved.  Continue monitoring daily.    Leukocytosis  Likely due to acute appendicitis  Currently on antibiotics   Expect to improve with improvement in infection      VTE Risk Mitigation (From admission, onward)           Ordered     IP VTE HIGH RISK PATIENT  Once         08/14/24 1704     Place sequential compression device  Until discontinued         08/14/24 1704                    Discharge Planning   JANN:      Code Status: Full Code   Is the patient medically ready for discharge?:     Reason for patient still in hospital (select all that apply): Patient trending condition  Discharge Plan A: Home with family                  Dixon Jain  MD  Department of Hospital Medicine   [facility] [place] - Med Surg

## 2024-08-18 NOTE — NURSING
[facility] [place]  Nurses Note -- 4 Eyes        8/17/2024         Skin assessed on: Q Shift        [x] No Pressure Injuries Present                 []Prevention Measures Documented     [] Yes LDA  for Pressure Injury Previously documented      [] Yes New Pressure Injury Discovered              [] LDA for New Pressure Injury Added        Attending RN:  Ashvin Kulkarni, RN      Second RN:  Mireya       Received report from off going nurse, Mireya. Patient received AAO X 3. Resp even and unlabored on room air. Bed locked in the lowest position with SR up X 2. Call light within reach. LR infusing at 100 ml/h via NOHEMY midline.

## 2024-08-18 NOTE — ASSESSMENT & PLAN NOTE
Dangers of cigarette smoking were reviewed with patient in detail. Patient was Counseled for 3-10 minutes.

## 2024-08-18 NOTE — ASSESSMENT & PLAN NOTE
Hyponatremia is likely due to Dehydration/hypovolemia. The patient's most recent sodium results are listed below.  Recent Labs     08/16/24  0716 08/17/24  0501   * 137       Plan  Hyponatremia likely due to hypovolemic hyponatremia   Currently improved.  Continue monitoring daily.

## 2024-08-18 NOTE — ASSESSMENT & PLAN NOTE
This patient does have evidence of infective focus (perforated appendix)  My overall impression is sepsis.  Source: Abdominal  Antibiotics given-   Antibiotics (72h ago, onward)      Start     Stop Route Frequency Ordered    08/15/24 0030  piperacillin-tazobactam (ZOSYN) 4.5 g in D5W 100 mL IVPB (MB+)         -- IV Every 8 hours (non-standard times) 08/14/24 1702              Will Not start Pressors- Levophed for MAP of 65  Source control achieved by: Abx IVF

## 2024-08-18 NOTE — PROGRESS NOTES
Progress Note  General Surgery    Admit Date: 8/14/2024  S/P: * No surgery found *    Post-operative Day:      Hospital Day: 5    SUBJECTIVE:     No acute events overnight. Patient states tolerating diet, +BM  OBJECTIVE:     Vital Signs (Most Recent)  Temp: 98.4 °F (36.9 °C) (08/18/24 1128)  Pulse: 101 (08/18/24 1128)  Resp: 19 (08/18/24 1128)  BP: (!) 166/90 (08/18/24 1128)  SpO2: 96 % (08/18/24 1128)    Vital Signs Range (Last 24H):  Temp:  [97.6 °F (36.4 °C)-99.9 °F (37.7 °C)]   Pulse:  []   Resp:  [18-20]   BP: (144-169)/(79-96)   SpO2:  [95 %-100 %]     I & O (Last 24H):  Intake/Output Summary (Last 24 hours) at 8/18/2024 1617  Last data filed at 8/18/2024 1229  Gross per 24 hour   Intake 1777.67 ml   Output 600 ml   Net 1177.67 ml       Physical Exam:  Gen: NAD   HEENT: NCAT  Pulm: unlabored, symmetrical   Abd: Soft, nttp. No rebound, guarding.       Laboratory:  Labs ordered but not drawn    ASSESSMENT/PLAN:     Assessment/Plan:  Increase diet as tolerated    Hussain Agarwal M.D., F.A.C.S.  [id number] and General Surgery  [place]

## 2024-08-19 LAB
ALBUMIN SERPL BCP-MCNC: 2.8 G/DL (ref 3.5–5.2)
ALP SERPL-CCNC: 94 U/L (ref 55–135)
ALT SERPL W/O P-5'-P-CCNC: 56 U/L (ref 10–44)
ANION GAP SERPL CALC-SCNC: 11 MMOL/L (ref 8–16)
AST SERPL-CCNC: 38 U/L (ref 10–40)
BASOPHILS # BLD AUTO: ABNORMAL K/UL (ref 0–0.2)
BASOPHILS NFR BLD: 0 % (ref 0–1.9)
BILIRUB SERPL-MCNC: 0.9 MG/DL (ref 0.1–1)
BUN SERPL-MCNC: 7 MG/DL (ref 6–20)
CALCIUM SERPL-MCNC: 9.1 MG/DL (ref 8.7–10.5)
CHLORIDE SERPL-SCNC: 97 MMOL/L (ref 95–110)
CO2 SERPL-SCNC: 30 MMOL/L (ref 23–29)
CREAT SERPL-MCNC: 0.8 MG/DL (ref 0.5–1.4)
DIFFERENTIAL METHOD BLD: ABNORMAL
EOSINOPHIL # BLD AUTO: ABNORMAL K/UL (ref 0–0.5)
EOSINOPHIL NFR BLD: 0 % (ref 0–8)
ERYTHROCYTE [DISTWIDTH] IN BLOOD BY AUTOMATED COUNT: 13.9 % (ref 11.5–14.5)
EST. GFR  (NO RACE VARIABLE): >60 ML/MIN/1.73 M^2
GLUCOSE SERPL-MCNC: 91 MG/DL (ref 70–110)
HCT VFR BLD AUTO: 44.5 % (ref 40–54)
HGB BLD-MCNC: 14.7 G/DL (ref 14–18)
IMM GRANULOCYTES # BLD AUTO: ABNORMAL K/UL (ref 0–0.04)
IMM GRANULOCYTES NFR BLD AUTO: ABNORMAL % (ref 0–0.5)
LYMPHOCYTES # BLD AUTO: ABNORMAL K/UL (ref 1–4.8)
LYMPHOCYTES NFR BLD: 16 % (ref 18–48)
MCH RBC QN AUTO: 28.6 PG (ref 27–31)
MCHC RBC AUTO-ENTMCNC: 33 G/DL (ref 32–36)
MCV RBC AUTO: 87 FL (ref 82–98)
METAMYELOCYTES NFR BLD MANUAL: 1 %
MONOCYTES # BLD AUTO: ABNORMAL K/UL (ref 0.3–1)
MONOCYTES NFR BLD: 17 % (ref 4–15)
MYELOCYTES NFR BLD MANUAL: 1 %
NEUTROPHILS NFR BLD: 57 % (ref 38–73)
NEUTS BAND NFR BLD MANUAL: 8 %
NRBC BLD-RTO: 0 /100 WBC
PLATELET # BLD AUTO: 297 K/UL (ref 150–450)
PLATELET BLD QL SMEAR: ABNORMAL
PMV BLD AUTO: 10.6 FL (ref 9.2–12.9)
POTASSIUM SERPL-SCNC: 2.8 MMOL/L (ref 3.5–5.1)
PROT SERPL-MCNC: 6.4 G/DL (ref 6–8.4)
RBC # BLD AUTO: 5.14 M/UL (ref 4.6–6.2)
SODIUM SERPL-SCNC: 138 MMOL/L (ref 136–145)
WBC # BLD AUTO: 21.53 K/UL (ref 3.9–12.7)

## 2024-08-19 PROCEDURE — 36415 COLL VENOUS BLD VENIPUNCTURE: CPT | Performed by: INTERNAL MEDICINE

## 2024-08-19 PROCEDURE — 63600175 PHARM REV CODE 636 W HCPCS: Performed by: STUDENT IN AN ORGANIZED HEALTH CARE EDUCATION/TRAINING PROGRAM

## 2024-08-19 PROCEDURE — 99223 1ST HOSP IP/OBS HIGH 75: CPT | Mod: ,,, | Performed by: INTERNAL MEDICINE

## 2024-08-19 PROCEDURE — 80053 COMPREHEN METABOLIC PANEL: CPT | Performed by: INTERNAL MEDICINE

## 2024-08-19 PROCEDURE — A4216 STERILE WATER/SALINE, 10 ML: HCPCS | Performed by: STUDENT IN AN ORGANIZED HEALTH CARE EDUCATION/TRAINING PROGRAM

## 2024-08-19 PROCEDURE — 99233 SBSQ HOSP IP/OBS HIGH 50: CPT | Mod: ,,, | Performed by: SURGERY

## 2024-08-19 PROCEDURE — 25000003 PHARM REV CODE 250: Performed by: STUDENT IN AN ORGANIZED HEALTH CARE EDUCATION/TRAINING PROGRAM

## 2024-08-19 PROCEDURE — 11000001 HC ACUTE MED/SURG PRIVATE ROOM

## 2024-08-19 PROCEDURE — 25000003 PHARM REV CODE 250

## 2024-08-19 PROCEDURE — 25000003 PHARM REV CODE 250: Performed by: NURSE PRACTITIONER

## 2024-08-19 PROCEDURE — 25500020 PHARM REV CODE 255: Performed by: INTERNAL MEDICINE

## 2024-08-19 PROCEDURE — 25000003 PHARM REV CODE 250: Performed by: INTERNAL MEDICINE

## 2024-08-19 PROCEDURE — 85027 COMPLETE CBC AUTOMATED: CPT | Performed by: INTERNAL MEDICINE

## 2024-08-19 PROCEDURE — 85007 BL SMEAR W/DIFF WBC COUNT: CPT | Performed by: INTERNAL MEDICINE

## 2024-08-19 RX ORDER — KETOROLAC TROMETHAMINE 10 MG/1
10 TABLET, FILM COATED ORAL EVERY 6 HOURS
Status: COMPLETED | OUTPATIENT
Start: 2024-08-19 | End: 2024-08-24

## 2024-08-19 RX ORDER — POTASSIUM CHLORIDE 20 MEQ/1
40 TABLET, EXTENDED RELEASE ORAL
Status: COMPLETED | OUTPATIENT
Start: 2024-08-19 | End: 2024-08-19

## 2024-08-19 RX ADMIN — PIPERACILLIN AND TAZOBACTAM 4.5 G: 4; .5 INJECTION, POWDER, LYOPHILIZED, FOR SOLUTION INTRAVENOUS; PARENTERAL at 09:08

## 2024-08-19 RX ADMIN — IOHEXOL 100 ML: 350 INJECTION, SOLUTION INTRAVENOUS at 09:08

## 2024-08-19 RX ADMIN — SODIUM CHLORIDE, POTASSIUM CHLORIDE, SODIUM LACTATE AND CALCIUM CHLORIDE: 600; 310; 30; 20 INJECTION, SOLUTION INTRAVENOUS at 07:08

## 2024-08-19 RX ADMIN — SUCRALFATE 1 G: 1 SUSPENSION ORAL at 11:08

## 2024-08-19 RX ADMIN — PANTOPRAZOLE SODIUM 40 MG: 40 INJECTION, POWDER, FOR SOLUTION INTRAVENOUS at 09:08

## 2024-08-19 RX ADMIN — SUCRALFATE 1 G: 1 SUSPENSION ORAL at 05:08

## 2024-08-19 RX ADMIN — Medication 10 ML: at 05:08

## 2024-08-19 RX ADMIN — PIPERACILLIN AND TAZOBACTAM 4.5 G: 4; .5 INJECTION, POWDER, LYOPHILIZED, FOR SOLUTION INTRAVENOUS; PARENTERAL at 04:08

## 2024-08-19 RX ADMIN — SUCRALFATE 1 G: 1 SUSPENSION ORAL at 12:08

## 2024-08-19 RX ADMIN — KETOROLAC TROMETHAMINE 10 MG: 10 TABLET, FILM COATED ORAL at 05:08

## 2024-08-19 RX ADMIN — HYDROMORPHONE HYDROCHLORIDE 1 MG: 1 INJECTION, SOLUTION INTRAMUSCULAR; INTRAVENOUS; SUBCUTANEOUS at 10:08

## 2024-08-19 RX ADMIN — KETOROLAC TROMETHAMINE 10 MG: 10 TABLET, FILM COATED ORAL at 11:08

## 2024-08-19 RX ADMIN — HYDROMORPHONE HYDROCHLORIDE 1 MG: 1 INJECTION, SOLUTION INTRAMUSCULAR; INTRAVENOUS; SUBCUTANEOUS at 06:08

## 2024-08-19 RX ADMIN — PIPERACILLIN AND TAZOBACTAM 4.5 G: 4; .5 INJECTION, POWDER, LYOPHILIZED, FOR SOLUTION INTRAVENOUS; PARENTERAL at 12:08

## 2024-08-19 RX ADMIN — POTASSIUM CHLORIDE 40 MEQ: 1500 TABLET, EXTENDED RELEASE ORAL at 12:08

## 2024-08-19 RX ADMIN — POTASSIUM CHLORIDE 40 MEQ: 1500 TABLET, EXTENDED RELEASE ORAL at 09:08

## 2024-08-19 RX ADMIN — Medication 10 ML: at 12:08

## 2024-08-19 RX ADMIN — KETOROLAC TROMETHAMINE 10 MG: 10 TABLET, FILM COATED ORAL at 12:08

## 2024-08-19 RX ADMIN — HYDROMORPHONE HYDROCHLORIDE 1 MG: 1 INJECTION, SOLUTION INTRAMUSCULAR; INTRAVENOUS; SUBCUTANEOUS at 01:08

## 2024-08-19 RX ADMIN — IOHEXOL 15 ML: 300 INJECTION, SOLUTION INTRAVENOUS at 09:08

## 2024-08-19 NOTE — ASSESSMENT & PLAN NOTE
Hyponatremia is likely due to Dehydration/hypovolemia. The patient's most recent sodium results are listed below.  Recent Labs     08/17/24  0501 08/19/24  0437    138       Plan  Hyponatremia likely due to hypovolemic hyponatremia   Currently improved.  Continue monitoring daily.

## 2024-08-19 NOTE — CONSULTS
Inpatient consult to Interventional Radiology  Consult performed by: Layla Hunter MD  Consult ordered by: Obed Ding MD  Reason for consult: Drainage of fluid collection  Assessment/Recommendations: Imaging review by IR demonstrated 3-4 non organized fluid collections, none of which us > 2.2 cm when measured independently. Drainage of collections < 3 cm or non-organized collections is not generally indicated. Please feel free to reach out to IR if further discussion is needed.

## 2024-08-19 NOTE — PROGRESS NOTES
Surgery Progress Note    Willian Ron is a 34 y.o. year old male in hospital for acute appendicitis w abscess now s/p IR drainage aspiration (too small to place a catheter).    NAEON, VSS, afebrile  WBC uptrending  Pain persistently severe in RLQ on palpation, worse with movement. Otherwise well controlled with current pain regimen  Now having bowel function and tolerating diet  Discussed plan of care with family, continue non op treatment, consult IR and antibiotics, repeat CT    ROS:  Negative except above    PE:  Vitals:    08/19/24 0459 08/19/24 0633 08/19/24 0740 08/19/24 1051   BP: (!) 167/93  (!) 164/81    BP Location: Left arm      Patient Position: Lying      Pulse: 96  98    Resp: 18 18 19 18   Temp: 99.5 °F (37.5 °C)  99.1 °F (37.3 °C)    TempSrc: Oral      SpO2: 96%  (!) 93%    Weight:       Height:         NAD  Calm when lying flat but grimaces when moving around in bed  No belabored breathing  No skin changes  Abd distended. No peritonitis, just generally uncomfortable during exam.    Significant Diagnostic Studies: Labs: BMP:   Recent Labs   Lab 08/19/24  0437   GLU 91      K 2.8*   CL 97   CO2 30*   BUN 7   CREATININE 0.8   CALCIUM 9.1    and CBC   Recent Labs   Lab 08/19/24  0437   WBC 21.53*   HGB 14.7   HCT 44.5        A/P:  Willian Ron is a 34 y.o. year old male  with acute appendicitis w abscess s/p IR drainage. Stable with non op treatment (IR aspiration, antibiotics), and plan for interval appendectomy in the future. Having bowel function and tolerating diet    - WBC uptrending, pain persistent  - Repeat CT A/P this AM showing new multiple abscesses, 4.9 x 1.9 cm in the midline lower abdomen, 2.9 x 2.8 cm in the right lower quadrant, and 4.4 x 2.1 cm in right hemipelvis  - Re-consult IR for assess for drainage  - Daily CBC/BMP. Replete lytes prn  - Hypertensive to 160's, possibly pain related  - MMPC, added toradol  - Diet: NPO pending IR   - IVF: LR @ 100  - PPx: LVX,  SCDs, protonix  - Abx: Zosyn. ID on board    Patient seen and case discussed with attending staff, Dr. Mancia. Attestation to follow.    Obed Ding MD  General Surgery Resident  [facility]

## 2024-08-19 NOTE — NURSING
[facility], [facility] [place]  Nurses Note -- 4 Eyes      8/18/2024       Skin assessed on: Q Shift      [x] No Pressure Injuries Present    []Prevention Measures Documented    [] Yes LDA  for Pressure Injury Previously documented     [] Yes New Pressure Injury Discovered   [] LDA for New Pressure Injury Added      Attending RN:  Ashvin Kulkarni, RN     Second RN:  Mireya        Received report from off going nurse, Mireya. Patient received AAO X 3. Resp even and unlabored. Bed locked in the lowest position with SR up X 2. Call light within reach. Midline noted to the pts NOHEMY.

## 2024-08-19 NOTE — ASSESSMENT & PLAN NOTE
Likely due to acute perforated appendicitis w/ surrounding abscesses  Currently on antibiotics   Possible plan for surgery

## 2024-08-19 NOTE — CONSULTS
"[facility] Surg  Infectious Disease  Consult Note    Patient Name: Willian Ron  MRN: [id number]  Admission Date: 8/14/2024  Hospital Length of Stay: 5 days  Attending Physician: Dixon Jain MD  Primary Care Provider: No primary care provider on file.     Isolation Status: No active isolations    Patient information was obtained from patient, relative(s), past medical records, and ER records.      Inpatient consult to Infectious Diseases  Consult performed by: Jamar Charles MD  Consult ordered by: Dixon Jain MD        Assessment/Plan:     Leukocytosis    35 yo man admitted with perforated appendicitis. Drained via IR with E.coli and Prevotella growing, so far. On Zosyn. Developed increased WBC and found to have new collections. Discussed with Gen Surgery - for repeat IR aspiration and observation on abx.    Recommendations  Continue Zosyn  Follow repeat culture results, trend WBC    Thank you for your consult. I will follow-up with patient. Please contact us if you have any additional questions.    Jamar Charles MD  Infectious Disease  [facility]    Subjective:     Principal Problem: Acute appendicitis    HPI: 34 y.o. man with no pertinent PMHx who presents with acute on-set of abdominal pain, fever and N/V x 5 days with new imaging consistent with acute appendicitis c/b perforation and formation of a 2.5 AP x 3.0 TV x 2.5 CC-cm periappendiceal air-fluid collection in the central, lower abdomen. Started on Zosyn and seen by Surgery. IR was consulted for drain placement and a drain was placed. Culture growing E.coli and an anaerobe. The patient's WBC has increased on appropriate abx and a repeat CT revealed "perforated appendicitis with multiple abscesses, progressed from 08/14/2024." The patient was seen with Gen Surgery. Feeling better despite WBC. Currently on Zosyn.       Past Medical History:   Diagnosis Date    Nausea & vomiting 8/16/2024       History reviewed. No " pertinent surgical history.    Review of patient's allergies indicates:   Allergen Reactions    Grass pollen-june grass standard        Medications:  No medications prior to admission.     Antibiotics (From admission, onward)      Start     Stop Route Frequency Ordered    08/15/24 0030  piperacillin-tazobactam (ZOSYN) 4.5 g in D5W 100 mL IVPB (MB+)         -- IV Every 8 hours (non-standard times) 08/14/24 1702          Antifungals (From admission, onward)      None          Antivirals (From admission, onward)      None               There is no immunization history on file for this patient.    Family History    None       Social History     Socioeconomic History    Marital status: Single   Tobacco Use    Smoking status: Every Day     Current packs/day: 0.50     Average packs/day: 0.5 packs/day for 7.0 years (3.5 ttl pk-yrs)     Types: Cigarettes    Smokeless tobacco: Never   Substance and Sexual Activity    Alcohol use: No    Drug use: No    Sexual activity: Yes     Partners: Female     Birth control/protection: Condom     Review of Systems   All other systems reviewed and are negative.    Objective:     Vital Signs (Most Recent):  Temp: 99.1 °F (37.3 °C) (08/19/24 0740)  Pulse: 98 (08/19/24 0740)  Resp: 18 (08/19/24 1051)  BP: (!) 164/81 (08/19/24 0740)  SpO2: (!) 93 % (08/19/24 0740) Vital Signs (24h Range):  Temp:  [98.5 °F (36.9 °C)-99.5 °F (37.5 °C)] 99.1 °F (37.3 °C)  Pulse:  [] 98  Resp:  [17-19] 18  SpO2:  [93 %-97 %] 93 %  BP: (159-167)/(81-95) 164/81     Weight: 120.7 kg (266 lb 1.5 oz)  Body mass index is 37.11 kg/m².    Estimated Creatinine Clearance: 170.4 mL/min (based on SCr of 0.8 mg/dL).     Physical Exam  Vitals and nursing note reviewed.   Constitutional:       General: He is not in acute distress.     Appearance: Normal appearance. He is not ill-appearing, toxic-appearing or diaphoretic.   HENT:      Head: Normocephalic and atraumatic.   Eyes:      Extraocular Movements: Extraocular  movements intact.      Pupils: Pupils are equal, round, and reactive to light.   Pulmonary:      Breath sounds: No rhonchi or rales.   Abdominal:      General: There is distension.      Tenderness: There is abdominal tenderness. There is no guarding or rebound.   Neurological:      General: No focal deficit present.      Mental Status: He is alert and oriented to person, place, and time.   Psychiatric:         Mood and Affect: Mood normal.         Behavior: Behavior normal.         Thought Content: Thought content normal.         Judgment: Judgment normal.          Significant Labs: CBC:   Recent Labs   Lab 08/19/24  0437   WBC 21.53*   HGB 14.7   HCT 44.5        Microbiology Results (last 7 days)       Procedure Component Value Units Date/Time    Blood Culture #1 **CANNOT BE ORDERED STAT** [[id number]] Collected: 08/14/24 1452    Order Status: Completed Specimen: Blood from Peripheral, Hand, Left Updated: 08/18/24 1703     Blood Culture, Routine No Growth after 4 days.    Blood Culture #2 **CANNOT BE ORDERED STAT** [[id number]] Collected: 08/14/24 1452    Order Status: Completed Specimen: Blood from Peripheral, Hand, Left Updated: 08/18/24 1703     Blood Culture, Routine No Growth after 4 days.    Culture, Anaerobic [[id number]]  (Abnormal) Collected: 08/15/24 1703    Order Status: Completed Specimen: Abscess from Abdomen Updated: 08/18/24 1212     Anaerobic Culture PREVOTELLA LOESCHEII  Many      Culture, Body Fluid (Aerobic) w/ Gram Stain [[id number]]  (Abnormal)  (Susceptibility) Collected: 08/15/24 1703    Order Status: Completed Specimen: Body Fluid from Abdominal Fluid Updated: 08/17/24 0718     AEROBIC CULTURE - FLUID ESCHERICHIA COLI  Many       Gram Stain Result Few WBC's      Many Gram positive rods      Moderate Gram negative rods      Few Gram positive cocci in chains      Rare Gram positive cocci in pairs    Fungus culture [[id number]] Collected: 08/15/24 1703    Order Status: Sent Specimen:  Abscess from Abdomen Updated: 08/15/24 1860            Significant Imaging: I have reviewed all pertinent imaging results/findings within the past 24 hours.

## 2024-08-19 NOTE — ASSESSMENT & PLAN NOTE
35 yo man admitted with perforated appendicitis. Drained via IR with E.coli and Prevotella growing, so far. On Zosyn. Developed increased WBC and found to have new collections. Discussed with Gen Surgery - for repeat IR aspiration and observation on abx.    Recommendations  Continue Zosyn  Follow repeat culture results, trend WBC

## 2024-08-19 NOTE — ASSESSMENT & PLAN NOTE
This patient does have evidence of infective focus (perforated appendix)  My overall impression is sepsis.  Source: Abdominal  Antibiotics given-   Antibiotics (72h ago, onward)      Start     Stop Route Frequency Ordered    08/15/24 0030  piperacillin-tazobactam (ZOSYN) 4.5 g in D5W 100 mL IVPB (MB+)         -- IV Every 8 hours (non-standard times) 08/14/24 1702              Will Not start Pressors- Levophed for MAP of 65  Source control achieved by: IV zosyn  ID following

## 2024-08-19 NOTE — PROGRESS NOTES
[facility] Medicine  Progress Note    Patient Name: Willian Ron  MRN: [id number]  Patient Class: IP- Inpatient   Admission Date: 8/14/2024  Length of Stay: 5 days  Attending Physician: Dixon Jain MD  Primary Care Provider: No primary care provider on file.        Subjective:     Principal Problem:Acute appendicitis    HPI:  Patient is a 34-year-old male with no PMH except for marijauna use who presented to  [facility] ER on 08/14/24 for further evaluation of generalised abdominal pain and subjective fever x 4 days associated with nausea and non bilious non bloody vomiting Last BM  days ago Unable to tolerate PO intake. Denied CP/SOB    During evaluation in the ER, patient was found to be hypertensive (182/100), tachycardic (126), T-max 100.5° F. labs revealed WBC 17.92, sodium 135, bicarb 22, creatinine 1.1, total bilirubin 1.1.  Lactic acid 1.9.  X-ray abdomen negative.  CT abdomen revealed findings concerning for perforated appendicitis with developing adjacent abscess.  Concern for partial SBO.  Hepatic steatosis.  Patient was given 1 L NS, 4 mg Zofran, 8 mg morphine, IV Zosyn.  General surgery was consulted in ED. admitted to hospital medicine for further management    Overview/Hospital Course:  34-year-old male with no PMH except for marijauna use who was admitted for sepsis secondary to acute perforated appendicitis with developing adjacent abscess as evidenced on CT. Initiated on IV Zosyn.  General surgery/GI on board.  Status post IR guided drainage of abscess on 08/15. Blood cultures w/ NGTD.  Fluid cultures grew E coli and Prevotella.  Patient was noted to have dark red emesis 08/17, initiated on IV Protonix.  Hemoglobin stable at this time.  X-ray KUB unremarkable for bowel obstruction.  Pt still having abdominal pain in his right lower quadrant, No fever, chills or rigors reported, able to tolerate PO intake, but his WBC count continues to rise despite receiving IV Abx  coverage w/ IV Zosyn. Repeat CT w/ contrast 08/19 showed perforated appendicitis with multiple abscesses, progressed from previous 08/14/2024, abscesses are not sizable thus not drainable per IR.  Possible surgery Tuesday Vs Wednesday Vs continuing antibiotics (final decision to be decided per surgery), ID are following.    Interval History: Pt continues to endorse having significant abdominal pain in his right lower quadrant area, gets worse w/ movement and deep breathing.  No fever, chills or rigors reported. Tolerating PO intake well. His WBC count continues to rise despite receiving IV Abx coverage w/ IV Zosyn. Repeat CT w/ contrast 08/19 showed perforated appendicitis with multiple abscesses, which progressed from previous 08/14/2024, abscesses are not sizable thus not drainable per IR.  Possible surgery Tuesday Vs Wednesday Vs continuing antibiotics (final decision to be decided per surgery), ID are following.    Review of Systems   Constitutional:  Negative for appetite change, chills, diaphoresis and fatigue.   Respiratory:  Negative for cough, chest tightness, shortness of breath and wheezing.    Cardiovascular:  Negative for chest pain, palpitations and leg swelling.   Gastrointestinal:  Positive for abdominal pain, diarrhea and nausea. Negative for abdominal distention, blood in stool and vomiting.   Genitourinary:  Negative for decreased urine volume, difficulty urinating, dysuria, flank pain, frequency, hematuria and urgency.   Musculoskeletal:  Negative for back pain.   Skin:  Negative for color change, pallor, rash and wound.   Neurological:  Negative for light-headedness.   Psychiatric/Behavioral:  Negative for agitation and confusion.      Objective:     Vital Signs (Most Recent):  Temp: 98.9 °F (37.2 °C) (08/19/24 1603)  Pulse: 88 (08/19/24 1603)  Resp: 19 (08/19/24 1603)  BP: (!) 146/75 (08/19/24 1603)  SpO2: 95 % (08/19/24 1603) Vital Signs (24h Range):  Temp:  [98.5 °F (36.9 °C)-99.5 °F (37.5  °C)] 98.9 °F (37.2 °C)  Pulse:  [] 88  Resp:  [17-19] 19  SpO2:  [93 %-97 %] 95 %  BP: (146-167)/(75-95) 146/75     Weight: 120.7 kg (266 lb 1.5 oz)  Body mass index is 37.11 kg/m².    Intake/Output Summary (Last 24 hours) at 8/19/2024 1850  Last data filed at 8/19/2024 1803  Gross per 24 hour   Intake 1724.03 ml   Output 1700 ml   Net 24.03 ml         Physical Exam  Constitutional:       General: He is not in acute distress.     Appearance: Normal appearance. He is obese. He is ill-appearing. He is not toxic-appearing or diaphoretic.   HENT:      Head: Normocephalic and atraumatic.      Nose: Nose normal. No congestion or rhinorrhea.      Mouth/Throat:      Mouth: Mucous membranes are dry.   Eyes:      General: No scleral icterus.     Extraocular Movements: Extraocular movements intact.      Conjunctiva/sclera: Conjunctivae normal.      Pupils: Pupils are equal, round, and reactive to light.   Cardiovascular:      Rate and Rhythm: Regular rhythm. Tachycardia present.      Pulses: Normal pulses.      Heart sounds: Normal heart sounds.   Pulmonary:      Effort: Pulmonary effort is normal. No respiratory distress.      Breath sounds: Normal breath sounds. No wheezing or rales.   Abdominal:      General: Abdomen is flat. Bowel sounds are normal. There is no distension.      Palpations: Abdomen is soft.      Tenderness: There is abdominal tenderness (right lower quadrant). There is no right CVA tenderness, left CVA tenderness, guarding or rebound.   Musculoskeletal:         General: No swelling, tenderness, deformity or signs of injury.      Cervical back: Normal range of motion and neck supple. No rigidity or tenderness.      Right lower leg: No edema.      Left lower leg: No edema.   Skin:     General: Skin is warm and dry.      Coloration: Skin is not jaundiced.      Findings: No lesion or rash.   Neurological:      General: No focal deficit present.      Mental Status: He is alert and oriented to person,  place, and time. Mental status is at baseline.      Cranial Nerves: No cranial nerve deficit.      Sensory: No sensory deficit.   Psychiatric:         Mood and Affect: Mood normal.         Behavior: Behavior normal.          Significant Labs: All pertinent labs within the past 24 hours have been reviewed.    Significant Imaging: I have reviewed all pertinent imaging results/findings within the past 24 hours.    Assessment/Plan:      * Acute appendicitis  - CT 08/14 revealed perforated appendicitis noting be developing an adjacent abscess   - Initiated on IVF and empiric IV Zosyn.  - Blood culture w/ NGTD  - Fluid culture growing E-coli & Prevotella  - General surgery on board  - Pt Status post IR guided drainage of abscess on 08/15  - Repeat CT w/ contrast 08/19 showed perforated appendicitis with multiple abscesses, which progressed from previous 08/14/2024  - Abscesses are not sizable thus not drainable per IR.  - Possible surgery Tuesday Vs Wednesday Vs continuing antibiotics (final decision to be decided per surgery)  - ID are following, appreciate recs  - Cont empiric IV Zosyn for now      Tobacco dependency  Dangers of cigarette smoking were reviewed with patient in detail. Patient was Counseled for 3-10 minutes.    Sepsis  This patient does have evidence of infective focus (perforated appendix)  My overall impression is sepsis.  Source: Abdominal  Antibiotics given-   Antibiotics (72h ago, onward)      Start     Stop Route Frequency Ordered    08/15/24 0030  piperacillin-tazobactam (ZOSYN) 4.5 g in D5W 100 mL IVPB (MB+)         -- IV Every 8 hours (non-standard times) 08/14/24 1702              Will Not start Pressors- Levophed for MAP of 65  Source control achieved by: IV zosyn  ID following    Marijuana use  Lifestyle modification  Counselled on cessation    Preoperative clearance  Modify RCRI index of 0 with class I risk and 3.9% risk of death/MI/cardiac arrest      Hepatic steatosis  CT evidence of  hepatic steatosis.  LFTs WNL.  Continue lifestyle modification   Weight loss may be helpful.    Hyponatremia  Hyponatremia is likely due to Dehydration/hypovolemia. The patient's most recent sodium results are listed below.  Recent Labs     08/17/24  0501 08/19/24  0437    138       Plan  Hyponatremia likely due to hypovolemic hyponatremia   Currently improved.  Continue monitoring daily.    Leukocytosis  Likely due to acute perforated appendicitis w/ surrounding abscesses  Currently on antibiotics   Possible plan for surgery      VTE Risk Mitigation (From admission, onward)           Ordered     IP VTE HIGH RISK PATIENT  Once         08/14/24 1704     Place sequential compression device  Until discontinued         08/14/24 1704                    Discharge Planning   JANN: 8/21/2024     Code Status: Full Code   Is the patient medically ready for discharge?:     Reason for patient still in hospital (select all that apply): Patient trending condition  Discharge Plan A: Home with family   Discharge Delays: None known at this time              Dixon Jain MD  Department of Hospital Medicine   [facility] [place] [facility] S[facility]

## 2024-08-19 NOTE — SUBJECTIVE & OBJECTIVE
Past Medical History:   Diagnosis Date    Nausea & vomiting 8/16/2024       History reviewed. No pertinent surgical history.    Review of patient's allergies indicates:   Allergen Reactions    Grass pollen-june grass standard        Medications:  No medications prior to admission.     Antibiotics (From admission, onward)      Start     Stop Route Frequency Ordered    08/15/24 0030  piperacillin-tazobactam (ZOSYN) 4.5 g in D5W 100 mL IVPB (MB+)         -- IV Every 8 hours (non-standard times) 08/14/24 1702          Antifungals (From admission, onward)      None          Antivirals (From admission, onward)      None               There is no immunization history on file for this patient.    Family History    None       Social History     Socioeconomic History    Marital status: Single   Tobacco Use    Smoking status: Every Day     Current packs/day: 0.50     Average packs/day: 0.5 packs/day for 7.0 years (3.5 ttl pk-yrs)     Types: Cigarettes    Smokeless tobacco: Never   Substance and Sexual Activity    Alcohol use: No    Drug use: No    Sexual activity: Yes     Partners: Female     Birth control/protection: Condom     Review of Systems   All other systems reviewed and are negative.    Objective:     Vital Signs (Most Recent):  Temp: 99.1 °F (37.3 °C) (08/19/24 0740)  Pulse: 98 (08/19/24 0740)  Resp: 18 (08/19/24 1051)  BP: (!) 164/81 (08/19/24 0740)  SpO2: (!) 93 % (08/19/24 0740) Vital Signs (24h Range):  Temp:  [98.5 °F (36.9 °C)-99.5 °F (37.5 °C)] 99.1 °F (37.3 °C)  Pulse:  [] 98  Resp:  [17-19] 18  SpO2:  [93 %-97 %] 93 %  BP: (159-167)/(81-95) 164/81     Weight: 120.7 kg (266 lb 1.5 oz)  Body mass index is 37.11 kg/m².    Estimated Creatinine Clearance: 170.4 mL/min (based on SCr of 0.8 mg/dL).     Physical Exam  Vitals and nursing note reviewed.   Constitutional:       General: He is not in acute distress.     Appearance: Normal appearance. He is not ill-appearing, toxic-appearing or diaphoretic.   HENT:       Head: Normocephalic and atraumatic.   Eyes:      Extraocular Movements: Extraocular movements intact.      Pupils: Pupils are equal, round, and reactive to light.   Pulmonary:      Breath sounds: No rhonchi or rales.   Abdominal:      General: There is distension.      Tenderness: There is abdominal tenderness. There is no guarding or rebound.   Neurological:      General: No focal deficit present.      Mental Status: He is alert and oriented to person, place, and time.   Psychiatric:         Mood and Affect: Mood normal.         Behavior: Behavior normal.         Thought Content: Thought content normal.         Judgment: Judgment normal.          Significant Labs: CBC:   Recent Labs   Lab 08/19/24  0437   WBC 21.53*   HGB 14.7   HCT 44.5        Microbiology Results (last 7 days)       Procedure Component Value Units Date/Time    Blood Culture #1 **CANNOT BE ORDERED STAT** [[id number]] Collected: 08/14/24 1452    Order Status: Completed Specimen: Blood from Peripheral, Hand, Left Updated: 08/18/24 1703     Blood Culture, Routine No Growth after 4 days.    Blood Culture #2 **CANNOT BE ORDERED STAT** [[id number]] Collected: 08/14/24 1452    Order Status: Completed Specimen: Blood from Peripheral, Hand, Left Updated: 08/18/24 1703     Blood Culture, Routine No Growth after 4 days.    Culture, Anaerobic [[id number]]  (Abnormal) Collected: 08/15/24 1703    Order Status: Completed Specimen: Abscess from Abdomen Updated: 08/18/24 1212     Anaerobic Culture PREVOTELLA LOESCHEII  Many      Culture, Body Fluid (Aerobic) w/ Gram Stain [[id number]]  (Abnormal)  (Susceptibility) Collected: 08/15/24 1703    Order Status: Completed Specimen: Body Fluid from Abdominal Fluid Updated: 08/17/24 0718     AEROBIC CULTURE - FLUID ESCHERICHIA COLI  Many       Gram Stain Result Few WBC's      Many Gram positive rods      Moderate Gram negative rods      Few Gram positive cocci in chains      Rare Gram positive cocci in pairs     Fungus culture [[id number]] Collected: 08/15/24 1703    Order Status: Sent Specimen: Abscess from Abdomen Updated: 08/15/24 7420            Significant Imaging: I have reviewed all pertinent imaging results/findings within the past 24 hours.

## 2024-08-19 NOTE — SUBJECTIVE & OBJECTIVE
Interval History: Pt continues to endorse having significant abdominal pain in his right lower quadrant area, gets worse w/ movement and deep breathing.  No fever, chills or rigors reported. Tolerating PO intake well. His WBC count continues to rise despite receiving IV Abx coverage w/ IV Zosyn. Repeat CT w/ contrast 08/19 showed perforated appendicitis with multiple abscesses, which progressed from previous 08/14/2024, abscesses are not sizable thus not drainable per IR.  Possible surgery Tuesday Vs Wednesday Vs continuing antibiotics (final decision to be decided per surgery), ID are following.    Review of Systems   Constitutional:  Negative for appetite change, chills, diaphoresis and fatigue.   Respiratory:  Negative for cough, chest tightness, shortness of breath and wheezing.    Cardiovascular:  Negative for chest pain, palpitations and leg swelling.   Gastrointestinal:  Positive for abdominal pain, diarrhea and nausea. Negative for abdominal distention, blood in stool and vomiting.   Genitourinary:  Negative for decreased urine volume, difficulty urinating, dysuria, flank pain, frequency, hematuria and urgency.   Musculoskeletal:  Negative for back pain.   Skin:  Negative for color change, pallor, rash and wound.   Neurological:  Negative for light-headedness.   Psychiatric/Behavioral:  Negative for agitation and confusion.      Objective:     Vital Signs (Most Recent):  Temp: 98.9 °F (37.2 °C) (08/19/24 1603)  Pulse: 88 (08/19/24 1603)  Resp: 19 (08/19/24 1603)  BP: (!) 146/75 (08/19/24 1603)  SpO2: 95 % (08/19/24 1603) Vital Signs (24h Range):  Temp:  [98.5 °F (36.9 °C)-99.5 °F (37.5 °C)] 98.9 °F (37.2 °C)  Pulse:  [] 88  Resp:  [17-19] 19  SpO2:  [93 %-97 %] 95 %  BP: (146-167)/(75-95) 146/75     Weight: 120.7 kg (266 lb 1.5 oz)  Body mass index is 37.11 kg/m².    Intake/Output Summary (Last 24 hours) at 8/19/2024 1850  Last data filed at 8/19/2024 1803  Gross per 24 hour   Intake 1724.03 ml    Output 1700 ml   Net 24.03 ml         Physical Exam  Constitutional:       General: He is not in acute distress.     Appearance: Normal appearance. He is obese. He is ill-appearing. He is not toxic-appearing or diaphoretic.   HENT:      Head: Normocephalic and atraumatic.      Nose: Nose normal. No congestion or rhinorrhea.      Mouth/Throat:      Mouth: Mucous membranes are dry.   Eyes:      General: No scleral icterus.     Extraocular Movements: Extraocular movements intact.      Conjunctiva/sclera: Conjunctivae normal.      Pupils: Pupils are equal, round, and reactive to light.   Cardiovascular:      Rate and Rhythm: Regular rhythm. Tachycardia present.      Pulses: Normal pulses.      Heart sounds: Normal heart sounds.   Pulmonary:      Effort: Pulmonary effort is normal. No respiratory distress.      Breath sounds: Normal breath sounds. No wheezing or rales.   Abdominal:      General: Abdomen is flat. Bowel sounds are normal. There is no distension.      Palpations: Abdomen is soft.      Tenderness: There is abdominal tenderness (right lower quadrant). There is no right CVA tenderness, left CVA tenderness, guarding or rebound.   Musculoskeletal:         General: No swelling, tenderness, deformity or signs of injury.      Cervical back: Normal range of motion and neck supple. No rigidity or tenderness.      Right lower leg: No edema.      Left lower leg: No edema.   Skin:     General: Skin is warm and dry.      Coloration: Skin is not jaundiced.      Findings: No lesion or rash.   Neurological:      General: No focal deficit present.      Mental Status: He is alert and oriented to person, place, and time. Mental status is at baseline.      Cranial Nerves: No cranial nerve deficit.      Sensory: No sensory deficit.   Psychiatric:         Mood and Affect: Mood normal.         Behavior: Behavior normal.          Significant Labs: All pertinent labs within the past 24 hours have been reviewed.    Significant  Imaging: I have reviewed all pertinent imaging results/findings within the past 24 hours.

## 2024-08-19 NOTE — ASSESSMENT & PLAN NOTE
- CT 08/14 revealed perforated appendicitis noting be developing an adjacent abscess   - Initiated on IVF and empiric IV Zosyn.  - Blood culture w/ NGTD  - Fluid culture growing E-coli & Prevotella  - General surgery on board  - Pt Status post IR guided drainage of abscess on 08/15  - Repeat CT w/ contrast 08/19 showed perforated appendicitis with multiple abscesses, which progressed from previous 08/14/2024  - Abscesses are not sizable thus not drainable per IR.  - Possible surgery Tuesday Vs Wednesday Vs continuing antibiotics (final decision to be decided per surgery)  - ID are following, appreciate recs  - Cont empiric IV Zosyn for now

## 2024-08-19 NOTE — HPI
"34 y.o. man with no pertinent PMHx who presents with acute on-set of abdominal pain, fever and N/V x 5 days with new imaging consistent with acute appendicitis c/b perforation and formation of a 2.5 AP x 3.0 TV x 2.5 CC-cm periappendiceal air-fluid collection in the central, lower abdomen. Started on Zosyn and seen by Surgery. IR was consulted for drain placement and a drain was placed. Culture growing E.coli and an anaerobe. The patient's WBC has increased on appropriate abx and a repeat CT revealed "perforated appendicitis with multiple abscesses, progressed from 08/14/2024." The patient was seen with Gen Surgery. Feeling better despite WBC. Currently on Zosyn.     "

## 2024-08-19 NOTE — PLAN OF CARE
Per attending, patient not medically ready for discharge; ID consulted and awaiting on final recommendations. Per ID, cultures to be repeated and patient to continue IV abx.  CM to continue to assess for and until discharge.     08/19/24 1345   Discharge Reassessment   Assessment Type Discharge Planning Reassessment   Did the patient's condition or plan change since previous assessment? No   Communicated JANN with patient/caregiver Date not available/Unable to determine   Discharge Plan A Home with family   DME Needed Upon Discharge  other (see comments)  (TBD)   Transition of Care Barriers None   Why the patient remains in the hospital Requires continued medical care   Post-Acute Status   Discharge Delays None known at this time

## 2024-08-20 LAB
ANION GAP SERPL CALC-SCNC: 10 MMOL/L (ref 8–16)
BASOPHILS # BLD AUTO: ABNORMAL K/UL (ref 0–0.2)
BASOPHILS NFR BLD: 0 % (ref 0–1.9)
BUN SERPL-MCNC: 7 MG/DL (ref 6–20)
CALCIUM SERPL-MCNC: 9 MG/DL (ref 8.7–10.5)
CHLORIDE SERPL-SCNC: 100 MMOL/L (ref 95–110)
CO2 SERPL-SCNC: 28 MMOL/L (ref 23–29)
CREAT SERPL-MCNC: 0.9 MG/DL (ref 0.5–1.4)
DIFFERENTIAL METHOD BLD: ABNORMAL
EOSINOPHIL # BLD AUTO: ABNORMAL K/UL (ref 0–0.5)
EOSINOPHIL NFR BLD: 0 % (ref 0–8)
ERYTHROCYTE [DISTWIDTH] IN BLOOD BY AUTOMATED COUNT: 14.1 % (ref 11.5–14.5)
EST. GFR  (NO RACE VARIABLE): >60 ML/MIN/1.73 M^2
GLUCOSE SERPL-MCNC: 87 MG/DL (ref 70–110)
HCT VFR BLD AUTO: 42.3 % (ref 40–54)
HGB BLD-MCNC: 14.1 G/DL (ref 14–18)
IMM GRANULOCYTES # BLD AUTO: ABNORMAL K/UL (ref 0–0.04)
IMM GRANULOCYTES NFR BLD AUTO: ABNORMAL % (ref 0–0.5)
LYMPHOCYTES # BLD AUTO: ABNORMAL K/UL (ref 1–4.8)
LYMPHOCYTES NFR BLD: 24 % (ref 18–48)
MCH RBC QN AUTO: 28.9 PG (ref 27–31)
MCHC RBC AUTO-ENTMCNC: 33.3 G/DL (ref 32–36)
MCV RBC AUTO: 87 FL (ref 82–98)
MONOCYTES # BLD AUTO: ABNORMAL K/UL (ref 0.3–1)
MONOCYTES NFR BLD: 2 % (ref 4–15)
NEUTROPHILS NFR BLD: 67 % (ref 38–73)
NEUTS BAND NFR BLD MANUAL: 7 %
NRBC BLD-RTO: 0 /100 WBC
PLATELET # BLD AUTO: 306 K/UL (ref 150–450)
PLATELET BLD QL SMEAR: ABNORMAL
PMV BLD AUTO: 10 FL (ref 9.2–12.9)
POTASSIUM SERPL-SCNC: 3.1 MMOL/L (ref 3.5–5.1)
RBC # BLD AUTO: 4.88 M/UL (ref 4.6–6.2)
SODIUM SERPL-SCNC: 138 MMOL/L (ref 136–145)
WBC # BLD AUTO: 18.17 K/UL (ref 3.9–12.7)

## 2024-08-20 PROCEDURE — 25000003 PHARM REV CODE 250: Performed by: NURSE PRACTITIONER

## 2024-08-20 PROCEDURE — 99233 SBSQ HOSP IP/OBS HIGH 50: CPT | Mod: ,,, | Performed by: INTERNAL MEDICINE

## 2024-08-20 PROCEDURE — 85007 BL SMEAR W/DIFF WBC COUNT: CPT | Performed by: INTERNAL MEDICINE

## 2024-08-20 PROCEDURE — 36415 COLL VENOUS BLD VENIPUNCTURE: CPT | Performed by: INTERNAL MEDICINE

## 2024-08-20 PROCEDURE — 11000001 HC ACUTE MED/SURG PRIVATE ROOM

## 2024-08-20 PROCEDURE — 25000003 PHARM REV CODE 250

## 2024-08-20 PROCEDURE — 80048 BASIC METABOLIC PNL TOTAL CA: CPT | Performed by: INTERNAL MEDICINE

## 2024-08-20 PROCEDURE — 25000003 PHARM REV CODE 250: Performed by: STUDENT IN AN ORGANIZED HEALTH CARE EDUCATION/TRAINING PROGRAM

## 2024-08-20 PROCEDURE — 63600175 PHARM REV CODE 636 W HCPCS: Performed by: STUDENT IN AN ORGANIZED HEALTH CARE EDUCATION/TRAINING PROGRAM

## 2024-08-20 PROCEDURE — 99232 SBSQ HOSP IP/OBS MODERATE 35: CPT | Mod: ,,, | Performed by: SURGERY

## 2024-08-20 PROCEDURE — A4216 STERILE WATER/SALINE, 10 ML: HCPCS | Performed by: STUDENT IN AN ORGANIZED HEALTH CARE EDUCATION/TRAINING PROGRAM

## 2024-08-20 PROCEDURE — 85027 COMPLETE CBC AUTOMATED: CPT | Performed by: INTERNAL MEDICINE

## 2024-08-20 RX ORDER — POTASSIUM CHLORIDE 20 MEQ/1
40 TABLET, EXTENDED RELEASE ORAL ONCE
Status: COMPLETED | OUTPATIENT
Start: 2024-08-20 | End: 2024-08-20

## 2024-08-20 RX ORDER — POTASSIUM CHLORIDE 7.45 MG/ML
10 INJECTION INTRAVENOUS
Status: COMPLETED | OUTPATIENT
Start: 2024-08-20 | End: 2024-08-20

## 2024-08-20 RX ADMIN — KETOROLAC TROMETHAMINE 10 MG: 10 TABLET, FILM COATED ORAL at 05:08

## 2024-08-20 RX ADMIN — POTASSIUM CHLORIDE 10 MEQ: 7.46 INJECTION, SOLUTION INTRAVENOUS at 09:08

## 2024-08-20 RX ADMIN — SUCRALFATE 1 G: 1 SUSPENSION ORAL at 11:08

## 2024-08-20 RX ADMIN — PIPERACILLIN AND TAZOBACTAM 4.5 G: 4; .5 INJECTION, POWDER, LYOPHILIZED, FOR SOLUTION INTRAVENOUS; PARENTERAL at 05:08

## 2024-08-20 RX ADMIN — PANTOPRAZOLE SODIUM 40 MG: 40 INJECTION, POWDER, FOR SOLUTION INTRAVENOUS at 08:08

## 2024-08-20 RX ADMIN — Medication 10 ML: at 11:08

## 2024-08-20 RX ADMIN — PIPERACILLIN AND TAZOBACTAM 4.5 G: 4; .5 INJECTION, POWDER, LYOPHILIZED, FOR SOLUTION INTRAVENOUS; PARENTERAL at 12:08

## 2024-08-20 RX ADMIN — KETOROLAC TROMETHAMINE 10 MG: 10 TABLET, FILM COATED ORAL at 11:08

## 2024-08-20 RX ADMIN — POTASSIUM CHLORIDE 40 MEQ: 1500 TABLET, EXTENDED RELEASE ORAL at 07:08

## 2024-08-20 RX ADMIN — PIPERACILLIN AND TAZOBACTAM 4.5 G: 4; .5 INJECTION, POWDER, LYOPHILIZED, FOR SOLUTION INTRAVENOUS; PARENTERAL at 08:08

## 2024-08-20 RX ADMIN — POTASSIUM CHLORIDE 10 MEQ: 7.46 INJECTION, SOLUTION INTRAVENOUS at 10:08

## 2024-08-20 RX ADMIN — POTASSIUM CHLORIDE 10 MEQ: 7.46 INJECTION, SOLUTION INTRAVENOUS at 11:08

## 2024-08-20 RX ADMIN — SUCRALFATE 1 G: 1 SUSPENSION ORAL at 05:08

## 2024-08-20 RX ADMIN — HYDROMORPHONE HYDROCHLORIDE 1 MG: 1 INJECTION, SOLUTION INTRAMUSCULAR; INTRAVENOUS; SUBCUTANEOUS at 04:08

## 2024-08-20 RX ADMIN — SODIUM CHLORIDE, POTASSIUM CHLORIDE, SODIUM LACTATE AND CALCIUM CHLORIDE: 600; 310; 30; 20 INJECTION, SOLUTION INTRAVENOUS at 08:08

## 2024-08-20 RX ADMIN — Medication 10 ML: at 05:08

## 2024-08-20 RX ADMIN — POTASSIUM CHLORIDE 10 MEQ: 7.46 INJECTION, SOLUTION INTRAVENOUS at 08:08

## 2024-08-20 NOTE — NURSING
[facility]  Nurses Note -- 4 Eyes      8/20/2024       Skin assessed on: Q Shift      [x] No Pressure Injuries Present    []Prevention Measures Documented    [] Yes LDA  for Pressure Injury Previously documented     [] Yes New Pressure Injury Discovered   [] LDA for New Pressure Injury Added      Attending RN:  Loida Munguia RN     Second RN:  BETH Lock

## 2024-08-20 NOTE — PROGRESS NOTES
Surgery Progress Note    Willian Ron is a 34 y.o. year old male in hospital for acute appendicitis w abscess now s/p IR drainage aspiration.     NAEON, VSS, afebrile  WBC downtrending  Pain much improved this AM, felt improved with toradol  Continuing to have daily BM and tolerating diet  Discussed plan of care with family, continue non op treatment, plan for interval appendectomy    ROS:  Negative except above    PE:  Vitals:    08/19/24 2023 08/20/24 0121 08/20/24 0517 08/20/24 0858   BP: (!) 151/74 (!) 162/88 (!) 166/81 (!) 130/94   BP Location: Left arm Left arm Left arm    Patient Position: Lying Lying Lying Lying   Pulse: 98 94 97 85   Resp: 18 17 18 18   Temp: 98.6 °F (37 °C) 99 °F (37.2 °C) 98.6 °F (37 °C) 99.4 °F (37.4 °C)   TempSrc: Oral Axillary Oral Oral   SpO2: 98% 97% 99% 99%   Weight:       Height:         General: Awake and alert, in no acute distress, appears stated age, well-nourished. Appears comfortable  HEENT: Atraumatic, normocephalic, MMM  Cardiac: Regular rate on monitor, well-perfused  Pulm: Breathing comfortably, symmetric chest rise, no respiratory distress. Satting well on RA  Abdomen: Soft, moderate localized tender to palpation of RLQ, improved from prior, non-distended, some guarding, no rebound  MSK: moving all extremities appropriately  Neuro: Aox4, CN 2-12 grossly intact      Significant Diagnostic Studies: Labs: BMP:   Recent Labs   Lab 08/19/24  0437 08/20/24  0451   GLU 91 87    138   K 2.8* 3.1*   CL 97 100   CO2 30* 28   BUN 7 7   CREATININE 0.8 0.9   CALCIUM 9.1 9.0    and CBC   Recent Labs   Lab 08/19/24  0437 08/20/24  0451   WBC 21.53* 18.17*   HGB 14.7 14.1   HCT 44.5 42.3    306     A/P:  Willian Ron is a 34 y.o. year old male  with acute appendicitis w abscess s/p IR drainage. Stable with non op treatment (IR aspiration, antibiotics), and plan for interval appendectomy in the future. Repeat CT A/P showing multiple abscesses, 4.9 x 1.9 cm in the  midline lower abdomen, 2.9 x 2.8 cm in the right lower quadrant, and 4.4 x 2.1 cm in right hemipelvis. IR unable to drain. Having bowel function and tolerating diet    - WBC downtrending, abdominal exam and pain improved this AM.  - Discussed with patient and family risks and benefits of appendectomy during this admission vs interval, patient improving on abx therapy, agreeable and comfortable with plan to continue non-op to treat acute inflammatory period and follow up outpatient for interval appendectomy  - Daily CBC/BMP. Replete lytes prn  - MMPC, toradol  - Diet: Advanced to regular diet   - IVF: LR @ 100, okay to discontinue with adequate PO intake  - PPx: LVX, SCDs, protonix  - Abx: Zosyn. ID on board  - Will continue to follow    Patient seen and case discussed with attending staff, Dr. Mancia. Attestation to follow.    Obed Ding MD  General Surgery Resident  [facility]

## 2024-08-20 NOTE — SUBJECTIVE & OBJECTIVE
Interval History:  Patient reports abdominal pain is improving.  Able to tolerate diet.  T-max 99.4° F.    Review of Systems   Constitutional: Negative.    Respiratory: Negative.     Gastrointestinal:  Positive for abdominal pain. Negative for nausea and vomiting.   Genitourinary: Negative.    Musculoskeletal: Negative.    Neurological: Negative.      Objective:     Vital Signs (Most Recent):  Temp: 99 °F (37.2 °C) (08/20/24 1127)  Pulse: 90 (08/20/24 1314)  Resp: 18 (08/20/24 1127)  BP: (!) 167/97 (08/20/24 1314)  SpO2: 97 % (08/20/24 1127) Vital Signs (24h Range):  Temp:  [98.6 °F (37 °C)-99.4 °F (37.4 °C)] 99 °F (37.2 °C)  Pulse:  [] 90  Resp:  [17-19] 18  SpO2:  [95 %-99 %] 97 %  BP: (130-167)/(74-97) 167/97     Weight: 120.7 kg (266 lb 1.5 oz)  Body mass index is 37.11 kg/m².    Intake/Output Summary (Last 24 hours) at 8/20/2024 1528  Last data filed at 8/20/2024 0515  Gross per 24 hour   Intake 240 ml   Output 275 ml   Net -35 ml         Physical Exam  Constitutional:       Appearance: He is normal weight. He is ill-appearing.   Cardiovascular:      Rate and Rhythm: Normal rate.      Pulses: Normal pulses.   Pulmonary:      Effort: No respiratory distress.      Breath sounds: Normal breath sounds. No wheezing.   Abdominal:      General: There is no distension.      Tenderness: There is abdominal tenderness.   Musculoskeletal:      Right lower leg: No edema.      Left lower leg: No edema.   Skin:     General: Skin is warm.   Neurological:      Mental Status: He is alert and oriented to person, place, and time. Mental status is at baseline.   Psychiatric:         Mood and Affect: Mood normal.             Significant Labs: All pertinent labs within the past 24 hours have been reviewed.    Significant Imaging: I have reviewed all pertinent imaging results/findings within the past 24 hours.

## 2024-08-20 NOTE — PROGRESS NOTES
"[facility] Surg  Infectious Disease  Progress Note    Patient Name: Willian Ron  MRN: [id number]  Admission Date: 8/14/2024  Length of Stay: 6 days  Attending Physician: Viki Altman,*  Primary Care Provider: No primary care provider on file.    Isolation Status: No active isolations  Assessment/Plan:      Leukocytosis    35 yo man admitted with perforated appendicitis. Drained via IR with E.coli and Prevotella growing, so far. On Zosyn. Developed increased WBC and found to have new collections. Discussed with Gen Surgery - for repeat IR aspiration and observation on abx but collections are not amenable to aspiration. The good news is that his WBC appears to be decreasing.    Recommendations  Continue Zosyn  Trend WBC to WNL  Repeat CT at some point      Jamar Charles MD  Infectious Disease  [facility] Surg    Subjective:     Principal Problem:Acute appendicitis    HPI: 34 y.o. man with no pertinent PMHx who presents with acute on-set of abdominal pain, fever and N/V x 5 days with new imaging consistent with acute appendicitis c/b perforation and formation of a 2.5 AP x 3.0 TV x 2.5 CC-cm periappendiceal air-fluid collection in the central, lower abdomen. Started on Zosyn and seen by Surgery. IR was consulted for drain placement and a drain was placed. Culture growing E.coli and an anaerobe. The patient's WBC has increased on appropriate abx and a repeat CT revealed "perforated appendicitis with multiple abscesses, progressed from 08/14/2024." The patient was seen with Gen Surgery. Feeling better despite WBC. Currently on Zosyn.     Interval History: Must be doing better - eating a [employer] sandwich. Fluid collections not amenable to aspiration. Denies jamie fever or chills.    Review of Systems   All other systems reviewed and are negative.    Objective:     Vital Signs (Most Recent):  Temp: 99 °F (37.2 °C) (08/20/24 1127)  Pulse: 100 (08/20/24 1127)  Resp: 18 (08/20/24 1127)  BP: (!) " 130/94 (08/20/24 0858)  SpO2: 97 % (08/20/24 1127) Vital Signs (24h Range):  Temp:  [98.6 °F (37 °C)-99.4 °F (37.4 °C)] 99 °F (37.2 °C)  Pulse:  [] 100  Resp:  [17-19] 18  SpO2:  [95 %-99 %] 97 %  BP: (130-166)/(74-94) 130/94     Weight: 120.7 kg (266 lb 1.5 oz)  Body mass index is 37.11 kg/m².    Estimated Creatinine Clearance: 151.5 mL/min (based on SCr of 0.9 mg/dL).     Physical Exam  Vitals and nursing note reviewed.   Constitutional:       General: He is not in acute distress.     Appearance: Normal appearance. He is not ill-appearing, toxic-appearing or diaphoretic.   HENT:      Head: Normocephalic and atraumatic.   Eyes:      Pupils: Pupils are equal, round, and reactive to light.   Cardiovascular:      Rate and Rhythm: Normal rate and regular rhythm.   Abdominal:      Tenderness: There is abdominal tenderness. There is no guarding or rebound.   Neurological:      Mental Status: He is alert.   Psychiatric:         Mood and Affect: Mood normal.         Behavior: Behavior normal.         Thought Content: Thought content normal.         Judgment: Judgment normal.          Significant Labs: CBC:   Recent Labs   Lab 08/19/24  0437 08/20/24  0451   WBC 21.53* 18.17*   HGB 14.7 14.1   HCT 44.5 42.3    306     Microbiology Results (last 7 days)       Procedure Component Value Units Date/Time    Blood Culture #1 **CANNOT BE ORDERED STAT** [[id number]] Collected: 08/14/24 1452    Order Status: Completed Specimen: Blood from Peripheral, Hand, Left Updated: 08/18/24 1703     Blood Culture, Routine No Growth after 4 days.    Blood Culture #2 **CANNOT BE ORDERED STAT** [[id number]] Collected: 08/14/24 1452    Order Status: Completed Specimen: Blood from Peripheral, Hand, Left Updated: 08/18/24 1703     Blood Culture, Routine No Growth after 4 days.    Culture, Anaerobic [[id number]]  (Abnormal) Collected: 08/15/24 1703    Order Status: Completed Specimen: Abscess from Abdomen Updated: 08/18/24 1211      Anaerobic Culture PREVOTELLA LOESCHEII  Many      Culture, Body Fluid (Aerobic) w/ Gram Stain [[id number]]  (Abnormal)  (Susceptibility) Collected: 08/15/24 1703    Order Status: Completed Specimen: Body Fluid from Abdominal Fluid Updated: 08/17/24 0718     AEROBIC CULTURE - FLUID ESCHERICHIA COLI  Many       Gram Stain Result Few WBC's      Many Gram positive rods      Moderate Gram negative rods      Few Gram positive cocci in chains      Rare Gram positive cocci in pairs    Fungus culture [[id number]] Collected: 08/15/24 1703    Order Status: Sent Specimen: Abscess from Abdomen Updated: 08/15/24 1724            Significant Imaging: I have reviewed all pertinent imaging results/findings within the past 24 hours.

## 2024-08-20 NOTE — PROGRESS NOTES
[facility] Medicine  Progress Note    Patient Name: Willian Ron  MRN: [id number]  Patient Class: IP- Inpatient   Admission Date: 8/14/2024  Length of Stay: 6 days  Attending Physician: Viki Altman,*  Primary Care Provider: No primary care provider on file.        Subjective:     Principal Problem:Acute appendicitis        HPI:  Patient is a 34-year-old male with no PMH except for marijauna use who presented to  [facility] on 08/14/24 for further evaluation of generalised abdominal pain and subjective fever x 4 days associated with nausea and non bilious non bloody vomiting Last BM  days ago Unable to tolerate PO intake. Denied CP/SOB    During evaluation in the ER, patient was found to be hypertensive (182/100), tachycardic (126), T-max 100.5° F. labs revealed WBC 17.92, sodium 135, bicarb 22, creatinine 1.1, total bilirubin 1.1.  Lactic acid 1.9.  X-ray abdomen negative.  CT abdomen revealed findings concerning for perforated appendicitis with developing adjacent abscess.  Concern for partial SBO.  Hepatic steatosis.  Patient was given 1 L NS, 4 mg Zofran, 8 mg morphine, IV Zosyn.  General surgery was consulted in ED. admitted to hospital medicine for further management    Overview/Hospital Course:  34-year-old male with no PMH except for marijauna use who was admitted for sepsis secondary to acute perforated appendicitis with developing adjacent abscess as evidenced on CT. Initiated on IV Zosyn.  General surgery/GI on board.  Status post IR guided drainage of abscess on 08/15. Blood cultures w/ NGTD.  Fluid cultures grew E coli and Prevotella.  Patient was noted to have dark red emesis 08/17, initiated on IV Protonix.  Hemoglobin stable at this time.  X-ray KUB unremarkable for bowel obstruction.Patient is noted to have worsening leukocytosis despite receiving IV Zosyn. Repeat CT w/ contrast 08/19 showed perforated appendicitis with multiple abscesses, progressed from previous  08/14/2024, abscesses are not sizable thus not drainable per IR.  Leukocytosis trending down.  Id on board.  Recommended to continue Zosyn and repeat CT abdomen in a couple of days.  Hypokalemia repleted    Interval History:  Patient reports abdominal pain is improving.  Able to tolerate diet.  T-max 99.4° F.    Review of Systems   Constitutional: Negative.    Respiratory: Negative.     Gastrointestinal:  Positive for abdominal pain. Negative for nausea and vomiting.   Genitourinary: Negative.    Musculoskeletal: Negative.    Neurological: Negative.      Objective:     Vital Signs (Most Recent):  Temp: 99 °F (37.2 °C) (08/20/24 1127)  Pulse: 90 (08/20/24 1314)  Resp: 18 (08/20/24 1127)  BP: (!) 167/97 (08/20/24 1314)  SpO2: 97 % (08/20/24 1127) Vital Signs (24h Range):  Temp:  [98.6 °F (37 °C)-99.4 °F (37.4 °C)] 99 °F (37.2 °C)  Pulse:  [] 90  Resp:  [17-19] 18  SpO2:  [95 %-99 %] 97 %  BP: (130-167)/(74-97) 167/97     Weight: 120.7 kg (266 lb 1.5 oz)  Body mass index is 37.11 kg/m².    Intake/Output Summary (Last 24 hours) at 8/20/2024 1528  Last data filed at 8/20/2024 0515  Gross per 24 hour   Intake 240 ml   Output 275 ml   Net -35 ml         Physical Exam  Constitutional:       Appearance: He is normal weight. He is ill-appearing.   Cardiovascular:      Rate and Rhythm: Normal rate.      Pulses: Normal pulses.   Pulmonary:      Effort: No respiratory distress.      Breath sounds: Normal breath sounds. No wheezing.   Abdominal:      General: There is no distension.      Tenderness: There is abdominal tenderness.   Musculoskeletal:      Right lower leg: No edema.      Left lower leg: No edema.   Skin:     General: Skin is warm.   Neurological:      Mental Status: He is alert and oriented to person, place, and time. Mental status is at baseline.   Psychiatric:         Mood and Affect: Mood normal.             Significant Labs: All pertinent labs within the past 24 hours have been reviewed.    Significant  Imaging: I have reviewed all pertinent imaging results/findings within the past 24 hours.      Assessment/Plan:      * Acute appendicitis  - CT 08/14 revealed perforated appendicitis noting be developing an adjacent abscess   - Initiated on IVF and empiric IV Zosyn.  - Blood culture w/ NGTD  - Fluid culture growing E-coli & Prevotella  - General surgery on board  - Pt Status post IR guided drainage of abscess on 08/15  - Repeat CT w/ contrast 08/19 showed perforated appendicitis with multiple abscesses, which progressed from previous 08/14/2024  - Abscesses are not sizable thus not drainable per IR.  - ID are following, appreciate recs  - Cont empiric IV Zosyn for now.  No plans for surgery  -we will need repeat CT.  WBC trending down      Tobacco dependency  Dangers of cigarette smoking were reviewed with patient in detail. Patient was Counseled for 3-10 minutes.    Sepsis  This patient does have evidence of infective focus (perforated appendix)  My overall impression is sepsis.  Source: Abdominal  Antibiotics given-   Antibiotics (72h ago, onward)      Start     Stop Route Frequency Ordered    08/15/24 0030  piperacillin-tazobactam (ZOSYN) 4.5 g in D5W 100 mL IVPB (MB+)         -- IV Every 8 hours (non-standard times) 08/14/24 1702              Will Not start Pressors- Levophed for MAP of 65  Source control achieved by: IV zosyn  ID following    Marijuana use  Lifestyle modification  Counselled on cessation    Preoperative clearance  Modify RCRI index of 0 with class I risk and 3.9% risk of death/MI/cardiac arrest      Hepatic steatosis  CT evidence of hepatic steatosis.  LFTs WNL.  Continue lifestyle modification   Weight loss may be helpful.    Hyponatremia  Hyponatremia is likely due to Dehydration/hypovolemia. The patient's most recent sodium results are listed below.  Recent Labs     08/19/24  0437 08/20/24  0451    138       Plan  Hyponatremia likely due to hypovolemic hyponatremia   Currently  improved.  Continue monitoring daily.    Leukocytosis  Likely due to acute perforated appendicitis w/ surrounding abscesses  Currently on antibiotics         VTE Risk Mitigation (From admission, onward)           Ordered     IP VTE HIGH RISK PATIENT  Once         08/14/24 1704     Place sequential compression device  Until discontinued         08/14/24 1704                    Discharge Planning   JANN: 8/22/2024     Code Status: Full Code   Is the patient medically ready for discharge?:     Reason for patient still in hospital (select all that apply): Patient trending condition and Treatment  Discharge Plan A: Home with family   Discharge Delays: None known at this time              Viki Altman MD  Department of Hospital Medicine   [facility] [place] [facility]

## 2024-08-20 NOTE — NURSING
[facility] [place]  Nurses Note -- 4 Eyes      8/19/2024       Skin assessed on: Q Shift      [x] No Pressure Injuries Present    []Prevention Measures Documented    [] Yes LDA  for Pressure Injury Previously documented     [] Yes New Pressure Injury Discovered   [] LDA for New Pressure Injury Added      Attending RN:  Ashvin Kulkarni, RN     Second RN:  Loida      Received report from off going nurse, Loida. Patient received resting with eyes closed. Resp even and unlabored on room air. Bed locked in the lowest position with SR up X 2. Call light within reach. Midline noted to the pts NOHEMY.

## 2024-08-20 NOTE — ASSESSMENT & PLAN NOTE
Hyponatremia is likely due to Dehydration/hypovolemia. The patient's most recent sodium results are listed below.  Recent Labs     08/19/24  0437 08/20/24  0451    138       Plan  Hyponatremia likely due to hypovolemic hyponatremia   Currently improved.  Continue monitoring daily.

## 2024-08-20 NOTE — PLAN OF CARE
Problem: Adult Inpatient Plan of Care  Goal: Plan of Care Review  Outcome: Progressing  Flowsheets (Taken 8/20/2024 1031)  Plan of Care Reviewed With: patient  Goal: Optimal Comfort and Wellbeing  Outcome: Progressing  Intervention: Monitor Pain and Promote Comfort  Flowsheets (Taken 8/20/2024 1031)  Pain Management Interventions:   care clustered   medication offered   pillow support provided   position adjusted   relaxation techniques promoted   quiet environment facilitated   pain management plan reviewed with patient/caregiver  Intervention: Provide Person-Centered Care  Flowsheets (Taken 8/20/2024 1031)  Trust Relationship/Rapport:   care explained   choices provided   emotional support provided   empathic listening provided   questions answered   questions encouraged   reassurance provided   thoughts/feelings acknowledged     Problem: Sepsis/Septic Shock  Goal: Absence of Infection Signs and Symptoms  Outcome: Progressing  Intervention: Promote Recovery  Flowsheets (Taken 8/20/2024 1031)  Sleep/Rest Enhancement:   regular sleep/rest pattern promoted   relaxation techniques promoted  Activity Management: Ambulated to bathroom - L4

## 2024-08-20 NOTE — SUBJECTIVE & OBJECTIVE
Interval History: Must be doing better - eating a [employer] sandwich. Fluid collections not amenable to aspiration. Denies jamie fever or chills.    Review of Systems   All other systems reviewed and are negative.    Objective:     Vital Signs (Most Recent):  Temp: 99 °F (37.2 °C) (08/20/24 1127)  Pulse: 100 (08/20/24 1127)  Resp: 18 (08/20/24 1127)  BP: (!) 130/94 (08/20/24 0858)  SpO2: 97 % (08/20/24 1127) Vital Signs (24h Range):  Temp:  [98.6 °F (37 °C)-99.4 °F (37.4 °C)] 99 °F (37.2 °C)  Pulse:  [] 100  Resp:  [17-19] 18  SpO2:  [95 %-99 %] 97 %  BP: (130-166)/(74-94) 130/94     Weight: 120.7 kg (266 lb 1.5 oz)  Body mass index is 37.11 kg/m².    Estimated Creatinine Clearance: 151.5 mL/min (based on SCr of 0.9 mg/dL).     Physical Exam  Vitals and nursing note reviewed.   Constitutional:       General: He is not in acute distress.     Appearance: Normal appearance. He is not ill-appearing, toxic-appearing or diaphoretic.   HENT:      Head: Normocephalic and atraumatic.   Eyes:      Pupils: Pupils are equal, round, and reactive to light.   Cardiovascular:      Rate and Rhythm: Normal rate and regular rhythm.   Abdominal:      Tenderness: There is abdominal tenderness. There is no guarding or rebound.   Neurological:      Mental Status: He is alert.   Psychiatric:         Mood and Affect: Mood normal.         Behavior: Behavior normal.         Thought Content: Thought content normal.         Judgment: Judgment normal.          Significant Labs: CBC:   Recent Labs   Lab 08/19/24  0437 08/20/24  0451   WBC 21.53* 18.17*   HGB 14.7 14.1   HCT 44.5 42.3    306     Microbiology Results (last 7 days)       Procedure Component Value Units Date/Time    Blood Culture #1 **CANNOT BE ORDERED STAT** [[id number]] Collected: 08/14/24 145    Order Status: Completed Specimen: Blood from Peripheral, Hand, Left Updated: 08/18/24 1703     Blood Culture, Routine No Growth after 4 days.    Blood Culture #2 **CANNOT BE  ORDERED STAT** [[id number]] Collected: 08/14/24 1452    Order Status: Completed Specimen: Blood from Peripheral, Hand, Left Updated: 08/18/24 1703     Blood Culture, Routine No Growth after 4 days.    Culture, Anaerobic [[id number]]  (Abnormal) Collected: 08/15/24 1703    Order Status: Completed Specimen: Abscess from Abdomen Updated: 08/18/24 1212     Anaerobic Culture PREVOTELLA LOESCHEII  Many      Culture, Body Fluid (Aerobic) w/ Gram Stain [[id number]]  (Abnormal)  (Susceptibility) Collected: 08/15/24 1703    Order Status: Completed Specimen: Body Fluid from Abdominal Fluid Updated: 08/17/24 0718     AEROBIC CULTURE - FLUID ESCHERICHIA COLI  Many       Gram Stain Result Few WBC's      Many Gram positive rods      Moderate Gram negative rods      Few Gram positive cocci in chains      Rare Gram positive cocci in pairs    Fungus culture [[id number]] Collected: 08/15/24 1703    Order Status: Sent Specimen: Abscess from Abdomen Updated: 08/15/24 1724            Significant Imaging: I have reviewed all pertinent imaging results/findings within the past 24 hours.

## 2024-08-20 NOTE — ASSESSMENT & PLAN NOTE
- CT 08/14 revealed perforated appendicitis noting be developing an adjacent abscess   - Initiated on IVF and empiric IV Zosyn.  - Blood culture w/ NGTD  - Fluid culture growing E-coli & Prevotella  - General surgery on board  - Pt Status post IR guided drainage of abscess on 08/15  - Repeat CT w/ contrast 08/19 showed perforated appendicitis with multiple abscesses, which progressed from previous 08/14/2024  - Abscesses are not sizable thus not drainable per IR.  - ID are following, appreciate recs  - Cont empiric IV Zosyn for now.  No plans for surgery  -we will need repeat CT.  WBC trending down

## 2024-08-20 NOTE — ASSESSMENT & PLAN NOTE
33 yo man admitted with perforated appendicitis. Drained via IR with E.coli and Prevotella growing, so far. On Zosyn. Developed increased WBC and found to have new collections. Discussed with Gen Surgery - for repeat IR aspiration and observation on abx but collections are not amenable to aspiration. The good news is that his WBC appears to be decreasing.    Recommendations  Continue Zosyn  Trend WBC to WNL  Repeat CT at some point

## 2024-08-21 LAB
ANION GAP SERPL CALC-SCNC: 9 MMOL/L (ref 8–16)
BASOPHILS # BLD AUTO: 0.07 K/UL (ref 0–0.2)
BASOPHILS NFR BLD: 0.5 % (ref 0–1.9)
BUN SERPL-MCNC: 8 MG/DL (ref 6–20)
CALCIUM SERPL-MCNC: 9.1 MG/DL (ref 8.7–10.5)
CHLORIDE SERPL-SCNC: 104 MMOL/L (ref 95–110)
CO2 SERPL-SCNC: 28 MMOL/L (ref 23–29)
CREAT SERPL-MCNC: 0.9 MG/DL (ref 0.5–1.4)
DIFFERENTIAL METHOD BLD: ABNORMAL
EOSINOPHIL # BLD AUTO: 0.3 K/UL (ref 0–0.5)
EOSINOPHIL NFR BLD: 1.9 % (ref 0–8)
ERYTHROCYTE [DISTWIDTH] IN BLOOD BY AUTOMATED COUNT: 14.2 % (ref 11.5–14.5)
EST. GFR  (NO RACE VARIABLE): >60 ML/MIN/1.73 M^2
GLUCOSE SERPL-MCNC: 96 MG/DL (ref 70–110)
HCT VFR BLD AUTO: 44.1 % (ref 40–54)
HGB BLD-MCNC: 14.4 G/DL (ref 14–18)
IMM GRANULOCYTES # BLD AUTO: 0.67 K/UL (ref 0–0.04)
IMM GRANULOCYTES NFR BLD AUTO: 4.9 % (ref 0–0.5)
LYMPHOCYTES # BLD AUTO: 2.7 K/UL (ref 1–4.8)
LYMPHOCYTES NFR BLD: 20 % (ref 18–48)
MCH RBC QN AUTO: 28.5 PG (ref 27–31)
MCHC RBC AUTO-ENTMCNC: 32.7 G/DL (ref 32–36)
MCV RBC AUTO: 87 FL (ref 82–98)
MONOCYTES # BLD AUTO: 1.2 K/UL (ref 0.3–1)
MONOCYTES NFR BLD: 8.7 % (ref 4–15)
NEUTROPHILS # BLD AUTO: 8.7 K/UL (ref 1.8–7.7)
NEUTROPHILS NFR BLD: 64 % (ref 38–73)
NRBC BLD-RTO: 0 /100 WBC
PLATELET # BLD AUTO: 345 K/UL (ref 150–450)
PMV BLD AUTO: 10.4 FL (ref 9.2–12.9)
POTASSIUM SERPL-SCNC: 3.3 MMOL/L (ref 3.5–5.1)
RBC # BLD AUTO: 5.06 M/UL (ref 4.6–6.2)
SODIUM SERPL-SCNC: 141 MMOL/L (ref 136–145)
WBC # BLD AUTO: 13.62 K/UL (ref 3.9–12.7)

## 2024-08-21 PROCEDURE — 63600175 PHARM REV CODE 636 W HCPCS: Performed by: STUDENT IN AN ORGANIZED HEALTH CARE EDUCATION/TRAINING PROGRAM

## 2024-08-21 PROCEDURE — 85025 COMPLETE CBC W/AUTO DIFF WBC: CPT | Performed by: STUDENT IN AN ORGANIZED HEALTH CARE EDUCATION/TRAINING PROGRAM

## 2024-08-21 PROCEDURE — C1751 CATH, INF, PER/CENT/MIDLINE: HCPCS

## 2024-08-21 PROCEDURE — 36410 VNPNXR 3YR/> PHY/QHP DX/THER: CPT

## 2024-08-21 PROCEDURE — 36415 COLL VENOUS BLD VENIPUNCTURE: CPT | Performed by: STUDENT IN AN ORGANIZED HEALTH CARE EDUCATION/TRAINING PROGRAM

## 2024-08-21 PROCEDURE — 25000003 PHARM REV CODE 250

## 2024-08-21 PROCEDURE — 11000001 HC ACUTE MED/SURG PRIVATE ROOM

## 2024-08-21 PROCEDURE — 25000003 PHARM REV CODE 250: Performed by: NURSE PRACTITIONER

## 2024-08-21 PROCEDURE — 99232 SBSQ HOSP IP/OBS MODERATE 35: CPT | Mod: ,,, | Performed by: SURGERY

## 2024-08-21 PROCEDURE — 25000003 PHARM REV CODE 250: Performed by: STUDENT IN AN ORGANIZED HEALTH CARE EDUCATION/TRAINING PROGRAM

## 2024-08-21 PROCEDURE — 80048 BASIC METABOLIC PNL TOTAL CA: CPT | Performed by: STUDENT IN AN ORGANIZED HEALTH CARE EDUCATION/TRAINING PROGRAM

## 2024-08-21 RX ORDER — ENOXAPARIN SODIUM 100 MG/ML
40 INJECTION SUBCUTANEOUS EVERY 24 HOURS
Status: DISCONTINUED | OUTPATIENT
Start: 2024-08-21 | End: 2024-08-23

## 2024-08-21 RX ORDER — HYDRALAZINE HYDROCHLORIDE 20 MG/ML
5 INJECTION INTRAMUSCULAR; INTRAVENOUS EVERY 6 HOURS PRN
Status: DISCONTINUED | OUTPATIENT
Start: 2024-08-21 | End: 2024-08-27 | Stop reason: HOSPADM

## 2024-08-21 RX ORDER — AMLODIPINE BESYLATE 5 MG/1
10 TABLET ORAL DAILY
Status: DISCONTINUED | OUTPATIENT
Start: 2024-08-21 | End: 2024-08-27 | Stop reason: HOSPADM

## 2024-08-21 RX ORDER — POTASSIUM CHLORIDE 20 MEQ/1
40 TABLET, EXTENDED RELEASE ORAL ONCE
Status: COMPLETED | OUTPATIENT
Start: 2024-08-21 | End: 2024-08-21

## 2024-08-21 RX ADMIN — PIPERACILLIN AND TAZOBACTAM 4.5 G: 4; .5 INJECTION, POWDER, LYOPHILIZED, FOR SOLUTION INTRAVENOUS; PARENTERAL at 02:08

## 2024-08-21 RX ADMIN — HYDROMORPHONE HYDROCHLORIDE 1 MG: 1 INJECTION, SOLUTION INTRAMUSCULAR; INTRAVENOUS; SUBCUTANEOUS at 10:08

## 2024-08-21 RX ADMIN — SUCRALFATE 1 G: 1 SUSPENSION ORAL at 05:08

## 2024-08-21 RX ADMIN — KETOROLAC TROMETHAMINE 10 MG: 10 TABLET, FILM COATED ORAL at 05:08

## 2024-08-21 RX ADMIN — KETOROLAC TROMETHAMINE 10 MG: 10 TABLET, FILM COATED ORAL at 11:08

## 2024-08-21 RX ADMIN — SUCRALFATE 1 G: 1 SUSPENSION ORAL at 12:08

## 2024-08-21 RX ADMIN — SUCRALFATE 1 G: 1 SUSPENSION ORAL at 11:08

## 2024-08-21 RX ADMIN — PANTOPRAZOLE SODIUM 40 MG: 40 INJECTION, POWDER, FOR SOLUTION INTRAVENOUS at 09:08

## 2024-08-21 RX ADMIN — ENOXAPARIN SODIUM 40 MG: 40 INJECTION SUBCUTANEOUS at 04:08

## 2024-08-21 RX ADMIN — PIPERACILLIN AND TAZOBACTAM 4.5 G: 4; .5 INJECTION, POWDER, LYOPHILIZED, FOR SOLUTION INTRAVENOUS; PARENTERAL at 05:08

## 2024-08-21 RX ADMIN — HYDROMORPHONE HYDROCHLORIDE 1 MG: 1 INJECTION, SOLUTION INTRAMUSCULAR; INTRAVENOUS; SUBCUTANEOUS at 03:08

## 2024-08-21 RX ADMIN — POTASSIUM CHLORIDE 40 MEQ: 1500 TABLET, EXTENDED RELEASE ORAL at 10:08

## 2024-08-21 RX ADMIN — AMLODIPINE BESYLATE 10 MG: 5 TABLET ORAL at 10:08

## 2024-08-21 RX ADMIN — PIPERACILLIN AND TAZOBACTAM 4.5 G: 4; .5 INJECTION, POWDER, LYOPHILIZED, FOR SOLUTION INTRAVENOUS; PARENTERAL at 09:08

## 2024-08-21 RX ADMIN — KETOROLAC TROMETHAMINE 10 MG: 10 TABLET, FILM COATED ORAL at 12:08

## 2024-08-21 NOTE — PROGRESS NOTES
[facility] Medicine  Progress Note    Patient Name: Willian Ron  MRN: [id number]  Patient Class: IP- Inpatient   Admission Date: 8/14/2024  Length of Stay: 7 days  Attending Physician: Viki Altman,*  Primary Care Provider: No primary care provider on file.        Subjective:     Principal Problem:Acute appendicitis        HPI:  Patient is a 34-year-old male with no PMH except for marijauna use who presented to  [facility] ER on 08/14/24 for further evaluation of generalised abdominal pain and subjective fever x 4 days associated with nausea and non bilious non bloody vomiting Last BM  days ago Unable to tolerate PO intake. Denied CP/SOB    During evaluation in the ER, patient was found to be hypertensive (182/100), tachycardic (126), T-max 100.5° F. labs revealed WBC 17.92, sodium 135, bicarb 22, creatinine 1.1, total bilirubin 1.1.  Lactic acid 1.9.  X-ray abdomen negative.  CT abdomen revealed findings concerning for perforated appendicitis with developing adjacent abscess.  Concern for partial SBO.  Hepatic steatosis.  Patient was given 1 L NS, 4 mg Zofran, 8 mg morphine, IV Zosyn.  General surgery was consulted in ED. admitted to hospital medicine for further management    Overview/Hospital Course:  34-year-old male with no PMH except for marijauna use who was admitted for sepsis secondary to acute perforated appendicitis with developing adjacent abscess as evidenced on CT. Initiated on IV Zosyn.  General surgery/GI on board.  Status post IR guided drainage of abscess on 08/15. Blood cultures w/ NGTD.  Fluid cultures grew E coli and Prevotella.  Patient was noted to have dark red emesis 08/17, initiated on IV Protonix.  Hemoglobin stable at this time.  X-ray KUB unremarkable for bowel obstruction.Patient is noted to have worsening leukocytosis despite receiving IV Zosyn. Repeat CT w/ contrast 08/19 showed perforated appendicitis with multiple abscesses, progressed from previous  08/14/2024, abscesses are not sizable thus not drainable per IR.  Leukocytosis trending down.  Id on board.  Recommended to continue Zosyn and repeat CT abdomen in a couple of days.  Hypokalemia repleted.  Leukocytosis improving    Interval History:  Patient reports abdominal pain is improving able to tolerate diet  Review of Systems   Constitutional: Negative.    Respiratory: Negative.     Cardiovascular: Negative.    Gastrointestinal:  Positive for abdominal pain.   Genitourinary: Negative.    Musculoskeletal: Negative.    Neurological: Negative.      Objective:     Vital Signs (Most Recent):  Temp: 98.5 °F (36.9 °C) (08/21/24 1211)  Pulse: 89 (08/21/24 1211)  Resp: 18 (08/21/24 1507)  BP: (!) 156/88 (08/21/24 1211)  SpO2: 97 % (08/21/24 1211) Vital Signs (24h Range):  Temp:  [97.6 °F (36.4 °C)-98.7 °F (37.1 °C)] 98.5 °F (36.9 °C)  Pulse:  [] 89  Resp:  [17-18] 18  SpO2:  [97 %-99 %] 97 %  BP: (156-182)/() 156/88     Weight: 120.7 kg (266 lb 1.5 oz)  Body mass index is 37.11 kg/m².    Intake/Output Summary (Last 24 hours) at 8/21/2024 1549  Last data filed at 8/21/2024 1225  Gross per 24 hour   Intake 2641.05 ml   Output 500 ml   Net 2141.05 ml         Physical Exam  Constitutional:       General: He is not in acute distress.     Appearance: He is obese.   Cardiovascular:      Rate and Rhythm: Normal rate.      Pulses: Normal pulses.   Pulmonary:      Effort: No respiratory distress.      Breath sounds: Normal breath sounds. No wheezing.   Abdominal:      General: Bowel sounds are normal. There is no distension.      Palpations: Abdomen is soft.      Tenderness: There is abdominal tenderness. There is no guarding.   Musculoskeletal:      Right lower leg: No edema.      Left lower leg: No edema.   Neurological:      Mental Status: He is alert and oriented to person, place, and time. Mental status is at baseline.   Psychiatric:         Mood and Affect: Mood normal.             Significant Labs: All  pertinent labs within the past 24 hours have been reviewed.    Significant Imaging: I have reviewed all pertinent imaging results/findings within the past 24 hours.      Assessment/Plan:      * Acute appendicitis  - CT 08/14 revealed perforated appendicitis noting be developing an adjacent abscess   - Initiated on IVF and empiric IV Zosyn.  - Blood culture w/ NGTD  - Fluid culture growing E-coli & Prevotella  - General surgery on board  - Pt Status post IR guided drainage of abscess on 08/15  - Repeat CT w/ contrast 08/19 showed perforated appendicitis with multiple abscesses, which progressed from previous 08/14/2024  - Abscesses are not sizable thus not drainable per IR.  - ID are following, appreciate recs  - Cont empiric IV Zosyn for now.  No plans for surgery  -id recommended repeat CT.  WBC trending down      Tobacco dependency  Dangers of cigarette smoking were reviewed with patient in detail. Patient was Counseled for 3-10 minutes.    Sepsis  This patient does have evidence of infective focus (perforated appendix)  My overall impression is sepsis.  Source: Abdominal  Antibiotics given-   Antibiotics (72h ago, onward)      Start     Stop Route Frequency Ordered    08/15/24 0030  piperacillin-tazobactam (ZOSYN) 4.5 g in D5W 100 mL IVPB (MB+)         -- IV Every 8 hours (non-standard times) 08/14/24 1702              Will Not start Pressors- Levophed for MAP of 65  Source control achieved by: IV zosyn  ID following    Marijuana use  Lifestyle modification  Counselled on cessation    Preoperative clearance  Modify RCRI index of 0 with class I risk and 3.9% risk of death/MI/cardiac arrest      Hepatic steatosis  CT evidence of hepatic steatosis.  LFTs WNL.  Continue lifestyle modification   Weight loss may be helpful.    Hyponatremia  Hyponatremia is likely due to Dehydration/hypovolemia. The patient's most recent sodium results are listed below.  Recent Labs     08/19/24  0437 08/20/24  0451    138        Plan  Hyponatremia likely due to hypovolemic hyponatremia   Currently improved.  Continue monitoring daily.    Leukocytosis  Likely due to acute perforated appendicitis w/ surrounding abscesses  Currently on antibiotics         VTE Risk Mitigation (From admission, onward)           Ordered     IP VTE HIGH RISK PATIENT  Once         08/14/24 1704     Place sequential compression device  Until discontinued         08/14/24 1704                    Discharge Planning   JANN: 8/23/2024     Code Status: Full Code   Is the patient medically ready for discharge?:     Reason for patient still in hospital (select all that apply): Patient trending condition and Treatment  Discharge Plan A: Home with family   Discharge Delays: None known at this time              Viki Altman MD  Department of Hospital Medicine   [facility] [place] [facility]

## 2024-08-21 NOTE — NURSING
[facility] [place]  Nurses Note -- 4 Eyes      8/21/2024       Skin assessed on: Q Shift      [x] No Pressure Injuries Present    []Prevention Measures Documented    [] Yes LDA  for Pressure Injury Previously documented     [] Yes New Pressure Injury Discovered   [] LDA for New Pressure Injury Added      Attending RN:  Ivanna Rodgers RN     Second RN:  BETH Lock

## 2024-08-21 NOTE — PLAN OF CARE
Pt A&Ox4, free from falls/injury, and able to make needs known during shift. VSS. Pt had c/o pain during shift. PRN pain medication given with relief. No acute distress noted. Bed locked and in lowest position. Bedside table and call light in reach. Will cont to monitor.  Problem: Adult Inpatient Plan of Care  Goal: Plan of Care Review  Outcome: Progressing  Goal: Patient-Specific Goal (Individualized)  Outcome: Progressing  Goal: Absence of Hospital-Acquired Illness or Injury  Outcome: Progressing  Goal: Optimal Comfort and Wellbeing  Outcome: Progressing  Goal: Readiness for Transition of Care  Outcome: Progressing     Problem: Pain Acute  Goal: Optimal Pain Control and Function  Outcome: Progressing     Problem: Infection  Goal: Absence of Infection Signs and Symptoms  Outcome: Progressing

## 2024-08-21 NOTE — ASSESSMENT & PLAN NOTE
- CT 08/14 revealed perforated appendicitis noting be developing an adjacent abscess   - Initiated on IVF and empiric IV Zosyn.  - Blood culture w/ NGTD  - Fluid culture growing E-coli & Prevotella  - General surgery on board  - Pt Status post IR guided drainage of abscess on 08/15  - Repeat CT w/ contrast 08/19 showed perforated appendicitis with multiple abscesses, which progressed from previous 08/14/2024  - Abscesses are not sizable thus not drainable per IR.  - ID are following, appreciate recs  - Cont empiric IV Zosyn for now.  No plans for surgery  -id recommended repeat CT.  WBC trending down

## 2024-08-21 NOTE — SUBJECTIVE & OBJECTIVE
Interval History:  Patient reports abdominal pain is improving able to tolerate diet  Review of Systems   Constitutional: Negative.    Respiratory: Negative.     Cardiovascular: Negative.    Gastrointestinal:  Positive for abdominal pain.   Genitourinary: Negative.    Musculoskeletal: Negative.    Neurological: Negative.      Objective:     Vital Signs (Most Recent):  Temp: 98.5 °F (36.9 °C) (08/21/24 1211)  Pulse: 89 (08/21/24 1211)  Resp: 18 (08/21/24 1507)  BP: (!) 156/88 (08/21/24 1211)  SpO2: 97 % (08/21/24 1211) Vital Signs (24h Range):  Temp:  [97.6 °F (36.4 °C)-98.7 °F (37.1 °C)] 98.5 °F (36.9 °C)  Pulse:  [] 89  Resp:  [17-18] 18  SpO2:  [97 %-99 %] 97 %  BP: (156-182)/() 156/88     Weight: 120.7 kg (266 lb 1.5 oz)  Body mass index is 37.11 kg/m².    Intake/Output Summary (Last 24 hours) at 8/21/2024 1549  Last data filed at 8/21/2024 1225  Gross per 24 hour   Intake 2641.05 ml   Output 500 ml   Net 2141.05 ml         Physical Exam  Constitutional:       General: He is not in acute distress.     Appearance: He is obese.   Cardiovascular:      Rate and Rhythm: Normal rate.      Pulses: Normal pulses.   Pulmonary:      Effort: No respiratory distress.      Breath sounds: Normal breath sounds. No wheezing.   Abdominal:      General: Bowel sounds are normal. There is no distension.      Palpations: Abdomen is soft.      Tenderness: There is abdominal tenderness. There is no guarding.   Musculoskeletal:      Right lower leg: No edema.      Left lower leg: No edema.   Neurological:      Mental Status: He is alert and oriented to person, place, and time. Mental status is at baseline.   Psychiatric:         Mood and Affect: Mood normal.             Significant Labs: All pertinent labs within the past 24 hours have been reviewed.    Significant Imaging: I have reviewed all pertinent imaging results/findings within the past 24 hours.

## 2024-08-21 NOTE — PROGRESS NOTES
Surgery Progress Note    Willian Ron is a 34 y.o. year old male in hospital for acute appendicitis w multiple abscesses now s/p IR drainage aspiration.     NAEON, VSS, afebrile  WBC downtrending  Pain well controlled  Continuing to have daily BM and tolerating diet  No fever, chills, nausea/vomiting or any other symptoms    ROS:  Negative except above    PE:  Vitals:    08/21/24 0803 08/21/24 1000 08/21/24 1002 08/21/24 1211   BP: (!) 178/90 (!) 174/90  (!) 156/88   BP Location: Left arm   Left arm   Patient Position: Lying   Lying   Pulse: 73   89   Resp: 17  18 17   Temp: 98.7 °F (37.1 °C)   98.5 °F (36.9 °C)   TempSrc: Oral   Oral   SpO2: 99%   97%   Weight:       Height:         General: Awake and alert, in no acute distress, appears stated age, well-nourished. Appears comfortable  HEENT: Atraumatic, normocephalic, MMM  Cardiac: Regular rate on monitor, well-perfused  Pulm: Breathing comfortably, symmetric chest rise, no respiratory distress. Satting well on RA  Abdomen: Soft, moderate localized tender to palpation of RLQ, improved from prior, non-distended, some guarding, no rebound  MSK: moving all extremities appropriately  Neuro: Aox4, CN 2-12 grossly intact      Significant Diagnostic Studies: Labs: BMP:   Recent Labs   Lab 08/20/24  0451 08/21/24  0707   GLU 87 96    141   K 3.1* 3.3*    104   CO2 28 28   BUN 7 8   CREATININE 0.9 0.9   CALCIUM 9.0 9.1    and CBC   Recent Labs   Lab 08/20/24  0451 08/21/24  0707   WBC 18.17* 13.62*   HGB 14.1 14.4   HCT 42.3 44.1    345     A/P:  Willian Ron is a 34 y.o. year old male  with acute appendicitis w abscess s/p IR drainage. Stable with non op treatment (IR aspiration, antibiotics), and plan for interval appendectomy in the future. Repeat CT A/P showing multiple abscesses, 4.9 x 1.9 cm in the midline lower abdomen, 2.9 x 2.8 cm in the right lower quadrant, and 4.4 x 2.1 cm in right hemipelvis. IR unable to drain. Having bowel function  and tolerating diet    - WBC downtrending, abdominal exam and pain improving  - Repeat CT in 2-3 days  - Continue non-op/antibiotic therapy to treat acute inflammatory period and follow up outpatient for interval appendectomy  - Daily CBC/BMP. Replete lytes prn  - MMPC, toradol  - Diet: Regular diet   - IVF: none  - PPx: okay for LVX, SCDs, protonix  - Abx: Zosyn. ID on board  - Will continue to follow    Patient seen and case discussed with attending staff, Dr. Mancia. Attestation to follow.    Obed Ding MD  General Surgery Resident  [facility]

## 2024-08-22 LAB
ANION GAP SERPL CALC-SCNC: 11 MMOL/L (ref 8–16)
BASOPHILS # BLD AUTO: 0.04 K/UL (ref 0–0.2)
BASOPHILS NFR BLD: 0.3 % (ref 0–1.9)
BUN SERPL-MCNC: 6 MG/DL (ref 6–20)
CALCIUM SERPL-MCNC: 8.9 MG/DL (ref 8.7–10.5)
CHLORIDE SERPL-SCNC: 104 MMOL/L (ref 95–110)
CO2 SERPL-SCNC: 25 MMOL/L (ref 23–29)
CREAT SERPL-MCNC: 0.9 MG/DL (ref 0.5–1.4)
DIFFERENTIAL METHOD BLD: ABNORMAL
EOSINOPHIL # BLD AUTO: 0.3 K/UL (ref 0–0.5)
EOSINOPHIL NFR BLD: 2.4 % (ref 0–8)
ERYTHROCYTE [DISTWIDTH] IN BLOOD BY AUTOMATED COUNT: 14.1 % (ref 11.5–14.5)
EST. GFR  (NO RACE VARIABLE): >60 ML/MIN/1.73 M^2
GLUCOSE SERPL-MCNC: 85 MG/DL (ref 70–110)
HCT VFR BLD AUTO: 43.3 % (ref 40–54)
HGB BLD-MCNC: 14 G/DL (ref 14–18)
IMM GRANULOCYTES # BLD AUTO: 0.49 K/UL (ref 0–0.04)
IMM GRANULOCYTES NFR BLD AUTO: 3.5 % (ref 0–0.5)
LYMPHOCYTES # BLD AUTO: 2.8 K/UL (ref 1–4.8)
LYMPHOCYTES NFR BLD: 20.2 % (ref 18–48)
MCH RBC QN AUTO: 28.2 PG (ref 27–31)
MCHC RBC AUTO-ENTMCNC: 32.3 G/DL (ref 32–36)
MCV RBC AUTO: 87 FL (ref 82–98)
MONOCYTES # BLD AUTO: 1.1 K/UL (ref 0.3–1)
MONOCYTES NFR BLD: 7.5 % (ref 4–15)
NEUTROPHILS # BLD AUTO: 9.3 K/UL (ref 1.8–7.7)
NEUTROPHILS NFR BLD: 66.1 % (ref 38–73)
NRBC BLD-RTO: 0 /100 WBC
PLATELET # BLD AUTO: 359 K/UL (ref 150–450)
PMV BLD AUTO: 10.3 FL (ref 9.2–12.9)
POCT GLUCOSE: 109 MG/DL (ref 70–110)
POTASSIUM SERPL-SCNC: 3.5 MMOL/L (ref 3.5–5.1)
RBC # BLD AUTO: 4.96 M/UL (ref 4.6–6.2)
SODIUM SERPL-SCNC: 140 MMOL/L (ref 136–145)
WBC # BLD AUTO: 14.04 K/UL (ref 3.9–12.7)

## 2024-08-22 PROCEDURE — 99233 SBSQ HOSP IP/OBS HIGH 50: CPT | Mod: ,,, | Performed by: INTERNAL MEDICINE

## 2024-08-22 PROCEDURE — 25000003 PHARM REV CODE 250: Performed by: NURSE PRACTITIONER

## 2024-08-22 PROCEDURE — 36415 COLL VENOUS BLD VENIPUNCTURE: CPT | Performed by: STUDENT IN AN ORGANIZED HEALTH CARE EDUCATION/TRAINING PROGRAM

## 2024-08-22 PROCEDURE — 63600175 PHARM REV CODE 636 W HCPCS: Performed by: STUDENT IN AN ORGANIZED HEALTH CARE EDUCATION/TRAINING PROGRAM

## 2024-08-22 PROCEDURE — 80048 BASIC METABOLIC PNL TOTAL CA: CPT | Performed by: STUDENT IN AN ORGANIZED HEALTH CARE EDUCATION/TRAINING PROGRAM

## 2024-08-22 PROCEDURE — 25000003 PHARM REV CODE 250

## 2024-08-22 PROCEDURE — 25000003 PHARM REV CODE 250: Performed by: STUDENT IN AN ORGANIZED HEALTH CARE EDUCATION/TRAINING PROGRAM

## 2024-08-22 PROCEDURE — 85025 COMPLETE CBC W/AUTO DIFF WBC: CPT | Performed by: STUDENT IN AN ORGANIZED HEALTH CARE EDUCATION/TRAINING PROGRAM

## 2024-08-22 PROCEDURE — 25500020 PHARM REV CODE 255: Performed by: STUDENT IN AN ORGANIZED HEALTH CARE EDUCATION/TRAINING PROGRAM

## 2024-08-22 PROCEDURE — 99232 SBSQ HOSP IP/OBS MODERATE 35: CPT | Mod: ,,, | Performed by: SURGERY

## 2024-08-22 PROCEDURE — 11000001 HC ACUTE MED/SURG PRIVATE ROOM

## 2024-08-22 PROCEDURE — A4216 STERILE WATER/SALINE, 10 ML: HCPCS | Performed by: STUDENT IN AN ORGANIZED HEALTH CARE EDUCATION/TRAINING PROGRAM

## 2024-08-22 RX ADMIN — PIPERACILLIN AND TAZOBACTAM 4.5 G: 4; .5 INJECTION, POWDER, LYOPHILIZED, FOR SOLUTION INTRAVENOUS; PARENTERAL at 10:08

## 2024-08-22 RX ADMIN — IOHEXOL 100 ML: 350 INJECTION, SOLUTION INTRAVENOUS at 05:08

## 2024-08-22 RX ADMIN — KETOROLAC TROMETHAMINE 10 MG: 10 TABLET, FILM COATED ORAL at 12:08

## 2024-08-22 RX ADMIN — PANTOPRAZOLE SODIUM 40 MG: 40 INJECTION, POWDER, FOR SOLUTION INTRAVENOUS at 08:08

## 2024-08-22 RX ADMIN — ENOXAPARIN SODIUM 40 MG: 40 INJECTION SUBCUTANEOUS at 04:08

## 2024-08-22 RX ADMIN — KETOROLAC TROMETHAMINE 10 MG: 10 TABLET, FILM COATED ORAL at 11:08

## 2024-08-22 RX ADMIN — HYDROMORPHONE HYDROCHLORIDE 1 MG: 1 INJECTION, SOLUTION INTRAMUSCULAR; INTRAVENOUS; SUBCUTANEOUS at 09:08

## 2024-08-22 RX ADMIN — SUCRALFATE 1 G: 1 SUSPENSION ORAL at 06:08

## 2024-08-22 RX ADMIN — SUCRALFATE 1 G: 1 SUSPENSION ORAL at 12:08

## 2024-08-22 RX ADMIN — KETOROLAC TROMETHAMINE 10 MG: 10 TABLET, FILM COATED ORAL at 06:08

## 2024-08-22 RX ADMIN — PIPERACILLIN AND TAZOBACTAM 4.5 G: 4; .5 INJECTION, POWDER, LYOPHILIZED, FOR SOLUTION INTRAVENOUS; PARENTERAL at 06:08

## 2024-08-22 RX ADMIN — SUCRALFATE 1 G: 1 SUSPENSION ORAL at 05:08

## 2024-08-22 RX ADMIN — PANTOPRAZOLE SODIUM 40 MG: 40 INJECTION, POWDER, FOR SOLUTION INTRAVENOUS at 10:08

## 2024-08-22 RX ADMIN — PIPERACILLIN AND TAZOBACTAM 4.5 G: 4; .5 INJECTION, POWDER, LYOPHILIZED, FOR SOLUTION INTRAVENOUS; PARENTERAL at 02:08

## 2024-08-22 RX ADMIN — AMLODIPINE BESYLATE 10 MG: 5 TABLET ORAL at 08:08

## 2024-08-22 RX ADMIN — IOHEXOL 15 ML: 300 INJECTION, SOLUTION INTRAVENOUS at 05:08

## 2024-08-22 RX ADMIN — Medication 10 ML: at 12:08

## 2024-08-22 RX ADMIN — SUCRALFATE 1 G: 1 SUSPENSION ORAL at 11:08

## 2024-08-22 RX ADMIN — Medication 10 ML: at 11:08

## 2024-08-22 RX ADMIN — KETOROLAC TROMETHAMINE 10 MG: 10 TABLET, FILM COATED ORAL at 05:08

## 2024-08-22 NOTE — PROGRESS NOTES
[facility] Surg  Adult Nutrition  Progress Note    SUMMARY     Recommendations  Recommendation:   1. Continue Regular diet as tolerated   2. Monitor weight and labs  3. Encourage PO intake as tolerated     Goals: Pt will consume 50-75% of meals by RD follow up    Nutrition Goal Status: new  Communication of RD Recs:  (POC)    Assessment and Plan  Nutrition Problem  Inadequate energy intake     Related to (etiology):   Acute appendicitis     Signs and Symptoms (as evidenced by):   Noted 25% meal intake   Abdominal pain      Interventions/Recommendations (treatment strategy):  Collaboration with other providers     Nutrition Diagnosis Status:   New    Malnutrition Assessment  Orbital Region (Subcutaneous Fat Loss): well nourished  Upper Arm Region (Subcutaneous Fat Loss): well nourished  Thoracic and Lumbar Region: well nourished   [place] Region (Muscle Loss): well nourished  Clavicle Bone Region (Muscle Loss): well nourished  Clavicle and Acromion Bone Region (Muscle Loss): well nourished  Scapular Bone Region (Muscle Loss): well nourished  Dorsal Hand (Muscle Loss): well nourished  Patellar Region (Muscle Loss): well nourished  Anterior Thigh Region (Muscle Loss): well nourished  Posterior Calf Region (Muscle Loss): well nourished     Reason for Assessment  Reason For Assessment: length of stay  Diagnosis: gastrointestinal disease (acute appendicitis)  Relevant Medical History: N/V  Interdisciplinary Rounds: did not attend  General Information Comments: Pt is currently on a regular diet. Wilder-20/incision right lateral  back. Noted 25% meal intake. Pt admitted for sepsis secondary to acute perforated appendicitis with developing adjacent abscess as evidenced by CT. S/p IR guided drainage of abscess on 8/15. Noted abdominal pain is improving, pt is able to tolerated diet. No recent weight history. NFPE conducted, pt nourished 8/22/24. Pt has been eating what he can.  Nutrition Discharge Planning: d/c on regular  "diet    Nutrition Risk Screen  Nutrition Risk Screen: no indicators present    Nutrition/Diet History  Patient Reported Diet/Restrictions/Preferences: general  Food Preferences: No prefs identified at this time  Factors Affecting Nutritional Intake: abdominal pain, altered gastrointestinal function, decreased appetite    Nutrition Related Social Determinants of Health: SDOH: Unable to assess at this time.     Anthropometrics  Temp: 97.6 °F (36.4 °C)  Height Method: Stated  Height: 5' 11" (180.3 cm)  Height (inches): 71 in  Weight Method: Bed Scale  Weight: 120.7 kg (266 lb 1.5 oz)  Weight (lb): 266.1 lb  Ideal Body Weight (IBW), Male: 172 lb  % Ideal Body Weight, Male (lb): 154.71 %  BMI (Calculated): 37.1  BMI Grade: 35 - 39.9 - obesity - grade II  Usual Body Weight (UBW), kg:  (No recent weight history)     Lab/Procedures/Meds  Pertinent Labs Reviewed: reviewed  Pertinent Labs Comments: K 3.3  Pertinent Medications Reviewed: reviewed  Pertinent Medications Comments: enoxaparin, pantoprazole, sodium chloride    Estimated/Assessed Needs  Weight Used For Calorie Calculations: 120.7 kg (266 lb 1.5 oz)  Energy Calorie Requirements (kcal): 2596 kcals (AF 1.2)  Energy Need Method: [place]-St L[place]pez  Protein Requirements: 120g (1g/kg)  Weight Used For Protein Calculations: 120.7 kg (266 lb 1.5 oz)     Estimated Fluid Requirement Method: RDA Method  RDA Method (mL): 2596     Nutrition Prescription Ordered  Current Diet Order: Regular Diet    Evaluation of Received Nutrient/Fluid Intake  I/O: 0/2  Energy Calories Required: not meeting needs  Protein Required: not meeting needs  Fluid Required: not meeting needs  Comments: LBM-8/21/24  Tolerance: tolerating  % Intake of Estimated Energy Needs: 25 - 50 %  % Meal Intake: 25 - 50 %    Nutrition Risk  Level of Risk/Frequency of Follow-up:  (1x/weekly)     Monitor and Evaluation  Food and Nutrient Intake: energy intake, food and beverage intake  Food and Nutrient Adminstration: " diet order  Anthropometric Measurements: weight, weight change, body mass index  Biochemical Data, Medical Tests and Procedures: electrolyte and renal panel, gastrointestinal profile, inflammatory profile, lipid profile  Nutrition-Focused Physical Findings: overall appearance     Nutrition Follow-Up  RD Follow-up?: Yes

## 2024-08-22 NOTE — ASSESSMENT & PLAN NOTE
Hyponatremia is likely due to Dehydration/hypovolemia. The patient's most recent sodium results are listed below.  Recent Labs     08/20/24  0451 08/21/24  0707 08/22/24  0753    141 140       Plan  Hyponatremia likely due to hypovolemic hyponatremia   Currently resolved.  Continue monitoring daily.   23-30 [address] [place] [id number]

## 2024-08-22 NOTE — PROGRESS NOTES
"[facility] Surg  Infectious Disease  Progress Note    Patient Name: Willian Ron  MRN: [id number]  Admission Date: 8/14/2024  Length of Stay: 8 days  Attending Physician: Harry Hill DO  Primary Care Provider: No primary care provider on file.    Isolation Status: No active isolations    Assessment/Plan:        Leukocytosis    33 yo man admitted with perforated appendicitis. Drained via IR with E.coli and Prevotella growing, so far. On Zosyn. Developed increased WBC and found to have new collections. Discussed with Gen Surgery - for repeat IR aspiration and observation on abx but collections are not amenable to aspiration. Pain worse today. WBC unchanged.    Recommendations  Continue Zosyn  Trend WBC to WNL  Repeat CT ?    Jamar Charles MD  Infectious Disease  [facility]    Subjective:     Principal Problem:Acute appendicitis    HPI: 34 y.o. man with no pertinent PMHx who presents with acute on-set of abdominal pain, fever and N/V x 5 days with new imaging consistent with acute appendicitis c/b perforation and formation of a 2.5 AP x 3.0 TV x 2.5 CC-cm periappendiceal air-fluid collection in the central, lower abdomen. Started on Zosyn and seen by Surgery. IR was consulted for drain placement and a drain was placed. Culture growing E.coli and an anaerobe. The patient's WBC has increased on appropriate abx and a repeat CT revealed "perforated appendicitis with multiple abscesses, progressed from 08/14/2024." The patient was seen with Gen Surgery. Feeling better despite WBC. Currently on Zosyn.     Interval History: Patient reports worsening pain this am.    Review of Systems   Gastrointestinal:  Positive for abdominal pain.   All other systems reviewed and are negative.    Objective:     Vital Signs (Most Recent):  Temp: 98.9 °F (37.2 °C) (08/22/24 0718)  Pulse: 85 (08/22/24 0718)  Resp: 18 (08/22/24 0956)  BP: (!) 145/85 (08/22/24 0718)  SpO2: 99 % (08/22/24 0718) Vital Signs (24h " Range):  Temp:  [97.3 °F (36.3 °C)-98.9 °F (37.2 °C)] 98.9 °F (37.2 °C)  Pulse:  [] 85  Resp:  [17-18] 18  SpO2:  [97 %-99 %] 99 %  BP: (133-156)/(69-88) 145/85     Weight: 120.7 kg (266 lb 1.5 oz)  Body mass index is 37.11 kg/m².    Estimated Creatinine Clearance: 151.5 mL/min (based on SCr of 0.9 mg/dL).     Physical Exam  Vitals and nursing note reviewed.   Constitutional:       Appearance: Normal appearance.   HENT:      Head: Normocephalic.   Eyes:      Pupils: Pupils are equal, round, and reactive to light.   Cardiovascular:      Rate and Rhythm: Normal rate.   Abdominal:      Tenderness: There is abdominal tenderness. There is no guarding or rebound.   Musculoskeletal:         General: No swelling.   Skin:     General: Skin is warm.   Neurological:      General: No focal deficit present.      Mental Status: He is alert and oriented to person, place, and time.   Psychiatric:         Mood and Affect: Mood normal.         Behavior: Behavior normal.         Thought Content: Thought content normal.         Judgment: Judgment normal.          Significant Labs: CBC:   Recent Labs   Lab 08/21/24  0707 08/22/24  0754   WBC 13.62* 14.04*   HGB 14.4 14.0   HCT 44.1 43.3    359     Microbiology Results (last 7 days)       Procedure Component Value Units Date/Time    Blood Culture #1 **CANNOT BE ORDERED STAT** [[id number]] Collected: 08/14/24 1452    Order Status: Completed Specimen: Blood from Peripheral, Hand, Left Updated: 08/18/24 1703     Blood Culture, Routine No Growth after 4 days.    Blood Culture #2 **CANNOT BE ORDERED STAT** [[id number]] Collected: 08/14/24 1452    Order Status: Completed Specimen: Blood from Peripheral, Hand, Left Updated: 08/18/24 1703     Blood Culture, Routine No Growth after 4 days.    Culture, Anaerobic [[id number]]  (Abnormal) Collected: 08/15/24 1703    Order Status: Completed Specimen: Abscess from Abdomen Updated: 08/18/24 1212     Anaerobic Culture PREVOTELLA  MARGARET  Many      Culture, Body Fluid (Aerobic) w/ Gram Stain [[id number]]  (Abnormal)  (Susceptibility) Collected: 08/15/24 1703    Order Status: Completed Specimen: Body Fluid from Abdominal Fluid Updated: 08/17/24 0718     AEROBIC CULTURE - FLUID ESCHERICHIA COLI  Many       Gram Stain Result Few WBC's      Many Gram positive rods      Moderate Gram negative rods      Few Gram positive cocci in chains      Rare Gram positive cocci in pairs    Fungus culture [[id number]] Collected: 08/15/24 1703    Order Status: Sent Specimen: Abscess from Abdomen Updated: 08/15/24 1724            Significant Imaging: I have reviewed all pertinent imaging results/findings within the past 24 hours.

## 2024-08-22 NOTE — NURSING
Pt back to unit from CT by wheelchair via transport. IV access in place, saline locked. NAD or pain noted.

## 2024-08-22 NOTE — PLAN OF CARE
Recommendations  Recommendation:   1. Continue Regular diet as tolerated   2. Monitor weight and labs  3. Encourage PO intake as tolerated     Goals: Pt will consume 50-75% of meals by RD follow up    Nutrition Goal Status: new  Communication of RD Recs:  (POC)

## 2024-08-22 NOTE — ASSESSMENT & PLAN NOTE
Likely due to acute perforated appendicitis w/ surrounding abscesses  Currently on antibiotics   Continue to trend WBC  ID following

## 2024-08-22 NOTE — PROGRESS NOTES
[facility] Medicine  Progress Note    Patient Name: Willian Ron  MRN: [id number]  Patient Class: IP- Inpatient   Admission Date: 8/14/2024  Length of Stay: 8 days  Attending Physician: Harry Hill DO  Primary Care Provider: No primary care provider on file.        Subjective:     Principal Problem:Acute appendicitis        HPI:  Patient is a 34-year-old male with no PMH except for marijauna use who presented to  [facility] ER on 08/14/24 for further evaluation of generalised abdominal pain and subjective fever x 4 days associated with nausea and non bilious non bloody vomiting Last BM  days ago Unable to tolerate PO intake. Denied CP/SOB    During evaluation in the ER, patient was found to be hypertensive (182/100), tachycardic (126), T-max 100.5° F. labs revealed WBC 17.92, sodium 135, bicarb 22, creatinine 1.1, total bilirubin 1.1.  Lactic acid 1.9.  X-ray abdomen negative.  CT abdomen revealed findings concerning for perforated appendicitis with developing adjacent abscess.  Concern for partial SBO.  Hepatic steatosis.  Patient was given 1 L NS, 4 mg Zofran, 8 mg morphine, IV Zosyn.  General surgery was consulted in ED. admitted to hospital medicine for further management    Overview/Hospital Course:  34-year-old male with no PMH except for marijauna use who was admitted for sepsis secondary to acute perforated appendicitis with developing adjacent abscess as evidenced on CT. Initiated on IV Zosyn.  General surgery/GI on board.  Status post IR guided drainage of abscess on 08/15. Blood cultures w/ NGTD.  Fluid cultures grew E coli and Prevotella.  Patient was noted to have dark red emesis 08/17, initiated on IV Protonix.  Hemoglobin stable at this time.  X-ray KUB unremarkable for bowel obstruction.Patient is noted to have worsening leukocytosis despite receiving IV Zosyn. Repeat CT w/ contrast 08/19 showed perforated appendicitis with multiple abscesses, progressed from previous  08/14/2024, abscesses are not sizable thus not drainable per IR.  Leukocytosis trending down.  Id on board.  Recommended to continue Zosyn and repeat CT abdomen in a couple of days.  Hypokalemia repleted.  Leukocytosis improving    Interval History:  No acute overnight events.  Patient remained afebrile.  Continues to have abdominal pain, but feels like it is improving.  No nausea or vomiting.  Plan to repeat CT abdomen today    Review of Systems   Constitutional:  Negative for chills and fever.   Respiratory: Negative.     Cardiovascular: Negative.    Gastrointestinal:  Positive for abdominal pain. Negative for nausea and vomiting.   Neurological: Negative.      Objective:     Vital Signs (Most Recent):  Temp: 97.6 °F (36.4 °C) (08/22/24 1114)  Pulse: 83 (08/22/24 1114)  Resp: 18 (08/22/24 1114)  BP: 127/78 (08/22/24 1114)  SpO2: 97 % (08/22/24 1114) Vital Signs (24h Range):  Temp:  [97.3 °F (36.3 °C)-98.9 °F (37.2 °C)] 97.6 °F (36.4 °C)  Pulse:  [] 83  Resp:  [18] 18  SpO2:  [97 %-99 %] 97 %  BP: (127-156)/(69-86) 127/78     Weight: 120.7 kg (266 lb 1.5 oz)  Body mass index is 37.11 kg/m².    Intake/Output Summary (Last 24 hours) at 8/22/2024 1542  Last data filed at 8/22/2024 1438  Gross per 24 hour   Intake 821.26 ml   Output 802 ml   Net 19.26 ml         Physical Exam  Vitals and nursing note reviewed.   Constitutional:       General: He is not in acute distress.     Appearance: He is obese. He is not ill-appearing.   Cardiovascular:      Rate and Rhythm: Normal rate and regular rhythm.      Pulses: Normal pulses.   Pulmonary:      Effort: No respiratory distress.      Breath sounds: Normal breath sounds. No wheezing.   Abdominal:      General: There is no distension.      Palpations: Abdomen is soft.      Tenderness: There is abdominal tenderness. There is no guarding.   Musculoskeletal:      Right lower leg: No edema.      Left lower leg: No edema.   Neurological:      Mental Status: He is alert and  oriented to person, place, and time. Mental status is at baseline.   Psychiatric:         Mood and Affect: Mood normal.             Significant Labs: All pertinent labs within the past 24 hours have been reviewed.    Significant Imaging: I have reviewed all pertinent imaging results/findings within the past 24 hours.      Assessment/Plan:      * Acute appendicitis  - CT 08/14 revealed perforated appendicitis noting be developing an adjacent abscess   - Initiated on IVF and empiric IV Zosyn.  - Blood culture w/ NGTD  - Fluid culture growing E-coli & Prevotella  - General surgery on board  - Pt Status post IR guided drainage of abscess on 08/15  - Repeat CT w/ contrast 08/19 showed perforated appendicitis with multiple abscesses, which progressed from previous 08/14/2024  - Abscesses are not sizable thus not drainable per IR.  - ID are following, appreciate recs  - Cont empiric IV Zosyn for now.  No plans for surgery  -ID recommended repeat CT.  WBC trending down  -Repeat CT abdomen 08/22/24, ordered      Tobacco dependency  Dangers of cigarette smoking were reviewed with patient in detail. Patient was Counseled for 3-10 minutes.    Sepsis  This patient does have evidence of infective focus (perforated appendix)  My overall impression is sepsis.  Source: Abdominal  Antibiotics given-   Antibiotics (72h ago, onward)      Start     Stop Route Frequency Ordered    08/15/24 0030  piperacillin-tazobactam (ZOSYN) 4.5 g in D5W 100 mL IVPB (MB+)         -- IV Every 8 hours (non-standard times) 08/14/24 1702              Will Not start Pressors- Levophed for MAP of 65  Source control achieved by: IV zosyn  ID following    Marijuana use  Lifestyle modification  Counselled on cessation    Preoperative clearance  Modify RCRI index of 0 with class I risk and 3.9% risk of death/MI/cardiac arrest      Hepatic steatosis  CT evidence of hepatic steatosis.  LFTs WNL.  Continue lifestyle modification   Weight loss may be  helpful.    Hyponatremia  Hyponatremia is likely due to Dehydration/hypovolemia. The patient's most recent sodium results are listed below.  Recent Labs     08/20/24  0451 08/21/24  0707 08/22/24  0753    141 140       Plan  Hyponatremia likely due to hypovolemic hyponatremia   Currently resolved.  Continue monitoring daily.    Leukocytosis  Likely due to acute perforated appendicitis w/ surrounding abscesses  Currently on antibiotics   Continue to trend WBC  ID following         VTE Risk Mitigation (From admission, onward)           Ordered     enoxaparin injection 40 mg  Every 24 hours         08/21/24 1551     IP VTE HIGH RISK PATIENT  Once         08/14/24 1704     Place sequential compression device  Until discontinued         08/14/24 1704                    Discharge Planning   JANN: 8/23/2024     Code Status: Full Code   Is the patient medically ready for discharge?:     Reason for patient still in hospital (select all that apply): Patient trending condition, Treatment, Imaging, and Consult recommendations  Discharge Plan A: Home with family   Discharge Delays: None known at this time              Harry Hill DO  Department of Hospital Medicine   [facility]urg

## 2024-08-22 NOTE — SUBJECTIVE & OBJECTIVE
Interval History: Patient reports worsening pain this am.    Review of Systems   Gastrointestinal:  Positive for abdominal pain.   All other systems reviewed and are negative.    Objective:     Vital Signs (Most Recent):  Temp: 98.9 °F (37.2 °C) (08/22/24 0718)  Pulse: 85 (08/22/24 0718)  Resp: 18 (08/22/24 0956)  BP: (!) 145/85 (08/22/24 0718)  SpO2: 99 % (08/22/24 0718) Vital Signs (24h Range):  Temp:  [97.3 °F (36.3 °C)-98.9 °F (37.2 °C)] 98.9 °F (37.2 °C)  Pulse:  [] 85  Resp:  [17-18] 18  SpO2:  [97 %-99 %] 99 %  BP: (133-156)/(69-88) 145/85     Weight: 120.7 kg (266 lb 1.5 oz)  Body mass index is 37.11 kg/m².    Estimated Creatinine Clearance: 151.5 mL/min (based on SCr of 0.9 mg/dL).     Physical Exam  Vitals and nursing note reviewed.   Constitutional:       Appearance: Normal appearance.   HENT:      Head: Normocephalic.   Eyes:      Pupils: Pupils are equal, round, and reactive to light.   Cardiovascular:      Rate and Rhythm: Normal rate.   Abdominal:      Tenderness: There is abdominal tenderness. There is no guarding or rebound.   Musculoskeletal:         General: No swelling.   Skin:     General: Skin is warm.   Neurological:      General: No focal deficit present.      Mental Status: He is alert and oriented to person, place, and time.   Psychiatric:         Mood and Affect: Mood normal.         Behavior: Behavior normal.         Thought Content: Thought content normal.         Judgment: Judgment normal.          Significant Labs: CBC:   Recent Labs   Lab 08/21/24  0707 08/22/24  0754   WBC 13.62* 14.04*   HGB 14.4 14.0   HCT 44.1 43.3    359     Microbiology Results (last 7 days)       Procedure Component Value Units Date/Time    Blood Culture #1 **CANNOT BE ORDERED STAT** [[id number]] Collected: 08/14/24 1452    Order Status: Completed Specimen: Blood from Peripheral, Hand, Left Updated: 08/18/24 1703     Blood Culture, Routine No Growth after 4 days.    Blood Culture #2 **CANNOT BE  ORDERED STAT** [[id number]] Collected: 08/14/24 1452    Order Status: Completed Specimen: Blood from Peripheral, Hand, Left Updated: 08/18/24 1703     Blood Culture, Routine No Growth after 4 days.    Culture, Anaerobic [[id number]]  (Abnormal) Collected: 08/15/24 1703    Order Status: Completed Specimen: Abscess from Abdomen Updated: 08/18/24 1212     Anaerobic Culture PREVOTELLA LOESCHEII  Many      Culture, Body Fluid (Aerobic) w/ Gram Stain [[id number]]  (Abnormal)  (Susceptibility) Collected: 08/15/24 1703    Order Status: Completed Specimen: Body Fluid from Abdominal Fluid Updated: 08/17/24 0718     AEROBIC CULTURE - FLUID ESCHERICHIA COLI  Many       Gram Stain Result Few WBC's      Many Gram positive rods      Moderate Gram negative rods      Few Gram positive cocci in chains      Rare Gram positive cocci in pairs    Fungus culture [[id number]] Collected: 08/15/24 1703    Order Status: Sent Specimen: Abscess from Abdomen Updated: 08/15/24 1724            Significant Imaging: I have reviewed all pertinent imaging results/findings within the past 24 hours.

## 2024-08-22 NOTE — PROGRESS NOTES
Surgery Progress Note    Willian Ron is a 34 y.o. year old male in hospital for acute appendicitis w multiple abscesses now s/p IR drainage aspiration.     NAEON, VSS, afebrile  WBC overall downtrending, stable  Pain well controlled, endorses improvement overall, worse with movement  Continuing to have daily BM and tolerating diet  No fever, chills, nausea/vomiting or any other symptoms    ROS:  Negative except above    PE:  Vitals:    08/21/24 1936 08/21/24 2310 08/22/24 0404 08/22/24 0718   BP: 136/79 133/82 (!) 156/86 (!) 145/85   BP Location:    Right arm   Patient Position: Lying Lying Lying Lying   Pulse: 84 93 (!) 117 85   Resp: 18 18 18 18   Temp: 97.7 °F (36.5 °C) 98.6 °F (37 °C) 98.7 °F (37.1 °C) 98.9 °F (37.2 °C)   TempSrc: Oral Oral Oral Oral   SpO2: 97% 98% 99% 99%   Weight:       Height:         General: Awake and alert, in no acute distress, appears stated age, well-nourished. Appears comfortable  HEENT: Atraumatic, normocephalic, MMM  Cardiac: Regular rate on monitor, well-perfused  Pulm: Breathing comfortably, symmetric chest rise, no respiratory distress. Satting well on RA  Abdomen: Soft, moderate localized tender to palpation of RLQ, mildly improved from prior, non-distended, no guarding, no rebound  MSK: moving all extremities appropriately  Neuro: Aox4, CN 2-12 grossly intact      Significant Diagnostic Studies: Labs: BMP:   Recent Labs   Lab 08/21/24  0707   GLU 96      K 3.3*      CO2 28   BUN 8   CREATININE 0.9   CALCIUM 9.1    and CBC   Recent Labs   Lab 08/21/24  0707 08/22/24  0754   WBC 13.62* 14.04*   HGB 14.4 14.0   HCT 44.1 43.3    359     A/P:  Willian Ron is a 34 y.o. year old male  with acute appendicitis w abscess s/p IR drainage. Stable with non op treatment (IR aspiration, antibiotics), and plan for interval appendectomy in the future. Repeat CT A/P showing multiple abscesses, 4.9 x 1.9 cm in the midline lower abdomen, 2.9 x 2.8 cm in the right lower  quadrant, and 4.4 x 2.1 cm in right hemipelvis. IR unable to drain. Having bowel function and tolerating diet. Improvement on IV abx therapy.    - WBC overall down trending, abdominal exam and pain improving  - Repeat CT in 2-3 days per ID. Transition to PO abx for course for 7-10 days thereafter pending results  - Continue non-op/antibiotic therapy to treat acute inflammatory period and follow up outpatient for interval appendectomy  - Daily CBC/BMP. Replete lytes prn  - MMPC, toradol  - Diet: Regular diet   - IVF: none  - PPx: okay for LVX, SCDs, protonix  - Abx: Zosyn. ID on board  - Surgery signing off, please call for any further question/concerns or changes in patient condition. Will schedule follow up appointment 1 week after discharge.    Thank you for the consult, rest of care per primary    Patient seen and case discussed with attending staff, Dr. Mancia. Attestation to follow.    Obed Ding MD  General Surgery Resident  [facility]

## 2024-08-22 NOTE — SUBJECTIVE & OBJECTIVE
Interval History:  No acute overnight events.  Patient remained afebrile.  Continues to have abdominal pain, but feels like it is improving.  No nausea or vomiting.  Plan to repeat CT abdomen today    Review of Systems   Constitutional:  Negative for chills and fever.   Respiratory: Negative.     Cardiovascular: Negative.    Gastrointestinal:  Positive for abdominal pain. Negative for nausea and vomiting.   Neurological: Negative.      Objective:     Vital Signs (Most Recent):  Temp: 97.6 °F (36.4 °C) (08/22/24 1114)  Pulse: 83 (08/22/24 1114)  Resp: 18 (08/22/24 1114)  BP: 127/78 (08/22/24 1114)  SpO2: 97 % (08/22/24 1114) Vital Signs (24h Range):  Temp:  [97.3 °F (36.3 °C)-98.9 °F (37.2 °C)] 97.6 °F (36.4 °C)  Pulse:  [] 83  Resp:  [18] 18  SpO2:  [97 %-99 %] 97 %  BP: (127-156)/(69-86) 127/78     Weight: 120.7 kg (266 lb 1.5 oz)  Body mass index is 37.11 kg/m².    Intake/Output Summary (Last 24 hours) at 8/22/2024 1542  Last data filed at 8/22/2024 1438  Gross per 24 hour   Intake 821.26 ml   Output 802 ml   Net 19.26 ml         Physical Exam  Vitals and nursing note reviewed.   Constitutional:       General: He is not in acute distress.     Appearance: He is obese. He is not ill-appearing.   Cardiovascular:      Rate and Rhythm: Normal rate and regular rhythm.      Pulses: Normal pulses.   Pulmonary:      Effort: No respiratory distress.      Breath sounds: Normal breath sounds. No wheezing.   Abdominal:      General: There is no distension.      Palpations: Abdomen is soft.      Tenderness: There is abdominal tenderness. There is no guarding.   Musculoskeletal:      Right lower leg: No edema.      Left lower leg: No edema.   Neurological:      Mental Status: He is alert and oriented to person, place, and time. Mental status is at baseline.   Psychiatric:         Mood and Affect: Mood normal.             Significant Labs: All pertinent labs within the past 24 hours have been reviewed.    Significant  Imaging: I have reviewed all pertinent imaging results/findings within the past 24 hours.

## 2024-08-22 NOTE — ASSESSMENT & PLAN NOTE
- CT 08/14 revealed perforated appendicitis noting be developing an adjacent abscess   - Initiated on IVF and empiric IV Zosyn.  - Blood culture w/ NGTD  - Fluid culture growing E-coli & Prevotella  - General surgery on board  - Pt Status post IR guided drainage of abscess on 08/15  - Repeat CT w/ contrast 08/19 showed perforated appendicitis with multiple abscesses, which progressed from previous 08/14/2024  - Abscesses are not sizable thus not drainable per IR.  - ID are following, appreciate recs  - Cont empiric IV Zosyn for now.  No plans for surgery  -ID recommended repeat CT.  WBC trending down  -Repeat CT abdomen 08/22/24, ordered

## 2024-08-22 NOTE — NURSING
Pt off the unit going to CT Scan by wheelchair via transport. IV access in place, saline locked. NAD or pain noted.

## 2024-08-22 NOTE — ASSESSMENT & PLAN NOTE
33 yo man admitted with perforated appendicitis. Drained via IR with E.coli and Prevotella growing, so far. On Zosyn. Developed increased WBC and found to have new collections. Discussed with Gen Surgery - for repeat IR aspiration and observation on abx but collections are not amenable to aspiration. Pain worse today. WBC unchanged.    Recommendations  Continue Zosyn  Trend WBC to WNL  Repeat CT ?

## 2024-08-23 PROBLEM — Z01.818 PREOPERATIVE CLEARANCE: Status: RESOLVED | Noted: 2024-08-14 | Resolved: 2024-08-23

## 2024-08-23 PROCEDURE — 63600175 PHARM REV CODE 636 W HCPCS: Performed by: STUDENT IN AN ORGANIZED HEALTH CARE EDUCATION/TRAINING PROGRAM

## 2024-08-23 PROCEDURE — 25000003 PHARM REV CODE 250: Performed by: NURSE PRACTITIONER

## 2024-08-23 PROCEDURE — 99233 SBSQ HOSP IP/OBS HIGH 50: CPT | Mod: ,,, | Performed by: INTERNAL MEDICINE

## 2024-08-23 PROCEDURE — 25000003 PHARM REV CODE 250: Performed by: STUDENT IN AN ORGANIZED HEALTH CARE EDUCATION/TRAINING PROGRAM

## 2024-08-23 PROCEDURE — 11000001 HC ACUTE MED/SURG PRIVATE ROOM

## 2024-08-23 PROCEDURE — 25000003 PHARM REV CODE 250

## 2024-08-23 PROCEDURE — A4216 STERILE WATER/SALINE, 10 ML: HCPCS | Performed by: STUDENT IN AN ORGANIZED HEALTH CARE EDUCATION/TRAINING PROGRAM

## 2024-08-23 RX ADMIN — SUCRALFATE 1 G: 1 SUSPENSION ORAL at 11:08

## 2024-08-23 RX ADMIN — SUCRALFATE 1 G: 1 SUSPENSION ORAL at 05:08

## 2024-08-23 RX ADMIN — AMLODIPINE BESYLATE 10 MG: 5 TABLET ORAL at 08:08

## 2024-08-23 RX ADMIN — KETOROLAC TROMETHAMINE 10 MG: 10 TABLET, FILM COATED ORAL at 05:08

## 2024-08-23 RX ADMIN — Medication 10 ML: at 05:08

## 2024-08-23 RX ADMIN — Medication 10 ML: at 12:08

## 2024-08-23 RX ADMIN — PANTOPRAZOLE SODIUM 40 MG: 40 INJECTION, POWDER, FOR SOLUTION INTRAVENOUS at 08:08

## 2024-08-23 RX ADMIN — KETOROLAC TROMETHAMINE 10 MG: 10 TABLET, FILM COATED ORAL at 11:08

## 2024-08-23 RX ADMIN — KETOROLAC TROMETHAMINE 10 MG: 10 TABLET, FILM COATED ORAL at 12:08

## 2024-08-23 RX ADMIN — PIPERACILLIN AND TAZOBACTAM 4.5 G: 4; .5 INJECTION, POWDER, LYOPHILIZED, FOR SOLUTION INTRAVENOUS; PARENTERAL at 01:08

## 2024-08-23 RX ADMIN — Medication 10 ML: at 11:08

## 2024-08-23 RX ADMIN — HYDROMORPHONE HYDROCHLORIDE 1 MG: 1 INJECTION, SOLUTION INTRAMUSCULAR; INTRAVENOUS; SUBCUTANEOUS at 03:08

## 2024-08-23 RX ADMIN — PIPERACILLIN AND TAZOBACTAM 4.5 G: 4; .5 INJECTION, POWDER, LYOPHILIZED, FOR SOLUTION INTRAVENOUS; PARENTERAL at 10:08

## 2024-08-23 RX ADMIN — HYDROMORPHONE HYDROCHLORIDE 1 MG: 1 INJECTION, SOLUTION INTRAMUSCULAR; INTRAVENOUS; SUBCUTANEOUS at 07:08

## 2024-08-23 RX ADMIN — SUCRALFATE 1 G: 1 SUSPENSION ORAL at 12:08

## 2024-08-23 RX ADMIN — PIPERACILLIN AND TAZOBACTAM 4.5 G: 4; .5 INJECTION, POWDER, LYOPHILIZED, FOR SOLUTION INTRAVENOUS; PARENTERAL at 05:08

## 2024-08-23 NOTE — ASSESSMENT & PLAN NOTE
- CT 08/14 revealed perforated appendicitis noting be developing an adjacent abscess   - Initiated on IVF and empiric IV Zosyn.  - Blood culture w/ NGTD  - Fluid culture growing E-coli & Prevotella  - General surgery on board  - Pt Status post IR guided drainage of abscess on 08/15  - Repeat CT w/ contrast 08/19 showed perforated appendicitis with multiple abscesses, which progressed from previous 08/14/2024  - Abscesses are not sizable thus not drainable per IR.  - ID are following, appreciate recs  - Cont empiric IV Zosyn for now.  No plans for surgery  - Repeat CT abdomen with multiple abscesses.  IR consulted for drainage, however given patient is not NPO, plan for repeat CT on Monday and assess for fluid collections if persistent plan for IR guided drainage.  NPO after midnight on 08/25

## 2024-08-23 NOTE — PLAN OF CARE
No complaints of N/V. IV antibiotics as ordered. Ambulating in the room. Medicated for abd pain 5/10 with routine pain medication throughout shift no complaints nor call for pain med.  Patient complained of right sided flank/ back pain 10/10 during report; grimacing and holding the affected area. Medicated with IV narcotic as ordered.   Problem: Adult Inpatient Plan of Care  Goal: Plan of Care Review  Outcome: Progressing  Goal: Patient-Specific Goal (Individualized)  Outcome: Progressing  Goal: Optimal Comfort and Wellbeing  Outcome: Progressing  Goal: Readiness for Transition of Care  Outcome: Progressing     Problem: Sepsis/Septic Shock  Goal: Optimal Coping  Outcome: Progressing  Goal: Optimal Nutrition Intake  Outcome: Progressing     Problem: Pain Acute  Goal: Optimal Pain Control and Function  Outcome: Progressing

## 2024-08-23 NOTE — ASSESSMENT & PLAN NOTE
Hyponatremia is likely due to Dehydration/hypovolemia. The patient's most recent sodium results are listed below.  Recent Labs     08/21/24  0707 08/22/24  0753    140       Plan  Hyponatremia likely due to hypovolemic hyponatremia   Currently resolved.  Continue monitoring daily.

## 2024-08-23 NOTE — PROGRESS NOTES
"[facility]  Infectious Disease  Progress Note    Patient Name: Willian Ron  MRN: [id number]  Admission Date: 8/14/2024  Length of Stay: 9 days  Attending Physician: Viki Altman,*  Primary Care Provider: No primary care provider on file.    Isolation Status: No active isolations  Assessment/Plan:      Leukocytosis    35 yo man admitted with perforated appendicitis. Drained via IR with E.coli and Prevotella growing, so far. On Zosyn. Developed increased WBC and found to have new collections. Discussed with Gen Surgery - for repeat IR aspiration and observation on abx but collections are not amenable to aspiration. Pain worse today. CT noted. WBC unmeasured today - will repeat in am.    Recommendations  Continue Zosyn  Trend WBC to WNL  Reconsult IR for additional assistance      Jamar Charles MD  Infectious Disease  [facility] - [facility]    Subjective:     Principal Problem:Acute appendicitis    HPI: 34 y.o. man with no pertinent PMHx who presents with acute on-set of abdominal pain, fever and N/V x 5 days with new imaging consistent with acute appendicitis c/b perforation and formation of a 2.5 AP x 3.0 TV x 2.5 CC-cm periappendiceal air-fluid collection in the central, lower abdomen. Started on Zosyn and seen by Surgery. IR was consulted for drain placement and a drain was placed. Culture growing E.coli and an anaerobe. The patient's WBC has increased on appropriate abx and a repeat CT revealed "perforated appendicitis with multiple abscesses, progressed from 08/14/2024." The patient was seen with Gen Surgery. Feeling better despite WBC. Currently on Zosyn.     Interval History: Feeling about the same. CT noted. Ate this am.    Review of Systems   Gastrointestinal:  Positive for abdominal pain.   All other systems reviewed and are negative.    Objective:     Vital Signs (Most Recent):  Temp: 98.5 °F (36.9 °C) (08/23/24 0745)  Pulse: 78 (08/23/24 0745)  Resp: 16 (08/23/24 0745)  BP: (!) " 141/81 (08/23/24 0745)  SpO2: 99 % (08/23/24 0745) Vital Signs (24h Range):  Temp:  [97.6 °F (36.4 °C)-98.5 °F (36.9 °C)] 98.5 °F (36.9 °C)  Pulse:  [78-95] 78  Resp:  [16-20] 16  SpO2:  [97 %-100 %] 99 %  BP: (110-151)/(72-84) 141/81     Weight: 120.7 kg (266 lb 1.5 oz)  Body mass index is 37.11 kg/m².    Estimated Creatinine Clearance: 151.5 mL/min (based on SCr of 0.9 mg/dL).     Physical Exam  Vitals and nursing note reviewed.   Constitutional:       Appearance: Normal appearance.   HENT:      Head: Normocephalic and atraumatic.   Eyes:      Pupils: Pupils are equal, round, and reactive to light.   Abdominal:      Tenderness: There is abdominal tenderness. There is no guarding or rebound.   Neurological:      General: No focal deficit present.      Mental Status: He is alert.   Psychiatric:         Mood and Affect: Mood normal.         Thought Content: Thought content normal.         Judgment: Judgment normal.          Significant Labs: CBC:   Recent Labs   Lab 08/22/24  0754   WBC 14.04*   HGB 14.0   HCT 43.3        Microbiology Results (last 7 days)       Procedure Component Value Units Date/Time    Blood Culture #1 **CANNOT BE ORDERED STAT** [[id number]] Collected: 08/14/24 1452    Order Status: Completed Specimen: Blood from Peripheral, Hand, Left Updated: 08/18/24 1703     Blood Culture, Routine No Growth after 4 days.    Blood Culture #2 **CANNOT BE ORDERED STAT** [[id number]] Collected: 08/14/24 1452    Order Status: Completed Specimen: Blood from Peripheral, Hand, Left Updated: 08/18/24 1703     Blood Culture, Routine No Growth after 4 days.    Culture, Anaerobic [[id number]]  (Abnormal) Collected: 08/15/24 1703    Order Status: Completed Specimen: Abscess from Abdomen Updated: 08/18/24 1212     Anaerobic Culture PREVOTELLA LOESCHEII  Many      Culture, Body Fluid (Aerobic) w/ Gram Stain [[id number]]  (Abnormal)  (Susceptibility) Collected: 08/15/24 1703    Order Status: Completed Specimen: Body  Fluid from Abdominal Fluid Updated: 08/17/24 0718     AEROBIC CULTURE - FLUID ESCHERICHIA COLI  Many       Gram Stain Result Few WBC's      Many Gram positive rods      Moderate Gram negative rods      Few Gram positive cocci in chains      Rare Gram positive cocci in pairs            Significant Imaging: CT:     1. Again noted is acute appendicitis complicated by perforation and 3.4 x 6.1-cm (previously 2.9 x 5.1-cm) periappendiceal phlegmon anterior to the right psoas muscle, 1.5 x 3.2-cm (previously 1.9 x 4.9-cm) lower central abdominal abscess, 0.7 x 1.0-cm (previously 1.9 x 2.6-cm) anterior right hemipelvic periappendiceal abscess and 0.9 x 2.1-cm (previously 1.6 x 3.0-cm) mid right upper hemipelvic abscess. Recommend consideration for IR consult to attempt image-guided drainage catheter placement into the periappendiceal phlegmon and aspiration of the lower central abdominal abscess. Two additionally described pelvic abscesses are likely too small with no safe window approach for attempted aspiration.

## 2024-08-23 NOTE — ASSESSMENT & PLAN NOTE
33 yo man admitted with perforated appendicitis. Drained via IR with E.coli and Prevotella growing, so far. On Zosyn. Developed increased WBC and found to have new collections. Discussed with Gen Surgery - for repeat IR aspiration and observation on abx but collections are not amenable to aspiration. Pain worse today. CT noted. WBC unmeasured today - will repeat in am.    Recommendations  Continue Zosyn  Trend WBC to WNL  Reconsult IR for additional assistance

## 2024-08-23 NOTE — CONSULTS
35 yo M admitted 8/14 with acute perforated appendicitis s/p IR aspiration on 8/15 with 15cc purulent fluid removed.     CT on 8/19 showed interval development of 3-4 non-organized fluid collections, none of which measured >/= 3 cm, and did not meet criteria for aspiration at that time.     Repeat CT 8/22 again shows collections in lower central abd, anterior right hemipelvis and mid right upper hemipelvis, all decreased in size from prior imaging.     Interventional Radiology consulted for consideration of percutaneous aspiration vs drain placement.     Imaging reviewed by and case discussed with Dr. Medina. Unfortunately due to patient not being NPO, we are unable to perform procedure today due to patient being unlikely to tolerate without moderate sedation. Additionally, given decreasing size of collections, this is unlikely to be a procedure that is needed emergently over the weekend.     Recommend repeating CT a/p on Monday 8/26 to re-evaluate fluid collections to see if they would potentially be amenable to percutaneous intervention on Tuesday.       Thank you for this consult. Please contact via Epic secure chat with questions.     Erika Marcos NP  Interventional Radiology

## 2024-08-23 NOTE — SUBJECTIVE & OBJECTIVE
Interval History: Feeling about the same. CT noted. Ate this am.    Review of Systems   Gastrointestinal:  Positive for abdominal pain.   All other systems reviewed and are negative.    Objective:     Vital Signs (Most Recent):  Temp: 98.5 °F (36.9 °C) (08/23/24 0745)  Pulse: 78 (08/23/24 0745)  Resp: 16 (08/23/24 0745)  BP: (!) 141/81 (08/23/24 0745)  SpO2: 99 % (08/23/24 0745) Vital Signs (24h Range):  Temp:  [97.6 °F (36.4 °C)-98.5 °F (36.9 °C)] 98.5 °F (36.9 °C)  Pulse:  [78-95] 78  Resp:  [16-20] 16  SpO2:  [97 %-100 %] 99 %  BP: (110-151)/(72-84) 141/81     Weight: 120.7 kg (266 lb 1.5 oz)  Body mass index is 37.11 kg/m².    Estimated Creatinine Clearance: 151.5 mL/min (based on SCr of 0.9 mg/dL).     Physical Exam  Vitals and nursing note reviewed.   Constitutional:       Appearance: Normal appearance.   HENT:      Head: Normocephalic and atraumatic.   Eyes:      Pupils: Pupils are equal, round, and reactive to light.   Abdominal:      Tenderness: There is abdominal tenderness. There is no guarding or rebound.   Neurological:      General: No focal deficit present.      Mental Status: He is alert.   Psychiatric:         Mood and Affect: Mood normal.         Thought Content: Thought content normal.         Judgment: Judgment normal.          Significant Labs: CBC:   Recent Labs   Lab 08/22/24  0754   WBC 14.04*   HGB 14.0   HCT 43.3        Microbiology Results (last 7 days)       Procedure Component Value Units Date/Time    Blood Culture #1 **CANNOT BE ORDERED STAT** [[id number]] Collected: 08/14/24 1452    Order Status: Completed Specimen: Blood from Peripheral, Hand, Left Updated: 08/18/24 1703     Blood Culture, Routine No Growth after 4 days.    Blood Culture #2 **CANNOT BE ORDERED STAT** [[id number]] Collected: 08/14/24 1452    Order Status: Completed Specimen: Blood from Peripheral, Hand, Left Updated: 08/18/24 9578     Blood Culture, Routine No Growth after 4 days.    Culture, Anaerobic  [[id number]]  (Abnormal) Collected: 08/15/24 1703    Order Status: Completed Specimen: Abscess from Abdomen Updated: 08/18/24 1212     Anaerobic Culture PREVOTELLA LOESCHEII  Many      Culture, Body Fluid (Aerobic) w/ Gram Stain [[id number]]  (Abnormal)  (Susceptibility) Collected: 08/15/24 1703    Order Status: Completed Specimen: Body Fluid from Abdominal Fluid Updated: 08/17/24 0718     AEROBIC CULTURE - FLUID ESCHERICHIA COLI  Many       Gram Stain Result Few WBC's      Many Gram positive rods      Moderate Gram negative rods      Few Gram positive cocci in chains      Rare Gram positive cocci in pairs            Significant Imaging: CT:     1. Again noted is acute appendicitis complicated by perforation and 3.4 x 6.1-cm (previously 2.9 x 5.1-cm) periappendiceal phlegmon anterior to the right psoas muscle, 1.5 x 3.2-cm (previously 1.9 x 4.9-cm) lower central abdominal abscess, 0.7 x 1.0-cm (previously 1.9 x 2.6-cm) anterior right hemipelvic periappendiceal abscess and 0.9 x 2.1-cm (previously 1.6 x 3.0-cm) mid right upper hemipelvic abscess. Recommend consideration for IR consult to attempt image-guided drainage catheter placement into the periappendiceal phlegmon and aspiration of the lower central abdominal abscess. Two additionally described pelvic abscesses are likely too small with no safe window approach for attempted aspiration.

## 2024-08-23 NOTE — SUBJECTIVE & OBJECTIVE
Interval History:  Patient reports abdominal pain is improving.    Review of Systems   Constitutional: Negative.    Respiratory: Negative.     Cardiovascular: Negative.    Gastrointestinal:  Positive for abdominal pain.   Genitourinary: Negative.    Musculoskeletal: Negative.    Neurological: Negative.      Objective:     Vital Signs (Most Recent):  Temp: 98.5 °F (36.9 °C) (08/23/24 1123)  Pulse: 71 (08/23/24 1123)  Resp: 18 (08/23/24 1529)  BP: 130/78 (08/23/24 1123)  SpO2: 99 % (08/23/24 1123) Vital Signs (24h Range):  Temp:  [97.7 °F (36.5 °C)-98.5 °F (36.9 °C)] 98.5 °F (36.9 °C)  Pulse:  [71-95] 71  Resp:  [16-20] 18  SpO2:  [99 %-100 %] 99 %  BP: (110-151)/(72-84) 130/78     Weight: 120.7 kg (266 lb 1.5 oz)  Body mass index is 37.11 kg/m².    Intake/Output Summary (Last 24 hours) at 8/23/2024 1601  Last data filed at 8/23/2024 1230  Gross per 24 hour   Intake 679.74 ml   Output 1100 ml   Net -420.26 ml         Physical Exam  Constitutional:       General: He is not in acute distress.     Appearance: He is obese.   Cardiovascular:      Rate and Rhythm: Normal rate.      Pulses: Normal pulses.   Pulmonary:      Effort: No respiratory distress.      Breath sounds: Normal breath sounds. No wheezing.   Abdominal:      General: Bowel sounds are normal. There is no distension.      Palpations: Abdomen is soft.      Tenderness: There is abdominal tenderness.   Musculoskeletal:      Right lower leg: No edema.      Left lower leg: No edema.   Skin:     General: Skin is warm.   Neurological:      Mental Status: He is alert and oriented to person, place, and time. Mental status is at baseline.   Psychiatric:         Mood and Affect: Mood normal.             Significant Labs: All pertinent labs within the past 24 hours have been reviewed.    Significant Imaging: I have reviewed all pertinent imaging results/findings within the past 24 hours.

## 2024-08-23 NOTE — NURSING
[facility] [place]  Nurses Note -- 4 Eyes    8-22-24      Skin assessed on: Q Shift      [x] No Pressure Injuries Present    []Prevention Measures Documented    [] Yes LDA  for Pressure Injury Previously documented     [] Yes New Pressure Injury Discovered   [] LDA for New Pressure Injury Added      Attending RN:  Jasmina Ash RN     Second RN:  Ivanna Rodgers RN

## 2024-08-23 NOTE — NURSING
[facility], [facility]  Nurses Note -- 4 Eyes        Date: 08/23/2024        Skin assessed on: Q Shift        [x] No Pressure Injuries Present                 []Prevention Measures Documented     [] Yes LDA Previously documented      [] Yes New Pressure Injury Discovered              [] LDA Added        Attending RN: BETH Munguia     Second RN:  Jasmina RN

## 2024-08-23 NOTE — PROGRESS NOTES
[facility]  Progress Note    Patient Name: Willian Ron  MRN: [id number]  Patient Class: IP- Inpatient   Admission Date: 8/14/2024  Length of Stay: 9 days  Attending Physician: Viki Altman,*  Primary Care Provider: No primary care provider on file.        Subjective:     Principal Problem:Acute appendicitis        HPI:  Patient is a 34-year-old male with no PMH except for marijauna use who presented to  [facility] on 08/14/24 for further evaluation of generalised abdominal pain and subjective fever x 4 days associated with nausea and non bilious non bloody vomiting Last BM  days ago Unable to tolerate PO intake. Denied CP/SOB    During evaluation in the ER, patient was found to be hypertensive (182/100), tachycardic (126), T-max 100.5° F. labs revealed WBC 17.92, sodium 135, bicarb 22, creatinine 1.1, total bilirubin 1.1.  Lactic acid 1.9.  X-ray abdomen negative.  CT abdomen revealed findings concerning for perforated appendicitis with developing adjacent abscess.  Concern for partial SBO.  Hepatic steatosis.  Patient was given 1 L NS, 4 mg Zofran, 8 mg morphine, IV Zosyn.  General surgery was consulted in ED. admitted to hospital medicine for further management    Overview/Hospital Course:  34-year-old male with no PMH except for marijauna use who was admitted for sepsis secondary to acute perforated appendicitis with developing adjacent abscess as evidenced on CT. Initiated on IV Zosyn.  General surgery/GI on board.  Status post IR guided drainage of abscess on 08/15. Blood cultures w/ NGTD.  Fluid cultures grew E coli and Prevotella.  Patient was noted to have dark red emesis 08/17, initiated on IV Protonix.  Hemoglobin stable at this time.  X-ray KUB unremarkable for bowel obstruction.Patient is noted to have worsening leukocytosis despite receiving IV Zosyn. Repeat CT w/ contrast 08/19 showed perforated appendicitis with multiple abscesses, progressed from previous  08/14/2024, abscesses are not sizable thus not drainable per IR.  Leukocytosis trending down.  Id on board.  Recommended to continue Zosyn .  Repeat CT abdomen with multiple abscesses.  IR consulted for drainage, however given patient is not NPO, plan for repeat CT on Monday and assess for fluid collections if persistent plan for IR guided drainage.  NPO after midnight on 08/25    Interval History:  Patient reports abdominal pain is improving.    Review of Systems   Constitutional: Negative.    Respiratory: Negative.     Cardiovascular: Negative.    Gastrointestinal:  Positive for abdominal pain.   Genitourinary: Negative.    Musculoskeletal: Negative.    Neurological: Negative.      Objective:     Vital Signs (Most Recent):  Temp: 98.5 °F (36.9 °C) (08/23/24 1123)  Pulse: 71 (08/23/24 1123)  Resp: 18 (08/23/24 1529)  BP: 130/78 (08/23/24 1123)  SpO2: 99 % (08/23/24 1123) Vital Signs (24h Range):  Temp:  [97.7 °F (36.5 °C)-98.5 °F (36.9 °C)] 98.5 °F (36.9 °C)  Pulse:  [71-95] 71  Resp:  [16-20] 18  SpO2:  [99 %-100 %] 99 %  BP: (110-151)/(72-84) 130/78     Weight: 120.7 kg (266 lb 1.5 oz)  Body mass index is 37.11 kg/m².    Intake/Output Summary (Last 24 hours) at 8/23/2024 1601  Last data filed at 8/23/2024 1230  Gross per 24 hour   Intake 679.74 ml   Output 1100 ml   Net -420.26 ml         Physical Exam  Constitutional:       General: He is not in acute distress.     Appearance: He is obese.   Cardiovascular:      Rate and Rhythm: Normal rate.      Pulses: Normal pulses.   Pulmonary:      Effort: No respiratory distress.      Breath sounds: Normal breath sounds. No wheezing.   Abdominal:      General: Bowel sounds are normal. There is no distension.      Palpations: Abdomen is soft.      Tenderness: There is abdominal tenderness.   Musculoskeletal:      Right lower leg: No edema.      Left lower leg: No edema.   Skin:     General: Skin is warm.   Neurological:      Mental Status: He is alert and oriented to  person, place, and time. Mental status is at baseline.   Psychiatric:         Mood and Affect: Mood normal.             Significant Labs: All pertinent labs within the past 24 hours have been reviewed.    Significant Imaging: I have reviewed all pertinent imaging results/findings within the past 24 hours.    Assessment/Plan:      * Acute appendicitis  - CT 08/14 revealed perforated appendicitis noting be developing an adjacent abscess   - Initiated on IVF and empiric IV Zosyn.  - Blood culture w/ NGTD  - Fluid culture growing E-coli & Prevotella  - General surgery on board  - Pt Status post IR guided drainage of abscess on 08/15  - Repeat CT w/ contrast 08/19 showed perforated appendicitis with multiple abscesses, which progressed from previous 08/14/2024  - Abscesses are not sizable thus not drainable per IR.  - ID are following, appreciate recs  - Cont empiric IV Zosyn for now.  No plans for surgery  - Repeat CT abdomen with multiple abscesses.  IR consulted for drainage, however given patient is not NPO, plan for repeat CT on Monday and assess for fluid collections if persistent plan for IR guided drainage.  NPO after midnight on 08/25      Tobacco dependency  Dangers of cigarette smoking were reviewed with patient in detail. Patient was Counseled for 3-10 minutes.    Sepsis  This patient does have evidence of infective focus (perforated appendix)  My overall impression is sepsis.  Source: Abdominal  Antibiotics given-   Antibiotics (72h ago, onward)      Start     Stop Route Frequency Ordered    08/15/24 0030  piperacillin-tazobactam (ZOSYN) 4.5 g in D5W 100 mL IVPB (MB+)         -- IV Every 8 hours (non-standard times) 08/14/24 1702              Will Not start Pressors- Levophed for MAP of 65  Source control achieved by: IV zosyn  ID following    Marijuana use  Lifestyle modification  Counselled on cessation    Hepatic steatosis  CT evidence of hepatic steatosis.  LFTs WNL.  Continue lifestyle modification    Weight loss may be helpful.    Hyponatremia  Hyponatremia is likely due to Dehydration/hypovolemia. The patient's most recent sodium results are listed below.  Recent Labs     08/21/24  0707 08/22/24  0753    140       Plan  Hyponatremia likely due to hypovolemic hyponatremia   Currently resolved.  Continue monitoring daily.    Leukocytosis  Likely due to acute perforated appendicitis w/ surrounding abscesses  Currently on antibiotics   Continue to trend WBC  ID following         VTE Risk Mitigation (From admission, onward)           Ordered     IP VTE HIGH RISK PATIENT  Once         08/14/24 1704     Place sequential compression device  Until discontinued         08/14/24 1704                    Discharge Planning   JANN: 8/23/2024     Code Status: Full Code   Is the patient medically ready for discharge?:     Reason for patient still in hospital (select all that apply): Patient trending condition and Treatment  Discharge Plan A: Home with family   Discharge Delays: None known at this time              Viki Altman MD  Department of Hospital Medicine   [facility] [place] [facility]

## 2024-08-23 NOTE — PLAN OF CARE
Patient developed increased WBC and found to have new collections. Discussed with Gen Surgery for repeat IR aspiration and observation on abx but collections are not amenable to aspiration. Pain worse today. CT noted. WBC unmeasured today - will repeat in am.     08/23/24 1432   Discharge Reassessment   Assessment Type Discharge Planning Reassessment   Did the patient's condition or plan change since previous assessment? No   Discharge Plan discussed with:   (Sibr Round)   Communicated JANN with patient/caregiver Date not available/Unable to determine   Discharge Plan A Home with family   Discharge Plan B Home with family   DME Needed Upon Discharge  none   Transition of Care Barriers None   Why the patient remains in the hospital Requires continued medical care   Post-Acute Status   Discharge Delays None known at this time

## 2024-08-24 LAB
ANION GAP SERPL CALC-SCNC: 8 MMOL/L (ref 8–16)
BASOPHILS # BLD AUTO: 0.06 K/UL (ref 0–0.2)
BASOPHILS NFR BLD: 0.6 % (ref 0–1.9)
BUN SERPL-MCNC: 11 MG/DL (ref 6–20)
CALCIUM SERPL-MCNC: 9.3 MG/DL (ref 8.7–10.5)
CHLORIDE SERPL-SCNC: 105 MMOL/L (ref 95–110)
CO2 SERPL-SCNC: 26 MMOL/L (ref 23–29)
CREAT SERPL-MCNC: 0.9 MG/DL (ref 0.5–1.4)
DIFFERENTIAL METHOD BLD: ABNORMAL
EOSINOPHIL # BLD AUTO: 0.4 K/UL (ref 0–0.5)
EOSINOPHIL NFR BLD: 4.1 % (ref 0–8)
ERYTHROCYTE [DISTWIDTH] IN BLOOD BY AUTOMATED COUNT: 14.1 % (ref 11.5–14.5)
EST. GFR  (NO RACE VARIABLE): >60 ML/MIN/1.73 M^2
GLUCOSE SERPL-MCNC: 72 MG/DL (ref 70–110)
HCT VFR BLD AUTO: 47 % (ref 40–54)
HGB BLD-MCNC: 14.3 G/DL (ref 14–18)
IMM GRANULOCYTES # BLD AUTO: 0.22 K/UL (ref 0–0.04)
IMM GRANULOCYTES NFR BLD AUTO: 2.1 % (ref 0–0.5)
LYMPHOCYTES # BLD AUTO: 2.2 K/UL (ref 1–4.8)
LYMPHOCYTES NFR BLD: 20.8 % (ref 18–48)
MCH RBC QN AUTO: 27.3 PG (ref 27–31)
MCHC RBC AUTO-ENTMCNC: 30.4 G/DL (ref 32–36)
MCV RBC AUTO: 90 FL (ref 82–98)
MONOCYTES # BLD AUTO: 0.8 K/UL (ref 0.3–1)
MONOCYTES NFR BLD: 7.9 % (ref 4–15)
NEUTROPHILS # BLD AUTO: 6.8 K/UL (ref 1.8–7.7)
NEUTROPHILS NFR BLD: 64.5 % (ref 38–73)
NRBC BLD-RTO: 0 /100 WBC
PLATELET # BLD AUTO: 341 K/UL (ref 150–450)
PMV BLD AUTO: 10.8 FL (ref 9.2–12.9)
POTASSIUM SERPL-SCNC: 4.4 MMOL/L (ref 3.5–5.1)
RBC # BLD AUTO: 5.23 M/UL (ref 4.6–6.2)
SODIUM SERPL-SCNC: 139 MMOL/L (ref 136–145)
WBC # BLD AUTO: 10.58 K/UL (ref 3.9–12.7)

## 2024-08-24 PROCEDURE — 80048 BASIC METABOLIC PNL TOTAL CA: CPT | Performed by: STUDENT IN AN ORGANIZED HEALTH CARE EDUCATION/TRAINING PROGRAM

## 2024-08-24 PROCEDURE — 25000003 PHARM REV CODE 250: Performed by: STUDENT IN AN ORGANIZED HEALTH CARE EDUCATION/TRAINING PROGRAM

## 2024-08-24 PROCEDURE — 85025 COMPLETE CBC W/AUTO DIFF WBC: CPT | Performed by: INTERNAL MEDICINE

## 2024-08-24 PROCEDURE — 36415 COLL VENOUS BLD VENIPUNCTURE: CPT | Performed by: STUDENT IN AN ORGANIZED HEALTH CARE EDUCATION/TRAINING PROGRAM

## 2024-08-24 PROCEDURE — 25000003 PHARM REV CODE 250

## 2024-08-24 PROCEDURE — 99233 SBSQ HOSP IP/OBS HIGH 50: CPT | Mod: ,,, | Performed by: INTERNAL MEDICINE

## 2024-08-24 PROCEDURE — A4216 STERILE WATER/SALINE, 10 ML: HCPCS | Performed by: STUDENT IN AN ORGANIZED HEALTH CARE EDUCATION/TRAINING PROGRAM

## 2024-08-24 PROCEDURE — 25000003 PHARM REV CODE 250: Performed by: NURSE PRACTITIONER

## 2024-08-24 PROCEDURE — 63600175 PHARM REV CODE 636 W HCPCS: Performed by: STUDENT IN AN ORGANIZED HEALTH CARE EDUCATION/TRAINING PROGRAM

## 2024-08-24 PROCEDURE — 11000001 HC ACUTE MED/SURG PRIVATE ROOM

## 2024-08-24 RX ADMIN — PIPERACILLIN AND TAZOBACTAM 4.5 G: 4; .5 INJECTION, POWDER, LYOPHILIZED, FOR SOLUTION INTRAVENOUS; PARENTERAL at 10:08

## 2024-08-24 RX ADMIN — HYDROMORPHONE HYDROCHLORIDE 1 MG: 1 INJECTION, SOLUTION INTRAMUSCULAR; INTRAVENOUS; SUBCUTANEOUS at 06:08

## 2024-08-24 RX ADMIN — SUCRALFATE 1 G: 1 SUSPENSION ORAL at 05:08

## 2024-08-24 RX ADMIN — OXYCODONE 5 MG: 5 TABLET ORAL at 08:08

## 2024-08-24 RX ADMIN — HYDROMORPHONE HYDROCHLORIDE 1 MG: 1 INJECTION, SOLUTION INTRAMUSCULAR; INTRAVENOUS; SUBCUTANEOUS at 10:08

## 2024-08-24 RX ADMIN — KETOROLAC TROMETHAMINE 10 MG: 10 TABLET, FILM COATED ORAL at 05:08

## 2024-08-24 RX ADMIN — PIPERACILLIN AND TAZOBACTAM 4.5 G: 4; .5 INJECTION, POWDER, LYOPHILIZED, FOR SOLUTION INTRAVENOUS; PARENTERAL at 05:08

## 2024-08-24 RX ADMIN — Medication 10 ML: at 11:08

## 2024-08-24 RX ADMIN — PIPERACILLIN AND TAZOBACTAM 4.5 G: 4; .5 INJECTION, POWDER, LYOPHILIZED, FOR SOLUTION INTRAVENOUS; PARENTERAL at 02:08

## 2024-08-24 RX ADMIN — PANTOPRAZOLE SODIUM 40 MG: 40 INJECTION, POWDER, FOR SOLUTION INTRAVENOUS at 08:08

## 2024-08-24 RX ADMIN — SUCRALFATE 1 G: 1 SUSPENSION ORAL at 11:08

## 2024-08-24 RX ADMIN — Medication 10 ML: at 05:08

## 2024-08-24 RX ADMIN — Medication 10 ML: at 12:08

## 2024-08-24 RX ADMIN — HYDROMORPHONE HYDROCHLORIDE 1 MG: 1 INJECTION, SOLUTION INTRAMUSCULAR; INTRAVENOUS; SUBCUTANEOUS at 04:08

## 2024-08-24 RX ADMIN — AMLODIPINE BESYLATE 10 MG: 5 TABLET ORAL at 08:08

## 2024-08-24 RX ADMIN — SUCRALFATE 1 G: 1 SUSPENSION ORAL at 12:08

## 2024-08-24 NOTE — PLAN OF CARE
Patient laying quietly in bed, resp even and unlabored, awaken with verbal stimuli, alert and oriented, NAD at this time, ANABELLA midline flushed without difficulty, saline locked, bed in lock position and locked, call light within reach.     Problem: Pain Acute  Goal: Optimal Pain Control and Function  Outcome: Progressing  Intervention: Develop Pain Management Plan  Flowsheets (Taken 8/24/2024 1538)  Pain Management Interventions:   care clustered   medication offered   pillow support provided   position adjusted  Intervention: Prevent or Manage Pain  Flowsheets (Taken 8/24/2024 1538)  Sleep/Rest Enhancement:   regular sleep/rest pattern promoted   relaxation techniques promoted   room darkened  Sensory Stimulation Regulation:   care clustered   lighting decreased   quiet environment promoted  Bowel Elimination Promotion: privacy promoted  Medication Review/Management:   medications reviewed   high-risk medications identified     Problem: Infection  Goal: Absence of Infection Signs and Symptoms  Outcome: Progressing  Intervention: Prevent or Manage Infection  Flowsheets (Taken 8/24/2024 1538)  Infection Management: aseptic technique maintained  Isolation Precautions: precautions maintained

## 2024-08-24 NOTE — PROGRESS NOTES
[facility] Medicine  Progress Note    Patient Name: Willian Ron  MRN: [id number]  Patient Class: IP- Inpatient   Admission Date: 8/14/2024  Length of Stay: 10 days  Attending Physician: Viki Altman,*  Primary Care Provider: No primary care provider on file.        Subjective:     Principal Problem:Acute appendicitis        HPI:  Patient is a 34-year-old male with no PMH except for marijauna use who presented to  [facility] ER on 08/14/24 for further evaluation of generalised abdominal pain and subjective fever x 4 days associated with nausea and non bilious non bloody vomiting Last BM  days ago Unable to tolerate PO intake. Denied CP/SOB    During evaluation in the ER, patient was found to be hypertensive (182/100), tachycardic (126), T-max 100.5° F. labs revealed WBC 17.92, sodium 135, bicarb 22, creatinine 1.1, total bilirubin 1.1.  Lactic acid 1.9.  X-ray abdomen negative.  CT abdomen revealed findings concerning for perforated appendicitis with developing adjacent abscess.  Concern for partial SBO.  Hepatic steatosis.  Patient was given 1 L NS, 4 mg Zofran, 8 mg morphine, IV Zosyn.  General surgery was consulted in ED. admitted to hospital medicine for further management    Overview/Hospital Course:  34-year-old male with no PMH except for marijauna use who was admitted for sepsis secondary to acute perforated appendicitis with developing adjacent abscess as evidenced on CT. Initiated on IV Zosyn.  General surgery/GI on board.  Status post IR guided drainage of abscess on 08/15. Blood cultures w/ NGTD.  Fluid cultures grew E coli and Prevotella.  Patient was noted to have dark red emesis 08/17, initiated on IV Protonix.  Hemoglobin stable at this time.  X-ray KUB unremarkable for bowel obstruction.Patient is noted to have worsening leukocytosis despite receiving IV Zosyn. Repeat CT w/ contrast 08/19 showed perforated appendicitis with multiple abscesses, progressed from previous  08/14/2024, abscesses are not sizable thus not drainable per IR.  Leukocytosis trending down.  Id on board.  Recommended to continue Zosyn .  Repeat CT abdomen with multiple abscesses.  IR consulted for drainage, however given patient is not NPO, plan for repeat CT on Monday and assess for fluid collections if persistent plan for IR guided drainage.  NPO after midnight on 08/25    Interval History:  Patient reports abdominal pain is improving.  Leukocytosis improving    Review of Systems   Constitutional: Negative.    Respiratory: Negative.     Cardiovascular: Negative.    Gastrointestinal:  Positive for abdominal pain.   Genitourinary: Negative.    Musculoskeletal: Negative.    Neurological: Negative.      Objective:     Vital Signs (Most Recent):  Temp: 98.1 °F (36.7 °C) (08/24/24 1118)  Pulse: 71 (08/24/24 1118)  Resp: 20 (08/24/24 1118)  BP: (!) 144/89 (08/24/24 1118)  SpO2: 99 % (08/24/24 1118) Vital Signs (24h Range):  Temp:  [97.9 °F (36.6 °C)-98.7 °F (37.1 °C)] 98.1 °F (36.7 °C)  Pulse:  [66-81] 71  Resp:  [16-20] 20  SpO2:  [98 %-100 %] 99 %  BP: (126-152)/(69-89) 144/89     Weight: 120.7 kg (266 lb 1.5 oz)  Body mass index is 37.11 kg/m².    Intake/Output Summary (Last 24 hours) at 8/24/2024 1450  Last data filed at 8/24/2024 1211  Gross per 24 hour   Intake 686.53 ml   Output 600 ml   Net 86.53 ml         Physical Exam  Constitutional:       General: He is not in acute distress.     Appearance: He is normal weight.   Cardiovascular:      Rate and Rhythm: Normal rate.      Pulses: Normal pulses.   Pulmonary:      Effort: No respiratory distress.      Breath sounds: Normal breath sounds. No wheezing.   Abdominal:      General: Bowel sounds are normal. There is no distension.      Palpations: Abdomen is soft.      Tenderness: There is abdominal tenderness.   Musculoskeletal:      Right lower leg: No edema.      Left lower leg: No edema.   Skin:     General: Skin is warm.   Neurological:      Mental Status:  He is alert and oriented to person, place, and time. Mental status is at baseline.   Psychiatric:         Mood and Affect: Mood normal.             Significant Labs: All pertinent labs within the past 24 hours have been reviewed.    Significant Imaging: I have reviewed all pertinent imaging results/findings within the past 24 hours.      Assessment/Plan:      * Acute appendicitis  - CT 08/14 revealed perforated appendicitis noting be developing an adjacent abscess   - Initiated on IVF and empiric IV Zosyn.  - Blood culture w/ NGTD  - Fluid culture growing E-coli & Prevotella  - General surgery on board  - Pt Status post IR guided drainage of abscess on 08/15  - Repeat CT w/ contrast 08/19 showed perforated appendicitis with multiple abscesses, which progressed from previous 08/14/2024  - Abscesses are not sizable thus not drainable per IR.  - ID are following, appreciate recs  - Cont empiric IV Zosyn for now.  No plans for surgery  - Repeat CT abdomen with multiple abscesses.  IR consulted for drainage, however given patient is not NPO, plan for repeat CT on Monday and assess for fluid collections if persistent plan for IR guided drainage.  NPO after midnight on 08/25  Leukocytosis improved      Tobacco dependency  Dangers of cigarette smoking were reviewed with patient in detail. Patient was Counseled for 3-10 minutes.    Sepsis  This patient does have evidence of infective focus (perforated appendix)  My overall impression is sepsis.  Source: Abdominal  Antibiotics given-   Antibiotics (72h ago, onward)      Start     Stop Route Frequency Ordered    08/15/24 0030  piperacillin-tazobactam (ZOSYN) 4.5 g in D5W 100 mL IVPB (MB+)         -- IV Every 8 hours (non-standard times) 08/14/24 1702              Will Not start Pressors- Levophed for MAP of 65  Source control achieved by: IV zosyn  ID following    Marijuana use  Lifestyle modification  Counselled on cessation    Hepatic steatosis  CT evidence of hepatic  steatosis.  LFTs WNL.  Continue lifestyle modification   Weight loss may be helpful.    Hyponatremia  Hyponatremia is likely due to Dehydration/hypovolemia. The patient's most recent sodium results are listed below.  Recent Labs     08/21/24  0707 08/22/24  0753    140       Plan  Hyponatremia likely due to hypovolemic hyponatremia   Currently resolved.  Continue monitoring daily.    Leukocytosis  Likely due to acute perforated appendicitis w/ surrounding abscesses  Currently on antibiotics   Leukocytosis improved  ID following .  Remains on Zosyn        VTE Risk Mitigation (From admission, onward)           Ordered     IP VTE HIGH RISK PATIENT  Once         08/14/24 1704     Place sequential compression device  Until discontinued         08/14/24 1704                    Discharge Planning   JANN: 8/23/2024     Code Status: Full Code   Is the patient medically ready for discharge?:     Reason for patient still in hospital (select all that apply): Patient trending condition and Treatment  Discharge Plan A: Home with family   Discharge Delays: None known at this time              Viki Altman MD  Department of Hospital Medicine   [facility]

## 2024-08-24 NOTE — ASSESSMENT & PLAN NOTE
Likely due to acute perforated appendicitis w/ surrounding abscesses  Currently on antibiotics   Leukocytosis improved  ID following .  Remains on Zosyn

## 2024-08-24 NOTE — PLAN OF CARE
Patient alert and oriented x4. Respirations even and unlabored. No distress noted. Skin warm and dry. Midline to left arm. Saline locked. Flushed as ordered. No complications noted. Iv antibiotics given as ordered. Patient complains of pain to abdomen. Scheduled medications given. Prn medication available when needed. Patient ambulated to bathroom throughout shift. Patient has no complaints at this time. Bed in lowest position. Call bell within reach. Instructed to call for any needs.     Problem: Adult Inpatient Plan of Care  Goal: Plan of Care Review  Outcome: Progressing  Flowsheets (Taken 8/24/2024 0401)  Plan of Care Reviewed With: patient  Goal: Patient-Specific Goal (Individualized)  Outcome: Progressing  Goal: Absence of Hospital-Acquired Illness or Injury  Outcome: Progressing  Intervention: Identify and Manage Fall Risk  Flowsheets (Taken 8/24/2024 0401)  Safety Promotion/Fall Prevention:   assistive device/personal item within reach   bed alarm refused   Fall Risk reviewed with patient/family   high risk medications identified   nonskid shoes/socks when out of bed     Problem: Pain Acute  Goal: Optimal Pain Control and Function  Outcome: Progressing  Intervention: Develop Pain Management Plan  Flowsheets (Taken 8/24/2024 0401)  Pain Management Interventions:   position adjusted   medication offered   pillow support provided  Intervention: Prevent or Manage Pain  Flowsheets (Taken 8/24/2024 0401)  Medication Review/Management:   medications reviewed   high-risk medications identified

## 2024-08-24 NOTE — SUBJECTIVE & OBJECTIVE
Interval History: Pain continues, on and off. WBC improved. Awaiting IR drainage on Monday.    Review of Systems   Gastrointestinal:  Positive for abdominal pain.   All other systems reviewed and are negative.    Objective:     Vital Signs (Most Recent):  Temp: 98.1 °F (36.7 °C) (08/24/24 1118)  Pulse: 71 (08/24/24 1118)  Resp: 20 (08/24/24 1118)  BP: (!) 144/89 (08/24/24 1118)  SpO2: 99 % (08/24/24 1118) Vital Signs (24h Range):  Temp:  [97.9 °F (36.6 °C)-98.7 °F (37.1 °C)] 98.1 °F (36.7 °C)  Pulse:  [66-81] 71  Resp:  [16-20] 20  SpO2:  [98 %-100 %] 99 %  BP: (126-152)/(69-89) 144/89     Weight: 120.7 kg (266 lb 1.5 oz)  Body mass index is 37.11 kg/m².    Estimated Creatinine Clearance: 151.5 mL/min (based on SCr of 0.9 mg/dL).     Physical Exam  Vitals and nursing note reviewed.   Constitutional:       General: He is not in acute distress.     Appearance: Normal appearance. He is not ill-appearing, toxic-appearing or diaphoretic.   HENT:      Head: Normocephalic and atraumatic.   Eyes:      Extraocular Movements: Extraocular movements intact.      Pupils: Pupils are equal, round, and reactive to light.   Cardiovascular:      Rate and Rhythm: Normal rate and regular rhythm.   Abdominal:      General: There is no distension.      Tenderness: There is abdominal tenderness. There is no guarding or rebound.   Neurological:      Mental Status: He is alert.   Psychiatric:         Mood and Affect: Mood normal.         Behavior: Behavior normal.         Thought Content: Thought content normal.         Judgment: Judgment normal.          Significant Labs: CBC:   Recent Labs   Lab 08/24/24  0401   WBC 10.58   HGB 14.3   HCT 47.0        Microbiology Results (last 7 days)       Procedure Component Value Units Date/Time    Culture, Anaerobic [[id number]]     Order Status: No result Specimen: Body Fluid     Fungus culture [[id number]]     Order Status: No result Specimen: Body Fluid     Culture, Body Fluid (Aerobic) w/  Gram Stain [[id number]]     Order Status: No result Specimen: Body Fluid     Blood Culture #1 **CANNOT BE ORDERED STAT** [[id number]] Collected: 08/14/24 1452    Order Status: Completed Specimen: Blood from Peripheral, Hand, Left Updated: 08/18/24 1703     Blood Culture, Routine No Growth after 4 days.    Blood Culture #2 **CANNOT BE ORDERED STAT** [[id number]] Collected: 08/14/24 1452    Order Status: Completed Specimen: Blood from Peripheral, Hand, Left Updated: 08/18/24 1703     Blood Culture, Routine No Growth after 4 days.    Culture, Anaerobic [[id number]]  (Abnormal) Collected: 08/15/24 1703    Order Status: Completed Specimen: Abscess from Abdomen Updated: 08/18/24 1212     Anaerobic Culture PREVOTELLA LOESCHEII  Many              Significant Imaging: I have reviewed all pertinent imaging results/findings within the past 24 hours.

## 2024-08-24 NOTE — NURSING
[facility]  Nurses Note -- 4 Eyes        Date: 08/24/2024        Skin assessed on: Q shift        [x] No Pressure Injuries Present                 []Prevention Measures Documented     [] Yes LDA Previously documented      [] Yes New Pressure Injury Discovered              [] LDA Added        Attending RN: BETH Munguia     Second RN:  BETH Ng

## 2024-08-24 NOTE — ASSESSMENT & PLAN NOTE
- CT 08/14 revealed perforated appendicitis noting be developing an adjacent abscess   - Initiated on IVF and empiric IV Zosyn.  - Blood culture w/ NGTD  - Fluid culture growing E-coli & Prevotella  - General surgery on board  - Pt Status post IR guided drainage of abscess on 08/15  - Repeat CT w/ contrast 08/19 showed perforated appendicitis with multiple abscesses, which progressed from previous 08/14/2024  - Abscesses are not sizable thus not drainable per IR.  - ID are following, appreciate recs  - Cont empiric IV Zosyn for now.  No plans for surgery  - Repeat CT abdomen with multiple abscesses.  IR consulted for drainage, however given patient is not NPO, plan for repeat CT on Monday and assess for fluid collections if persistent plan for IR guided drainage.  NPO after midnight on 08/25  Leukocytosis improved

## 2024-08-24 NOTE — PROGRESS NOTES
[facility]  Infectious Disease  Progress Note    Patient Name: Willian Ron  MRN: [id number]  Admission Date: 8/14/2024  Length of Stay: 10 days  Attending Physician: Viki Altman,*  Primary Care Provider: No primary care provider on file.    Isolation Status: No active isolations  Assessment/Plan:      Leukocytosis    33 yo man admitted with perforated appendicitis. Drained via IR with E.coli and Prevotella growing. On Zosyn. Developed increased WBC and found to have new collections. Discussed with Gen Surgery - for repeat IR aspiration and observation on abx but collections are not amenable to aspiration. Pain worse today. CT noted. WBC now normalized. Awaiting IR drainage on Monday.    Recommendations  Continue Zosyn for now  Await repeat CT and IR drainage on Monday    Jamar Charles MD  Infectious Disease  [facility] S[facility]    Subjective:     Principal Problem:Acute appendicitis    HPI: 34 y.o. man with no pertinent PMHx who presents with acute on-set of abdominal pain, fever and N/V x 5 days with new imaging consistent with acute appendicitis c/b perforation and formation of a 2.5 AP x 3.0 TV x 2.5 CC-cm periappendiceal air-fluid collection in the central, lower abdomen. Started on Zosyn and seen by Surgery. IR was consulted for drain placement and a drain was placed. Culture growing E.coli and an anaerobe. The patient's WBC has increased on appropriate abx and a repeat CT revealed "perforated appendicitis with multiple abscesses, progressed from 08/14/2024." The patient was seen with Gen Surgery. Feeling better despite WBC. Currently on Zosyn.     Interval History: Pain continues, on and off. WBC improved. Awaiting IR drainage on Monday.    Review of Systems   Gastrointestinal:  Positive for abdominal pain.   All other systems reviewed and are negative.    Objective:     Vital Signs (Most Recent):  Temp: 98.1 °F (36.7 °C) (08/24/24 1118)  Pulse: 71 (08/24/24 1118)  Resp: 20 " (08/24/24 1118)  BP: (!) 144/89 (08/24/24 1118)  SpO2: 99 % (08/24/24 1118) Vital Signs (24h Range):  Temp:  [97.9 °F (36.6 °C)-98.7 °F (37.1 °C)] 98.1 °F (36.7 °C)  Pulse:  [66-81] 71  Resp:  [16-20] 20  SpO2:  [98 %-100 %] 99 %  BP: (126-152)/(69-89) 144/89     Weight: 120.7 kg (266 lb 1.5 oz)  Body mass index is 37.11 kg/m².    Estimated Creatinine Clearance: 151.5 mL/min (based on SCr of 0.9 mg/dL).     Physical Exam  Vitals and nursing note reviewed.   Constitutional:       General: He is not in acute distress.     Appearance: Normal appearance. He is not ill-appearing, toxic-appearing or diaphoretic.   HENT:      Head: Normocephalic and atraumatic.   Eyes:      Extraocular Movements: Extraocular movements intact.      Pupils: Pupils are equal, round, and reactive to light.   Cardiovascular:      Rate and Rhythm: Normal rate and regular rhythm.   Abdominal:      General: There is no distension.      Tenderness: There is abdominal tenderness. There is no guarding or rebound.   Neurological:      Mental Status: He is alert.   Psychiatric:         Mood and Affect: Mood normal.         Behavior: Behavior normal.         Thought Content: Thought content normal.         Judgment: Judgment normal.          Significant Labs: CBC:   Recent Labs   Lab 08/24/24  0401   WBC 10.58   HGB 14.3   HCT 47.0        Microbiology Results (last 7 days)       Procedure Component Value Units Date/Time    Culture, Anaerobic [[id number]]     Order Status: No result Specimen: Body Fluid     Fungus culture [[id number]]     Order Status: No result Specimen: Body Fluid     Culture, Body Fluid (Aerobic) w/ Gram Stain [[id number]]     Order Status: No result Specimen: Body Fluid     Blood Culture #1 **CANNOT BE ORDERED STAT** [[id number]] Collected: 08/14/24 1452    Order Status: Completed Specimen: Blood from Peripheral, Hand, Left Updated: 08/18/24 1703     Blood Culture, Routine No Growth after 4 days.    Blood Culture #2 **CANNOT BE  ORDERED STAT** [[id number]] Collected: 08/14/24 1452    Order Status: Completed Specimen: Blood from Peripheral, Hand, Left Updated: 08/18/24 1703     Blood Culture, Routine No Growth after 4 days.    Culture, Anaerobic [[id number]]  (Abnormal) Collected: 08/15/24 1703    Order Status: Completed Specimen: Abscess from Abdomen Updated: 08/18/24 1212     Anaerobic Culture PREVOTELLA LOESCHEII  Many              Significant Imaging: I have reviewed all pertinent imaging results/findings within the past 24 hours.

## 2024-08-24 NOTE — ASSESSMENT & PLAN NOTE
35 yo man admitted with perforated appendicitis. Drained via IR with E.coli and Prevotella growing. On Zosyn. Developed increased WBC and found to have new collections. Discussed with Gen Surgery - for repeat IR aspiration and observation on abx but collections are not amenable to aspiration. Pain worse today. CT noted. WBC now normalized. Awaiting IR drainage on Monday.    Recommendations  Continue Zosyn for now  Await repeat CT and IR drainage on Monday

## 2024-08-24 NOTE — NURSING
[facility]  Nurses Note -- 4 Eyes      8/23/24      Skin assessed on: Q Shift      [x] No Pressure Injuries Present    [x]Prevention Measures Documented    [] Yes LDA  for Pressure Injury Previously documented     [] Yes New Pressure Injury Discovered   [] LDA for New Pressure Injury Added      Attending RN:  Berenice Ramesh RN     Second RN:  Nilda Stinson Rn

## 2024-08-24 NOTE — SUBJECTIVE & OBJECTIVE
Interval History:  Patient reports abdominal pain is improving.  Leukocytosis improving    Review of Systems   Constitutional: Negative.    Respiratory: Negative.     Cardiovascular: Negative.    Gastrointestinal:  Positive for abdominal pain.   Genitourinary: Negative.    Musculoskeletal: Negative.    Neurological: Negative.      Objective:     Vital Signs (Most Recent):  Temp: 98.1 °F (36.7 °C) (08/24/24 1118)  Pulse: 71 (08/24/24 1118)  Resp: 20 (08/24/24 1118)  BP: (!) 144/89 (08/24/24 1118)  SpO2: 99 % (08/24/24 1118) Vital Signs (24h Range):  Temp:  [97.9 °F (36.6 °C)-98.7 °F (37.1 °C)] 98.1 °F (36.7 °C)  Pulse:  [66-81] 71  Resp:  [16-20] 20  SpO2:  [98 %-100 %] 99 %  BP: (126-152)/(69-89) 144/89     Weight: 120.7 kg (266 lb 1.5 oz)  Body mass index is 37.11 kg/m².    Intake/Output Summary (Last 24 hours) at 8/24/2024 1450  Last data filed at 8/24/2024 1211  Gross per 24 hour   Intake 686.53 ml   Output 600 ml   Net 86.53 ml         Physical Exam  Constitutional:       General: He is not in acute distress.     Appearance: He is normal weight.   Cardiovascular:      Rate and Rhythm: Normal rate.      Pulses: Normal pulses.   Pulmonary:      Effort: No respiratory distress.      Breath sounds: Normal breath sounds. No wheezing.   Abdominal:      General: Bowel sounds are normal. There is no distension.      Palpations: Abdomen is soft.      Tenderness: There is abdominal tenderness.   Musculoskeletal:      Right lower leg: No edema.      Left lower leg: No edema.   Skin:     General: Skin is warm.   Neurological:      Mental Status: He is alert and oriented to person, place, and time. Mental status is at baseline.   Psychiatric:         Mood and Affect: Mood normal.             Significant Labs: All pertinent labs within the past 24 hours have been reviewed.    Significant Imaging: I have reviewed all pertinent imaging results/findings within the past 24 hours.

## 2024-08-25 PROCEDURE — 25000003 PHARM REV CODE 250: Performed by: NURSE PRACTITIONER

## 2024-08-25 PROCEDURE — A4216 STERILE WATER/SALINE, 10 ML: HCPCS | Performed by: STUDENT IN AN ORGANIZED HEALTH CARE EDUCATION/TRAINING PROGRAM

## 2024-08-25 PROCEDURE — 25000003 PHARM REV CODE 250: Performed by: STUDENT IN AN ORGANIZED HEALTH CARE EDUCATION/TRAINING PROGRAM

## 2024-08-25 PROCEDURE — 63600175 PHARM REV CODE 636 W HCPCS: Performed by: STUDENT IN AN ORGANIZED HEALTH CARE EDUCATION/TRAINING PROGRAM

## 2024-08-25 PROCEDURE — 11000001 HC ACUTE MED/SURG PRIVATE ROOM

## 2024-08-25 RX ADMIN — SUCRALFATE 1 G: 1 SUSPENSION ORAL at 11:08

## 2024-08-25 RX ADMIN — PIPERACILLIN AND TAZOBACTAM 4.5 G: 4; .5 INJECTION, POWDER, LYOPHILIZED, FOR SOLUTION INTRAVENOUS; PARENTERAL at 02:08

## 2024-08-25 RX ADMIN — PANTOPRAZOLE SODIUM 40 MG: 40 INJECTION, POWDER, FOR SOLUTION INTRAVENOUS at 08:08

## 2024-08-25 RX ADMIN — OXYCODONE 5 MG: 5 TABLET ORAL at 11:08

## 2024-08-25 RX ADMIN — AMLODIPINE BESYLATE 10 MG: 5 TABLET ORAL at 08:08

## 2024-08-25 RX ADMIN — PIPERACILLIN AND TAZOBACTAM 4.5 G: 4; .5 INJECTION, POWDER, LYOPHILIZED, FOR SOLUTION INTRAVENOUS; PARENTERAL at 06:08

## 2024-08-25 RX ADMIN — OXYCODONE 5 MG: 5 TABLET ORAL at 12:08

## 2024-08-25 RX ADMIN — Medication 10 ML: at 11:08

## 2024-08-25 RX ADMIN — SUCRALFATE 1 G: 1 SUSPENSION ORAL at 05:08

## 2024-08-25 RX ADMIN — SUCRALFATE 1 G: 1 SUSPENSION ORAL at 06:08

## 2024-08-25 RX ADMIN — Medication 10 ML: at 05:08

## 2024-08-25 RX ADMIN — HYDROMORPHONE HYDROCHLORIDE 1 MG: 1 INJECTION, SOLUTION INTRAMUSCULAR; INTRAVENOUS; SUBCUTANEOUS at 09:08

## 2024-08-25 RX ADMIN — PIPERACILLIN AND TAZOBACTAM 4.5 G: 4; .5 INJECTION, POWDER, LYOPHILIZED, FOR SOLUTION INTRAVENOUS; PARENTERAL at 11:08

## 2024-08-25 RX ADMIN — HYDROMORPHONE HYDROCHLORIDE 1 MG: 1 INJECTION, SOLUTION INTRAMUSCULAR; INTRAVENOUS; SUBCUTANEOUS at 03:08

## 2024-08-25 RX ADMIN — Medication 10 ML: at 06:08

## 2024-08-25 NOTE — NURSING
[facility] [place]  Nurses Note -- 4 Eyes      8/24/24       Skin assessed on: Q Shift      [x] No Pressure Injuries Present    [x]Prevention Measures Documented    [] Yes LDA  for Pressure Injury Previously documented     [] Yes New Pressure Injury Discovered   [] LDA for New Pressure Injury Added      Attending RN:  Berenice Ramesh RN     Second RN:  Nilda Stinson Rn

## 2024-08-25 NOTE — PROGRESS NOTES
[facility] Medicine  Progress Note    Patient Name: Willian Ron  MRN: [id number]  Patient Class: IP- Inpatient   Admission Date: 8/14/2024  Length of Stay: 11 days  Attending Physician: Viki Altman,*  Primary Care Provider: No primary care provider on file.        Subjective:     Principal Problem:Acute appendicitis        HPI:  Patient is a 34-year-old male with no PMH except for marijauna use who presented to  [facility] ER on 08/14/24 for further evaluation of generalised abdominal pain and subjective fever x 4 days associated with nausea and non bilious non bloody vomiting Last BM  days ago Unable to tolerate PO intake. Denied CP/SOB    During evaluation in the ER, patient was found to be hypertensive (182/100), tachycardic (126), T-max 100.5° F. labs revealed WBC 17.92, sodium 135, bicarb 22, creatinine 1.1, total bilirubin 1.1.  Lactic acid 1.9.  X-ray abdomen negative.  CT abdomen revealed findings concerning for perforated appendicitis with developing adjacent abscess.  Concern for partial SBO.  Hepatic steatosis.  Patient was given 1 L NS, 4 mg Zofran, 8 mg morphine, IV Zosyn.  General surgery was consulted in ED. admitted to hospital medicine for further management    Overview/Hospital Course:  34-year-old male with no PMH except for marijauna use who was admitted for sepsis secondary to acute perforated appendicitis with developing adjacent abscess as evidenced on CT. Initiated on IV Zosyn.  General surgery/GI on board.  Status post IR guided drainage of abscess on 08/15. Blood cultures w/ NGTD.  Fluid cultures grew E coli and Prevotella.  Patient was noted to have dark red emesis 08/17, initiated on IV Protonix.  Hemoglobin stable at this time.  X-ray KUB unremarkable for bowel obstruction.Patient is noted to have worsening leukocytosis despite receiving IV Zosyn. Repeat CT w/ contrast 08/19 showed perforated appendicitis with multiple abscesses, progressed from previous  08/14/2024, abscesses are not sizable thus not drainable per IR.  Leukocytosis trending down.  Id on board.  Recommended to continue Zosyn .  Repeat CT abdomen with multiple abscesses.  IR consulted for drainage, however given patient is not NPO, plan for repeat CT on Monday and assess for fluid collections if persistent plan for IR guided drainage.  NPO after midnight on 08/25    Interval History:  Patient reports abdominal pain is improving.  No nausea/vomiting.  No fever.    Review of Systems   Constitutional: Negative.    Respiratory: Negative.     Cardiovascular: Negative.    Gastrointestinal:  Positive for abdominal pain.   Genitourinary: Negative.    Musculoskeletal: Negative.    Neurological: Negative.      Objective:     Vital Signs (Most Recent):  Temp: 98.4 °F (36.9 °C) (08/25/24 1126)  Pulse: 103 (08/25/24 1126)  Resp: 18 (08/25/24 1242)  BP: (!) 146/78 (08/25/24 1126)  SpO2: 97 % (08/25/24 1126) Vital Signs (24h Range):  Temp:  [97.4 °F (36.3 °C)-98.6 °F (37 °C)] 98.4 °F (36.9 °C)  Pulse:  [] 103  Resp:  [16-19] 18  SpO2:  [96 %-100 %] 97 %  BP: (129-152)/(61-78) 146/78     Weight: 120.7 kg (266 lb 1.5 oz)  Body mass index is 37.11 kg/m².    Intake/Output Summary (Last 24 hours) at 8/25/2024 1509  Last data filed at 8/25/2024 1230  Gross per 24 hour   Intake 557.16 ml   Output --   Net 557.16 ml         Physical Exam  Constitutional:       Appearance: He is obese.   Cardiovascular:      Rate and Rhythm: Normal rate.      Pulses: Normal pulses.   Pulmonary:      Effort: No respiratory distress.      Breath sounds: Normal breath sounds. No wheezing.   Abdominal:      General: Bowel sounds are normal. There is no distension.      Palpations: Abdomen is soft.      Tenderness: There is abdominal tenderness.   Musculoskeletal:      Right lower leg: No edema.      Left lower leg: No edema.   Neurological:      Mental Status: He is alert and oriented to person, place, and time. Mental status is at  baseline.             Significant Labs: All pertinent labs within the past 24 hours have been reviewed.    Significant Imaging: I have reviewed all pertinent imaging results/findings within the past 24 hours.      Assessment/Plan:      * Acute appendicitis  - CT 08/14 revealed perforated appendicitis noting be developing an adjacent abscess   - Initiated on IVF and empiric IV Zosyn.  - Blood culture w/ NGTD  - Fluid culture growing E-coli & Prevotella  - General surgery on board  - Pt Status post IR guided drainage of abscess on 08/15  - Repeat CT w/ contrast 08/19 showed perforated appendicitis with multiple abscesses, which progressed from previous 08/14/2024  - Abscesses are not sizable thus not drainable per IR.  - ID are following, appreciate recs  - Cont empiric IV Zosyn for now.  No plans for surgery  - Repeat CT abdomen with multiple abscesses.  IR consulted for drainage, however given patient is not NPO, plan for repeat CT on Monday and assess for fluid collections if persistent plan for IR guided drainage.  NPO after midnight on 08/25  Leukocytosis improved      Tobacco dependency  Dangers of cigarette smoking were reviewed with patient in detail. Patient was Counseled for 3-10 minutes.    Sepsis  This patient does have evidence of infective focus (perforated appendix)  My overall impression is sepsis.  Source: Abdominal  Antibiotics given-   Antibiotics (72h ago, onward)      Start     Stop Route Frequency Ordered    08/15/24 0030  piperacillin-tazobactam (ZOSYN) 4.5 g in D5W 100 mL IVPB (MB+)         -- IV Every 8 hours (non-standard times) 08/14/24 1702              Will Not start Pressors- Levophed for MAP of 65  Source control achieved by: IV zosyn  ID following    Marijuana use  Lifestyle modification  Counselled on cessation    Hepatic steatosis  CT evidence of hepatic steatosis.  LFTs WNL.  Continue lifestyle modification   Weight loss may be helpful.    Hyponatremia  Hyponatremia is likely due to  Dehydration/hypovolemia. The patient's most recent sodium results are listed below.  Recent Labs     08/21/24  0707 08/22/24  0753    140       Plan  Hyponatremia likely due to hypovolemic hyponatremia   Currently resolved.  Continue monitoring daily.    Leukocytosis  Likely due to acute perforated appendicitis w/ surrounding abscesses  Currently on antibiotics   Leukocytosis improved  ID following .  Remains on Zosyn        VTE Risk Mitigation (From admission, onward)           Ordered     IP VTE HIGH RISK PATIENT  Once         08/14/24 1704     Place sequential compression device  Until discontinued         08/14/24 1704                    Discharge Planning   JANN: 8/23/2024     Code Status: Full Code   Is the patient medically ready for discharge?:     Reason for patient still in hospital (select all that apply): Patient trending condition and Treatment  Discharge Plan A: Home with family   Discharge Delays: None known at this time              Viki Altman MD  Department of Hospital Medicine   [facility]

## 2024-08-25 NOTE — SUBJECTIVE & OBJECTIVE
Interval History:  Patient reports abdominal pain is improving.  No nausea/vomiting.  No fever.    Review of Systems   Constitutional: Negative.    Respiratory: Negative.     Cardiovascular: Negative.    Gastrointestinal:  Positive for abdominal pain.   Genitourinary: Negative.    Musculoskeletal: Negative.    Neurological: Negative.      Objective:     Vital Signs (Most Recent):  Temp: 98.4 °F (36.9 °C) (08/25/24 1126)  Pulse: 103 (08/25/24 1126)  Resp: 18 (08/25/24 1242)  BP: (!) 146/78 (08/25/24 1126)  SpO2: 97 % (08/25/24 1126) Vital Signs (24h Range):  Temp:  [97.4 °F (36.3 °C)-98.6 °F (37 °C)] 98.4 °F (36.9 °C)  Pulse:  [] 103  Resp:  [16-19] 18  SpO2:  [96 %-100 %] 97 %  BP: (129-152)/(61-78) 146/78     Weight: 120.7 kg (266 lb 1.5 oz)  Body mass index is 37.11 kg/m².    Intake/Output Summary (Last 24 hours) at 8/25/2024 1509  Last data filed at 8/25/2024 1230  Gross per 24 hour   Intake 557.16 ml   Output --   Net 557.16 ml         Physical Exam  Constitutional:       Appearance: He is obese.   Cardiovascular:      Rate and Rhythm: Normal rate.      Pulses: Normal pulses.   Pulmonary:      Effort: No respiratory distress.      Breath sounds: Normal breath sounds. No wheezing.   Abdominal:      General: Bowel sounds are normal. There is no distension.      Palpations: Abdomen is soft.      Tenderness: There is abdominal tenderness.   Musculoskeletal:      Right lower leg: No edema.      Left lower leg: No edema.   Neurological:      Mental Status: He is alert and oriented to person, place, and time. Mental status is at baseline.             Significant Labs: All pertinent labs within the past 24 hours have been reviewed.    Significant Imaging: I have reviewed all pertinent imaging results/findings within the past 24 hours.

## 2024-08-25 NOTE — PLAN OF CARE
Patient alert and oriented x4. Respirations even and unlabored. No distress noted. Skin warm and dry. Midline to left arm. Saline locked. Flushed as ordered. No complications noted. Iv antibiotics given as ordered. Patient complains of pain to abdomen. Pain medication given as needed. Patient ambulated to bathroom throughout shift. Patient has no complaints at this time. Bed in lowest position. Call bell within reach. Instructed to call for any needs.          Problem: Adult Inpatient Plan of Care  Goal: Plan of Care Review  Outcome: Progressing  Flowsheets (Taken 8/25/2024 0407)  Plan of Care Reviewed With: patient  Goal: Patient-Specific Goal (Individualized)  Outcome: Progressing  Goal: Absence of Hospital-Acquired Illness or Injury  Outcome: Progressing  Intervention: Identify and Manage Fall Risk  Flowsheets (Taken 8/25/2024 0407)  Safety Promotion/Fall Prevention:   assistive device/personal item within reach   bed alarm refused   Fall Risk reviewed with patient/family   high risk medications identified   medications reviewed   nonskid shoes/socks when out of bed     Problem: Pain Acute  Goal: Optimal Pain Control and Function  Outcome: Progressing  Intervention: Develop Pain Management Plan  Flowsheets (Taken 8/25/2024 0407)  Pain Management Interventions:   position adjusted   pain management plan reviewed with patient/caregiver   medication offered     Problem: Infection  Goal: Absence of Infection Signs and Symptoms  Outcome: Progressing  Intervention: Prevent or Manage Infection  Flowsheets (Taken 8/25/2024 0407)  Infection Management: aseptic technique maintained

## 2024-08-26 LAB
ANION GAP SERPL CALC-SCNC: 10 MMOL/L (ref 8–16)
BASOPHILS # BLD AUTO: 0.08 K/UL (ref 0–0.2)
BASOPHILS NFR BLD: 0.7 % (ref 0–1.9)
BUN SERPL-MCNC: 10 MG/DL (ref 6–20)
CALCIUM SERPL-MCNC: 10.1 MG/DL (ref 8.7–10.5)
CHLORIDE SERPL-SCNC: 101 MMOL/L (ref 95–110)
CO2 SERPL-SCNC: 26 MMOL/L (ref 23–29)
CREAT SERPL-MCNC: 1.1 MG/DL (ref 0.5–1.4)
DIFFERENTIAL METHOD BLD: ABNORMAL
EOSINOPHIL # BLD AUTO: 0.5 K/UL (ref 0–0.5)
EOSINOPHIL NFR BLD: 3.9 % (ref 0–8)
ERYTHROCYTE [DISTWIDTH] IN BLOOD BY AUTOMATED COUNT: 13.7 % (ref 11.5–14.5)
EST. GFR  (NO RACE VARIABLE): >60 ML/MIN/1.73 M^2
FUNGUS SPEC CULT: NORMAL
GLUCOSE SERPL-MCNC: 80 MG/DL (ref 70–110)
HCT VFR BLD AUTO: 51.3 % (ref 40–54)
HGB BLD-MCNC: 16.4 G/DL (ref 14–18)
IMM GRANULOCYTES # BLD AUTO: 0.16 K/UL (ref 0–0.04)
IMM GRANULOCYTES NFR BLD AUTO: 1.3 % (ref 0–0.5)
LYMPHOCYTES # BLD AUTO: 2.8 K/UL (ref 1–4.8)
LYMPHOCYTES NFR BLD: 22.9 % (ref 18–48)
MCH RBC QN AUTO: 28.3 PG (ref 27–31)
MCHC RBC AUTO-ENTMCNC: 32 G/DL (ref 32–36)
MCV RBC AUTO: 89 FL (ref 82–98)
MONOCYTES # BLD AUTO: 1.2 K/UL (ref 0.3–1)
MONOCYTES NFR BLD: 9.8 % (ref 4–15)
NEUTROPHILS # BLD AUTO: 7.4 K/UL (ref 1.8–7.7)
NEUTROPHILS NFR BLD: 61.4 % (ref 38–73)
NRBC BLD-RTO: 0 /100 WBC
PLATELET # BLD AUTO: 384 K/UL (ref 150–450)
PMV BLD AUTO: 10.4 FL (ref 9.2–12.9)
POTASSIUM SERPL-SCNC: 4.2 MMOL/L (ref 3.5–5.1)
RBC # BLD AUTO: 5.79 M/UL (ref 4.6–6.2)
SODIUM SERPL-SCNC: 137 MMOL/L (ref 136–145)
WBC # BLD AUTO: 12.07 K/UL (ref 3.9–12.7)

## 2024-08-26 PROCEDURE — 99233 SBSQ HOSP IP/OBS HIGH 50: CPT | Mod: ,,, | Performed by: STUDENT IN AN ORGANIZED HEALTH CARE EDUCATION/TRAINING PROGRAM

## 2024-08-26 PROCEDURE — 36415 COLL VENOUS BLD VENIPUNCTURE: CPT | Performed by: STUDENT IN AN ORGANIZED HEALTH CARE EDUCATION/TRAINING PROGRAM

## 2024-08-26 PROCEDURE — 25000003 PHARM REV CODE 250: Performed by: STUDENT IN AN ORGANIZED HEALTH CARE EDUCATION/TRAINING PROGRAM

## 2024-08-26 PROCEDURE — 25000003 PHARM REV CODE 250: Performed by: NURSE PRACTITIONER

## 2024-08-26 PROCEDURE — 80048 BASIC METABOLIC PNL TOTAL CA: CPT | Performed by: STUDENT IN AN ORGANIZED HEALTH CARE EDUCATION/TRAINING PROGRAM

## 2024-08-26 PROCEDURE — 85025 COMPLETE CBC W/AUTO DIFF WBC: CPT | Performed by: STUDENT IN AN ORGANIZED HEALTH CARE EDUCATION/TRAINING PROGRAM

## 2024-08-26 PROCEDURE — A4216 STERILE WATER/SALINE, 10 ML: HCPCS | Performed by: STUDENT IN AN ORGANIZED HEALTH CARE EDUCATION/TRAINING PROGRAM

## 2024-08-26 PROCEDURE — 63600175 PHARM REV CODE 636 W HCPCS: Performed by: STUDENT IN AN ORGANIZED HEALTH CARE EDUCATION/TRAINING PROGRAM

## 2024-08-26 PROCEDURE — 25500020 PHARM REV CODE 255: Performed by: STUDENT IN AN ORGANIZED HEALTH CARE EDUCATION/TRAINING PROGRAM

## 2024-08-26 PROCEDURE — 11000001 HC ACUTE MED/SURG PRIVATE ROOM

## 2024-08-26 RX ADMIN — Medication 10 ML: at 11:08

## 2024-08-26 RX ADMIN — PANTOPRAZOLE SODIUM 40 MG: 40 INJECTION, POWDER, FOR SOLUTION INTRAVENOUS at 08:08

## 2024-08-26 RX ADMIN — OXYCODONE 5 MG: 5 TABLET ORAL at 02:08

## 2024-08-26 RX ADMIN — AMLODIPINE BESYLATE 10 MG: 5 TABLET ORAL at 08:08

## 2024-08-26 RX ADMIN — SUCRALFATE 1 G: 1 SUSPENSION ORAL at 05:08

## 2024-08-26 RX ADMIN — SUCRALFATE 1 G: 1 SUSPENSION ORAL at 11:08

## 2024-08-26 RX ADMIN — OXYCODONE 5 MG: 5 TABLET ORAL at 08:08

## 2024-08-26 RX ADMIN — PANTOPRAZOLE SODIUM 40 MG: 40 INJECTION, POWDER, FOR SOLUTION INTRAVENOUS at 09:08

## 2024-08-26 RX ADMIN — PIPERACILLIN AND TAZOBACTAM 4.5 G: 4; .5 INJECTION, POWDER, LYOPHILIZED, FOR SOLUTION INTRAVENOUS; PARENTERAL at 05:08

## 2024-08-26 RX ADMIN — IOHEXOL 15 ML: 300 INJECTION, SOLUTION INTRAVENOUS at 11:08

## 2024-08-26 RX ADMIN — IOHEXOL 100 ML: 350 INJECTION, SOLUTION INTRAVENOUS at 11:08

## 2024-08-26 RX ADMIN — Medication 10 ML: at 06:08

## 2024-08-26 RX ADMIN — PIPERACILLIN AND TAZOBACTAM 4.5 G: 4; .5 INJECTION, POWDER, LYOPHILIZED, FOR SOLUTION INTRAVENOUS; PARENTERAL at 11:08

## 2024-08-26 RX ADMIN — PIPERACILLIN AND TAZOBACTAM 4.5 G: 4; .5 INJECTION, POWDER, LYOPHILIZED, FOR SOLUTION INTRAVENOUS; PARENTERAL at 02:08

## 2024-08-26 NOTE — PLAN OF CARE
Patient alert and oriented x4. Respirations even and unlabored. No distress noted. Skin warm and dry. Midline to left arm. Saline locked. Flushed as ordered. No complications noted. Iv antibiotics given as ordered. Patient complains of pain to abdomen. Pain medication given as needed. Patient ambulated to bathroom throughout shift. Patient npo for possible drain placement today. Sign on patient's door. Patient has no complaints at this time. Bed in lowest position. Call bell within reach. Instructed to call for any needs.     Problem: Adult Inpatient Plan of Care  Goal: Plan of Care Review  Outcome: Progressing  Flowsheets (Taken 8/26/2024 0346)  Plan of Care Reviewed With: patient  Goal: Patient-Specific Goal (Individualized)  Outcome: Progressing     Problem: Pain Acute  Goal: Optimal Pain Control and Function  Outcome: Progressing  Intervention: Develop Pain Management Plan  Flowsheets (Taken 8/26/2024 0346)  Pain Management Interventions:   care clustered   position adjusted   pain management plan reviewed with patient/caregiver   medication offered   pillow support provided     Problem: Infection  Goal: Absence of Infection Signs and Symptoms  Outcome: Progressing  Intervention: Prevent or Manage Infection  Flowsheets (Taken 8/26/2024 0346)  Infection Management: aseptic technique maintained

## 2024-08-26 NOTE — PROGRESS NOTES
[facility]  Infectious Disease  Progress Note    Patient Name: Willain Ron  MRN: [id number]  Admission Date: 8/14/2024  Length of Stay: 12 days  Attending Physician: Viki Altman,*  Primary Care Provider: No primary care provider on file.    Isolation Status: No active isolations  Assessment/Plan:      GI  * Acute appendicitis  Willian Ron is a 34 year old man admitted 8/14 with perforated appendicitis and adjacent abscess that is being managed non-operatively. Drained via IR with E.coli and Prevotella growing. CT 8/19 with progression of abscesses.  Surgery signed off on 8/22 with plans to follow up in clinic which is scheduled for 9/10. Repeat CT 8/26 with some improvement in abscess size but development of possible new abscess at right lateral conal fascia.     Recommendations   - given that there is no plan for surgical intervention, okay to transition to oral antibiotics with PO levofloxacin 750 mg q24 hours and PO amox-clav 875 BID until 9/10 (to cover e. Coli, prevotella, strep and enterococcus)  - will obtain CT A/P, CBC and CMP on 9/10 to follow abdominal abscesses  - will arrange ID clinic follow up        Above discussed with primary team.     Time: 50 minutes   50% of time spent on face-to-face counseling and coordination of care. Counseling included review of test results, diagnosis, and treatment plan with patient and/or family.  I have reviewed hospital notes from  service and other specialty providers as well as outside medical records. I have also reviewed CBC, CMP/BMP,  cultures and imaging with my interpretation as documented. Patient is high risk of morbidity, on antibiotics requiring intensive monitoring for toxicity.     Anticipated Disposition: TBD    Thank you for your consult. I will sign off. Please contact us if you have any additional questions.    Aishwarya Madera MD  Infectious Disease  [facility] - Med Surg    Subjective:     Principal Problem:Acute  "appendicitis    HPI: 34 y.o. man with no pertinent PMHx who presents with acute on-set of abdominal pain, fever and N/V x 5 days with new imaging consistent with acute appendicitis c/b perforation and formation of a 2.5 AP x 3.0 TV x 2.5 CC-cm periappendiceal air-fluid collection in the central, lower abdomen. Started on Zosyn and seen by Surgery. IR was consulted for drain placement and a drain was placed. Culture growing E.coli and an anaerobe. The patient's WBC has increased on appropriate abx and a repeat CT revealed "perforated appendicitis with multiple abscesses, progressed from 08/14/2024." The patient was seen with Gen Surgery. Feeling better despite WBC. Currently on Zosyn.     Interval History: continues to have abdominal pain that is RLQ and mid-abdominal. Has decreased appetite. No fevers.     Review of Systems   Constitutional:  Positive for appetite change and fatigue. Negative for fever.   Gastrointestinal:  Positive for abdominal pain.   Skin: Negative.      Objective:     Vital Signs (Most Recent):  Temp: 98.8 °F (37.1 °C) (08/26/24 1457)  Pulse: 80 (08/26/24 1457)  Resp: 18 (08/26/24 1457)  BP: (!) 114/54 (08/26/24 1457)  SpO2: 98 % (08/26/24 1457) Vital Signs (24h Range):  Temp:  [98 °F (36.7 °C)-98.8 °F (37.1 °C)] 98.8 °F (37.1 °C)  Pulse:  [78-93] 80  Resp:  [16-19] 18  SpO2:  [98 %-100 %] 98 %  BP: (114-134)/(54-79) 114/54     Weight: 120.7 kg (266 lb 1.5 oz)  Body mass index is 37.11 kg/m².    Estimated Creatinine Clearance: 124 mL/min (based on SCr of 1.1 mg/dL).     Physical Exam  Vitals and nursing note reviewed.   Constitutional:       Appearance: Normal appearance. He is not ill-appearing.   HENT:      Head: Normocephalic.   Pulmonary:      Effort: Pulmonary effort is normal. No respiratory distress.   Abdominal:      General: There is no distension.      Palpations: Abdomen is soft.      Tenderness: There is abdominal tenderness. There is no guarding.   Neurological:      Mental Status: " He is alert.          Significant Labs:   Microbiology Results (last 7 days)       Procedure Component Value Units Date/Time    Fungus culture [[id number]] Collected: 08/15/24 1703    Order Status: Completed Specimen: Abscess from Abdomen Updated: 08/26/24 1409     Fungus (Mycology) Culture No fungal growth to date    Culture, Anaerobic [[id number]]     Order Status: No result Specimen: Body Fluid     Fungus culture [[id number]]     Order Status: No result Specimen: Body Fluid     Culture, Body Fluid (Aerobic) w/ Gram Stain [[id number]]     Order Status: No result Specimen: Body Fluid             Significant Imaging: I have reviewed all pertinent imaging results/findings within the past 24 hours.

## 2024-08-26 NOTE — NURSING
[facility] [place]  Nurses Note -- 4 Eyes      8/25/2024       Skin assessed on: Q Shift      [x] No Pressure Injuries Present    [x]Prevention Measures Documented    [] Yes LDA  for Pressure Injury Previously documented     [] Yes New Pressure Injury Discovered   [] LDA for New Pressure Injury Added      Attending RN:  Berenice Ramesh RN     Second RN:  Nilda Stinson Rn

## 2024-08-26 NOTE — SUBJECTIVE & OBJECTIVE
Interval History:       Review of Systems   Constitutional: Negative.    Respiratory: Negative.     Cardiovascular: Negative.    Gastrointestinal:  Positive for abdominal pain.   Genitourinary: Negative.    Musculoskeletal: Negative.    Neurological: Negative.      Objective:     Vital Signs (Most Recent):  Temp: 98.8 °F (37.1 °C) (08/26/24 1457)  Pulse: 80 (08/26/24 1457)  Resp: 18 (08/26/24 1457)  BP: (!) 114/54 (08/26/24 1457)  SpO2: 98 % (08/26/24 1457) Vital Signs (24h Range):  Temp:  [98 °F (36.7 °C)-98.8 °F (37.1 °C)] 98.8 °F (37.1 °C)  Pulse:  [73-93] 80  Resp:  [16-19] 18  SpO2:  [98 %-100 %] 98 %  BP: (114-134)/(54-79) 114/54     Weight: 120.7 kg (266 lb 1.5 oz)  Body mass index is 37.11 kg/m².    Intake/Output Summary (Last 24 hours) at 8/26/2024 1543  Last data filed at 8/26/2024 1201  Gross per 24 hour   Intake 210.35 ml   Output --   Net 210.35 ml         Physical Exam  Constitutional:       General: He is not in acute distress.     Appearance: He is obese.   Cardiovascular:      Rate and Rhythm: Normal rate.      Pulses: Normal pulses.   Pulmonary:      Effort: No respiratory distress.      Breath sounds: Normal breath sounds. No wheezing.   Abdominal:      General: Bowel sounds are normal. There is no distension.      Palpations: Abdomen is soft.      Tenderness: There is abdominal tenderness.   Musculoskeletal:      Right lower leg: No edema.      Left lower leg: No edema.   Skin:     General: Skin is warm.   Neurological:      Mental Status: He is alert and oriented to person, place, and time. Mental status is at baseline.   Psychiatric:         Mood and Affect: Mood normal.             Significant Labs: All pertinent labs within the past 24 hours have been reviewed.    Significant Imaging: I have reviewed all pertinent imaging results/findings within the past 24 hours.

## 2024-08-26 NOTE — PLAN OF CARE
Patient is going for repeat CT today. ID to assess for fluid collections if persistent plan for IR guided drainage.  Patient reports abdominal pain is improving.  No nausea/vomiting.  No fever.       08/26/24 1347   Discharge Reassessment   Assessment Type Discharge Planning Reassessment   Did the patient's condition or plan change since previous assessment? No   Discharge Plan discussed with:   (Colin Mack/Dr. Altman)   Communicated JANN with patient/caregiver Date not available/Unable to determine   Discharge Plan A Home with family   Discharge Plan B Home with family   DME Needed Upon Discharge  none   Transition of Care Barriers None   Why the patient remains in the hospital Requires continued medical care   Post-Acute Status   Discharge Delays None known at this time

## 2024-08-26 NOTE — NURSING
Patient reeducated on npo status. Nothing to eat or drink starting now. Patient verbalizes an understanding. Water pitcher moved from bedside table and emptied.

## 2024-08-26 NOTE — SUBJECTIVE & OBJECTIVE
Interval History: continues to have abdominal pain that is RLQ and mid-abdominal. Has decreased appetite. No fevers.     Review of Systems   Constitutional:  Positive for appetite change and fatigue. Negative for fever.   Gastrointestinal:  Positive for abdominal pain.   Skin: Negative.      Objective:     Vital Signs (Most Recent):  Temp: 98.8 °F (37.1 °C) (08/26/24 1457)  Pulse: 80 (08/26/24 1457)  Resp: 18 (08/26/24 1457)  BP: (!) 114/54 (08/26/24 1457)  SpO2: 98 % (08/26/24 1457) Vital Signs (24h Range):  Temp:  [98 °F (36.7 °C)-98.8 °F (37.1 °C)] 98.8 °F (37.1 °C)  Pulse:  [78-93] 80  Resp:  [16-19] 18  SpO2:  [98 %-100 %] 98 %  BP: (114-134)/(54-79) 114/54     Weight: 120.7 kg (266 lb 1.5 oz)  Body mass index is 37.11 kg/m².    Estimated Creatinine Clearance: 124 mL/min (based on SCr of 1.1 mg/dL).     Physical Exam  Vitals and nursing note reviewed.   Constitutional:       Appearance: Normal appearance. He is not ill-appearing.   HENT:      Head: Normocephalic.   Pulmonary:      Effort: Pulmonary effort is normal. No respiratory distress.   Abdominal:      General: There is no distension.      Palpations: Abdomen is soft.      Tenderness: There is abdominal tenderness. There is no guarding.   Neurological:      Mental Status: He is alert.          Significant Labs:   Microbiology Results (last 7 days)       Procedure Component Value Units Date/Time    Fungus culture [[id number]] Collected: 08/15/24 1703    Order Status: Completed Specimen: Abscess from Abdomen Updated: 08/26/24 1409     Fungus (Mycology) Culture No fungal growth to date    Culture, Anaerobic [[id number]]     Order Status: No result Specimen: Body Fluid     Fungus culture [[id number]]     Order Status: No result Specimen: Body Fluid     Culture, Body Fluid (Aerobic) w/ Gram Stain [[id number]]     Order Status: No result Specimen: Body Fluid             Significant Imaging: I have reviewed all pertinent imaging results/findings within the past  24 hours.

## 2024-08-26 NOTE — ASSESSMENT & PLAN NOTE
- CT 08/14 revealed perforated appendicitis noting be developing an adjacent abscess   - Initiated on IVF and empiric IV Zosyn.  - Blood culture w/ NGTD  - Fluid culture growing E-coli & Prevotella  - General surgery on board  - Pt Status post IR guided drainage of abscess on 08/15  - Repeat CT w/ contrast 08/19 showed perforated appendicitis with multiple abscesses, which progressed from previous 08/14/2024  - Abscesses are not sizable thus not drainable per IR.  - ID are following, appreciate recs  - Cont empiric IV Zosyn for now.  No plans for surgery  - repeat CT with no evidence of enlarging abscesses.  No plans for IR guided drainage.  Pending ID recommendations on antibiotics

## 2024-08-26 NOTE — ASSESSMENT & PLAN NOTE
Willian Ron is a 34 year old man admitted 8/14 with perforated appendicitis and adjacent abscess that is being managed non-operatively. Drained via IR with E.coli and Prevotella growing. CT 8/19 with progression of abscesses.  Surgery signed off on 8/22 with plans to follow up in clinic which is scheduled for 9/10. Repeat CT 8/26 with some improvement in abscess size but development of possible new abscess at right lateral conal fascia.     Recommendations   - given that there is no plan for surgical intervention, okay to transition to oral antibiotics with PO levofloxacin 750 mg BID and PO amox-clav 875 BID until 9/10 (to cover e. Coli, prevotella, strep and enterococcus)  - will obtain CT A/P, CBC and CMP on 9/10 to follow abdominal abscesses  - will arrange ID clinic follow up

## 2024-08-26 NOTE — PROGRESS NOTES
[facility] Medicine  Progress Note    Patient Name: Willian Ron  MRN: [id number]  Patient Class: IP- Inpatient   Admission Date: 8/14/2024  Length of Stay: 12 days  Attending Physician: Viki Altman,*  Primary Care Provider: No primary care provider on file.        Subjective:     Principal Problem:Acute appendicitis        HPI:  Patient is a 34-year-old male with no PMH except for marijauna use who presented to  [facility] ER on 08/14/24 for further evaluation of generalised abdominal pain and subjective fever x 4 days associated with nausea and non bilious non bloody vomiting Last BM  days ago Unable to tolerate PO intake. Denied CP/SOB    During evaluation in the ER, patient was found to be hypertensive (182/100), tachycardic (126), T-max 100.5° F. labs revealed WBC 17.92, sodium 135, bicarb 22, creatinine 1.1, total bilirubin 1.1.  Lactic acid 1.9.  X-ray abdomen negative.  CT abdomen revealed findings concerning for perforated appendicitis with developing adjacent abscess.  Concern for partial SBO.  Hepatic steatosis.  Patient was given 1 L NS, 4 mg Zofran, 8 mg morphine, IV Zosyn.  General surgery was consulted in ED. admitted to hospital medicine for further management    Overview/Hospital Course:  34-year-old male with no PMH except for marijauna use who was admitted for sepsis secondary to acute perforated appendicitis with developing adjacent abscess as evidenced on CT. Initiated on IV Zosyn.  General surgery/GI on board.  Status post IR guided drainage of abscess on 08/15. Blood cultures w/ NGTD.  Fluid cultures grew E coli and Prevotella.  Patient was noted to have dark red emesis 08/17, initiated on IV Protonix.  Hemoglobin stable at this time.  X-ray KUB unremarkable for bowel obstruction.Patient is noted to have worsening leukocytosis despite receiving IV Zosyn. Repeat CT w/ contrast 08/19 showed perforated appendicitis with multiple abscesses, progressed from previous  08/14/2024, abscesses are not sizable thus not drainable per IR.  Leukocytosis trending down.  Id on board.  Recommended to continue Zosyn .  Repeat CT abdomen with multiple abscesses.  IR consulted for drainage, however given patient is not NPO, plan for repeat CT today.  No plans for drainage per IR.  Pending ID recommendations on antibiotics     Interval History:       Review of Systems   Constitutional: Negative.    Respiratory: Negative.     Cardiovascular: Negative.    Gastrointestinal:  Positive for abdominal pain.   Genitourinary: Negative.    Musculoskeletal: Negative.    Neurological: Negative.      Objective:     Vital Signs (Most Recent):  Temp: 98.8 °F (37.1 °C) (08/26/24 1457)  Pulse: 80 (08/26/24 1457)  Resp: 18 (08/26/24 1457)  BP: (!) 114/54 (08/26/24 1457)  SpO2: 98 % (08/26/24 1457) Vital Signs (24h Range):  Temp:  [98 °F (36.7 °C)-98.8 °F (37.1 °C)] 98.8 °F (37.1 °C)  Pulse:  [73-93] 80  Resp:  [16-19] 18  SpO2:  [98 %-100 %] 98 %  BP: (114-134)/(54-79) 114/54     Weight: 120.7 kg (266 lb 1.5 oz)  Body mass index is 37.11 kg/m².    Intake/Output Summary (Last 24 hours) at 8/26/2024 1543  Last data filed at 8/26/2024 1201  Gross per 24 hour   Intake 210.35 ml   Output --   Net 210.35 ml         Physical Exam  Constitutional:       General: He is not in acute distress.     Appearance: He is obese.   Cardiovascular:      Rate and Rhythm: Normal rate.      Pulses: Normal pulses.   Pulmonary:      Effort: No respiratory distress.      Breath sounds: Normal breath sounds. No wheezing.   Abdominal:      General: Bowel sounds are normal. There is no distension.      Palpations: Abdomen is soft.      Tenderness: There is abdominal tenderness.   Musculoskeletal:      Right lower leg: No edema.      Left lower leg: No edema.   Skin:     General: Skin is warm.   Neurological:      Mental Status: He is alert and oriented to person, place, and time. Mental status is at baseline.   Psychiatric:         Mood and  Affect: Mood normal.             Significant Labs: All pertinent labs within the past 24 hours have been reviewed.    Significant Imaging: I have reviewed all pertinent imaging results/findings within the past 24 hours.    Assessment/Plan:      * Acute appendicitis  - CT 08/14 revealed perforated appendicitis noting be developing an adjacent abscess   - Initiated on IVF and empiric IV Zosyn.  - Blood culture w/ NGTD  - Fluid culture growing E-coli & Prevotella  - General surgery on board  - Pt Status post IR guided drainage of abscess on 08/15  - Repeat CT w/ contrast 08/19 showed perforated appendicitis with multiple abscesses, which progressed from previous 08/14/2024  - Abscesses are not sizable thus not drainable per IR.  - ID are following, appreciate recs  - Cont empiric IV Zosyn for now.  No plans for surgery  - repeat CT with no evidence of enlarging abscesses.  No plans for IR guided drainage.  Pending ID recommendations on antibiotics    Tobacco dependency  Dangers of cigarette smoking were reviewed with patient in detail. Patient was Counseled for 3-10 minutes.    Sepsis  This patient does have evidence of infective focus (perforated appendix)  My overall impression is sepsis.  Source: Abdominal  Antibiotics given-   Antibiotics (72h ago, onward)      Start     Stop Route Frequency Ordered    08/15/24 0030  piperacillin-tazobactam (ZOSYN) 4.5 g in D5W 100 mL IVPB (MB+)         -- IV Every 8 hours (non-standard times) 08/14/24 1702              Will Not start Pressors- Levophed for MAP of 65  Source control achieved by: IV zosyn  ID following    Marijuana use  Lifestyle modification  Counselled on cessation    Hepatic steatosis  CT evidence of hepatic steatosis.  LFTs WNL.  Continue lifestyle modification   Weight loss may be helpful.    Hyponatremia  Hyponatremia is likely due to Dehydration/hypovolemia. The patient's most recent sodium results are listed below.  Recent Labs     08/21/24  0707  08/22/24  0753    140       Plan  Hyponatremia likely due to hypovolemic hyponatremia   Currently resolved.  Continue monitoring daily.    Leukocytosis  Likely due to acute perforated appendicitis w/ surrounding abscesses  Currently on antibiotics   Leukocytosis improved  ID following .  Remains on Zosyn        VTE Risk Mitigation (From admission, onward)           Ordered     IP VTE HIGH RISK PATIENT  Once         08/14/24 1704     Place sequential compression device  Until discontinued         08/14/24 1704                    Discharge Planning   JANN: 8/23/2024     Code Status: Full Code   Is the patient medically ready for discharge?:     Reason for patient still in hospital (select all that apply): Patient trending condition and Treatment  Discharge Plan A: Home with family   Discharge Delays: None known at this time              Viki Altman MD  Department of Hospital Medicine   [facility]

## 2024-08-27 VITALS
HEART RATE: 78 BPM | HEIGHT: 71 IN | RESPIRATION RATE: 17 BRPM | DIASTOLIC BLOOD PRESSURE: 57 MMHG | WEIGHT: 266.13 LBS | BODY MASS INDEX: 37.26 KG/M2 | TEMPERATURE: 99 F | SYSTOLIC BLOOD PRESSURE: 116 MMHG | OXYGEN SATURATION: 99 %

## 2024-08-27 PROCEDURE — 25000003 PHARM REV CODE 250: Performed by: STUDENT IN AN ORGANIZED HEALTH CARE EDUCATION/TRAINING PROGRAM

## 2024-08-27 PROCEDURE — 25000003 PHARM REV CODE 250: Performed by: NURSE PRACTITIONER

## 2024-08-27 PROCEDURE — A4216 STERILE WATER/SALINE, 10 ML: HCPCS | Performed by: STUDENT IN AN ORGANIZED HEALTH CARE EDUCATION/TRAINING PROGRAM

## 2024-08-27 PROCEDURE — 63600175 PHARM REV CODE 636 W HCPCS: Performed by: STUDENT IN AN ORGANIZED HEALTH CARE EDUCATION/TRAINING PROGRAM

## 2024-08-27 RX ORDER — FAMOTIDINE 40 MG/1
40 TABLET, FILM COATED ORAL DAILY
Qty: 30 TABLET | Refills: 2 | Status: ON HOLD | OUTPATIENT
Start: 2024-08-27 | End: 2024-11-25

## 2024-08-27 RX ORDER — LEVOFLOXACIN 750 MG/1
750 TABLET ORAL DAILY
Qty: 14 TABLET | Refills: 0 | Status: ON HOLD | OUTPATIENT
Start: 2024-08-27 | End: 2024-09-10

## 2024-08-27 RX ORDER — AMOXICILLIN AND CLAVULANATE POTASSIUM 875; 125 MG/1; MG/1
1 TABLET, FILM COATED ORAL EVERY 12 HOURS
Qty: 28 TABLET | Refills: 0 | Status: ON HOLD | OUTPATIENT
Start: 2024-08-27 | End: 2024-09-10

## 2024-08-27 RX ORDER — IBUPROFEN 200 MG
1 TABLET ORAL DAILY
Qty: 14 PATCH | Refills: 0 | Status: ON HOLD | OUTPATIENT
Start: 2024-08-27 | End: 2024-09-11

## 2024-08-27 RX ORDER — OXYCODONE HYDROCHLORIDE 5 MG/1
5 TABLET ORAL EVERY 6 HOURS PRN
Qty: 10 TABLET | Refills: 0 | Status: ON HOLD | OUTPATIENT
Start: 2024-08-27 | End: 2024-09-01

## 2024-08-27 RX ADMIN — SUCRALFATE 1 G: 1 SUSPENSION ORAL at 06:08

## 2024-08-27 RX ADMIN — Medication 10 ML: at 06:08

## 2024-08-27 RX ADMIN — OXYCODONE 5 MG: 5 TABLET ORAL at 01:08

## 2024-08-27 RX ADMIN — SUCRALFATE 1 G: 1 SUSPENSION ORAL at 11:08

## 2024-08-27 RX ADMIN — OXYCODONE 5 MG: 5 TABLET ORAL at 10:08

## 2024-08-27 RX ADMIN — PIPERACILLIN AND TAZOBACTAM 4.5 G: 4; .5 INJECTION, POWDER, LYOPHILIZED, FOR SOLUTION INTRAVENOUS; PARENTERAL at 06:08

## 2024-08-27 RX ADMIN — PANTOPRAZOLE SODIUM 40 MG: 40 INJECTION, POWDER, FOR SOLUTION INTRAVENOUS at 08:08

## 2024-08-27 NOTE — NURSING
Patient with concerns about diagnose and ct scan results. Explained to patient that information must be given to him by provider. Will report to oncoming shift nurse and provider in am.

## 2024-08-27 NOTE — NURSING
[facility]  Nurses Note -- 4 Eyes      8/26/24      Skin assessed on: Q Shift      [x] No Pressure Injuries Present    [x]Prevention Measures Documented    [] Yes LDA  for Pressure Injury Previously documented     [] Yes New Pressure Injury Discovered   [] LDA for New Pressure Injury Added      Attending RN:  Berenice Ramesh, RN     Second RN:  Bethany Maxwell Lpn

## 2024-08-27 NOTE — NURSING
Patient is discharged. On room air, Awake and alert, no distress noted. Midline removed with tip intact. Instructions printed, to be reviewed by virtual nurse. Prescriptions delivered to bedside. Bed in lowest position, wheels locked, call light in reach. Mom and sister at bedside

## 2024-08-27 NOTE — PLAN OF CARE
Patient alert and oriented x4. Respirations even and unlabored. No distress noted. Skin warm and dry. Midline to left arm. Saline locked. Flushed as ordered. No complications noted. Iv antibiotics given throughout shift as ordered. Patient complains of pain to abdomen. Pain medication given as needed. Patient ambulated to bathroom throughout shift. Patient has no complaints at this time. Bed in lowest position. Call bell within reach. Instructed to call for any needs.        Problem: Adult Inpatient Plan of Care  Goal: Plan of Care Review  Outcome: Progressing  Flowsheets (Taken 8/27/2024 0447)  Plan of Care Reviewed With: patient  Goal: Patient-Specific Goal (Individualized)  Outcome: Progressing  Goal: Absence of Hospital-Acquired Illness or Injury  Outcome: Progressing  Intervention: Identify and Manage Fall Risk  Flowsheets (Taken 8/27/2024 0447)  Safety Promotion/Fall Prevention:   assistive device/personal item within reach   bed alarm set   Fall Risk reviewed with patient/family   high risk medications identified   medications reviewed   nonskid shoes/socks when out of bed     Problem: Pain Acute  Goal: Optimal Pain Control and Function  Outcome: Progressing  Intervention: Develop Pain Management Plan  Flowsheets (Taken 8/27/2024 0447)  Pain Management Interventions:   medication offered   position adjusted   pain management plan reviewed with patient/caregiver   pillow support provided     Problem: Infection  Goal: Absence of Infection Signs and Symptoms  Outcome: Progressing  Intervention: Prevent or Manage Infection  Flowsheets (Taken 8/27/2024 0447)  Infection Management: aseptic technique maintained

## 2024-08-27 NOTE — PLAN OF CARE
Problem: Adult Inpatient Plan of Care  Goal: Plan of Care Review  Outcome: Progressing  Flowsheets (Taken 8/27/2024 1232)  Plan of Care Reviewed With: patient     Problem: Pain Acute  Goal: Optimal Pain Control and Function  Outcome: Progressing  Intervention: Develop Pain Management Plan  Flowsheets (Taken 8/27/2024 1232)  Pain Management Interventions:   care clustered   pain management plan reviewed with patient/caregiver  Intervention: Prevent or Manage Pain  Flowsheets (Taken 8/27/2024 1232)  Sleep/Rest Enhancement: awakenings minimized  Medication Review/Management: medications reviewed  Intervention: Optimize Psychosocial Wellbeing  Flowsheets (Taken 8/27/2024 1232)  Supportive Measures: active listening utilized     Problem: Wound  Goal: Optimal Coping  Outcome: Progressing  Intervention: Support Patient and Family Response  Flowsheets (Taken 8/27/2024 1232)  Supportive Measures: active listening utilized     Problem: Infection  Goal: Absence of Infection Signs and Symptoms  Outcome: Progressing  Intervention: Prevent or Manage Infection  Flowsheets (Taken 8/27/2024 1232)  Infection Management: aseptic technique maintained

## 2024-08-27 NOTE — NURSING
AVS virtually reviewed with patient and his Mother in its entirety with emphasis on diet, medications, follow-up appointments and reasons to return to the ED or contact the [facility] Nurse Care Line. Patient also encouraged to utilize their patient portal. Ease and convenience of use reiterated. Education complete and patient voiced understanding. All questions answered. Discharge teaching completed.

## 2024-08-27 NOTE — PLAN OF CARE
Case Management Final Discharge Note      Discharge Disposition: Home    New DME ordered / company name: None    Relevant SDOH / Transition of Care Barriers:  None    Primary Caretaker and contact information: Stephanie (mother) [phone]    Scheduled followup appointment:ID, General Surgery scheduled. Pt will follow up with [facility] for PCP.    Referrals placed: Smoking Cessation, General Surgery and ID     Transportation: Pt's mother will provide transportation      Patient and family educated on discharge services and updated on DC plan. Bedside RN notified, patient clear to discharge from Case Management Perspective.       08/27/24 1041   Final Note   Assessment Type Final Discharge Note   Anticipated Discharge Disposition Home   What phone number can be called within the next 1-3 days to see how you are doing after discharge? [phone]   Hospital Resources/Appts/Education Provided Provided patient/caregiver with written discharge plan information   Post-Acute Status   Discharge Delays None known at this time

## 2024-08-27 NOTE — DISCHARGE SUMMARY
[facility] Medicine  Discharge Summary      Patient Name: Willian Ron  MRN: [id number]  [facility]: [id number]  Patient Class: IP- Inpatient  Admission Date: 8/14/2024  Hospital Length of Stay: 13 days  Discharge Date and Time:  08/27/2024 4:11 PM  Attending Physician: No att. providers found   Discharging Provider: Viki Altman MD  Primary Care Provider: No primary care provider on file.    Primary Care Team: Networked reference to record PCT     HPI:   Patient is a 34-year-old male with no PMH except for marijauna use who presented to  [facility] ER on 08/14/24 for further evaluation of generalised abdominal pain and subjective fever x 4 days associated with nausea and non bilious non bloody vomiting Last BM  days ago Unable to tolerate PO intake. Denied CP/SOB    During evaluation in the ER, patient was found to be hypertensive (182/100), tachycardic (126), T-max 100.5° F. labs revealed WBC 17.92, sodium 135, bicarb 22, creatinine 1.1, total bilirubin 1.1.  Lactic acid 1.9.  X-ray abdomen negative.  CT abdomen revealed findings concerning for perforated appendicitis with developing adjacent abscess.  Concern for partial SBO.  Hepatic steatosis.  Patient was given 1 L NS, 4 mg Zofran, 8 mg morphine, IV Zosyn.  General surgery was consulted in ED. admitted to hospital medicine for further management    Procedure(s) (LRB):  ROBOTIC APPENDECTOMY (N/A)      Hospital Course:   34-year-old male with no PMH except for marijauna use who was admitted for sepsis secondary to acute perforated appendicitis with developing adjacent abscess as evidenced on CT. Initiated on IV Zosyn.  General surgery/GI on board.  Status post IR guided drainage of abscess on 08/15. Blood cultures w/ NGTD.  Fluid cultures grew E coli and Prevotella.  Patient was noted to have dark red emesis 08/17, initiated on IV Protonix.  Hemoglobin stable at this time.  X-ray KUB unremarkable for bowel obstruction.Patient is noted  to have worsening leukocytosis despite receiving IV Zosyn. Repeat CT w/ contrast 08/19 showed perforated appendicitis with multiple abscesses, progressed from previous 08/14/2024, abscesses are not sizable thus not drainable per IR.  Leukocytosis trended down Id on board.  Received IV Zosyn inpatient.  Patient is discharged home on levofloxacin and Augmentin with HUYEN 09/10/2024 per ID recommendations.  Plan for repeat CT at end of antibiotic course.  To follow up with PCP/general surgery/ID outpatient     Goals of Care Treatment Preferences:  Code Status: Full Code         Consults:   Consults (From admission, onward)          Status Ordering Provider     Inpatient consult to Interventional Radiology  Once        Provider:  Erika Marcos NP    Completed FREDDIERAMYA DELVALLE     Inpatient consult to Interventional Radiology  Once        Provider:  Layla Hunter MD    Completed RAMONE HALL     Inpatient consult to Infectious Diseases  Once        Provider:  Christine Baker MD    Completed GERA AVELAR     Inpatient consult to Midline team  Once        Provider:  (Not yet assigned)    IBRAHIMA Miguel     Inpatient consult to Gastroenterology  Once        Provider:  Lorena White NP    Completed FREDDIE, SRIVYSHNGERI     Inpatient consult to Interventional Radiology  Once        Provider:  Anderson Medina MD    Completed FREDDIE, SRIVYSHNGERI     Inpatient consult to General Surgery  Once        Provider:  Otis Kinsey MD    Completed CARLOS FRAZIERVYSHNAVI            No new Assessment & Plan notes have been filed under this hospital service since the last note was generated.  Service: Hospital Medicine    Final Active Diagnoses:    Diagnosis Date Noted POA    PRINCIPAL PROBLEM:  Acute appendicitis [K35.80] 08/14/2024 Yes    Tobacco dependency [F17.200] 08/18/2024 Yes    Leukocytosis [D72.829] 08/14/2024 Yes    Hyponatremia [E87.1] 08/14/2024 Yes    Hepatic steatosis [K76.0]  08/14/2024 Yes    Marijuana use [F12.90] 08/14/2024 No    Sepsis [A41.9] 08/14/2024 Yes      Problems Resolved During this Admission:    Diagnosis Date Noted Date Resolved POA    Nausea & vomiting [R11.2] 08/16/2024 08/17/2024 Yes    Partial small bowel obstruction [K56.600] 08/14/2024 08/15/2024 Yes    Preoperative clearance [Z01.818] 08/14/2024 08/23/2024 Not Applicable    Tobacco dependency [F17.200] 08/14/2024 08/17/2024 Yes       Discharged Condition: good    Disposition: Home or Self Care    Follow Up:   Follow-up Information       [facility] - Follow up.    Why: to schedule appointment PCP hospital follow up  Contact information:  [address] [place] [address]  [phone]                           Patient Instructions:      CT Abdomen Pelvis With IV Contrast Routine Oral Contrast   Standing Status: Future Standing Exp. Date: 08/26/25     Order Specific Question Answer Comments   Is the patient allergic to iodine contrast? No    Is the patient taking metformin or a metformin combination medication?  If so, the patient should hold the medication for 2 days following contrast. No    Does this patient have impaired renal function? No    May the Radiologist modify the order per protocol to meet the clinical needs of the patient? Yes    Oral/Rectal Contrast instructions: Routine Oral Contrast      BASIC METABOLIC PANEL   Standing Status: Future Standing Exp. Date: 11/24/25     Order Specific Question Answer Comments   Send normal result to authorizing provider's In Basket if patient is active on MyChart: Yes      CBC W/ AUTO DIFFERENTIAL   Standing Status: Future Standing Exp. Date: 11/24/25     Ambulatory referral/consult to Smoking Cessation Program   Standing Status: Future   Referral Priority: Routine Referral Type: Consultation   Referral Reason: Specialty Services Required   Requested Specialty: CTTS   Number of Visits Requested: 1     Ambulatory referral/consult to General Surgery    Standing Status: Future   Referral Priority: Routine Referral Type: Consultation   Referral Reason: Specialty Services Required   Requested Specialty: General Surgery   Number of Visits Requested: 1     Ambulatory referral/consult to Infectious Disease   Standing Status: Future   Referral Priority: Routine Referral Type: Consultation   Referral Reason: Specialty Services Required   Requested Specialty: Infectious Diseases   Number of Visits Requested: 1     Diet Adult Regular       Significant Diagnostic Studies: Labs: CMP   Recent Labs   Lab 08/26/24  0451      K 4.2      CO2 26   GLU 80   BUN 10   CREATININE 1.1   CALCIUM 10.1   ANIONGAP 10    and CBC   Recent Labs   Lab 08/26/24  0451   WBC 12.07   HGB 16.4   HCT 51.3          Pending Diagnostic Studies:       None           Medications:  Reconciled Home Medications:      Medication List        START taking these medications      amoxicillin-clavulanate 875-125mg 875-125 mg per tablet  Commonly known as: AUGMENTIN  Take 1 tablet by mouth every 12 (twelve) hours. for 14 days     famotidine 40 MG tablet  Commonly known as: PEPCID  Take 1 tablet (40 mg total) by mouth once daily.     levoFLOXacin 750 MG tablet  Commonly known as: LEVAQUIN  Take 1 tablet (750 mg total) by mouth once daily for 14 days.     nicotine 14 mg/24 hr  Commonly known as: NICODERM CQ  Place 1 patch onto the skin once daily     oxyCODONE 5 MG immediate release tablet  Commonly known as: ROXICODONE  Take 1 tablet (5 mg total) by mouth every 6 (six) hours as needed for Pain.              Indwelling Lines/Drains at time of discharge:   Lines/Drains/Airways       None                   Time spent on the discharge of patient: 35 minutes         Viki Altman MD  Department of Hospital Medicine  [facility]

## 2024-08-30 ENCOUNTER — NURSE TRIAGE (OUTPATIENT)
Dept: ADMINISTRATIVE | Facility: CLINIC | Age: 34
End: 2024-08-30
Payer: MEDICAID

## 2024-08-30 ENCOUNTER — ANESTHESIA (OUTPATIENT)
Dept: SURGERY | Facility: HOSPITAL | Age: 34
End: 2024-08-30
Payer: MEDICAID

## 2024-08-30 ENCOUNTER — HOSPITAL ENCOUNTER (INPATIENT)
Facility: HOSPITAL | Age: 34
LOS: 9 days | Discharge: HOME HEALTH | DRG: 853 | End: 2024-09-08
Attending: EMERGENCY MEDICINE | Admitting: STUDENT IN AN ORGANIZED HEALTH CARE EDUCATION/TRAINING PROGRAM
Payer: MEDICAID

## 2024-08-30 ENCOUNTER — ANESTHESIA EVENT (OUTPATIENT)
Dept: SURGERY | Facility: HOSPITAL | Age: 34
End: 2024-08-30
Payer: MEDICAID

## 2024-08-30 DIAGNOSIS — A41.9 SEPSIS: ICD-10-CM

## 2024-08-30 DIAGNOSIS — K65.1 INTRA-ABDOMINAL ABSCESS: Primary | ICD-10-CM

## 2024-08-30 DIAGNOSIS — R07.9 CHEST PAIN: ICD-10-CM

## 2024-08-30 DIAGNOSIS — R00.0 TACHYCARDIA: ICD-10-CM

## 2024-08-30 DIAGNOSIS — K35.32 PERFORATED APPENDICITIS: ICD-10-CM

## 2024-08-30 PROBLEM — E66.9 OBESITY (BMI 30-39.9): Status: ACTIVE | Noted: 2024-08-30

## 2024-08-30 PROBLEM — E87.1 HYPONATREMIA: Status: RESOLVED | Noted: 2024-08-14 | Resolved: 2024-08-30

## 2024-08-30 PROBLEM — D72.829 LEUKOCYTOSIS: Status: RESOLVED | Noted: 2024-08-14 | Resolved: 2024-08-30

## 2024-08-30 LAB
ALBUMIN SERPL BCP-MCNC: 3.6 G/DL (ref 3.5–5.2)
ALLENS TEST: NORMAL
ALP SERPL-CCNC: 79 U/L (ref 55–135)
ALT SERPL W/O P-5'-P-CCNC: 45 U/L (ref 10–44)
ANION GAP SERPL CALC-SCNC: 13 MMOL/L (ref 8–16)
AST SERPL-CCNC: 20 U/L (ref 10–40)
BACTERIA #/AREA URNS HPF: NORMAL /HPF
BASOPHILS # BLD AUTO: 0.1 K/UL (ref 0–0.2)
BASOPHILS NFR BLD: 0.6 % (ref 0–1.9)
BILIRUB SERPL-MCNC: 0.6 MG/DL (ref 0.1–1)
BILIRUB UR QL STRIP: NEGATIVE
BUN SERPL-MCNC: 15 MG/DL (ref 6–20)
CALCIUM SERPL-MCNC: 10.1 MG/DL (ref 8.7–10.5)
CHLORIDE SERPL-SCNC: 99 MMOL/L (ref 95–110)
CLARITY UR: CLEAR
CO2 SERPL-SCNC: 24 MMOL/L (ref 23–29)
COLOR UR: YELLOW
CREAT SERPL-MCNC: 1.1 MG/DL (ref 0.5–1.4)
DELSYS: NORMAL
DIFFERENTIAL METHOD BLD: ABNORMAL
EOSINOPHIL # BLD AUTO: 0.2 K/UL (ref 0–0.5)
EOSINOPHIL NFR BLD: 1.4 % (ref 0–8)
ERYTHROCYTE [DISTWIDTH] IN BLOOD BY AUTOMATED COUNT: 13.2 % (ref 11.5–14.5)
EST. GFR  (NO RACE VARIABLE): >60 ML/MIN/1.73 M^2
GLUCOSE SERPL-MCNC: 98 MG/DL (ref 70–110)
GLUCOSE UR QL STRIP: NEGATIVE
HCT VFR BLD AUTO: 44.8 % (ref 40–54)
HGB BLD-MCNC: 14.6 G/DL (ref 14–18)
HGB UR QL STRIP: NEGATIVE
HYALINE CASTS #/AREA URNS LPF: 0 /LPF
IMM GRANULOCYTES # BLD AUTO: 0.11 K/UL (ref 0–0.04)
IMM GRANULOCYTES NFR BLD AUTO: 0.7 % (ref 0–0.5)
KETONES UR QL STRIP: NEGATIVE
LACTATE SERPL-SCNC: 0.7 MMOL/L (ref 0.5–2.2)
LDH SERPL L TO P-CCNC: 0.55 MMOL/L (ref 0.5–2.2)
LEUKOCYTE ESTERASE UR QL STRIP: NEGATIVE
LYMPHOCYTES # BLD AUTO: 3.1 K/UL (ref 1–4.8)
LYMPHOCYTES NFR BLD: 19.3 % (ref 18–48)
MAGNESIUM SERPL-MCNC: 1.9 MG/DL (ref 1.6–2.6)
MCH RBC QN AUTO: 28.1 PG (ref 27–31)
MCHC RBC AUTO-ENTMCNC: 32.6 G/DL (ref 32–36)
MCV RBC AUTO: 86 FL (ref 82–98)
MICROSCOPIC COMMENT: NORMAL
MODE: NORMAL
MONOCYTES # BLD AUTO: 1.8 K/UL (ref 0.3–1)
MONOCYTES NFR BLD: 11.5 % (ref 4–15)
NEUTROPHILS # BLD AUTO: 10.6 K/UL (ref 1.8–7.7)
NEUTROPHILS NFR BLD: 66.5 % (ref 38–73)
NITRITE UR QL STRIP: NEGATIVE
NRBC BLD-RTO: 0 /100 WBC
PH UR STRIP: 6 [PH] (ref 5–8)
PLATELET # BLD AUTO: 423 K/UL (ref 150–450)
PMV BLD AUTO: 10.6 FL (ref 9.2–12.9)
POTASSIUM SERPL-SCNC: 3.8 MMOL/L (ref 3.5–5.1)
PROCALCITONIN SERPL IA-MCNC: 0.1 NG/ML (ref 0–0.5)
PROT SERPL-MCNC: 8.2 G/DL (ref 6–8.4)
PROT UR QL STRIP: ABNORMAL
RBC # BLD AUTO: 5.2 M/UL (ref 4.6–6.2)
RBC #/AREA URNS HPF: 4 /HPF (ref 0–4)
SAMPLE: NORMAL
SITE: NORMAL
SODIUM SERPL-SCNC: 136 MMOL/L (ref 136–145)
SP GR UR STRIP: >1.03 (ref 1–1.03)
URN SPEC COLLECT METH UR: ABNORMAL
UROBILINOGEN UR STRIP-ACNC: NEGATIVE EU/DL
WBC # BLD AUTO: 15.89 K/UL (ref 3.9–12.7)
WBC #/AREA URNS HPF: 1 /HPF (ref 0–5)

## 2024-08-30 PROCEDURE — 37000009 HC ANESTHESIA EA ADD 15 MINS: Performed by: SURGERY

## 2024-08-30 PROCEDURE — 25000003 PHARM REV CODE 250: Performed by: STUDENT IN AN ORGANIZED HEALTH CARE EDUCATION/TRAINING PROGRAM

## 2024-08-30 PROCEDURE — 87076 CULTURE ANAEROBE IDENT EACH: CPT | Mod: 59 | Performed by: SURGERY

## 2024-08-30 PROCEDURE — 36415 COLL VENOUS BLD VENIPUNCTURE: CPT | Performed by: EMERGENCY MEDICINE

## 2024-08-30 PROCEDURE — 25000003 PHARM REV CODE 250: Performed by: NURSE ANESTHETIST, CERTIFIED REGISTERED

## 2024-08-30 PROCEDURE — 81000 URINALYSIS NONAUTO W/SCOPE: CPT | Performed by: EMERGENCY MEDICINE

## 2024-08-30 PROCEDURE — 44139 MOBILIZATION OF COLON: CPT | Mod: ,,, | Performed by: SURGERY

## 2024-08-30 PROCEDURE — 25000003 PHARM REV CODE 250: Performed by: EMERGENCY MEDICINE

## 2024-08-30 PROCEDURE — 83605 ASSAY OF LACTIC ACID: CPT | Performed by: EMERGENCY MEDICINE

## 2024-08-30 PROCEDURE — C9290 INJ, BUPIVACAINE LIPOSOME: HCPCS | Performed by: SURGERY

## 2024-08-30 PROCEDURE — 87070 CULTURE OTHR SPECIMN AEROBIC: CPT | Performed by: SURGERY

## 2024-08-30 PROCEDURE — 25000003 PHARM REV CODE 250: Performed by: SURGERY

## 2024-08-30 PROCEDURE — 25500020 PHARM REV CODE 255: Performed by: EMERGENCY MEDICINE

## 2024-08-30 PROCEDURE — 0DBP0ZZ EXCISION OF RECTUM, OPEN APPROACH: ICD-10-PCS | Performed by: SURGERY

## 2024-08-30 PROCEDURE — 63600175 PHARM REV CODE 636 W HCPCS: Performed by: SURGERY

## 2024-08-30 PROCEDURE — 63600175 PHARM REV CODE 636 W HCPCS: Performed by: NURSE ANESTHETIST, CERTIFIED REGISTERED

## 2024-08-30 PROCEDURE — 63600175 PHARM REV CODE 636 W HCPCS: Performed by: ANESTHESIOLOGY

## 2024-08-30 PROCEDURE — 87040 BLOOD CULTURE FOR BACTERIA: CPT | Performed by: EMERGENCY MEDICINE

## 2024-08-30 PROCEDURE — 83605 ASSAY OF LACTIC ACID: CPT

## 2024-08-30 PROCEDURE — C1765 ADHESION BARRIER: HCPCS | Performed by: SURGERY

## 2024-08-30 PROCEDURE — 85025 COMPLETE CBC W/AUTO DIFF WBC: CPT | Performed by: EMERGENCY MEDICINE

## 2024-08-30 PROCEDURE — D9220A PRA ANESTHESIA: Mod: CRNA,,, | Performed by: NURSE ANESTHETIST, CERTIFIED REGISTERED

## 2024-08-30 PROCEDURE — 99223 1ST HOSP IP/OBS HIGH 75: CPT | Mod: ,,, | Performed by: SURGERY

## 2024-08-30 PROCEDURE — D9220A PRA ANESTHESIA: Mod: ANES,,, | Performed by: ANESTHESIOLOGY

## 2024-08-30 PROCEDURE — 44900 I&D APPENDIX ABSCESS OPEN: CPT | Mod: ,,, | Performed by: SURGERY

## 2024-08-30 PROCEDURE — 94761 N-INVAS EAR/PLS OXIMETRY MLT: CPT

## 2024-08-30 PROCEDURE — 71000039 HC RECOVERY, EACH ADD'L HOUR: Performed by: SURGERY

## 2024-08-30 PROCEDURE — 87075 CULTR BACTERIA EXCEPT BLOOD: CPT | Performed by: SURGERY

## 2024-08-30 PROCEDURE — 27201423 OPTIME MED/SURG SUP & DEVICES STERILE SUPPLY: Performed by: SURGERY

## 2024-08-30 PROCEDURE — 84145 PROCALCITONIN (PCT): CPT | Performed by: EMERGENCY MEDICINE

## 2024-08-30 PROCEDURE — 63600175 PHARM REV CODE 636 W HCPCS: Performed by: STUDENT IN AN ORGANIZED HEALTH CARE EDUCATION/TRAINING PROGRAM

## 2024-08-30 PROCEDURE — 87185 SC STD ENZYME DETCJ PER NZM: CPT | Mod: 59 | Performed by: SURGERY

## 2024-08-30 PROCEDURE — 99900035 HC TECH TIME PER 15 MIN (STAT)

## 2024-08-30 PROCEDURE — 25000003 PHARM REV CODE 250: Performed by: ANESTHESIOLOGY

## 2024-08-30 PROCEDURE — 11000001 HC ACUTE MED/SURG PRIVATE ROOM

## 2024-08-30 PROCEDURE — 83735 ASSAY OF MAGNESIUM: CPT | Performed by: EMERGENCY MEDICINE

## 2024-08-30 PROCEDURE — 93005 ELECTROCARDIOGRAM TRACING: CPT

## 2024-08-30 PROCEDURE — 0DTF0ZZ RESECTION OF RIGHT LARGE INTESTINE, OPEN APPROACH: ICD-10-PCS | Performed by: SURGERY

## 2024-08-30 PROCEDURE — 44160 REMOVAL OF COLON: CPT | Mod: 59,,, | Performed by: SURGERY

## 2024-08-30 PROCEDURE — 80053 COMPREHEN METABOLIC PANEL: CPT | Performed by: EMERGENCY MEDICINE

## 2024-08-30 PROCEDURE — 0DTN0ZZ RESECTION OF SIGMOID COLON, OPEN APPROACH: ICD-10-PCS | Performed by: SURGERY

## 2024-08-30 PROCEDURE — 63600175 PHARM REV CODE 636 W HCPCS: Performed by: EMERGENCY MEDICINE

## 2024-08-30 PROCEDURE — 71000033 HC RECOVERY, INTIAL HOUR: Performed by: SURGERY

## 2024-08-30 PROCEDURE — 37000008 HC ANESTHESIA 1ST 15 MINUTES: Performed by: SURGERY

## 2024-08-30 PROCEDURE — 36000709 HC OR TIME LEV III EA ADD 15 MIN: Performed by: SURGERY

## 2024-08-30 PROCEDURE — 36000708 HC OR TIME LEV III 1ST 15 MIN: Performed by: SURGERY

## 2024-08-30 PROCEDURE — 93010 ELECTROCARDIOGRAM REPORT: CPT | Mod: ,,, | Performed by: GENERAL PRACTICE

## 2024-08-30 PROCEDURE — 44145 PARTIAL REMOVAL OF COLON: CPT | Mod: 22,,, | Performed by: SURGERY

## 2024-08-30 DEVICE — SEPRAFILM ADHESION BARRIER (MEMBRANE) IS A STERILE, BIORESORBABLE, TRANSLUCENT ADHESION BARRIER COMPOSED OF TWO ANIONIC POLYSACCHARIDES, SODIUM HYALURONATE (HA) AND CARBOXYMETHYLCELLULOSE (CMC).
Type: IMPLANTABLE DEVICE | Site: ABDOMEN | Status: FUNCTIONAL
Brand: SEPRAFILM

## 2024-08-30 RX ORDER — MIDAZOLAM HYDROCHLORIDE 1 MG/ML
INJECTION INTRAMUSCULAR; INTRAVENOUS
Status: DISCONTINUED | OUTPATIENT
Start: 2024-08-30 | End: 2024-08-30

## 2024-08-30 RX ORDER — HYDROMORPHONE HYDROCHLORIDE 1 MG/ML
0.5 INJECTION, SOLUTION INTRAMUSCULAR; INTRAVENOUS; SUBCUTANEOUS
Status: COMPLETED | OUTPATIENT
Start: 2024-08-30 | End: 2024-08-30

## 2024-08-30 RX ORDER — ACETAMINOPHEN 10 MG/ML
1000 INJECTION, SOLUTION INTRAVENOUS EVERY 6 HOURS
Status: DISPENSED | OUTPATIENT
Start: 2024-08-31 | End: 2024-08-31

## 2024-08-30 RX ORDER — OXYCODONE HYDROCHLORIDE 5 MG/1
5 TABLET ORAL EVERY 6 HOURS PRN
Status: DISCONTINUED | OUTPATIENT
Start: 2024-08-30 | End: 2024-08-30

## 2024-08-30 RX ORDER — ROCURONIUM BROMIDE 10 MG/ML
INJECTION, SOLUTION INTRAVENOUS
Status: DISCONTINUED | OUTPATIENT
Start: 2024-08-30 | End: 2024-08-30

## 2024-08-30 RX ORDER — DEXMEDETOMIDINE HYDROCHLORIDE 100 UG/ML
INJECTION, SOLUTION INTRAVENOUS
Status: DISCONTINUED | OUTPATIENT
Start: 2024-08-30 | End: 2024-08-30

## 2024-08-30 RX ORDER — FENTANYL CITRATE 50 UG/ML
INJECTION, SOLUTION INTRAMUSCULAR; INTRAVENOUS
Status: DISCONTINUED | OUTPATIENT
Start: 2024-08-30 | End: 2024-08-30

## 2024-08-30 RX ORDER — LORAZEPAM 2 MG/ML
0.25 INJECTION INTRAMUSCULAR EVERY 4 HOURS PRN
Status: DISCONTINUED | OUTPATIENT
Start: 2024-08-30 | End: 2024-09-02

## 2024-08-30 RX ORDER — MORPHINE SULFATE 2 MG/ML
4 INJECTION, SOLUTION INTRAMUSCULAR; INTRAVENOUS EVERY 4 HOURS PRN
Status: DISCONTINUED | OUTPATIENT
Start: 2024-08-30 | End: 2024-08-30

## 2024-08-30 RX ORDER — KETOROLAC TROMETHAMINE 30 MG/ML
INJECTION, SOLUTION INTRAMUSCULAR; INTRAVENOUS
Status: DISCONTINUED | OUTPATIENT
Start: 2024-08-30 | End: 2024-08-30

## 2024-08-30 RX ORDER — MUPIROCIN 20 MG/G
OINTMENT TOPICAL 2 TIMES DAILY
Status: COMPLETED | OUTPATIENT
Start: 2024-08-30 | End: 2024-09-01

## 2024-08-30 RX ORDER — HYDROMORPHONE HYDROCHLORIDE 1 MG/ML
1 INJECTION, SOLUTION INTRAMUSCULAR; INTRAVENOUS; SUBCUTANEOUS EVERY 4 HOURS PRN
Status: DISCONTINUED | OUTPATIENT
Start: 2024-08-30 | End: 2024-08-31

## 2024-08-30 RX ORDER — OXYCODONE HYDROCHLORIDE 5 MG/1
5 TABLET ORAL EVERY 4 HOURS PRN
Status: DISCONTINUED | OUTPATIENT
Start: 2024-08-30 | End: 2024-09-02

## 2024-08-30 RX ORDER — HALOPERIDOL 5 MG/ML
0.5 INJECTION INTRAMUSCULAR EVERY 10 MIN PRN
Status: DISCONTINUED | OUTPATIENT
Start: 2024-08-30 | End: 2024-08-30 | Stop reason: HOSPADM

## 2024-08-30 RX ORDER — DIPHENHYDRAMINE HYDROCHLORIDE 50 MG/ML
12.5 INJECTION INTRAMUSCULAR; INTRAVENOUS EVERY 4 HOURS PRN
Status: DISCONTINUED | OUTPATIENT
Start: 2024-08-30 | End: 2024-09-04

## 2024-08-30 RX ORDER — DEXAMETHASONE SODIUM PHOSPHATE 4 MG/ML
INJECTION, SOLUTION INTRA-ARTICULAR; INTRALESIONAL; INTRAMUSCULAR; INTRAVENOUS; SOFT TISSUE
Status: DISCONTINUED | OUTPATIENT
Start: 2024-08-30 | End: 2024-08-30

## 2024-08-30 RX ORDER — ENOXAPARIN SODIUM 100 MG/ML
40 INJECTION SUBCUTANEOUS EVERY 24 HOURS
Status: DISCONTINUED | OUTPATIENT
Start: 2024-08-31 | End: 2024-09-01

## 2024-08-30 RX ORDER — BUPIVACAINE HYDROCHLORIDE 2.5 MG/ML
INJECTION, SOLUTION EPIDURAL; INFILTRATION; INTRACAUDAL
Status: DISCONTINUED | OUTPATIENT
Start: 2024-08-30 | End: 2024-08-30 | Stop reason: HOSPADM

## 2024-08-30 RX ORDER — ACETAMINOPHEN 325 MG/1
650 TABLET ORAL EVERY 8 HOURS PRN
Status: DISCONTINUED | OUTPATIENT
Start: 2024-08-30 | End: 2024-08-30

## 2024-08-30 RX ORDER — HYDROMORPHONE HYDROCHLORIDE 2 MG/ML
0.2 INJECTION, SOLUTION INTRAMUSCULAR; INTRAVENOUS; SUBCUTANEOUS EVERY 5 MIN PRN
Status: DISCONTINUED | OUTPATIENT
Start: 2024-08-30 | End: 2024-08-30 | Stop reason: HOSPADM

## 2024-08-30 RX ORDER — ONDANSETRON HYDROCHLORIDE 2 MG/ML
4 INJECTION, SOLUTION INTRAVENOUS
Status: COMPLETED | OUTPATIENT
Start: 2024-08-30 | End: 2024-08-30

## 2024-08-30 RX ORDER — ONDANSETRON 4 MG/1
8 TABLET, ORALLY DISINTEGRATING ORAL EVERY 8 HOURS PRN
Status: DISCONTINUED | OUTPATIENT
Start: 2024-08-30 | End: 2024-09-08 | Stop reason: HOSPADM

## 2024-08-30 RX ORDER — DEXTROSE, SODIUM CHLORIDE, SODIUM LACTATE, POTASSIUM CHLORIDE, AND CALCIUM CHLORIDE 5; .6; .31; .03; .02 G/100ML; G/100ML; G/100ML; G/100ML; G/100ML
INJECTION, SOLUTION INTRAVENOUS CONTINUOUS
Status: DISCONTINUED | OUTPATIENT
Start: 2024-08-30 | End: 2024-09-04

## 2024-08-30 RX ORDER — LIDOCAINE HYDROCHLORIDE 20 MG/ML
INJECTION INTRAVENOUS
Status: DISCONTINUED | OUTPATIENT
Start: 2024-08-30 | End: 2024-08-30

## 2024-08-30 RX ORDER — GABAPENTIN 100 MG/1
200 CAPSULE ORAL 2 TIMES DAILY
Status: DISCONTINUED | OUTPATIENT
Start: 2024-08-30 | End: 2024-09-02

## 2024-08-30 RX ORDER — ESMOLOL HYDROCHLORIDE 10 MG/ML
INJECTION INTRAVENOUS
Status: DISCONTINUED | OUTPATIENT
Start: 2024-08-30 | End: 2024-08-30

## 2024-08-30 RX ORDER — CALCIUM CARBONATE 200(500)MG
1000 TABLET,CHEWABLE ORAL EVERY 8 HOURS PRN
Status: DISCONTINUED | OUTPATIENT
Start: 2024-08-30 | End: 2024-09-08 | Stop reason: HOSPADM

## 2024-08-30 RX ORDER — ONDANSETRON HYDROCHLORIDE 2 MG/ML
INJECTION, SOLUTION INTRAMUSCULAR; INTRAVENOUS
Status: DISCONTINUED | OUTPATIENT
Start: 2024-08-30 | End: 2024-08-30

## 2024-08-30 RX ORDER — NALOXONE HCL 0.4 MG/ML
0.02 VIAL (ML) INJECTION
Status: DISCONTINUED | OUTPATIENT
Start: 2024-08-30 | End: 2024-08-30

## 2024-08-30 RX ORDER — PROPOFOL 10 MG/ML
VIAL (ML) INTRAVENOUS
Status: DISCONTINUED | OUTPATIENT
Start: 2024-08-30 | End: 2024-08-30

## 2024-08-30 RX ORDER — ONDANSETRON HYDROCHLORIDE 2 MG/ML
4 INJECTION, SOLUTION INTRAVENOUS EVERY 6 HOURS PRN
Status: DISCONTINUED | OUTPATIENT
Start: 2024-08-30 | End: 2024-09-08 | Stop reason: HOSPADM

## 2024-08-30 RX ORDER — SODIUM CHLORIDE 0.9 % (FLUSH) 0.9 %
10 SYRINGE (ML) INJECTION
Status: DISCONTINUED | OUTPATIENT
Start: 2024-08-30 | End: 2024-08-30

## 2024-08-30 RX ORDER — DIPHENHYDRAMINE HYDROCHLORIDE 50 MG/ML
12.5 INJECTION INTRAMUSCULAR; INTRAVENOUS EVERY 6 HOURS PRN
Status: DISCONTINUED | OUTPATIENT
Start: 2024-08-30 | End: 2024-08-30 | Stop reason: SDUPTHER

## 2024-08-30 RX ORDER — SUCCINYLCHOLINE CHLORIDE 20 MG/ML
INJECTION INTRAMUSCULAR; INTRAVENOUS
Status: DISCONTINUED | OUTPATIENT
Start: 2024-08-30 | End: 2024-08-30

## 2024-08-30 RX ORDER — ONDANSETRON HYDROCHLORIDE 2 MG/ML
4 INJECTION, SOLUTION INTRAVENOUS EVERY 12 HOURS PRN
Status: DISCONTINUED | OUTPATIENT
Start: 2024-08-30 | End: 2024-08-30 | Stop reason: SDUPTHER

## 2024-08-30 RX ORDER — IBUPROFEN 600 MG/1
600 TABLET ORAL 4 TIMES DAILY
Status: DISCONTINUED | OUTPATIENT
Start: 2024-08-31 | End: 2024-09-08

## 2024-08-30 RX ORDER — SODIUM CHLORIDE, SODIUM LACTATE, POTASSIUM CHLORIDE, CALCIUM CHLORIDE 600; 310; 30; 20 MG/100ML; MG/100ML; MG/100ML; MG/100ML
INJECTION, SOLUTION INTRAVENOUS CONTINUOUS
Status: DISCONTINUED | OUTPATIENT
Start: 2024-08-30 | End: 2024-08-30

## 2024-08-30 RX ORDER — BISACODYL 5 MG
5 TABLET, DELAYED RELEASE (ENTERIC COATED) ORAL NIGHTLY
Status: DISCONTINUED | OUTPATIENT
Start: 2024-08-30 | End: 2024-09-08 | Stop reason: HOSPADM

## 2024-08-30 RX ORDER — ACETAMINOPHEN 10 MG/ML
INJECTION, SOLUTION INTRAVENOUS
Status: DISCONTINUED | OUTPATIENT
Start: 2024-08-30 | End: 2024-08-30

## 2024-08-30 RX ORDER — PANTOPRAZOLE SODIUM 40 MG/10ML
40 INJECTION, POWDER, LYOPHILIZED, FOR SOLUTION INTRAVENOUS
Status: COMPLETED | OUTPATIENT
Start: 2024-08-30 | End: 2024-08-30

## 2024-08-30 RX ADMIN — DEXMEDETOMIDINE HYDROCHLORIDE 4 MCG: 100 INJECTION, SOLUTION INTRAVENOUS at 04:08

## 2024-08-30 RX ADMIN — ROCURONIUM BROMIDE 10 MG: 10 INJECTION, SOLUTION INTRAVENOUS at 04:08

## 2024-08-30 RX ADMIN — ESMOLOL HYDROCHLORIDE 20 MG: 10 INJECTION, SOLUTION INTRAVENOUS at 03:08

## 2024-08-30 RX ADMIN — ROCURONIUM BROMIDE 10 MG: 10 INJECTION, SOLUTION INTRAVENOUS at 03:08

## 2024-08-30 RX ADMIN — HYDROMORPHONE HYDROCHLORIDE 0.2 MG: 2 INJECTION INTRAMUSCULAR; INTRAVENOUS; SUBCUTANEOUS at 07:08

## 2024-08-30 RX ADMIN — SODIUM CHLORIDE, POTASSIUM CHLORIDE, SODIUM LACTATE AND CALCIUM CHLORIDE 1000 ML: 600; 310; 30; 20 INJECTION, SOLUTION INTRAVENOUS at 09:08

## 2024-08-30 RX ADMIN — ONDANSETRON 4 MG: 2 INJECTION INTRAMUSCULAR; INTRAVENOUS at 10:08

## 2024-08-30 RX ADMIN — GLYCOPYRROLATE 0.1 MG: 0.2 INJECTION, SOLUTION INTRAMUSCULAR; INTRAVITREAL at 02:08

## 2024-08-30 RX ADMIN — IOHEXOL 100 ML: 350 INJECTION, SOLUTION INTRAVENOUS at 09:08

## 2024-08-30 RX ADMIN — ROCURONIUM BROMIDE 40 MG: 10 INJECTION, SOLUTION INTRAVENOUS at 03:08

## 2024-08-30 RX ADMIN — PIPERACILLIN SODIUM AND TAZOBACTAM SODIUM 4.5 G: 4; .5 INJECTION, POWDER, FOR SOLUTION INTRAVENOUS at 08:08

## 2024-08-30 RX ADMIN — MUPIROCIN 1 G: 20 OINTMENT TOPICAL at 11:08

## 2024-08-30 RX ADMIN — ESMOLOL HYDROCHLORIDE 10 MG: 10 INJECTION, SOLUTION INTRAVENOUS at 03:08

## 2024-08-30 RX ADMIN — MEROPENEM 1 G: 1 INJECTION, POWDER, FOR SOLUTION INTRAVENOUS at 12:08

## 2024-08-30 RX ADMIN — SODIUM CHLORIDE, SODIUM LACTATE, POTASSIUM CHLORIDE, AND CALCIUM CHLORIDE: .6; .31; .03; .02 INJECTION, SOLUTION INTRAVENOUS at 10:08

## 2024-08-30 RX ADMIN — DEXMEDETOMIDINE HYDROCHLORIDE 4 MCG: 100 INJECTION, SOLUTION INTRAVENOUS at 03:08

## 2024-08-30 RX ADMIN — BISACODYL 5 MG: 5 TABLET, COATED ORAL at 11:08

## 2024-08-30 RX ADMIN — PROPOFOL 200 MG: 10 INJECTION, EMULSION INTRAVENOUS at 02:08

## 2024-08-30 RX ADMIN — DEXMEDETOMIDINE HYDROCHLORIDE 4 MCG: 100 INJECTION, SOLUTION INTRAVENOUS at 02:08

## 2024-08-30 RX ADMIN — DEXAMETHASONE SODIUM PHOSPHATE 8 MG: 4 INJECTION, SOLUTION INTRA-ARTICULAR; INTRALESIONAL; INTRAMUSCULAR; INTRAVENOUS; SOFT TISSUE at 02:08

## 2024-08-30 RX ADMIN — LIDOCAINE HYDROCHLORIDE 100 MG: 20 INJECTION, SOLUTION INTRAVENOUS at 02:08

## 2024-08-30 RX ADMIN — DEXMEDETOMIDINE HYDROCHLORIDE 4 MCG: 100 INJECTION, SOLUTION INTRAVENOUS at 05:08

## 2024-08-30 RX ADMIN — ACETAMINOPHEN 1000 MG: 10 INJECTION INTRAVENOUS at 11:08

## 2024-08-30 RX ADMIN — SODIUM CHLORIDE, SODIUM GLUCONATE, SODIUM ACETATE, POTASSIUM CHLORIDE AND MAGNESIUM CHLORIDE: 526; 502; 368; 37; 30 INJECTION, SOLUTION INTRAVENOUS at 01:08

## 2024-08-30 RX ADMIN — FENTANYL CITRATE 50 MCG: 0.05 INJECTION, SOLUTION INTRAMUSCULAR; INTRAVENOUS at 03:08

## 2024-08-30 RX ADMIN — FENTANYL CITRATE 100 MCG: 0.05 INJECTION, SOLUTION INTRAMUSCULAR; INTRAVENOUS at 02:08

## 2024-08-30 RX ADMIN — SUGAMMADEX 100 MG: 100 INJECTION, SOLUTION INTRAVENOUS at 06:08

## 2024-08-30 RX ADMIN — FENTANYL CITRATE 50 MCG: 0.05 INJECTION, SOLUTION INTRAMUSCULAR; INTRAVENOUS at 06:08

## 2024-08-30 RX ADMIN — MIDAZOLAM HYDROCHLORIDE 2 MG: 1 INJECTION, SOLUTION INTRAMUSCULAR; INTRAVENOUS at 02:08

## 2024-08-30 RX ADMIN — ROCURONIUM BROMIDE 10 MG: 10 INJECTION, SOLUTION INTRAVENOUS at 02:08

## 2024-08-30 RX ADMIN — ESMOLOL HYDROCHLORIDE 20 MG: 10 INJECTION, SOLUTION INTRAVENOUS at 04:08

## 2024-08-30 RX ADMIN — FENTANYL CITRATE 50 MCG: 0.05 INJECTION, SOLUTION INTRAMUSCULAR; INTRAVENOUS at 04:08

## 2024-08-30 RX ADMIN — SUCCINYLCHOLINE CHLORIDE 120 MG: 20 INJECTION, SOLUTION INTRAMUSCULAR; INTRAVENOUS at 02:08

## 2024-08-30 RX ADMIN — SODIUM CHLORIDE, SODIUM LACTATE, POTASSIUM CHLORIDE, CALCIUM CHLORIDE AND DEXTROSE MONOHYDRATE: 5; 600; 310; 30; 20 INJECTION, SOLUTION INTRAVENOUS at 08:08

## 2024-08-30 RX ADMIN — ONDANSETRON 4 MG: 2 INJECTION INTRAMUSCULAR; INTRAVENOUS at 02:08

## 2024-08-30 RX ADMIN — PANTOPRAZOLE SODIUM 40 MG: 40 INJECTION, POWDER, LYOPHILIZED, FOR SOLUTION INTRAVENOUS at 10:08

## 2024-08-30 RX ADMIN — KETOROLAC TROMETHAMINE 30 MG: 30 INJECTION, SOLUTION INTRAMUSCULAR; INTRAVENOUS at 05:08

## 2024-08-30 RX ADMIN — HYDROMORPHONE HYDROCHLORIDE 0.5 MG: 1 INJECTION, SOLUTION INTRAMUSCULAR; INTRAVENOUS; SUBCUTANEOUS at 10:08

## 2024-08-30 RX ADMIN — ONDANSETRON 4 MG: 2 INJECTION INTRAMUSCULAR; INTRAVENOUS at 05:08

## 2024-08-30 RX ADMIN — GABAPENTIN 200 MG: 100 CAPSULE ORAL at 11:08

## 2024-08-30 RX ADMIN — ACETAMINOPHEN 1000 MG: 10 INJECTION INTRAVENOUS at 03:08

## 2024-08-30 NOTE — SUBJECTIVE & OBJECTIVE
No current facility-administered medications on file prior to encounter.     Current Outpatient Medications on File Prior to Encounter   Medication Sig    amoxicillin-clavulanate 875-125mg (AUGMENTIN) 875-125 mg per tablet Take 1 tablet by mouth every 12 (twelve) hours. for 14 days    famotidine (PEPCID) 40 MG tablet Take 1 tablet (40 mg total) by mouth once daily.    levoFLOXacin (LEVAQUIN) 750 MG tablet Take 1 tablet (750 mg total) by mouth once daily for 14 days.    oxyCODONE (ROXICODONE) 5 MG immediate release tablet Take 1 tablet (5 mg total) by mouth every 6 (six) hours as needed for Pain.    nicotine (NICODERM CQ) 14 mg/24 hr Place 1 patch onto the skin once daily       Review of patient's allergies indicates:   Allergen Reactions    Grass pollen-june grass standard        Past Medical History:   Diagnosis Date    Nausea & vomiting 8/16/2024     History reviewed. No pertinent surgical history.  Family History    None       Tobacco Use    Smoking status: Every Day     Current packs/day: 0.50     Average packs/day: 0.5 packs/day for 7.0 years (3.5 ttl pk-yrs)     Types: Cigarettes    Smokeless tobacco: Never   Substance and Sexual Activity    Alcohol use: No    Drug use: No    Sexual activity: Yes     Partners: Female     Birth control/protection: Condom     Review of Systems   Constitutional:  Negative for activity change and appetite change.   Respiratory:  Negative for apnea.    Cardiovascular:  Negative for chest pain.   Gastrointestinal:  Positive for abdominal pain, nausea and vomiting.   Skin:  Negative for color change.     Objective:     Vital Signs (Most Recent):  Temp: 98.4 °F (36.9 °C) (08/30/24 1315)  Pulse: 99 (08/30/24 1315)  Resp: 18 (08/30/24 1315)  BP: (!) 141/86 (08/30/24 1338)  SpO2: 98 % (08/30/24 1315) Vital Signs (24h Range):  Temp:  [98.4 °F (36.9 °C)-99 °F (37.2 °C)] 98.4 °F (36.9 °C)  Pulse:  [] 99  Resp:  [18-20] 18  SpO2:  [98 %] 98 %  BP: (135-142)/(76-86) 141/86     Weight:  118.8 kg (262 lb)  Body mass index is 36.54 kg/m².     Physical Exam  Vitals reviewed.   Cardiovascular:      Rate and Rhythm: Normal rate.   Pulmonary:      Effort: No respiratory distress.   Abdominal:      General: Abdomen is flat. There is distension.      Tenderness: There is abdominal tenderness. There is guarding.      Comments: Localized tenderness to the right lower quadrant on deep palpation   Musculoskeletal:      Cervical back: Normal range of motion.   Neurological:      Mental Status: He is alert.            I have reviewed all pertinent lab results within the past 24 hours.  CBC:   Recent Labs   Lab 08/30/24  0918   WBC 15.89*   RBC 5.20   HGB 14.6   HCT 44.8      MCV 86   MCH 28.1   MCHC 32.6     CMP:   Recent Labs   Lab 08/30/24  0918   GLU 98   CALCIUM 10.1   ALBUMIN 3.6   PROT 8.2      K 3.8   CO2 24   CL 99   BUN 15   CREATININE 1.1   ALKPHOS 79   ALT 45*   AST 20   BILITOT 0.6       Significant Diagnostics:  CT scan today demonstrating findings consistent with continued acute appendicitis with associated right lower quadrant abscess again collection was not amenable to percutaneous drain

## 2024-08-30 NOTE — BRIEF OP NOTE
[facility]  Brief Operative Note    SUMMARY     Surgery Date: 8/30/2024     Surgeons and Role:     * Chaz Harris MD - Primary    Assisting Surgeon: Christi Navarro     Pre-op Diagnosis:  Perforated appendix [K35.32]    Post-op Diagnosis:  Post-Op Diagnosis Codes:     * Perforated appendix [K35.32]  Appendiceal abscess  Colon perforation     Procedure:  Exploratory laparotomy with drainage of abdominal abscess  R hemicolectomy with ileocolic anastomosis  Rectosigmoid resection with colorectal anastomosis  Mobilization of splenic flexure.       Anesthesia: General    Implants:  Implant Name Type Inv. Item Serial No.  Lot No. LRB No. Used Action   MEMBRANE SEPRAFILM 5 X 6 - [id number]  MEMBRANE SEPRAFILM 5 X 6  [employer] [id number]  1 Implanted       Operative Findings: Abscess in mesentery and along retroperitoneum lining from perforated appendicitis.  Wall of abscess contained by loop of sigmoid colon leading to perforation of sigmoid colon.  R hemicolectomy performed and Rectosigmoid resection with anastomosis    Estimated Blood Loss: 350 cc's      Estimated Blood Loss has been documented.         Specimens:   Specimen (24h ago, onward)       Start     Ordered    08/30/24 1756  Specimen to Pathology - Surgery  Once        Comments: Pre-op Diagnosis: Perforated appendix [K35.32]Procedure(s):LAPAROTOMY, EXPLORATORYCOLON RESECTION Number of specimens: 2Name of specimens: #1 Right Colon, #2 Rectal sigmoid with donuts, stitch on proximal donut     Question:  Release to patient  Answer:  Immediate    08/30/24 4164                    [id number]

## 2024-08-30 NOTE — ASSESSMENT & PLAN NOTE
In setting of appendicitis with abscess.  -Zosyn  -Surgery to perform appendectomy; follow up cultures  -Follow up blood cultures

## 2024-08-30 NOTE — HPI
34-year-old gentleman who presents to this hospital with worsening pain and discomfort associated nausea and vomiting.  Patient was essentially been suffering with perforated appendicitis for the last 3 weeks.  He presented to an outside facility on the 14th of August with findings concerning for perforated appendicitis.  This was after 4-5 day history of pain and discomfort.  Attempts were made at IR aspiration which were unsuccessful.  Patient was treated conservatively with IV antibiotics bowel rest and ultimately was able to advance his diet as tolerated.  Patient remained hemodynamically stable and was felt to have recovered.  He was discharged from that facility 2 days ago.  He presents today with worsening pain and discomfort.  He has no significant past medical history.  No significant abdominal surgical history

## 2024-08-30 NOTE — ASSESSMENT & PLAN NOTE
Patient with acute perforated appendicitis was then present now for 3 weeks.  He was failed conservative management.  Has persistent localized pain with rebound tenderness.  Worsening phlegmon and worsening overall physical condition.  Given failure of nonoperative management I have had discussion with the patient and his mother recommended exploratory laparotomy.  Goal surgically to drain the abscess hopefully remove the appendix.  May require partial cecectomy or even ileocecectomy.  Risks of surgery have been discussed in detail.  These include but are not limited to bleeding, infection, abscess, bowel resection, ostomy, anastomotic leak and need for further surgery.  They expressed understanding.  Informed consent has been obtained.  We will proceed with surgery today

## 2024-08-30 NOTE — ASSESSMENT & PLAN NOTE
-IV fluids  -Zosyn  -Surgery to perform appendectomy; follow up cultures  -Follow up blood cultures  -NPO  -PRN analgesics and antiemetics

## 2024-08-30 NOTE — SUBJECTIVE & OBJECTIVE
Past Medical History:   Diagnosis Date    Nausea & vomiting 8/16/2024       History reviewed. No pertinent surgical history.    Review of patient's allergies indicates:   Allergen Reactions    Grass pollen-june grass standard        No current facility-administered medications on file prior to encounter.     Current Outpatient Medications on File Prior to Encounter   Medication Sig    amoxicillin-clavulanate 875-125mg (AUGMENTIN) 875-125 mg per tablet Take 1 tablet by mouth every 12 (twelve) hours. for 14 days    famotidine (PEPCID) 40 MG tablet Take 1 tablet (40 mg total) by mouth once daily.    levoFLOXacin (LEVAQUIN) 750 MG tablet Take 1 tablet (750 mg total) by mouth once daily for 14 days.    oxyCODONE (ROXICODONE) 5 MG immediate release tablet Take 1 tablet (5 mg total) by mouth every 6 (six) hours as needed for Pain.    nicotine (NICODERM CQ) 14 mg/24 hr Place 1 patch onto the skin once daily     Family History    None       Tobacco Use    Smoking status: Every Day     Current packs/day: 0.50     Average packs/day: 0.5 packs/day for 7.0 years (3.5 ttl pk-yrs)     Types: Cigarettes    Smokeless tobacco: Never   Substance and Sexual Activity    Alcohol use: No    Drug use: No    Sexual activity: Yes     Partners: Female     Birth control/protection: Condom     Review of Systems   Gastrointestinal:  Positive for abdominal pain.     Objective:     Vital Signs (Most Recent):  Temp: 98.4 °F (36.9 °C) (08/30/24 1315)  Pulse: 99 (08/30/24 1315)  Resp: 18 (08/30/24 1315)  BP: (!) 141/86 (08/30/24 1338)  SpO2: 98 % (08/30/24 1315) Vital Signs (24h Range):  Temp:  [98.4 °F (36.9 °C)-99 °F (37.2 °C)] 98.4 °F (36.9 °C)  Pulse:  [] 99  Resp:  [18-20] 18  SpO2:  [98 %] 98 %  BP: (135-142)/(76-86) 141/86     Weight: 118.8 kg (262 lb)  Body mass index is 36.54 kg/m².     Physical Exam  Vitals and nursing note reviewed.   Constitutional:       General: He is not in acute distress.     Appearance: He is obese. He is  ill-appearing.   HENT:      Head: Normocephalic and atraumatic.      Right Ear: External ear normal.      Left Ear: External ear normal.      Nose: Nose normal.      Mouth/Throat:      Mouth: Mucous membranes are moist.      Pharynx: Oropharynx is clear.   Eyes:      Extraocular Movements: Extraocular movements intact.      Conjunctiva/sclera: Conjunctivae normal.   Cardiovascular:      Rate and Rhythm: Normal rate and regular rhythm.      Pulses: Normal pulses.      Heart sounds: Normal heart sounds.   Pulmonary:      Effort: Pulmonary effort is normal.      Breath sounds: Normal breath sounds.   Abdominal:      General: Bowel sounds are normal. There is no distension.      Palpations: Abdomen is soft.      Tenderness: There is abdominal tenderness.   Musculoskeletal:         General: Normal range of motion.      Cervical back: Normal range of motion and neck supple.      Right lower leg: No edema.      Left lower leg: No edema.   Skin:     General: Skin is warm and dry.   Neurological:      Mental Status: He is alert. Mental status is at baseline.   Psychiatric:         Mood and Affect: Mood normal.         Behavior: Behavior normal.                Significant Labs: All pertinent labs within the past 24 hours have been reviewed.    Significant Imaging: I have reviewed all pertinent imaging results/findings within the past 24 hours.

## 2024-08-30 NOTE — HPI
Willian Ron is a 34 year old male with a past medical history of acute perforated appendicitis with abscess s/p IR drainage (E coli and Prevotella) on Augmentin and Levaquin given persistent abscesses who presents with worsening abdominal pain. CT in the ED shows acute appendicitis with abscess. Surgery was consulted from the ED for appendectomy. He received IV fluids, meropenem, hydromorphone, Protonix and Zofran in the ED. Hospital Medicine was consulted for admission.

## 2024-08-30 NOTE — ANESTHESIA PREPROCEDURE EVALUATION
08/30/2024  Willian Ron is a 34 y.o., male.      Pre-op Assessment    I have reviewed the Patient Summary Reports.     I have reviewed the Nursing Notes. I have reviewed the NPO Status.   I have reviewed the Medications.     Review of Systems  Anesthesia Hx:  No previous Anesthesia             Denies Family Hx of Anesthesia complications.     Social:  Smoker THC      Hepatic/GI:      Liver Disease,  Septic abdomen          Endocrine:        Obesity / BMI > 30      Physical Exam  General: Cooperative, Alert, Oriented and Flat Affect    Airway:  Mallampati: II   Mouth Opening: Normal  TM Distance: Normal  Tongue: Normal  Neck ROM: Normal ROM  Neck: Girth Increased    Dental:    Chest/Lungs:  Normal Respiratory Rate    Heart:  Rate: Normal  Rhythm: Regular Rhythm        Anesthesia Plan  Type of Anesthesia, risks & benefits discussed:    Anesthesia Type: Gen ETT  Intra-op Monitoring Plan: Standard ASA Monitors  Post Op Pain Control Plan: multimodal analgesia  Induction:  IV and rapid sequence  Airway Plan: Video  Informed Consent: Informed consent signed with the Patient and all parties understand the risks and agree with anesthesia plan.  All questions answered.   ASA Score: 3 Emergent    Ready For Surgery From Anesthesia Perspective.     .

## 2024-08-30 NOTE — CONSULTS
[facility]  General Surgery  Consult Note    Patient Name: Willian Ron  MRN: [id number]  Code Status: Full Code  Admission Date: 8/30/2024  Hospital Length of Stay: 0 days  Attending Physician: Jesse Ross MD  Primary Care Provider: Jennifer Primary Doctor    Patient information was obtained from patient and ER records.     Consults  Subjective:     Principal Problem: Sepsis    History of Present Illness: 34-year-old gentleman who presents to this hospital with worsening pain and discomfort associated nausea and vomiting.  Patient was essentially been suffering with perforated appendicitis for the last 3 weeks.  He presented to an outside facility on the 14th of August with findings concerning for perforated appendicitis.  This was after 4-5 day history of pain and discomfort.  Attempts were made at IR aspiration which were unsuccessful.  Patient was treated conservatively with IV antibiotics bowel rest and ultimately was able to advance his diet as tolerated.  Patient remained hemodynamically stable and was felt to have recovered.  He was discharged from that facility 2 days ago.  He presents today with worsening pain and discomfort.  He has no significant past medical history.  No significant abdominal surgical history    No current facility-administered medications on file prior to encounter.     Current Outpatient Medications on File Prior to Encounter   Medication Sig    amoxicillin-clavulanate 875-125mg (AUGMENTIN) 875-125 mg per tablet Take 1 tablet by mouth every 12 (twelve) hours. for 14 days    famotidine (PEPCID) 40 MG tablet Take 1 tablet (40 mg total) by mouth once daily.    levoFLOXacin (LEVAQUIN) 750 MG tablet Take 1 tablet (750 mg total) by mouth once daily for 14 days.    oxyCODONE (ROXICODONE) 5 MG immediate release tablet Take 1 tablet (5 mg total) by mouth every 6 (six) hours as needed for Pain.    nicotine (NICODERM CQ) 14 mg/24 hr Place 1 patch onto the skin once  daily       Review of patient's allergies indicates:   Allergen Reactions    Grass pollen-june grass standard        Past Medical History:   Diagnosis Date    Nausea & vomiting 8/16/2024     History reviewed. No pertinent surgical history.  Family History    None       Tobacco Use    Smoking status: Every Day     Current packs/day: 0.50     Average packs/day: 0.5 packs/day for 7.0 years (3.5 ttl pk-yrs)     Types: Cigarettes    Smokeless tobacco: Never   Substance and Sexual Activity    Alcohol use: No    Drug use: No    Sexual activity: Yes     Partners: Female     Birth control/protection: Condom     Review of Systems   Constitutional:  Negative for activity change and appetite change.   Respiratory:  Negative for apnea.    Cardiovascular:  Negative for chest pain.   Gastrointestinal:  Positive for abdominal pain, nausea and vomiting.   Skin:  Negative for color change.     Objective:     Vital Signs (Most Recent):  Temp: 98.4 °F (36.9 °C) (08/30/24 1315)  Pulse: 99 (08/30/24 1315)  Resp: 18 (08/30/24 1315)  BP: (!) 141/86 (08/30/24 1338)  SpO2: 98 % (08/30/24 1315) Vital Signs (24h Range):  Temp:  [98.4 °F (36.9 °C)-99 °F (37.2 °C)] 98.4 °F (36.9 °C)  Pulse:  [] 99  Resp:  [18-20] 18  SpO2:  [98 %] 98 %  BP: (135-142)/(76-86) 141/86     Weight: 118.8 kg (262 lb)  Body mass index is 36.54 kg/m².     Physical Exam  Vitals reviewed.   Cardiovascular:      Rate and Rhythm: Normal rate.   Pulmonary:      Effort: No respiratory distress.   Abdominal:      General: Abdomen is flat. There is distension.      Tenderness: There is abdominal tenderness. There is guarding.      Comments: Localized tenderness to the right lower quadrant on deep palpation   Musculoskeletal:      Cervical back: Normal range of motion.   Neurological:      Mental Status: He is alert.            I have reviewed all pertinent lab results within the past 24 hours.  CBC:   Recent Labs   Lab 08/30/24  0918   WBC 15.89*   RBC 5.20   HGB 14.6    HCT 44.8      MCV 86   MCH 28.1   MCHC 32.6     CMP:   Recent Labs   Lab 08/30/24  0918   GLU 98   CALCIUM 10.1   ALBUMIN 3.6   PROT 8.2      K 3.8   CO2 24   CL 99   BUN 15   CREATININE 1.1   ALKPHOS 79   ALT 45*   AST 20   BILITOT 0.6       Significant Diagnostics:  CT scan today demonstrating findings consistent with continued acute appendicitis with associated right lower quadrant abscess again collection was not amenable to percutaneous drain    Assessment/Plan:     Acute appendicitis  Patient with acute perforated appendicitis was then present now for 3 weeks.  He was failed conservative management.  Has persistent localized pain with rebound tenderness.  Worsening phlegmon and worsening overall physical condition.  Given failure of nonoperative management I have had discussion with the patient and his mother recommended exploratory laparotomy.  Goal surgically to drain the abscess hopefully remove the appendix.  May require partial cecectomy or even ileocecectomy.  Risks of surgery have been discussed in detail.  These include but are not limited to bleeding, infection, abscess, bowel resection, ostomy, anastomotic leak and need for further surgery.  They expressed understanding.  Informed consent has been obtained.  We will proceed with surgery today      VTE Risk Mitigation (From admission, onward)           Ordered     enoxaparin injection 40 mg  Daily         08/30/24 1316     IP VTE HIGH RISK PATIENT  Once         08/30/24 1316     Place sequential compression device  Until discontinued         08/30/24 1316                    Thank you for your consult. I will follow-up with patient. Please contact us if you have any additional questions.    Chaz Harris MD  General Surgery  [facility]

## 2024-08-30 NOTE — ASSESSMENT & PLAN NOTE
Body mass index is 36.54 kg/m². Morbid obesity complicates all aspects of disease management from diagnostic modalities to treatment.

## 2024-08-30 NOTE — ED PROVIDER NOTES
Encounter Date: 8/30/2024       History     Chief Complaint   Patient presents with    Abdominal Pain     Recent admit [facility] , presently on antibiotics      34-year-old male with seen and treated at [facility] for complicated perforated appendicitis with multiple abscesses.  Admitted on 08/14, Discharged on 08/27 after patient presented with 4 day history of previous abdominal pain.  Per review of discharge summary patient's recent hospitalization and course was remarkable for admission for sepsis secondary to acute perforated appendicitis with developing adjacent abscess as evidenced on CT. Initiated on IV Zosyn.  General surgery/GI on board.  Status post IR guided drainage of abscess on 08/15. Blood cultures w/ NGTD.  Fluid cultures grew E coli and Prevotella.  Patient was noted to have dark red emesis 08/17, initiated on IV Protonix.  Hemoglobin stable at this time.  X-ray KUB unremarkable for bowel obstruction.Patient is noted to have worsening leukocytosis despite receiving IV Zosyn. Repeat CT w/ contrast 08/19 showed perforated appendicitis with multiple abscesses, progressed from previous 08/14/2024, abscesses are not sizable thus not drainable per IR.  Leukocytosis trended down Id on board.  Received IV Zosyn inpatient.  Patient is discharged home on levofloxacin and Augmentin with HUYEN 09/10/2024 per ID recommendations.  Plan for repeat CT at end of antibiotic course.  To follow up with PCP/general surgery/ID outpatient.  Patient returns to emergency department today with complaint of nausea vomiting worsening abdominal pain which began this morning.  Patient states has increased 10/10 right lower quadrant abdominal pain.  Occurred after vomiting.  He denies fever.  States has been taking his medications as prescribed.      Review of patient's allergies indicates:   Allergen Reactions    Grass pollen-yuri grass standard      Past Medical History:   Diagnosis Date    Nausea & vomiting  8/16/2024     History reviewed. No pertinent surgical history.  No family history on file.  Social History     Tobacco Use    Smoking status: Every Day     Current packs/day: 0.50     Average packs/day: 0.5 packs/day for 7.0 years (3.5 ttl pk-yrs)     Types: Cigarettes    Smokeless tobacco: Never   Substance Use Topics    Alcohol use: No    Drug use: No     Review of Systems   Constitutional:  Negative for fever.   HENT:  Negative for sore throat.    Respiratory:  Negative for shortness of breath.    Cardiovascular:  Negative for chest pain.   Gastrointestinal:  Positive for abdominal pain, nausea and vomiting.   Genitourinary:  Negative for dysuria.   Musculoskeletal:  Negative for back pain.   Skin:  Negative for rash.   Neurological:  Negative for weakness.   Hematological:  Does not bruise/bleed easily.       Physical Exam     Initial Vitals [08/30/24 0837]   BP Pulse Resp Temp SpO2   135/78 99 20 99 °F (37.2 °C) 98 %      MAP       --         Physical Exam    Nursing note and vitals reviewed.  Constitutional: He appears well-developed and well-nourished.   HENT:   Head: Normocephalic and atraumatic.   Nose: Nose normal.   Mouth/Throat: Oropharynx is clear and moist.   Eyes: Conjunctivae and EOM are normal. Pupils are equal, round, and reactive to light. No scleral icterus.   Neck: Neck supple.   Normal range of motion.  Cardiovascular:  Normal rate, regular rhythm, normal heart sounds and intact distal pulses.     Exam reveals no gallop and no friction rub.       No murmur heard.  Pulmonary/Chest: No stridor. No respiratory distress.   Course bilateral breath sounds no adventitious sounds   Abdominal: Bowel sounds are normal. He exhibits no mass. There is no abdominal tenderness.   Positive tenderness to right lower quadrant abdominal area.  With mild guarding noted.  Decreased bowel sounds. There is no rebound and no guarding.   Musculoskeletal:         General: No edema. Normal range of motion.      Cervical  back: Normal range of motion and neck supple.     Lymphadenopathy:     He has no cervical adenopathy.   Neurological: He is alert and oriented to person, place, and time. He has normal strength and normal reflexes. No cranial nerve deficit or sensory deficit. GCS score is 15. GCS eye subscore is 4. GCS verbal subscore is 5. GCS motor subscore is 6.   Skin: Skin is warm and dry. Capillary refill takes less than 2 seconds. No rash noted.   Psychiatric: He has a normal mood and affect. His behavior is normal. Judgment and thought content normal.         ED Course   Procedures  Labs Reviewed   CBC W/ AUTO DIFFERENTIAL - Abnormal       Result Value    WBC 15.89 (*)     RBC 5.20      Hemoglobin 14.6      Hematocrit 44.8      MCV 86      MCH 28.1      MCHC 32.6      RDW 13.2      Platelets 423      MPV 10.6      Immature Granulocytes 0.7 (*)     Gran # (ANC) 10.6 (*)     Immature Grans (Abs) 0.11 (*)     Lymph # 3.1      Mono # 1.8 (*)     Eos # 0.2      Baso # 0.10      nRBC 0      Gran % 66.5      Lymph % 19.3      Mono % 11.5      Eosinophil % 1.4      Basophil % 0.6      Differential Method Automated     COMPREHENSIVE METABOLIC PANEL - Abnormal    Sodium 136      Potassium 3.8      Chloride 99      CO2 24      Glucose 98      BUN 15      Creatinine 1.1      Calcium 10.1      Total Protein 8.2      Albumin 3.6      Total Bilirubin 0.6      Alkaline Phosphatase 79      AST 20      ALT 45 (*)     eGFR >60      Anion Gap 13     URINALYSIS, REFLEX TO URINE CULTURE - Abnormal    Specimen UA Urine, Clean Catch      Color, UA Yellow      Appearance, UA Clear      pH, UA 6.0      Specific Gravity, UA >1.030 (*)     Protein, UA 1+ (*)     Glucose, UA Negative      Ketones, UA Negative      Bilirubin (UA) Negative      Occult Blood UA Negative      Nitrite, UA Negative      Urobilinogen, UA Negative      Leukocytes, UA Negative      Narrative:     Specimen Source->Urine   MAGNESIUM    Magnesium 1.9     PROCALCITONIN     Procalcitonin 0.10     URINALYSIS MICROSCOPIC    RBC, UA 4      WBC, UA 1      Bacteria None      Hyaline Casts, UA 0      Microscopic Comment SEE COMMENT      Narrative:     Specimen Source->Urine   ISTAT LACTATE    POC Lactate 0.55      Sample VENOUS      Site Other      Allens Test N/A      DelSys Room Air      Mode SPONT     POCT LACTATE          Imaging Results              X-Ray Chest AP Portable (Final result)  Result time 08/30/24 10:45:31      Final result by Estiven Schroeder MD (08/30/24 10:45:31)                   Impression:      Negative chest.  No significant change.      Electronically signed by: Estiven Schroeder MD  Date:    08/30/2024  Time:    10:45               Narrative:    EXAMINATION:  XR CHEST AP PORTABLE    CLINICAL HISTORY:  Sepsis;    TECHNIQUE:  Single frontal view of the chest was performed.    COMPARISON:  08/14/2024    FINDINGS:  The cardiomediastinal silhouette is within normal limits.  The lungs are well expanded without consolidation or pleural effusion.                                        CT Abdomen Pelvis With IV Contrast NO Oral Contrast (Final result)  Result time 08/30/24 10:09:49      Final result by Estiven Schroeder MD (08/30/24 10:09:49)                   Impression:      Persistent features of acute appendicitis, with evolution of adjacent right lower quadrant abscess which is now better formed.  This is not accessible for percutaneous drainage.  No distinct right pericolic gutter collection on today's exam.    This report was flagged in Epic as abnormal.      Electronically signed by: Estiven Schroeder MD  Date:    08/30/2024  Time:    10:09               Narrative:    EXAMINATION:  CT ABDOMEN PELVIS WITH IV CONTRAST    CLINICAL HISTORY:  Abdominal pain;    TECHNIQUE:  Low dose axial images, sagittal and coronal reformations were obtained from the lung bases to the pubic symphysis following the IV administration of 100 mL of Omnipaque 350 .  Oral contrast was  not given.    COMPARISON:  08/26/2024    FINDINGS:  The appendix is enlarged and fluid filled, measuring 11 mm diameter.  There is a gas containing peripherally enhancing fluid collection adjacent to the appendix measuring 6 cm diameter, increasingly well-formed relative to prior.  Abundant fat stranding is present in the right lower quadrant which is mildly increased.  No pneumoperitoneum.  The bowel is nondilated.    The liver, spleen, pancreas, and adrenal glands are unremarkable.  There is a punctate stone of the lower pole of the left kidney.  No significant bony abnormality.                                       Medications   lactated ringers infusion ( Intravenous New Bag 8/30/24 1000)   sodium chloride 0.9% flush 10 mL (has no administration in time range)   acetaminophen tablet 650 mg (has no administration in time range)   naloxone 0.4 mg/mL injection 0.02 mg (has no administration in time range)   enoxaparin injection 40 mg (has no administration in time range)   ondansetron disintegrating tablet 8 mg (has no administration in time range)   oxyCODONE immediate release tablet 5 mg (has no administration in time range)   morphine injection 4 mg (has no administration in time range)   piperacillin-tazobactam (ZOSYN) 4.5 g in D5W 100 mL IVPB (MB+) (has no administration in time range)   electrolyte-S (ISOLYTE) ( Intravenous Restarted 8/30/24 1526)   HYDROmorphone (PF) injection 0.2 mg (has no administration in time range)   haloperidol lactate injection 0.5 mg (has no administration in time range)   BUPivacaine (PF) 0.25% (2.5 mg/ml) injection (30 mLs Other Given 8/30/24 1536)   BUPivacaine liposome (PF) 1.3 % (13.3 mg/mL) suspension (20 mLs  Given 8/30/24 1536)   lactated ringers bolus 1,000 mL (0 mLs Intravenous Stopped 8/30/24 1059)   HYDROmorphone injection 0.5 mg (0.5 mg Intravenous Given 8/30/24 1000)   ondansetron injection 4 mg (4 mg Intravenous Given 8/30/24 1000)   pantoprazole injection 40 mg (40  mg Intravenous Given 8/30/24 1000)   iohexoL (OMNIPAQUE 350) injection 100 mL (100 mLs Intravenous Given 8/30/24 0937)   meropenem (MERREM) 1 g in 0.9% NaCl 100 mL IVPB (MB+) (0 g Intravenous Stopped 8/30/24 1329)     Medical Decision Making  Amount and/or Complexity of Data Reviewed  Labs: ordered.  Radiology: ordered.    Risk  Prescription drug management.  Decision regarding hospitalization.               ED Course as of 08/30/24 1600   Fri Aug 30, 2024   1301 Case discussed with general surgery Dr. Harris.  Currently this time patient found with reaccumulation of right lower quadrant intra-abdominal abscess.  Per CT findings not amenable to percutaneous IR drainage.  Patient was offered transferred back to [facility] [place], [place].  Per patient and family decided that they would continue the care here.  Discussed with general surgery Dr. Harris.  Patient will undergo immediate surgical intervention.  Patient to remain NPO.  IV meropenem given.  Patient admitted to Hospital Medicine.  Remained hemodynamically adequate.  Labs reviewed white count 21164.  Case discussed in detail with patient and family concerning complications and need for open operative procedure.  This is to be discussed more in detail with General surgery.  Patient currently awaiting admission. [RM]      ED Course User Index  [RM] Jake Dow MD               Medical Decision Making:   Initial Assessment:   34-year-old male with seen and treated at [facility] for complicated perforated appendicitis with multiple abscesses.  Admitted on 08/14, Discharged on 08/27 after patient presented with 4 day history of previous abdominal pain.  Per review of discharge summary patient's recent hospitalization and course was remarkable for admission for sepsis secondary to acute perforated appendicitis with developing adjacent abscess as evidenced on CT. Initiated on IV Zosyn.  General surgery/GI on board.  Status post IR guided drainage of abscess on 08/15.  Blood cultures w/ NGTD.  Fluid cultures grew E coli and Prevotella.  Patient was noted to have dark red emesis 08/17, initiated on IV Protonix.  Hemoglobin stable at this time.  X-ray KUB unremarkable for bowel obstruction.Patient is noted to have worsening leukocytosis despite receiving IV Zosyn. Repeat CT w/ contrast 08/19 showed perforated appendicitis with multiple abscesses, progressed from previous 08/14/2024, abscesses are not sizable thus not drainable per IR.  Leukocytosis trended down Id on board.  Received IV Zosyn inpatient.  Patient is discharged home on levofloxacin and Augmentin with HUYEN 09/10/2024 per ID recommendations.  Plan for repeat CT at end of antibiotic course.  To follow up with PCP/general surgery/ID outpatient.  Patient returns to emergency department today with complaint of nausea vomiting worsening abdominal pain which began this morning.  Patient states has increased 10/10 right lower quadrant abdominal pain.  Occurred after vomiting.  He denies fever.  States has been taking his medications as prescribed.    Differential Diagnosis:   Peritonitis, reaccumulation of intra-abdominal abscess, bowel obstruction,  Clinical Tests:   Lab Tests: Ordered and Reviewed  Radiological Study: Ordered and Reviewed             Clinical Impression:  Final diagnoses:  [K35.32] Perforated appendicitis  [K65.1] Intra-abdominal abscess (Primary)  [A41.9] Sepsis          ED Disposition Condition    Admit Stable                Jake Dow MD  08/30/24 1600

## 2024-08-30 NOTE — PLAN OF CARE
Patient ready for surgery. Surgery and anesthesia consents signed. Educated on incentive spirometer use. Belongings in with mother. Mother set up with text message notifications.

## 2024-08-30 NOTE — PLAN OF CARE
Unable to complete at this time due to pt in surgery.       08/30/24 8302   Discharge Assessment   Assessment Type Discharge Planning Assessment

## 2024-08-30 NOTE — TRANSFER OF CARE
"Anesthesia Transfer of Care Note    Patient: Willian Ron    Procedure(s) Performed: Procedure(s) (LRB):  LAPAROTOMY, EXPLORATORY (N/A)  COLON RESECTION (Right)    Patient location: PACU    Anesthesia Type: general    Transport from OR: Transported from OR on 6-10 L/min O2 by face mask with adequate spontaneous ventilation    Post pain: adequate analgesia    Post assessment: no apparent anesthetic complications and tolerated procedure well    Post vital signs: stable    Level of consciousness: awake    Nausea/Vomiting: no nausea/vomiting    Complications: none    Transfer of care protocol was followed    Last vitals: Visit Vitals  BP (!) 141/86   Pulse 99   Temp 36.9 °C (98.4 °F) (Oral)   Resp 18   Ht 5' 11" (1.803 m)   Wt 118.8 kg (262 lb)   SpO2 98%   BMI 36.54 kg/m²     "

## 2024-08-30 NOTE — ANESTHESIA PROCEDURE NOTES
Intubation    Date/Time: 8/30/2024 2:57 PM    Performed by: Jayesh Paredes CRNA  Authorized by: Jayesh Paredes CRNA    Intubation:     Induction:  Intravenous    Intubated:  Postinduction    Mask Ventilation:  Easy mask    Attempts:  1    Attempted By:  CRNA    Method of Intubation:  Video laryngoscopy    Blade:  Horner 3    Laryngeal View Grade: Grade I - full view of cords      Difficult Airway Encountered?: No      Complications:  None    Airway Device:  Oral endotracheal tube    Airway Device Size:  7.5    Style/Cuff Inflation:  Cuffed    Inflation Amount (mL):  4    Tube secured:  22    Secured at:  The lips    Placement Verified By:  Capnometry    Complicating Factors:  None    Findings Post-Intubation:  BS equal bilateral

## 2024-08-30 NOTE — TELEPHONE ENCOUNTER
"Pt states started vomiting,with RLQ pain. 9/10 pain. Current temp 96.6. Pt difficult to understand. Per protocol, go to ED/UC now. Pt verbalizes understanding and advised to call back for any further questions or concerns.   Reason for Disposition   Patient sounds very sick or weak to the triager    Additional Information   Negative: Shock suspected (e.g., cold/pale/clammy skin, too weak to stand, low BP, rapid pulse)   Negative: Difficult to awaken or acting confused (e.g., disoriented, slurred speech)   Negative: Passed out (e.g., fainted, lost consciousness, blacked out and was not responding)   Negative: Sounds like a life-threatening emergency to the triager   Negative: [1] SEVERE pain (e.g., excruciating) AND [2] present > 1 hour   Negative: [1] SEVERE pain AND [2] age > 60 years   Negative: [1] Vomiting AND [2] contains red blood or black ("coffee ground") material  (Exception: Few red streaks in vomit that only happened once.)   Negative: Blood in bowel movements  (Exception: Blood on surface of BM with constipation.)   Negative: Black or tarry bowel movements  (Exception: Chronic-unchanged black-grey BMs AND is taking iron pills or Pepto-Bismol.)   Negative: [1] Unable to urinate (or only a few drops) > 4 hours AND [2] bladder feels very full (e.g., palpable bladder or strong urge to urinate)   Negative: [1] Vomiting AND [2] contains bile (green color)   Negative: [1] Pain in the scrotum or testicle AND [2] present > 1 hour    Protocols used: Abdominal Pain - Male-A-AH    "

## 2024-08-30 NOTE — OP NOTE
Date of Procedure:  Aug 30, 2024    Staff Surgeon: Dr Chaz Harris    Assistant: Christi Navarro    Preoperative Diagnosis; Intraabdominal abscess secondary to perforated appendicitis    Postoperative Diagnosis:   Abscess secondary to perforated appendicitis  Sigmoid colon perforation    Procedure:  Exploratory laparotomy with drainage of abdominal abscess ( 22 modifier)    Right hemicolectomy with ileocolic anastomosis  Rectosigmoid resection with colorectal anastomosis  Mobilization of splenic flexure    Anesthesia: General endotracheal anesthesia     Indication for procedure:   This is a 34-year-old gentleman who presented to this hospital this afternoon with worsening pain tenderness nausea and vomiting increasing white blood cell count.  Patient had originally presented at an outside facility over 2 weeks ago with findings consistent with a perforated appendicitis.  Conservative management was undertaken.  Perforation or abscess was not amenable to interventional radiology drainage.  His symptoms appeared to improve with antibiotics.  He was discharged home 2 days ago.  He returns to this facility today with worsening symptoms.  On CT imaging there was a larger more formed abscess present along the mesentery and the retroperitoneum.  It was not amenable to percutaneous drainage.  On exam he had significant tenderness and he was scheduled for exploratory laparotomy    Description of procedure:   Following signing informed consent patient was taken to the operating room placed supine position.  General endotracheal anesthesia was administered.  Stout catheter was inserted.  The abdomen is prepped and draped standard fashion.  A time-out procedure was performed.  Having performed time-out procedure make a large midline incision extending from above the umbilicus to a point below the umbilicus.  Dissection was carried out the deep dermal tissue down to the fascia which was sharply divided.  The abdominal cavity was  entered.  Upon entering abdominal cavity there were no significant adhesions to the anterior abdominal wall.  Nino wound retractor was placed.  There is obvious phlegmon and inflammatory reaction present in the lower abdomen particularly in the right side in the sacral promontory.  The incision was extended superiorly and I identify normal ascending colon.  Hepatic flexure was mobilized and proceed to mobilize the proximal ascending colon down towards the cecum.  Upon entering the cecum there was an intense inflammatory reaction with small bowel and phlegmonous mesentery of the terminal ileum ascending colon and the sigmoid colon stuck to the retroperitoneum.  I mobilized the cecum and encountered a large feculent and purulent abscess.  Cultures were taken.  The areas irrigated with copious amounts of fluid.  I mobilize the terminal ileum away from the retroperitoneum.  There is a phlegmonous rind from the mesentery of the terminal ileum stuck to a redundant loop of the sigmoid colon.  This is freed with significant difficulty and there does appear to be perforation along the sigmoid colon where the wall of the abscess had been stuck to the sigmoid colon.  This tissue is thin walled it appears essentially necrotic.  It was obvious that a ileocolic resection will be needed.  I began by picking a point in the terminal ileum with a small-bowel is not thickened or inflamed.  The mesentery it was ligated with the LigaSure device.  I proceed proximally ligating the mesentery.  I removed the phlegmonous rind and proceed towards the cecum.  The mesentery of the terminal ileum is freed from the ileocolic pedicle.  I ligate the ileocolic pedicle was 0 silk ties.  The ascending colon hepatic flexure or both fully mobilized and released.  I pick a point of distal resection in the proximal transverse colon.  The right branch of the middle colic was preserved and has good perfusion to this area.  I staple across the transverse  colon and remove the specimen from the field.  The areas copiously irrigated.  Next I turned my attention to the sigmoid colon.  As mentioned previously this wall of the sigmoid colon appears necrotic and perforated.  Procedure was paused patient was placed in lithotomy position.  I mobilized this redundant loop of sigmoid colon.  I dissect along the white line of Toldt down towards the rectum.  I freed the medial border of the mesentery.  The CRISTEL is identified and isolated.  It was ligated with 0 silk ties.  Prior to this the ureter was identified and swept posteriorly.  Having ligated the CRISTEL I dissect the mesorectum down onto the proximal rectum.  Mesorectum was ligated with the LigaSure device.  I staple across the proximal rectum with a contour stapler.  I then mobilized the sigmoid colon descending colon along the white line of Toldt.  The retroperitoneal attachments are .  The IMV is ligated with a LigaSure device.  The splenic colic ligament was fully released.  This was done with the assistance of a LigaSure device.  A separate the omentum from the transverse colon pick a point of proximal resection in the mid descending colon.  Specimen was sent off the field to pathology.  A 2-0 Prolene pursestring suture was placed.  A 29 EEA anvil was secured in place.  I fashion create a stapled end-to-end colorectal anastomosis.  Both donuts were intact.  Anastomosis was made a proximally 16 cm from the anal verge.  Having ensured that both donuts were intact I perform endoscopy.  Anastomosis was visualized.  There is no evidence of bleeding.  Mucosa appears nice and healthy.  The anastomosis was submerged in saline in the colon proximal to the anastomosis was clamped.  There was no evidence of a leak noted.  The abdomen is then thoroughly irrigated.  All participants in the surgery changed gowns and gloves.  Having irrigated the abdomen I mobilize the terminal ileum and fashion create a stapled side-to-side  isoperistaltic anastomosis.  A colotomy was made a proximally 8 cm distal to the previous previously mentioned staple line.  An enterotomy was made 2 cm proximal to the staple line in the terminal ileum.  The isoperistaltic anastomosis was created.  Anastomosis was widely patent no evidence of bleeding.  The common channel was closed with a running barbed suture.  Vascularized omental pedicle flap was placed over this.  The abdomen is irrigated and hemostasis was checked for and obtained.  I do place a drain from the right upper quadrant in the left upper quadrant.  There were in the left upper quadrant travels down the left side down to the pelvis the 1 in the right upper quadrant travels down the right side into the area of the right lower abdomen.  The fascia was injected with Exparel mixed with Marcaine.  Seprafilm was placed.  The fascia was closed with a running heavy PDS suture.  Skin incisions closed with 4-0 Monocryl.  The patient was extubated taken to recovery room stable condition.  There were no immediate complications.  A 22 modifier is appropriate given the severe phlegmonous reaction from the perforated appendicitis and abscess.  Patient's body habitus also created significant difficulty with the case.  For these reasons 22 modifier should be added

## 2024-08-30 NOTE — H&P
[facility] Medicine  History & Physical    Patient Name: Willian Ron  MRN: [id number]  Patient Class: IP- Inpatient  Admission Date: 8/30/2024  Attending Physician: Jesse Ross MD  Primary Care Provider: No primary care provider on file.         Patient information was obtained from patient, relative(s), and ER records.     Subjective:     Principal Problem:Sepsis    Chief Complaint:   Chief Complaint   Patient presents with    Abdominal Pain     Recent admit [facility] , presently on antibiotics         HPI: Willian Ron is a 34 year old male with a past medical history of acute perforated appendicitis with abscess s/p IR drainage (E coli and Prevotella) on Augmentin and Levaquin given persistent abscesses who presents with worsening abdominal pain. CT in the ED shows acute appendicitis with abscess. Surgery was consulted from the ED for appendectomy. He received IV fluids, meropenem, hydromorphone, Protonix and Zofran in the ED. Hospital Medicine was consulted for admission.    Past Medical History:   Diagnosis Date    Nausea & vomiting 8/16/2024       History reviewed. No pertinent surgical history.    Review of patient's allergies indicates:   Allergen Reactions    Grass pollen-yuri grass standard        No current facility-administered medications on file prior to encounter.     Current Outpatient Medications on File Prior to Encounter   Medication Sig    amoxicillin-clavulanate 875-125mg (AUGMENTIN) 875-125 mg per tablet Take 1 tablet by mouth every 12 (twelve) hours. for 14 days    famotidine (PEPCID) 40 MG tablet Take 1 tablet (40 mg total) by mouth once daily.    levoFLOXacin (LEVAQUIN) 750 MG tablet Take 1 tablet (750 mg total) by mouth once daily for 14 days.    oxyCODONE (ROXICODONE) 5 MG immediate release tablet Take 1 tablet (5 mg total) by mouth every 6 (six) hours as needed for Pain.    nicotine (NICODERM CQ) 14 mg/24 hr Place 1 patch onto the  skin once daily     Family History    None       Tobacco Use    Smoking status: Every Day     Current packs/day: 0.50     Average packs/day: 0.5 packs/day for 7.0 years (3.5 ttl pk-yrs)     Types: Cigarettes    Smokeless tobacco: Never   Substance and Sexual Activity    Alcohol use: No    Drug use: No    Sexual activity: Yes     Partners: Female     Birth control/protection: Condom     Review of Systems   Gastrointestinal:  Positive for abdominal pain.     Objective:     Vital Signs (Most Recent):  Temp: 98.4 °F (36.9 °C) (08/30/24 1315)  Pulse: 99 (08/30/24 1315)  Resp: 18 (08/30/24 1315)  BP: (!) 141/86 (08/30/24 1338)  SpO2: 98 % (08/30/24 1315) Vital Signs (24h Range):  Temp:  [98.4 °F (36.9 °C)-99 °F (37.2 °C)] 98.4 °F (36.9 °C)  Pulse:  [] 99  Resp:  [18-20] 18  SpO2:  [98 %] 98 %  BP: (135-142)/(76-86) 141/86     Weight: 118.8 kg (262 lb)  Body mass index is 36.54 kg/m².     Physical Exam  Vitals and nursing note reviewed.   Constitutional:       General: He is not in acute distress.     Appearance: He is obese. He is ill-appearing.   HENT:      Head: Normocephalic and atraumatic.      Right Ear: External ear normal.      Left Ear: External ear normal.      Nose: Nose normal.      Mouth/Throat:      Mouth: Mucous membranes are moist.      Pharynx: Oropharynx is clear.   Eyes:      Extraocular Movements: Extraocular movements intact.      Conjunctiva/sclera: Conjunctivae normal.   Cardiovascular:      Rate and Rhythm: Normal rate and regular rhythm.      Pulses: Normal pulses.      Heart sounds: Normal heart sounds.   Pulmonary:      Effort: Pulmonary effort is normal.      Breath sounds: Normal breath sounds.   Abdominal:      General: Bowel sounds are normal. There is no distension.      Palpations: Abdomen is soft.      Tenderness: There is abdominal tenderness.   Musculoskeletal:         General: Normal range of motion.      Cervical back: Normal range of motion and neck supple.      Right lower leg:  No edema.      Left lower leg: No edema.   Skin:     General: Skin is warm and dry.   Neurological:      Mental Status: He is alert. Mental status is at baseline.   Psychiatric:         Mood and Affect: Mood normal.         Behavior: Behavior normal.                Significant Labs: All pertinent labs within the past 24 hours have been reviewed.    Significant Imaging: I have reviewed all pertinent imaging results/findings within the past 24 hours.  Assessment/Plan:     * Sepsis  In setting of appendicitis with abscess.  -Zosyn  -Surgery to perform appendectomy; follow up cultures  -Follow up blood cultures    Acute appendicitis  -IV fluids  -Zosyn  -Surgery to perform appendectomy; follow up cultures  -Follow up blood cultures  -NPO  -PRN analgesics and antiemetics    Obesity (BMI 30-39.9)  Body mass index is 36.54 kg/m². Morbid obesity complicates all aspects of disease management from diagnostic modalities to treatment.         Tobacco dependency  -Nicotine patch  -Patient to be counseled on cessation      VTE Risk Mitigation (From admission, onward)           Ordered     enoxaparin injection 40 mg  Daily         08/30/24 1316     IP VTE HIGH RISK PATIENT  Once         08/30/24 1316     Place sequential compression device  Until discontinued         08/30/24 1316                                    Jesse Ross MD  Department of Hospital Medicine  [facility]

## 2024-08-31 PROBLEM — K65.1 ABDOMINAL VISCERAL ABSCESS: Status: ACTIVE | Noted: 2024-08-31

## 2024-08-31 PROBLEM — K63.1 PERFORATION OF SIGMOID COLON: Status: ACTIVE | Noted: 2024-08-31

## 2024-08-31 LAB
ALBUMIN SERPL BCP-MCNC: 2.7 G/DL (ref 3.5–5.2)
ALP SERPL-CCNC: 69 U/L (ref 55–135)
ALT SERPL W/O P-5'-P-CCNC: 41 U/L (ref 10–44)
ANION GAP SERPL CALC-SCNC: 12 MMOL/L (ref 8–16)
AST SERPL-CCNC: 25 U/L (ref 10–40)
BASOPHILS # BLD AUTO: ABNORMAL K/UL (ref 0–0.2)
BASOPHILS NFR BLD: 0 % (ref 0–1.9)
BILIRUB SERPL-MCNC: 1.6 MG/DL (ref 0.1–1)
BUN SERPL-MCNC: 16 MG/DL (ref 6–20)
CALCIUM SERPL-MCNC: 9.2 MG/DL (ref 8.7–10.5)
CHLORIDE SERPL-SCNC: 99 MMOL/L (ref 95–110)
CO2 SERPL-SCNC: 22 MMOL/L (ref 23–29)
CREAT SERPL-MCNC: 1.1 MG/DL (ref 0.5–1.4)
DIFFERENTIAL METHOD BLD: ABNORMAL
EOSINOPHIL # BLD AUTO: ABNORMAL K/UL (ref 0–0.5)
EOSINOPHIL NFR BLD: 0 % (ref 0–8)
ERYTHROCYTE [DISTWIDTH] IN BLOOD BY AUTOMATED COUNT: 13.2 % (ref 11.5–14.5)
EST. GFR  (NO RACE VARIABLE): >60 ML/MIN/1.73 M^2
GLUCOSE SERPL-MCNC: 202 MG/DL (ref 70–110)
HCT VFR BLD AUTO: 41.5 % (ref 40–54)
HGB BLD-MCNC: 13.5 G/DL (ref 14–18)
IMM GRANULOCYTES # BLD AUTO: ABNORMAL K/UL (ref 0–0.04)
IMM GRANULOCYTES NFR BLD AUTO: ABNORMAL % (ref 0–0.5)
LYMPHOCYTES # BLD AUTO: ABNORMAL K/UL (ref 1–4.8)
LYMPHOCYTES NFR BLD: 6 % (ref 18–48)
MAGNESIUM SERPL-MCNC: 2 MG/DL (ref 1.6–2.6)
MCH RBC QN AUTO: 28.4 PG (ref 27–31)
MCHC RBC AUTO-ENTMCNC: 32.5 G/DL (ref 32–36)
MCV RBC AUTO: 87 FL (ref 82–98)
MONOCYTES # BLD AUTO: ABNORMAL K/UL (ref 0.3–1)
MONOCYTES NFR BLD: 4 % (ref 4–15)
NEUTROPHILS NFR BLD: 85 % (ref 38–73)
NEUTS BAND NFR BLD MANUAL: 5 %
NRBC BLD-RTO: 0 /100 WBC
OVALOCYTES BLD QL SMEAR: ABNORMAL
PHOSPHATE SERPL-MCNC: 4 MG/DL (ref 2.7–4.5)
PLATELET # BLD AUTO: 511 K/UL (ref 150–450)
PLATELET BLD QL SMEAR: ABNORMAL
PMV BLD AUTO: 11 FL (ref 9.2–12.9)
POIKILOCYTOSIS BLD QL SMEAR: SLIGHT
POTASSIUM SERPL-SCNC: 4.8 MMOL/L (ref 3.5–5.1)
PROT SERPL-MCNC: 6.6 G/DL (ref 6–8.4)
RBC # BLD AUTO: 4.76 M/UL (ref 4.6–6.2)
SODIUM SERPL-SCNC: 133 MMOL/L (ref 136–145)
WBC # BLD AUTO: 25.54 K/UL (ref 3.9–12.7)

## 2024-08-31 PROCEDURE — 85027 COMPLETE CBC AUTOMATED: CPT | Performed by: SURGERY

## 2024-08-31 PROCEDURE — 97116 GAIT TRAINING THERAPY: CPT

## 2024-08-31 PROCEDURE — 80053 COMPREHEN METABOLIC PANEL: CPT | Performed by: SURGERY

## 2024-08-31 PROCEDURE — 99900035 HC TECH TIME PER 15 MIN (STAT)

## 2024-08-31 PROCEDURE — 97161 PT EVAL LOW COMPLEX 20 MIN: CPT

## 2024-08-31 PROCEDURE — 63600175 PHARM REV CODE 636 W HCPCS: Performed by: SURGERY

## 2024-08-31 PROCEDURE — 63600175 PHARM REV CODE 636 W HCPCS: Performed by: STUDENT IN AN ORGANIZED HEALTH CARE EDUCATION/TRAINING PROGRAM

## 2024-08-31 PROCEDURE — 25000003 PHARM REV CODE 250

## 2024-08-31 PROCEDURE — 84100 ASSAY OF PHOSPHORUS: CPT | Performed by: SURGERY

## 2024-08-31 PROCEDURE — 11000001 HC ACUTE MED/SURG PRIVATE ROOM

## 2024-08-31 PROCEDURE — 25000003 PHARM REV CODE 250: Performed by: SURGERY

## 2024-08-31 PROCEDURE — 93005 ELECTROCARDIOGRAM TRACING: CPT

## 2024-08-31 PROCEDURE — 94799 UNLISTED PULMONARY SVC/PX: CPT

## 2024-08-31 PROCEDURE — 97530 THERAPEUTIC ACTIVITIES: CPT

## 2024-08-31 PROCEDURE — 21400001 HC TELEMETRY ROOM

## 2024-08-31 PROCEDURE — 83735 ASSAY OF MAGNESIUM: CPT | Performed by: SURGERY

## 2024-08-31 PROCEDURE — 94761 N-INVAS EAR/PLS OXIMETRY MLT: CPT

## 2024-08-31 PROCEDURE — 36415 COLL VENOUS BLD VENIPUNCTURE: CPT | Performed by: SURGERY

## 2024-08-31 PROCEDURE — 93010 ELECTROCARDIOGRAM REPORT: CPT | Mod: ,,, | Performed by: GENERAL PRACTICE

## 2024-08-31 PROCEDURE — 25000003 PHARM REV CODE 250: Performed by: STUDENT IN AN ORGANIZED HEALTH CARE EDUCATION/TRAINING PROGRAM

## 2024-08-31 PROCEDURE — 85007 BL SMEAR W/DIFF WBC COUNT: CPT | Performed by: SURGERY

## 2024-08-31 RX ORDER — IBUPROFEN 200 MG
1 TABLET ORAL DAILY
Status: DISCONTINUED | OUTPATIENT
Start: 2024-08-31 | End: 2024-09-04

## 2024-08-31 RX ORDER — BACLOFEN 5 MG/1
5 TABLET ORAL 3 TIMES DAILY
Status: DISCONTINUED | OUTPATIENT
Start: 2024-08-31 | End: 2024-09-07

## 2024-08-31 RX ORDER — HYDROMORPHONE HYDROCHLORIDE 2 MG/ML
1.5 INJECTION, SOLUTION INTRAMUSCULAR; INTRAVENOUS; SUBCUTANEOUS
Status: DISCONTINUED | OUTPATIENT
Start: 2024-08-31 | End: 2024-09-02

## 2024-08-31 RX ORDER — KETOROLAC TROMETHAMINE 30 MG/ML
15 INJECTION, SOLUTION INTRAMUSCULAR; INTRAVENOUS EVERY 6 HOURS PRN
Status: DISCONTINUED | OUTPATIENT
Start: 2024-08-31 | End: 2024-09-02

## 2024-08-31 RX ORDER — HYDROMORPHONE HYDROCHLORIDE 1 MG/ML
1 INJECTION, SOLUTION INTRAMUSCULAR; INTRAVENOUS; SUBCUTANEOUS ONCE
Status: COMPLETED | OUTPATIENT
Start: 2024-08-31 | End: 2024-08-31

## 2024-08-31 RX ORDER — NALOXONE HCL 0.4 MG/ML
0.4 VIAL (ML) INJECTION
Status: DISCONTINUED | OUTPATIENT
Start: 2024-08-31 | End: 2024-09-08 | Stop reason: HOSPADM

## 2024-08-31 RX ORDER — ALUMINUM HYDROXIDE, MAGNESIUM HYDROXIDE, AND SIMETHICONE 1200; 120; 1200 MG/30ML; MG/30ML; MG/30ML
30 SUSPENSION ORAL
Status: DISCONTINUED | OUTPATIENT
Start: 2024-08-31 | End: 2024-08-31

## 2024-08-31 RX ORDER — PROCHLORPERAZINE EDISYLATE 5 MG/ML
2.5 INJECTION INTRAMUSCULAR; INTRAVENOUS EVERY 6 HOURS PRN
Status: DISCONTINUED | OUTPATIENT
Start: 2024-08-31 | End: 2024-09-08 | Stop reason: HOSPADM

## 2024-08-31 RX ORDER — ALUMINUM HYDROXIDE, MAGNESIUM HYDROXIDE, AND SIMETHICONE 1200; 120; 1200 MG/30ML; MG/30ML; MG/30ML
30 SUSPENSION ORAL EVERY 4 HOURS PRN
Status: DISCONTINUED | OUTPATIENT
Start: 2024-08-31 | End: 2024-09-08 | Stop reason: HOSPADM

## 2024-08-31 RX ADMIN — MUPIROCIN 1 G: 20 OINTMENT TOPICAL at 07:08

## 2024-08-31 RX ADMIN — HYDROMORPHONE HYDROCHLORIDE 1.5 MG: 2 INJECTION INTRAMUSCULAR; INTRAVENOUS; SUBCUTANEOUS at 03:08

## 2024-08-31 RX ADMIN — ACETAMINOPHEN 1000 MG: 10 INJECTION INTRAVENOUS at 05:08

## 2024-08-31 RX ADMIN — BACLOFEN 5 MG: 5 TABLET ORAL at 03:08

## 2024-08-31 RX ADMIN — PIPERACILLIN SODIUM AND TAZOBACTAM SODIUM 4.5 G: 4; .5 INJECTION, POWDER, FOR SOLUTION INTRAVENOUS at 03:08

## 2024-08-31 RX ADMIN — SODIUM CHLORIDE, SODIUM LACTATE, POTASSIUM CHLORIDE, CALCIUM CHLORIDE AND DEXTROSE MONOHYDRATE: 5; 600; 310; 30; 20 INJECTION, SOLUTION INTRAVENOUS at 12:08

## 2024-08-31 RX ADMIN — HYDROMORPHONE HYDROCHLORIDE 1 MG: 1 INJECTION, SOLUTION INTRAMUSCULAR; INTRAVENOUS; SUBCUTANEOUS at 01:08

## 2024-08-31 RX ADMIN — KETOROLAC TROMETHAMINE 15 MG: 30 INJECTION, SOLUTION INTRAMUSCULAR at 07:08

## 2024-08-31 RX ADMIN — IBUPROFEN 600 MG: 600 TABLET ORAL at 08:08

## 2024-08-31 RX ADMIN — BACLOFEN 5 MG: 5 TABLET ORAL at 07:08

## 2024-08-31 RX ADMIN — ACETAMINOPHEN 1000 MG: 10 INJECTION INTRAVENOUS at 12:08

## 2024-08-31 RX ADMIN — GABAPENTIN 200 MG: 100 CAPSULE ORAL at 07:08

## 2024-08-31 RX ADMIN — BISACODYL 5 MG: 5 TABLET, COATED ORAL at 07:08

## 2024-08-31 RX ADMIN — ENOXAPARIN SODIUM 40 MG: 40 INJECTION SUBCUTANEOUS at 06:08

## 2024-08-31 RX ADMIN — ONDANSETRON 4 MG: 2 INJECTION INTRAMUSCULAR; INTRAVENOUS at 03:08

## 2024-08-31 RX ADMIN — HYDROMORPHONE HYDROCHLORIDE 1 MG: 1 INJECTION, SOLUTION INTRAMUSCULAR; INTRAVENOUS; SUBCUTANEOUS at 06:08

## 2024-08-31 RX ADMIN — KETOROLAC TROMETHAMINE 15 MG: 30 INJECTION, SOLUTION INTRAMUSCULAR at 12:08

## 2024-08-31 RX ADMIN — PIPERACILLIN SODIUM AND TAZOBACTAM SODIUM 4.5 G: 4; .5 INJECTION, POWDER, FOR SOLUTION INTRAVENOUS at 12:08

## 2024-08-31 RX ADMIN — ALUMINUM HYDROXIDE, MAGNESIUM HYDROXIDE, AND SIMETHICONE 30 ML: 1200; 120; 1200 SUSPENSION ORAL at 04:08

## 2024-08-31 RX ADMIN — ALUMINUM HYDROXIDE, MAGNESIUM HYDROXIDE, AND SIMETHICONE 30 ML: 1200; 120; 1200 SUSPENSION ORAL at 07:08

## 2024-08-31 RX ADMIN — PIPERACILLIN SODIUM AND TAZOBACTAM SODIUM 4.5 G: 4; .5 INJECTION, POWDER, FOR SOLUTION INTRAVENOUS at 07:08

## 2024-08-31 RX ADMIN — MUPIROCIN 1 G: 20 OINTMENT TOPICAL at 08:08

## 2024-08-31 RX ADMIN — HYDROMORPHONE HYDROCHLORIDE 1 MG: 1 INJECTION, SOLUTION INTRAMUSCULAR; INTRAVENOUS; SUBCUTANEOUS at 09:08

## 2024-08-31 NOTE — SUBJECTIVE & OBJECTIVE
"Interval History: see "Hospital Course"    Review of Systems   Gastrointestinal:  Positive for abdominal pain.     Objective:     Vital Signs (Most Recent):  Temp: 98.5 °F (36.9 °C) (08/31/24 0445)  Pulse: (!) 125 (08/31/24 0804)  Resp: (!) 24 (08/31/24 0804)  BP: (!) 154/89 (08/31/24 0328)  SpO2: 99 % (08/31/24 0804) Vital Signs (24h Range):  Temp:  [97.4 °F (36.3 °C)-99 °F (37.2 °C)] 98.5 °F (36.9 °C)  Pulse:  [] 125  Resp:  [11-24] 24  SpO2:  [95 %-100 %] 99 %  BP: (126-154)/(76-89) 154/89     Weight: 118.5 kg (261 lb 3.9 oz)  Body mass index is 36.44 kg/m².    Intake/Output Summary (Last 24 hours) at 8/31/2024 0833  Last data filed at 8/31/2024 0728  Gross per 24 hour   Intake 2900 ml   Output 2520 ml   Net 380 ml         Physical Exam  Vitals and nursing note reviewed.   Constitutional:       General: He is not in acute distress.     Appearance: He is obese. He is ill-appearing.   HENT:      Head: Normocephalic and atraumatic.      Right Ear: External ear normal.      Left Ear: External ear normal.      Nose: Nose normal.      Mouth/Throat:      Mouth: Mucous membranes are moist.      Pharynx: Oropharynx is clear.   Eyes:      Extraocular Movements: Extraocular movements intact.      Conjunctiva/sclera: Conjunctivae normal.   Cardiovascular:      Rate and Rhythm: Regular rhythm. Tachycardia present.      Pulses: Normal pulses.      Heart sounds: Normal heart sounds.   Pulmonary:      Effort: Pulmonary effort is normal.      Breath sounds: Normal breath sounds.   Abdominal:      Tenderness: There is abdominal tenderness.      Comments: Two GELY drains. Dressings clean, dry and intact.   Genitourinary:     Comments: Stout.  Musculoskeletal:         General: Normal range of motion.      Cervical back: Normal range of motion and neck supple.      Right lower leg: No edema.      Left lower leg: No edema.   Skin:     General: Skin is warm and dry.   Neurological:      Mental Status: He is alert. Mental status is " at baseline.   Psychiatric:         Mood and Affect: Mood normal.         Behavior: Behavior normal.             Significant Labs: All pertinent labs within the past 24 hours have been reviewed.    Significant Imaging: I have reviewed all pertinent imaging results/findings within the past 24 hours.

## 2024-08-31 NOTE — ASSESSMENT & PLAN NOTE
-S/p sigmoid colectomy with anastomosis  -NPO  -IV fluids  -PRN analgesics and antiemetics  -Incentive spirometry  -Surgery following

## 2024-08-31 NOTE — ANESTHESIA POSTPROCEDURE EVALUATION
Anesthesia Post Evaluation    Patient: Willian Ron    Procedure(s) Performed: Procedure(s) (LRB):  LAPAROTOMY, EXPLORATORY (N/A)  COLON RESECTION (Bilateral)  INCISION AND DRAINAGE, ABSCESS (N/A)    Final Anesthesia Type: general      Patient location during evaluation: PACU  Patient participation: Yes- Able to Participate  Level of consciousness: awake and alert  Post-procedure vital signs: reviewed and stable  Pain management: adequate  Airway patency: patent    PONV status at discharge: No PONV  Anesthetic complications: no      Cardiovascular status: blood pressure returned to baseline  Respiratory status: unassisted  Hydration status: euvolemic  Follow-up not needed.              Vitals Value Taken Time   /89 08/31/24 0328   Temp 36.9 °C (98.5 °F) 08/31/24 0445   Pulse 125 08/31/24 0804   Resp 24 08/31/24 0804   SpO2 99 % 08/31/24 0804         Event Time   Out of Recovery 19:55:00         Pain/Lauren Score: Pain Rating Prior to Med Admin: 8 (8/31/2024  6:25 AM)  Pain Rating Post Med Admin: 2 (8/31/2024  2:19 AM)  Lauren Score: 10 (8/30/2024  7:45 PM)

## 2024-08-31 NOTE — PT/OT/SLP EVAL
Physical Therapy Evaluation    Patient Name:  Willian Ron   MRN:  [id number]    Recommendations:     Discharge Recommendations: Low Intensity Therapy   Discharge Equipment Recommendations: walker, rolling   Barriers to discharge: None    Assessment:     Willian Ron is a 34 y.o. male admitted with a medical diagnosis of Sepsis.  He presents with the following impairments/functional limitations: impaired balance, pain, impaired skin, impaired muscle length, gait instability, impaired functional mobility, impaired self care skills, impaired endurance, decreased lower extremity function, impaired cardiopulmonary response to activity, weakness; pt does not vocalize his responses, did not speak a word when asked about his condition or home situation; he just nods his head yes or no; his mother is answering for him a lot and is very kind & helpful.    Rehab Prognosis: Good; patient would benefit from acute skilled PT services to address these deficits and reach maximum level of function.    Recent Surgery: Procedure(s) (LRB):  LAPAROTOMY, EXPLORATORY (N/A)  COLON RESECTION (Bilateral)  INCISION AND DRAINAGE, ABSCESS (N/A) 1 Day Post-Op    Plan:     During this hospitalization, patient to be seen 5 x/week to address the identified rehab impairments via gait training, therapeutic activities, therapeutic exercises and progress toward the following goals:    Plan of Care Expires:  09/28/24    Subjective     Chief Complaint: abd pain he did not verbalized pain intensity but nodded yes when asked if his pain is at a 10/10  Patient/Family Comments/goals: did not voice any when asked; mother wants pt to walk and get out of bed  Pain/Comfort:  Pain Rating 1: 10/10  Location - Side 1: Bilateral  Location - Orientation 1: generalized  Location 1: abdomen  Pain Rating Post-Intervention 1: 10/10    Patients cultural, spiritual, Taoism conflicts given the current situation: no    Living Environment:  Lives with wife and 6  children; all info taken from mother as pt chose to not talk  Prior to admission, patients level of function was (I).  Equipment used at home: none.  DME owned (not currently used): none.  Upon discharge, patient will have assistance from family.    Objective:     Communicated with BETH Moss prior to session.  Patient found HOB elevated with GELY drain, peripheral IV, telemetry  upon PT entry to room.    General Precautions: Standard, fall  Orthopedic Precautions:N/A   Braces: N/A  Respiratory Status: Room air    Exams:  Gross Motor Coordination:  WFL  Postural Exam:  Patient presented with the following abnormalities:    -       Rounded shoulders  -       Kyphosis  RLE ROM: WFL  RLE Strength: WFL  LLE ROM: WFL  LLE Strength: WFL    Functional Mobility:  Bed Mobility:     Rolling Left:  stand by assistance  Rolling Right: stand by assistance  Supine to Sit: contact guard assistance  Sit to Supine: contact guard assistance  Transfers:     Sit to Stand:  contact guard assistance with rolling walker  Gait: 160ft with a RW, CGA, slow paced, steady, gait belt put above abd due to pain  Balance: Sitting: G/G; Standing: G/G      AM-PAC 6 CLICK MOBILITY  Total Score:22       Treatment & Education:  Functional mobility training as above; pt educated on safety precautions and mobility techniques, as well as the role and benefits of PT and mobilization.    Patient left HOB elevated with all lines intact, call button in reach, bed alarm on, RN notified, and mother present.    GOALS:   Multidisciplinary Problems       Physical Therapy Goals          Problem: Physical Therapy    Goal Priority Disciplines Outcome Goal Variances Interventions   Physical Therapy Goal     PT, PT/OT Progressing     Description: Goals to be met by: 24     Patient will increase functional independence with mobility by performin. Supine to sit with [place]  2. Sit to stand transfer with [place]  3. Bed to chair transfer with  Modified [place] using Rolling Walker  4. Gait  x 500 feet with Modified [place] using Rolling Walker.   5. Lower extremity exercise program x30 reps per handout, with independence                         History:     Past Medical History:   Diagnosis Date    Nausea & vomiting 8/16/2024       History reviewed. No pertinent surgical history.    Time Tracking:     PT Received On: 08/31/24  PT Start Time: 1435     PT Stop Time: 1518  PT Total Time (min): 43 min     Billable Minutes: Evaluation 10, Gait Training 15, and Therapeutic Activity 17      08/31/2024

## 2024-08-31 NOTE — PLAN OF CARE
Problem: Sepsis/Septic Shock  Goal: Optimal Coping  Outcome: Progressing  Goal: Absence of Bleeding  Outcome: Progressing  Goal: Blood Glucose Level Within Targeted Range  Outcome: Progressing  Goal: Absence of Infection Signs and Symptoms  Outcome: Progressing  Goal: Optimal Nutrition Intake  Outcome: Progressing     Problem: Adult Inpatient Plan of Care  Goal: Plan of Care Review  Outcome: Progressing  Goal: Patient-Specific Goal (Individualized)  Outcome: Progressing  Goal: Absence of Hospital-Acquired Illness or Injury  Outcome: Progressing  Goal: Optimal Comfort and Wellbeing  Outcome: Progressing  Goal: Readiness for Transition of Care  Outcome: Progressing     Problem: Wound  Goal: Optimal Coping  Outcome: Progressing  Goal: Optimal Functional Ability  Outcome: Progressing  Goal: Absence of Infection Signs and Symptoms  Outcome: Progressing  Goal: Improved Oral Intake  Outcome: Progressing  Goal: Optimal Pain Control and Function  Outcome: Progressing  Goal: Skin Health and Integrity  Outcome: Progressing  Goal: Optimal Wound Healing  Outcome: Progressing     Problem: Infection  Goal: Absence of Infection Signs and Symptoms  Outcome: Progressing     Problem: Pain Acute  Goal: Optimal Pain Control and Function  Outcome: Progressing     Problem: Fall Injury Risk  Goal: Absence of Fall and Fall-Related Injury  Outcome: Progressing

## 2024-08-31 NOTE — ASSESSMENT & PLAN NOTE
S/p ileocolic colectomy with anastomosis.  -IV fluids  -Zosyn  -Follow up blood cultures  -NPO  -PRN analgesics and antiemetics  -Surgery following  -Incentive spirometry

## 2024-08-31 NOTE — ASSESSMENT & PLAN NOTE
Body mass index is 36.44 kg/m². Morbid obesity complicates all aspects of disease management from diagnostic modalities to treatment.

## 2024-08-31 NOTE — PROGRESS NOTES
"[facility] Medicine  Progress Note    Patient Name: Willian Ron  MRN: [id number]  Patient Class: IP- Inpatient   Admission Date: 8/30/2024  Length of Stay: 1 days  Attending Physician: Jesse Ross MD  Primary Care Provider: Jennifer, Primary Doctor        Subjective:     Principal Problem:Sepsis        HPI:  Willian Ron is a 34 year old male with a past medical history of acute perforated appendicitis with abscess s/p IR drainage (E coli and Prevotella) on Augmentin and Levaquin given persistent abscesses who presents with worsening abdominal pain. CT in the ED shows acute appendicitis with abscess. Surgery was consulted from the ED for appendectomy. He received IV fluids, meropenem, hydromorphone, Protonix and Zofran in the ED. Hospital Medicine was consulted for admission.    Overview/Hospital Course:  Willian Ron is a 34 year old male with a past medical history of acute perforated appendicitis with abscess s/p IR drainage (E coli and Prevotella) on Augmentin and Levaquin who presented with persistent acute appendicitis with abscess. He is on Zosyn and IV fluids. Dr. Harris of Surgery took the patient to the OR for exploratory laparotomy where the patient underwent drainage of the abdominal abscess with right hemicolectomy with ileocolic anastomosis as well as rectosigmoid resection with colorectal anastomosis with splenic flexure mobilization. Cultures are pending. He remains NPO.    Interval History: see "Hospital Course"    Review of Systems   Gastrointestinal:  Positive for abdominal pain.     Objective:     Vital Signs (Most Recent):  Temp: 98.5 °F (36.9 °C) (08/31/24 0445)  Pulse: (!) 125 (08/31/24 0804)  Resp: (!) 24 (08/31/24 0804)  BP: (!) 154/89 (08/31/24 0328)  SpO2: 99 % (08/31/24 0804) Vital Signs (24h Range):  Temp:  [97.4 °F (36.3 °C)-99 °F (37.2 °C)] 98.5 °F (36.9 °C)  Pulse:  [] 125  Resp:  [11-24] 24  SpO2:  [95 %-100 %] 99 %  BP: (126-154)/(76-89) " 154/89     Weight: 118.5 kg (261 lb 3.9 oz)  Body mass index is 36.44 kg/m².    Intake/Output Summary (Last 24 hours) at 8/31/2024 0833  Last data filed at 8/31/2024 0728  Gross per 24 hour   Intake 2900 ml   Output 2520 ml   Net 380 ml         Physical Exam  Vitals and nursing note reviewed.   Constitutional:       General: He is not in acute distress.     Appearance: He is obese. He is ill-appearing.   HENT:      Head: Normocephalic and atraumatic.      Right Ear: External ear normal.      Left Ear: External ear normal.      Nose: Nose normal.      Mouth/Throat:      Mouth: Mucous membranes are moist.      Pharynx: Oropharynx is clear.   Eyes:      Extraocular Movements: Extraocular movements intact.      Conjunctiva/sclera: Conjunctivae normal.   Cardiovascular:      Rate and Rhythm: Regular rhythm. Tachycardia present.      Pulses: Normal pulses.      Heart sounds: Normal heart sounds.   Pulmonary:      Effort: Pulmonary effort is normal.      Breath sounds: Normal breath sounds.   Abdominal:      Tenderness: There is abdominal tenderness.      Comments: Two GELY drains. Dressings clean, dry and intact.   Genitourinary:     Comments: Stout.  Musculoskeletal:         General: Normal range of motion.      Cervical back: Normal range of motion and neck supple.      Right lower leg: No edema.      Left lower leg: No edema.   Skin:     General: Skin is warm and dry.   Neurological:      Mental Status: He is alert. Mental status is at baseline.   Psychiatric:         Mood and Affect: Mood normal.         Behavior: Behavior normal.             Significant Labs: All pertinent labs within the past 24 hours have been reviewed.    Significant Imaging: I have reviewed all pertinent imaging results/findings within the past 24 hours.    Assessment/Plan:      * Sepsis  In setting of appendicitis with abscess as well as sigmoid colon perforation.  -Zosyn empirically  -Follow up operative cultures  -S/p ex lap with abscess  drainage and sigmoid and ileocolic colectomies with anastomoses  -Follow up blood cultures    Perforation of sigmoid colon  -S/p sigmoid colectomy with anastomosis  -NPO  -IV fluids  -PRN analgesics and antiemetics  -Incentive spirometry  -Surgery following      Abdominal visceral abscess  -S/p surgical drainage  -Follow up cultures  -Continue Zosyn  -Surgery following      Acute appendicitis  S/p ileocolic colectomy with anastomosis.  -IV fluids  -Zosyn  -Follow up blood cultures  -NPO  -PRN analgesics and antiemetics  -Surgery following  -Incentive spirometry    Obesity (BMI 30-39.9)  Body mass index is 36.44 kg/m². Morbid obesity complicates all aspects of disease management from diagnostic modalities to treatment.         Tobacco dependency  -Nicotine patch  -Patient to be counseled on cessation      VTE Risk Mitigation (From admission, onward)           Ordered     enoxaparin injection 40 mg  Daily         08/30/24 1316     IP VTE HIGH RISK PATIENT  Once         08/30/24 1316     Place sequential compression device  Until discontinued         08/30/24 1316                    Discharge Planning   JANN:      Code Status: Full Code   Is the patient medically ready for discharge?:     Reason for patient still in hospital (select all that apply): Patient trending condition, Laboratory test, Treatment, and Consult recommendations                     Jesse Ross MD  Department of Hospital Medicine   [facility]/Surg

## 2024-08-31 NOTE — NURSING
Nurses Note -- 4 Eyes      8/31/2024   2:25 AM      Skin assessed during: Admit      [x] No Altered Skin Integrity Present    []Prevention Measures Documented      [] Yes- Altered Skin Integrity Present or Discovered   [] LDA Added if Not in Epic (Describe Wound)   [] New Altered Skin Integrity was Present on Admit and Documented in LDA   [] Wound Image Taken    Wound Care Consulted? No    Attending Nurse:  Zoraida Rivera RN/Staff Member:  tr

## 2024-08-31 NOTE — ASSESSMENT & PLAN NOTE
In setting of appendicitis with abscess as well as sigmoid colon perforation.  -Zosyn empirically  -Follow up operative cultures  -S/p ex lap with abscess drainage and sigmoid and ileocolic colectomies with anastomoses  -Follow up blood cultures

## 2024-08-31 NOTE — PLAN OF CARE
Problem: Physical Therapy  Goal: Physical Therapy Goal  Description: Goals to be met by: 24     Patient will increase functional independence with mobility by performin. Supine to sit with [place]  2. Sit to stand transfer with [place]  3. Bed to chair transfer with Modified [place] using Rolling Walker  4. Gait  x 500 feet with Modified [place] using Rolling Walker.   5. Lower extremity exercise program x30 reps per handout, with independence    Outcome: Progressing   PT for functional mob training and/ or ex; pt educated on safety precautions

## 2024-08-31 NOTE — PROGRESS NOTES
POD 1 s/p ex lap with Bowl resction x2 for perforated appendicitis  Notes he is sore.  Denies n/v.  No fever/chills  No flatus or BM    Wt Readings from Last 3 Encounters:   08/30/24 118.5 kg (261 lb 3.9 oz)   08/22/24 120.7 kg (266 lb 1.5 oz)   01/07/20 108.9 kg (240 lb)     Temp Readings from Last 3 Encounters:   08/31/24 97.6 °F (36.4 °C) (Oral)   08/27/24 98.6 °F (37 °C) (Oral)   01/07/20 98.8 °F (37.1 °C) (Oral)     BP Readings from Last 3 Encounters:   08/31/24 (!) 142/83   08/27/24 (!) 116/57   01/07/20 (!) 146/83     Pulse Readings from Last 3 Encounters:   08/31/24 (!) 132   08/27/24 78   01/07/20 82     AAox3  Sinus  Soft/nd/appt ttp  GELY serosang    Lab Results   Component Value Date    WBC 25.54 (H) 08/31/2024    HGB 13.5 (L) 08/31/2024    HCT 41.5 08/31/2024    MCV 87 08/31/2024     (H) 08/31/2024       BMP  Lab Results   Component Value Date     (L) 08/31/2024    K 4.8 08/31/2024    CL 99 08/31/2024    CO2 22 (L) 08/31/2024    BUN 16 08/31/2024    CREATININE 1.1 08/31/2024    CALCIUM 9.2 08/31/2024    ANIONGAP 12 08/31/2024    EGFRNORACEVR >60 08/31/2024     A/P: s/p ex lap with bowel resection  Sips of clears  Aggressive IS  ambulate

## 2024-08-31 NOTE — PLAN OF CARE
Pt released per anesthesia. Pt A&Ox3. No nausea or vomiting. Pain controlled. All belongings to Room 215

## 2024-08-31 NOTE — PLAN OF CARE
[facility] - Med/Surg  Initial Discharge Assessment       Primary Care Provider: Jennifer, Primary Doctor    Admission Diagnosis: Sepsis [A41.9]  Intra-abdominal abscess [K65.1]    Admission Date: 8/30/2024  Expected Discharge Date:     Transition of Care Barriers: None    Payor: MEDICAID / Plan: HEALTHY BLUE [place] / Product Type: Managed Medicaid /     Extended Emergency Contact Information  Primary Emergency Contact: Stephanie Ron   [place]  Mobile Phone: [phone]  Relation: Mother  Secondary Emergency Contact: Hilda Calero   [place]  Mobile Phone: [phone]  Relation: Sister    Discharge Plan A: Home  Discharge Plan B: Home    Patient and mother in room. Patient philip, mother provided most information. Patient lives with SO and dependent children, reports independence and plans to return home at [place]. CM to follow for dc planning needs.       [facility] #[id number] [address] AT [address] [address]  Phone: [phone] Fax: [phone]    [facility] #[id number] [place], [place] - [address] [facility] AT [facility] & [address] [address]  Phone: [phone] Fax: [phone]      Initial Assessment (most recent)       Adult Discharge Assessment - 08/31/24 1436          Discharge Assessment    Assessment Type Discharge Planning Assessment     Confirmed/corrected address, phone number and insurance Yes     Confirmed Demographics Correct on Facesheet     Source of Information patient;family     Does patient/caregiver understand observation status No     Was observation education provided? No     Communicated JANN with patient/caregiver Date not available/Unable to determine     Reason For Admission sepsis     People in Home child(shivam), dependent;significant other     Do you expect to return to your current living situation?  Yes     Do you have help at home or someone to help you manage your care at home? Yes     Prior to hospitilization cognitive status: Alert/Oriented     Current cognitive status: Alert/Oriented     Walking or Climbing Stairs Difficulty no     Dressing/Bathing Difficulty no     Readmission within 30 days? No     Patient currently being followed by outpatient case management? No     Do you currently have service(s) that help you manage your care at home? No     Do you take prescription medications? Yes     Do you have prescription coverage? Yes     Do you have any problems affording any of your prescribed medications? No     Is the patient taking medications as prescribed? yes     Are you on dialysis? No     Do you take coumadin? No     Discharge Plan A Home     Discharge Plan B Home     DME Needed Upon Discharge  none     Discharge Plan discussed with: Patient;Parent(s)     Name(s) and Number(s) Stephanie Ron [phone]     Transition of Care Barriers None

## 2024-08-31 NOTE — HOSPITAL COURSE
Willian Ron is a 34 year old male with a past medical history of acute perforated appendicitis with abscess s/p IR drainage (E coli and Prevotella) on Augmentin and Levaquin who presented with persistent acute appendicitis with abscess. He was placed on Zosyn and IV fluids. Dr. Harrsi of General Surgery took the patient to the OR for exploratory laparotomy 8/30 where the patient underwent drainage of the abdominal abscess with right hemicolectomy with ileocolic anastomosis as well as rectosigmoid resection with colorectal anastomosis with splenic flexure mobilization. Cultures showed anaerobes. He tolerated a PO diet after much difficulty during his course and was also on PPN which was discontinued on discharge. His course was complicated by a post-operative anemia for which Hgb was trended and Lovenox prophylaxis was briefly held and restarted 9/6. He was able to be discharged 9/8 with Augmentin to complete a two week course of antibiotics. He will follow up with Surgery. HH PT/OT was ordered on discharge.

## 2024-09-01 PROBLEM — D64.9 POSTOPERATIVE ANEMIA: Status: ACTIVE | Noted: 2024-09-01

## 2024-09-01 LAB
ALBUMIN SERPL BCP-MCNC: 2.5 G/DL (ref 3.5–5.2)
ALP SERPL-CCNC: 57 U/L (ref 55–135)
ALT SERPL W/O P-5'-P-CCNC: 29 U/L (ref 10–44)
ANION GAP SERPL CALC-SCNC: 12 MMOL/L (ref 8–16)
AST SERPL-CCNC: 18 U/L (ref 10–40)
BASOPHILS # BLD AUTO: 0.09 K/UL (ref 0–0.2)
BASOPHILS # BLD AUTO: ABNORMAL K/UL (ref 0–0.2)
BASOPHILS NFR BLD: 0 % (ref 0–1.9)
BASOPHILS NFR BLD: 0.4 % (ref 0–1.9)
BILIRUB SERPL-MCNC: 0.6 MG/DL (ref 0.1–1)
BUN SERPL-MCNC: 15 MG/DL (ref 6–20)
CALCIUM SERPL-MCNC: 8.6 MG/DL (ref 8.7–10.5)
CHLORIDE SERPL-SCNC: 98 MMOL/L (ref 95–110)
CO2 SERPL-SCNC: 27 MMOL/L (ref 23–29)
CREAT SERPL-MCNC: 1 MG/DL (ref 0.5–1.4)
DIFFERENTIAL METHOD BLD: ABNORMAL
DIFFERENTIAL METHOD BLD: ABNORMAL
EOSINOPHIL # BLD AUTO: 0.5 K/UL (ref 0–0.5)
EOSINOPHIL # BLD AUTO: ABNORMAL K/UL (ref 0–0.5)
EOSINOPHIL NFR BLD: 0 % (ref 0–8)
EOSINOPHIL NFR BLD: 2.1 % (ref 0–8)
ERYTHROCYTE [DISTWIDTH] IN BLOOD BY AUTOMATED COUNT: 13.2 % (ref 11.5–14.5)
ERYTHROCYTE [DISTWIDTH] IN BLOOD BY AUTOMATED COUNT: 13.2 % (ref 11.5–14.5)
EST. GFR  (NO RACE VARIABLE): >60 ML/MIN/1.73 M^2
GLUCOSE SERPL-MCNC: 122 MG/DL (ref 70–110)
HCT VFR BLD AUTO: 28.9 % (ref 40–54)
HCT VFR BLD AUTO: 31 % (ref 40–54)
HGB BLD-MCNC: 9.3 G/DL (ref 14–18)
HGB BLD-MCNC: 9.8 G/DL (ref 14–18)
IMM GRANULOCYTES # BLD AUTO: 0.26 K/UL (ref 0–0.04)
IMM GRANULOCYTES # BLD AUTO: ABNORMAL K/UL (ref 0–0.04)
IMM GRANULOCYTES NFR BLD AUTO: 1.1 % (ref 0–0.5)
IMM GRANULOCYTES NFR BLD AUTO: ABNORMAL % (ref 0–0.5)
LYMPHOCYTES # BLD AUTO: 2.6 K/UL (ref 1–4.8)
LYMPHOCYTES # BLD AUTO: ABNORMAL K/UL (ref 1–4.8)
LYMPHOCYTES NFR BLD: 11.5 % (ref 18–48)
LYMPHOCYTES NFR BLD: 14 % (ref 18–48)
MAGNESIUM SERPL-MCNC: 2.2 MG/DL (ref 1.6–2.6)
MCH RBC QN AUTO: 27.7 PG (ref 27–31)
MCH RBC QN AUTO: 28.2 PG (ref 27–31)
MCHC RBC AUTO-ENTMCNC: 31.6 G/DL (ref 32–36)
MCHC RBC AUTO-ENTMCNC: 32.2 G/DL (ref 32–36)
MCV RBC AUTO: 88 FL (ref 82–98)
MCV RBC AUTO: 88 FL (ref 82–98)
MONOCYTES # BLD AUTO: 3.2 K/UL (ref 0.3–1)
MONOCYTES # BLD AUTO: ABNORMAL K/UL (ref 0.3–1)
MONOCYTES NFR BLD: 14 % (ref 4–15)
MONOCYTES NFR BLD: 6 % (ref 4–15)
NEUTROPHILS # BLD AUTO: 16.2 K/UL (ref 1.8–7.7)
NEUTROPHILS NFR BLD: 70.9 % (ref 38–73)
NEUTROPHILS NFR BLD: 75 % (ref 38–73)
NEUTS BAND NFR BLD MANUAL: 5 %
NRBC BLD-RTO: 0 /100 WBC
NRBC BLD-RTO: 0 /100 WBC
OVALOCYTES BLD QL SMEAR: ABNORMAL
PHOSPHATE SERPL-MCNC: 1.8 MG/DL (ref 2.7–4.5)
PLATELET # BLD AUTO: 401 K/UL (ref 150–450)
PLATELET # BLD AUTO: 420 K/UL (ref 150–450)
PLATELET BLD QL SMEAR: ABNORMAL
PMV BLD AUTO: 10.1 FL (ref 9.2–12.9)
PMV BLD AUTO: 11 FL (ref 9.2–12.9)
POIKILOCYTOSIS BLD QL SMEAR: SLIGHT
POTASSIUM SERPL-SCNC: 3.9 MMOL/L (ref 3.5–5.1)
PROT SERPL-MCNC: 6.1 G/DL (ref 6–8.4)
RBC # BLD AUTO: 3.3 M/UL (ref 4.6–6.2)
RBC # BLD AUTO: 3.54 M/UL (ref 4.6–6.2)
SODIUM SERPL-SCNC: 137 MMOL/L (ref 136–145)
WBC # BLD AUTO: 18.88 K/UL (ref 3.9–12.7)
WBC # BLD AUTO: 22.88 K/UL (ref 3.9–12.7)

## 2024-09-01 PROCEDURE — 63600175 PHARM REV CODE 636 W HCPCS: Performed by: SURGERY

## 2024-09-01 PROCEDURE — 25000003 PHARM REV CODE 250: Performed by: STUDENT IN AN ORGANIZED HEALTH CARE EDUCATION/TRAINING PROGRAM

## 2024-09-01 PROCEDURE — 85025 COMPLETE CBC W/AUTO DIFF WBC: CPT | Performed by: STUDENT IN AN ORGANIZED HEALTH CARE EDUCATION/TRAINING PROGRAM

## 2024-09-01 PROCEDURE — 85007 BL SMEAR W/DIFF WBC COUNT: CPT | Performed by: SURGERY

## 2024-09-01 PROCEDURE — 84100 ASSAY OF PHOSPHORUS: CPT | Performed by: SURGERY

## 2024-09-01 PROCEDURE — 36415 COLL VENOUS BLD VENIPUNCTURE: CPT | Performed by: SURGERY

## 2024-09-01 PROCEDURE — 11000001 HC ACUTE MED/SURG PRIVATE ROOM

## 2024-09-01 PROCEDURE — 21400001 HC TELEMETRY ROOM

## 2024-09-01 PROCEDURE — 85027 COMPLETE CBC AUTOMATED: CPT | Performed by: SURGERY

## 2024-09-01 PROCEDURE — 83735 ASSAY OF MAGNESIUM: CPT | Performed by: SURGERY

## 2024-09-01 PROCEDURE — 36415 COLL VENOUS BLD VENIPUNCTURE: CPT | Performed by: STUDENT IN AN ORGANIZED HEALTH CARE EDUCATION/TRAINING PROGRAM

## 2024-09-01 PROCEDURE — 63600175 PHARM REV CODE 636 W HCPCS: Performed by: STUDENT IN AN ORGANIZED HEALTH CARE EDUCATION/TRAINING PROGRAM

## 2024-09-01 PROCEDURE — 25000003 PHARM REV CODE 250: Performed by: SURGERY

## 2024-09-01 PROCEDURE — 94799 UNLISTED PULMONARY SVC/PX: CPT

## 2024-09-01 PROCEDURE — 80053 COMPREHEN METABOLIC PANEL: CPT | Performed by: SURGERY

## 2024-09-01 PROCEDURE — 97535 SELF CARE MNGMENT TRAINING: CPT

## 2024-09-01 PROCEDURE — 97165 OT EVAL LOW COMPLEX 30 MIN: CPT

## 2024-09-01 PROCEDURE — 25000003 PHARM REV CODE 250

## 2024-09-01 PROCEDURE — 94761 N-INVAS EAR/PLS OXIMETRY MLT: CPT

## 2024-09-01 RX ORDER — POTASSIUM CHLORIDE 7.45 MG/ML
80 INJECTION INTRAVENOUS
Status: DISCONTINUED | OUTPATIENT
Start: 2024-09-01 | End: 2024-09-02

## 2024-09-01 RX ORDER — MAGNESIUM SULFATE HEPTAHYDRATE 40 MG/ML
2 INJECTION, SOLUTION INTRAVENOUS
Status: DISCONTINUED | OUTPATIENT
Start: 2024-09-01 | End: 2024-09-02

## 2024-09-01 RX ORDER — MAGNESIUM SULFATE HEPTAHYDRATE 40 MG/ML
4 INJECTION, SOLUTION INTRAVENOUS
Status: DISCONTINUED | OUTPATIENT
Start: 2024-09-01 | End: 2024-09-02

## 2024-09-01 RX ADMIN — HYDROMORPHONE HYDROCHLORIDE 1.5 MG: 2 INJECTION INTRAMUSCULAR; INTRAVENOUS; SUBCUTANEOUS at 05:09

## 2024-09-01 RX ADMIN — KETOROLAC TROMETHAMINE 15 MG: 30 INJECTION, SOLUTION INTRAMUSCULAR at 03:09

## 2024-09-01 RX ADMIN — BACLOFEN 5 MG: 5 TABLET ORAL at 03:09

## 2024-09-01 RX ADMIN — BISACODYL 5 MG: 5 TABLET, COATED ORAL at 09:09

## 2024-09-01 RX ADMIN — MUPIROCIN 1 G: 20 OINTMENT TOPICAL at 08:09

## 2024-09-01 RX ADMIN — HYDROMORPHONE HYDROCHLORIDE 1.5 MG: 2 INJECTION INTRAMUSCULAR; INTRAVENOUS; SUBCUTANEOUS at 12:09

## 2024-09-01 RX ADMIN — SODIUM CHLORIDE, SODIUM LACTATE, POTASSIUM CHLORIDE, CALCIUM CHLORIDE AND DEXTROSE MONOHYDRATE: 5; 600; 310; 30; 20 INJECTION, SOLUTION INTRAVENOUS at 09:09

## 2024-09-01 RX ADMIN — GABAPENTIN 200 MG: 100 CAPSULE ORAL at 09:09

## 2024-09-01 RX ADMIN — PIPERACILLIN SODIUM AND TAZOBACTAM SODIUM 4.5 G: 4; .5 INJECTION, POWDER, FOR SOLUTION INTRAVENOUS at 12:09

## 2024-09-01 RX ADMIN — PIPERACILLIN SODIUM AND TAZOBACTAM SODIUM 4.5 G: 4; .5 INJECTION, POWDER, FOR SOLUTION INTRAVENOUS at 04:09

## 2024-09-01 RX ADMIN — BACLOFEN 5 MG: 5 TABLET ORAL at 08:09

## 2024-09-01 RX ADMIN — ALUMINUM HYDROXIDE, MAGNESIUM HYDROXIDE, AND SIMETHICONE 30 ML: 1200; 120; 1200 SUSPENSION ORAL at 09:09

## 2024-09-01 RX ADMIN — GABAPENTIN 200 MG: 100 CAPSULE ORAL at 08:09

## 2024-09-01 RX ADMIN — SODIUM PHOSPHATE, MONOBASIC, MONOHYDRATE AND SODIUM PHOSPHATE, DIBASIC, ANHYDROUS 20.01 MMOL: 142; 276 INJECTION, SOLUTION INTRAVENOUS at 06:09

## 2024-09-01 RX ADMIN — SODIUM CHLORIDE, SODIUM LACTATE, POTASSIUM CHLORIDE, CALCIUM CHLORIDE AND DEXTROSE MONOHYDRATE: 5; 600; 310; 30; 20 INJECTION, SOLUTION INTRAVENOUS at 04:09

## 2024-09-01 RX ADMIN — PIPERACILLIN SODIUM AND TAZOBACTAM SODIUM 4.5 G: 4; .5 INJECTION, POWDER, FOR SOLUTION INTRAVENOUS at 09:09

## 2024-09-01 RX ADMIN — MUPIROCIN 1 G: 20 OINTMENT TOPICAL at 09:09

## 2024-09-01 RX ADMIN — HYDROMORPHONE HYDROCHLORIDE 1.5 MG: 2 INJECTION INTRAMUSCULAR; INTRAVENOUS; SUBCUTANEOUS at 06:09

## 2024-09-01 RX ADMIN — KETOROLAC TROMETHAMINE 15 MG: 30 INJECTION, SOLUTION INTRAMUSCULAR at 09:09

## 2024-09-01 RX ADMIN — KETOROLAC TROMETHAMINE 15 MG: 30 INJECTION, SOLUTION INTRAMUSCULAR at 08:09

## 2024-09-01 RX ADMIN — KETOROLAC TROMETHAMINE 15 MG: 30 INJECTION, SOLUTION INTRAMUSCULAR at 01:09

## 2024-09-01 RX ADMIN — BACLOFEN 5 MG: 5 TABLET ORAL at 09:09

## 2024-09-01 NOTE — PROGRESS NOTES
"[facility]  Progress Note    Patient Name: Willian Ron  MRN: [id number]  Patient Class: IP- Inpatient   Admission Date: 8/30/2024  Length of Stay: 2 days  Attending Physician: Jesse Ross MD  Primary Care Provider: Jennifer, Primary Doctor        Subjective:     Principal Problem:Sepsis        HPI:  Willian Ron is a 34 year old male with a past medical history of acute perforated appendicitis with abscess s/p IR drainage (E coli and Prevotella) on Augmentin and Levaquin given persistent abscesses who presents with worsening abdominal pain. CT in the ED shows acute appendicitis with abscess. Surgery was consulted from the ED for appendectomy. He received IV fluids, meropenem, hydromorphone, Protonix and Zofran in the ED. Hospital Medicine was consulted for admission.    Overview/Hospital Course:  Willian Ron is a 34 year old male with a past medical history of acute perforated appendicitis with abscess s/p IR drainage (E coli and Prevotella) on Augmentin and Levaquin who presented with persistent acute appendicitis with abscess. He is on Zosyn and IV fluids. Dr. Harris of Surgery took the patient to the OR for exploratory laparotomy 8/30 where the patient underwent drainage of the abdominal abscess with right hemicolectomy with ileocolic anastomosis as well as rectosigmoid resection with colorectal anastomosis with splenic flexure mobilization. Cultures are pending. He remains NPO. His course has been complicated by a post-operative anemia for which Hgb is being trended and Lovenox prophylaxis has been discontinued. PT/OT recommends low intensity therapy.    Interval History: see "Hospital Course"    Review of Systems   Gastrointestinal:  Positive for abdominal pain.     Objective:     Vital Signs (Most Recent):  Temp: 98.5 °F (36.9 °C) (09/01/24 0843)  Pulse: (!) 125 (09/01/24 0843)  Resp: (!) 22 (09/01/24 0741)  BP: 131/78 (09/01/24 0843)  SpO2: 98 % (09/01/24 0843) " Vital Signs (24h Range):  Temp:  [97.4 °F (36.3 °C)-98.8 °F (37.1 °C)] 98.5 °F (36.9 °C)  Pulse:  [111-128] 125  Resp:  [17-22] 22  SpO2:  [93 %-98 %] 98 %  BP: (131-167)/(78-88) 131/78     Weight: 118.5 kg (261 lb 3.9 oz)  Body mass index is 36.44 kg/m².    Intake/Output Summary (Last 24 hours) at 9/1/2024 0933  Last data filed at 9/1/2024 0635  Gross per 24 hour   Intake 1766.82 ml   Output 1525 ml   Net 241.82 ml         Physical Exam  Vitals and nursing note reviewed.   Constitutional:       General: He is not in acute distress.     Appearance: He is obese. He is ill-appearing.   HENT:      Head: Normocephalic and atraumatic.      Right Ear: External ear normal.      Left Ear: External ear normal.      Nose: Nose normal.      Mouth/Throat:      Mouth: Mucous membranes are moist.      Pharynx: Oropharynx is clear.   Eyes:      Extraocular Movements: Extraocular movements intact.      Conjunctiva/sclera: Conjunctivae normal.   Cardiovascular:      Rate and Rhythm: Regular rhythm. Tachycardia present.      Pulses: Normal pulses.      Heart sounds: Normal heart sounds.   Pulmonary:      Effort: Pulmonary effort is normal.      Breath sounds: Normal breath sounds.   Abdominal:      Tenderness: There is abdominal tenderness.      Comments: Two GELY drains. Dressings clean, dry and intact.   Musculoskeletal:         General: Normal range of motion.      Cervical back: Normal range of motion and neck supple.      Right lower leg: No edema.      Left lower leg: No edema.   Skin:     General: Skin is warm and dry.   Neurological:      Mental Status: He is alert. Mental status is at baseline.   Psychiatric:         Mood and Affect: Mood normal.         Behavior: Behavior normal.             Significant Labs: All pertinent labs within the past 24 hours have been reviewed.    Significant Imaging: I have reviewed all pertinent imaging results/findings within the past 24 hours.    Assessment/Plan:      * Sepsis  In setting of  appendicitis with abscess as well as sigmoid colon perforation.  -Zosyn empirically  -Follow up operative cultures  -S/p ex lap with abscess drainage and sigmoid and ileocolic colectomies with anastomoses  -Follow up blood cultures    Postoperative anemia  -Hold Lovenox  -Trend Hgb with CBC  -Low threshold to order CTA abdomen and pelvis    Perforation of sigmoid colon  -S/p sigmoid colectomy with anastomosis  -NPO  -IV fluids  -PRN analgesics and antiemetics  -Incentive spirometry  -Surgery following      Abdominal visceral abscess  -S/p surgical drainage  -Follow up cultures  -Continue Zosyn  -Surgery following      Acute appendicitis  S/p ileocolic colectomy with anastomosis.  -IV fluids  -Zosyn  -Follow up blood cultures  -NPO  -PRN analgesics and antiemetics  -Surgery following  -Incentive spirometry    Obesity (BMI 30-39.9)  Body mass index is 36.44 kg/m². Morbid obesity complicates all aspects of disease management from diagnostic modalities to treatment.         Tobacco dependency  -Nicotine patch  -Patient to be counseled on cessation      VTE Risk Mitigation (From admission, onward)           Ordered     IP VTE HIGH RISK PATIENT  Once         08/30/24 1316     Place sequential compression device  Until discontinued         08/30/24 1316                    Discharge Planning   JANN:      Code Status: Full Code   Is the patient medically ready for discharge?:     Reason for patient still in hospital (select all that apply): Patient new problem, Patient trending condition, Laboratory test, Treatment, and Consult recommendations  Discharge Plan A: Home                  Jesse Ross MD  Department of Hospital Medicine   [facility]/Surg

## 2024-09-01 NOTE — NURSING
Notified that pt was vomiting, emesis x1 noted, 200cc light green emesis noted, pt c/o nausea, PRN Zofran given as rx'd, col compresses given, message sent to Dr Vandana MD aware, stated to keep pt NPO, and continue to monitor, care ongoing     @430pm, pt reports that nausea has subsided, pain is tolerable, safety maintained, care ongoing

## 2024-09-01 NOTE — NURSING
Message sent to dr Ross, regarding getting PT/OT on board to help pt with getting up and moving around being that this is his 1st major sx, new orders noted for PT/OT pt and mom @bedside made aware

## 2024-09-01 NOTE — CARE UPDATE
08/31/24 1957   Patient Assessment/Suction   Level of Consciousness (AVPU) alert   Respiratory Effort Normal;Unlabored   Expansion/Accessory Muscles/Retractions expansion symmetric;no retractions;no use of accessory muscles   All Lung Fields Breath Sounds Anterior:;Lateral:;equal bilaterally   VAISHALI Breath Sounds clear   Rhythm/Pattern, Respiratory no shortness of breath reported;depth regular;pattern regular;unlabored   PRE-TX-O2   Device (Oxygen Therapy) room air   SpO2 97 %   Pulse Oximetry Type Intermittent   $ Pulse Oximetry - Multiple Charge Pulse Oximetry - Multiple   Pulse (!) 115   Resp 18   Positioning HOB elevated 45 degrees   Incentive Spirometer   $ Incentive Spirometer Charges done with encouragement   Incentive Spirometer Predicted Level (mL) 2400   Administration (IS) proper technique demonstrated   Number of Repetitions (IS) 5   Level Incentive Spirometer (mL) 750   Patient Tolerance (IS) fair

## 2024-09-01 NOTE — CARE UPDATE
09/01/24 0741   Patient Assessment/Suction   Level of Consciousness (AVPU) alert   Respiratory Effort Normal;Unlabored   Expansion/Accessory Muscles/Retractions no use of accessory muscles   Rhythm/Pattern, Respiratory shortness of breath   PRE-TX-O2   Device (Oxygen Therapy) room air   SpO2 97 %   Pulse Oximetry Type Intermittent   $ Pulse Oximetry - Multiple Charge Pulse Oximetry - Multiple   Pulse (!) 124   Resp (!) 22   Incentive Spirometer   $ Incentive Spirometer Charges done with encouragement   Administration (IS) instruction provided, follow-up   Number of Repetitions (IS) 10   Level Incentive Spirometer (mL) 750   Patient Tolerance (IS) poor

## 2024-09-01 NOTE — NURSING
Pt and mom @bedside asked if drg needs to be over open spot of incision, appears that steri strip was removed by Dr Harris, message sent to Dr Harris regarding if drg was needed over area, MD stated to leave open to air, pt and mom made aware, drgs around marlon drains replaced with gaze and paper tape, scant bloody drainage noted on old drg, care ongoing

## 2024-09-01 NOTE — SUBJECTIVE & OBJECTIVE
"Interval History: see "Hospital Course"    Review of Systems   Gastrointestinal:  Positive for abdominal pain.     Objective:     Vital Signs (Most Recent):  Temp: 98.5 °F (36.9 °C) (09/01/24 0843)  Pulse: (!) 125 (09/01/24 0843)  Resp: (!) 22 (09/01/24 0741)  BP: 131/78 (09/01/24 0843)  SpO2: 98 % (09/01/24 0843) Vital Signs (24h Range):  Temp:  [97.4 °F (36.3 °C)-98.8 °F (37.1 °C)] 98.5 °F (36.9 °C)  Pulse:  [111-128] 125  Resp:  [17-22] 22  SpO2:  [93 %-98 %] 98 %  BP: (131-167)/(78-88) 131/78     Weight: 118.5 kg (261 lb 3.9 oz)  Body mass index is 36.44 kg/m².    Intake/Output Summary (Last 24 hours) at 9/1/2024 0933  Last data filed at 9/1/2024 0635  Gross per 24 hour   Intake 1766.82 ml   Output 1525 ml   Net 241.82 ml         Physical Exam  Vitals and nursing note reviewed.   Constitutional:       General: He is not in acute distress.     Appearance: He is obese. He is ill-appearing.   HENT:      Head: Normocephalic and atraumatic.      Right Ear: External ear normal.      Left Ear: External ear normal.      Nose: Nose normal.      Mouth/Throat:      Mouth: Mucous membranes are moist.      Pharynx: Oropharynx is clear.   Eyes:      Extraocular Movements: Extraocular movements intact.      Conjunctiva/sclera: Conjunctivae normal.   Cardiovascular:      Rate and Rhythm: Regular rhythm. Tachycardia present.      Pulses: Normal pulses.      Heart sounds: Normal heart sounds.   Pulmonary:      Effort: Pulmonary effort is normal.      Breath sounds: Normal breath sounds.   Abdominal:      Tenderness: There is abdominal tenderness.      Comments: Two GELY drains. Dressings clean, dry and intact.   Musculoskeletal:         General: Normal range of motion.      Cervical back: Normal range of motion and neck supple.      Right lower leg: No edema.      Left lower leg: No edema.   Skin:     General: Skin is warm and dry.   Neurological:      Mental Status: He is alert. Mental status is at baseline.   Psychiatric:         " Mood and Affect: Mood normal.         Behavior: Behavior normal.             Significant Labs: All pertinent labs within the past 24 hours have been reviewed.    Significant Imaging: I have reviewed all pertinent imaging results/findings within the past 24 hours.

## 2024-09-01 NOTE — NURSING
Tele tech reported that pt heart rate has been consistently in 130s since 7am, message sent to Dr Vandana Md stated to get EKG, new ordered note, pt and mom @bedside notified, care ongoing

## 2024-09-01 NOTE — PT/OT/SLP EVAL
Occupational Therapy Evaluation and Treatment    Name: Willian Ron  MRN: [id number]  Admitting Diagnosis: Sepsis  Recent Surgery: Procedure(s) (LRB):  LAPAROTOMY, EXPLORATORY (N/A)  COLON RESECTION (Bilateral)  INCISION AND DRAINAGE, ABSCESS (N/A) 2 Days Post-Op    Recommendations:     Discharge Recommendations: Low Intensity Therapy  Discharge Equipment Recommendations: none  Barriers to discharge: None    Assessment:     Willian Ron is a 34 y.o. male with a medical diagnosis of Sepsis. Patient agreed to participate in OT. Pt did not vocalize regarding his PLOF, however he nodded yes/no. Pt's mother answered questions for him. Pt required CGA with sit <> stand transfer and toilet transfer. He required supervision with toileting. Pt requested to complete grooming at bed level due to 9/10 abdomen pain. Nurse notified. Pt presents with impaired self care skills, impaired functional mobility and pain. These performance deficits have resulted in activity limitations including bed mobility, transfers, transitional movement patterns (bending, reaching), upper body dressing, lower body dressing, toileting, bathing and carrying objects. Patient's role as independent adult has been significantly affected. Patient would benefit from further inpatient therapy services to improve his independence with ADLs and functional mobility to maximize return to prior level of function.    Rehab Prognosis: Good; patient would benefit from acute OT services to address these deficits and reach maximum level of function.    Plan:     Patient to be seen 5 x/week to address the above listed problems via self-care/home management, therapeutic activities, therapeutic exercises  Plan of Care Expires: 09/15/24  Plan of Care Reviewed with: patient    Subjective     Chief Complaint: Abdomen pain  Patient Comments/Goals: Pain relief  Pain/Comfort:  Pain Rating 1: 9/10  Location 1: abdomen  Pain Addressed 1: Nurse notified    Patients  cultural, spiritual, Holiness conflicts given the current situation: yes    Social History:  Living Environment: Patient lives with his family.  Prior Level of Function: Prior to admission, patient was independent with ADLs.  Roles and Routines: Patient was driving prior to admission.  Equipment Used at Home: none  DME owned (not currently used): none  Assistance Upon Discharge: family    Objective:     Communicated with nurse prior to session. Patient found HOB elevated with peripheral IV upon OT entry to room.    General Precautions: Standard, fall   Orthopedic Precautions: N/A   Braces: N/A    Respiratory Status: Room air    Occupational Performance    Bed Mobility:   Rolling/Turning to Right with stand by assistance  Scooting to HOB in supine: stand by assistance  Scooting anteriorly to EOB to have both feet on floor: stand by assistance  Supine to sit with minimum assistance  Sit to Supine with minimum assistance    Functional Mobility/Transfers:  Sit <> Stand Transfer with contact guard assistance with no AD  Toilet Transfer Step Transfer technique with contact guard assistance with grab bars  Functional Mobility: bed <> bathroom with CGA with no AD per patient request    Activities of Daily Living:  Feeding: NPO  Grooming: set up assistance at bed level due to abdomen pain  Upper Body Dressing: maximal assistance  Lower Body Dressing: maximal assistance  Toileting: supervision    Cognitive/Visual Perceptual:  Cognitive/Psychosocial Skills: -     Oriented to: Person, Place, Time, Situation  -     Follows Commands/attention: Follows multistep commands  -     Communication: clear/fluent  -     Memory: No Deficits noted  -     Safety awareness/insight to disability: intact  -     Mood/Affect/Coping skills/emotional control: Cooperative and Pleasant    Physical Exam:  Balance: -     Sitting: modified independence  -     Standing: contact guard assistance  Upper Extremity Range of Motion:  -       Right Upper  Extremity: WNL  -       Left Upper Extremity: WNL  Upper Extremity Strength: -       Right Upper Extremity: WNL  -       Left Upper Extremity: WNL    AMPAC 6 Click ADL:  AMPAC Total Score: 18    Treatment & Education:  Patient was educated on role of OT/POC, discharge planning and reviewed use of call bell for assistance.   Pt tolerated sitting EOB for ~15 mins with supervision.   Pt completed ADLs and functional mobility for treatment session as noted above.     Patient left HOB elevated with all lines intact, call button in reach, RN notified, bed alarm on and patient's mother present.    GOALS:   Multidisciplinary Problems       Occupational Therapy Goals          Problem: Occupational Therapy    Goal Priority Disciplines Outcome Interventions   Occupational Therapy Goal     OT, PT/OT Progressing    Description: Goals to be met by: 9/15/2024     Patient will increase functional independence with ADLs by performing:    UE Dressing with Modified [place].  LE Dressing with Modified [place].  Grooming with Modified [place].  Toileting from toilet with Modified [place] for hygiene and clothing management.   Toilet transfer to toilet with Modified [place].                         History:     Past Medical History:   Diagnosis Date    Nausea & vomiting 8/16/2024       History reviewed. No pertinent surgical history.    Time Tracking:     OT Date of Treatment: 09/01/24  OT Start Time: 1126  OT Stop Time: 1155  OT Total Time (min): 29 min    Billable Minutes: Evaluation 15 and Self Care/Home Management 14    9/1/2024

## 2024-09-01 NOTE — PLAN OF CARE
Problem: Adult Inpatient Plan of Care  Goal: Plan of Care Review  Outcome: Progressing  Goal: Patient-Specific Goal (Individualized)  Outcome: Progressing  Goal: Absence of Hospital-Acquired Illness or Injury  Outcome: Progressing  Goal: Optimal Comfort and Wellbeing  Outcome: Progressing  Goal: Readiness for Transition of Care  Outcome: Progressing     Problem: Wound  Goal: Optimal Coping  Outcome: Progressing  Goal: Optimal Functional Ability  Outcome: Progressing  Goal: Absence of Infection Signs and Symptoms  Outcome: Progressing  Goal: Improved Oral Intake  Outcome: Progressing  Goal: Optimal Pain Control and Function  Outcome: Progressing  Goal: Skin Health and Integrity  Outcome: Progressing  Goal: Optimal Wound Healing  Outcome: Progressing     Problem: Pain Acute  Goal: Optimal Pain Control and Function  Outcome: Progressing     Problem: Infection  Goal: Absence of Infection Signs and Symptoms  Outcome: Progressing

## 2024-09-01 NOTE — NURSING
Pt ambulated nurses station x2 laps with stand by assist, pain tolerable, assist back into bed, requested pain medication for pain 7/10, prn dilaudid given as rx'd, safety maintained, care ongoing

## 2024-09-01 NOTE — NURSING
Pt given PO IBU, ok'd with Dr Harris that pt can have small sips with meds, pt reports that pill got stuck in his throat, pt given small sips of water to pass pill, pt stated that he will not take po pain medication or gabapentin because the previous pill was stuck in throat, pt c/o ABD pain 8/10, message sent to Dr Vandana Md stated he would put in 1 time dose of dilaudid IV for pain relief, pt and mom @bedside made aware, medication given as rx'd, care ongoing

## 2024-09-01 NOTE — PLAN OF CARE
Problem: Occupational Therapy  Goal: Occupational Therapy Goal  Description: Goals to be met by: 9/15/2024     Patient will increase functional independence with ADLs by performing:    UE Dressing with Modified [place].  LE Dressing with Modified [place].  Grooming with Modified [place].  Toileting from toilet with Modified [place] for hygiene and clothing management.   Toilet transfer to toilet with Modified [place].     OT evaluation completed. POC established.

## 2024-09-01 NOTE — PLAN OF CARE
Problem: Sepsis/Septic Shock  Goal: Optimal Coping  Outcome: Progressing  Goal: Absence of Bleeding  Outcome: Progressing  Goal: Blood Glucose Level Within Targeted Range  Outcome: Progressing  Goal: Absence of Infection Signs and Symptoms  Outcome: Progressing  Goal: Optimal Nutrition Intake  Outcome: Progressing     Problem: Adult Inpatient Plan of Care  Goal: Plan of Care Review  Outcome: Progressing  Goal: Patient-Specific Goal (Individualized)  Outcome: Progressing  Goal: Absence of Hospital-Acquired Illness or Injury  Outcome: Progressing  Goal: Optimal Comfort and Wellbeing  Outcome: Progressing  Goal: Readiness for Transition of Care  Outcome: Progressing     Problem: Wound  Goal: Optimal Coping  Outcome: Progressing  Goal: Optimal Functional Ability  Outcome: Progressing  Goal: Absence of Infection Signs and Symptoms  Outcome: Progressing  Goal: Improved Oral Intake  Outcome: Progressing  Goal: Optimal Pain Control and Function  Outcome: Progressing  Goal: Skin Health and Integrity  Outcome: Progressing  Goal: Optimal Wound Healing  Outcome: Progressing     Problem: Infection  Goal: Absence of Infection Signs and Symptoms  Outcome: Progressing     Problem: Pain Acute  Goal: Optimal Pain Control and Function  Outcome: Progressing     Problem: Fall Injury Risk  Goal: Absence of Fall and Fall-Related Injury  Outcome: Progressing     Problem: Skin Injury Risk Increased  Goal: Skin Health and Integrity  Outcome: Progressing

## 2024-09-01 NOTE — PROGRESS NOTES
Patient seen and examined.  He was postoperative day 2 status post exploratory laparotomy with drainage of moderate-sized abscess from perforated appendicitis.  Patient had right hemicolectomy with ileocolic anastomosis.  He also required rectosigmoid resection secondary to necrosis of the sigmoid colon wall from the abscess.  Colorectal anastomosis was performed.  He was resting comfortably in bed.  He denies any nausea or vomiting over the last 24 hours.  Denies flatus.  Did have slight smearing the rectum.  He was ambulating.  Pain is controlled    Wt Readings from Last 3 Encounters:   08/30/24 118.5 kg (261 lb 3.9 oz)   08/22/24 120.7 kg (266 lb 1.5 oz)   01/07/20 108.9 kg (240 lb)     Temp Readings from Last 3 Encounters:   09/01/24 98.4 °F (36.9 °C) (Oral)   08/27/24 98.6 °F (37 °C) (Oral)   01/07/20 98.8 °F (37.1 °C) (Oral)     BP Readings from Last 3 Encounters:   09/01/24 (!) 143/84   08/27/24 (!) 116/57   01/07/20 (!) 146/83     Pulse Readings from Last 3 Encounters:   09/01/24 (!) 116   08/27/24 78   01/07/20 82     AAOx3  Sinus tach  Soft/nd/ ttp BS present  GELY serosang    Lab Results   Component Value Date    WBC 22.88 (H) 09/01/2024    HGB 9.3 (L) 09/01/2024    HCT 28.9 (L) 09/01/2024    MCV 88 09/01/2024     09/01/2024       BMP  Lab Results   Component Value Date     09/01/2024    K 3.9 09/01/2024    CL 98 09/01/2024    CO2 27 09/01/2024    BUN 15 09/01/2024    CREATININE 1.0 09/01/2024    CALCIUM 8.6 (L) 09/01/2024    ANIONGAP 12 09/01/2024    EGFRNORACEVR >60 09/01/2024       A/P:  Status post colon resection     Generally speaking patient doing fairly well.  Encourage ambulation and aggressive IS   We will start on clears.  Continue to trend white blood cell count.  Is returning to normal

## 2024-09-02 LAB
ALBUMIN SERPL BCP-MCNC: 2.5 G/DL (ref 3.5–5.2)
ALP SERPL-CCNC: 57 U/L (ref 55–135)
ALT SERPL W/O P-5'-P-CCNC: 23 U/L (ref 10–44)
ANION GAP SERPL CALC-SCNC: 9 MMOL/L (ref 8–16)
AST SERPL-CCNC: 15 U/L (ref 10–40)
BACTERIA SPEC AEROBE CULT: NO GROWTH
BASOPHILS # BLD AUTO: 0.07 K/UL (ref 0–0.2)
BASOPHILS NFR BLD: 0.5 % (ref 0–1.9)
BILIRUB SERPL-MCNC: 0.5 MG/DL (ref 0.1–1)
BUN SERPL-MCNC: 12 MG/DL (ref 6–20)
CALCIUM SERPL-MCNC: 9 MG/DL (ref 8.7–10.5)
CHLORIDE SERPL-SCNC: 96 MMOL/L (ref 95–110)
CO2 SERPL-SCNC: 32 MMOL/L (ref 23–29)
CREAT SERPL-MCNC: 1 MG/DL (ref 0.5–1.4)
CRP SERPL-MCNC: 189.2 MG/L (ref 0–8.2)
DIFFERENTIAL METHOD BLD: ABNORMAL
EOSINOPHIL # BLD AUTO: 0.6 K/UL (ref 0–0.5)
EOSINOPHIL NFR BLD: 3.8 % (ref 0–8)
ERYTHROCYTE [DISTWIDTH] IN BLOOD BY AUTOMATED COUNT: 13.3 % (ref 11.5–14.5)
EST. GFR  (NO RACE VARIABLE): >60 ML/MIN/1.73 M^2
GLUCOSE SERPL-MCNC: 102 MG/DL (ref 70–110)
HCT VFR BLD AUTO: 26.4 % (ref 40–54)
HGB BLD-MCNC: 8.5 G/DL (ref 14–18)
IMM GRANULOCYTES # BLD AUTO: 0.13 K/UL (ref 0–0.04)
IMM GRANULOCYTES NFR BLD AUTO: 0.8 % (ref 0–0.5)
LYMPHOCYTES # BLD AUTO: 2.7 K/UL (ref 1–4.8)
LYMPHOCYTES NFR BLD: 17.4 % (ref 18–48)
MAGNESIUM SERPL-MCNC: 2.3 MG/DL (ref 1.6–2.6)
MCH RBC QN AUTO: 28.5 PG (ref 27–31)
MCHC RBC AUTO-ENTMCNC: 32.2 G/DL (ref 32–36)
MCV RBC AUTO: 89 FL (ref 82–98)
MONOCYTES # BLD AUTO: 1.9 K/UL (ref 0.3–1)
MONOCYTES NFR BLD: 12.4 % (ref 4–15)
NEUTROPHILS # BLD AUTO: 10 K/UL (ref 1.8–7.7)
NEUTROPHILS NFR BLD: 65.1 % (ref 38–73)
NRBC BLD-RTO: 0 /100 WBC
PHOSPHATE SERPL-MCNC: 2 MG/DL (ref 2.7–4.5)
PLATELET # BLD AUTO: 388 K/UL (ref 150–450)
PMV BLD AUTO: 10.4 FL (ref 9.2–12.9)
POTASSIUM SERPL-SCNC: 3.4 MMOL/L (ref 3.5–5.1)
PROT SERPL-MCNC: 6.3 G/DL (ref 6–8.4)
RBC # BLD AUTO: 2.98 M/UL (ref 4.6–6.2)
SODIUM SERPL-SCNC: 137 MMOL/L (ref 136–145)
WBC # BLD AUTO: 15.33 K/UL (ref 3.9–12.7)

## 2024-09-02 PROCEDURE — 63600175 PHARM REV CODE 636 W HCPCS: Performed by: STUDENT IN AN ORGANIZED HEALTH CARE EDUCATION/TRAINING PROGRAM

## 2024-09-02 PROCEDURE — 97535 SELF CARE MNGMENT TRAINING: CPT

## 2024-09-02 PROCEDURE — 63600175 PHARM REV CODE 636 W HCPCS: Performed by: HOSPITALIST

## 2024-09-02 PROCEDURE — 25000003 PHARM REV CODE 250

## 2024-09-02 PROCEDURE — 36415 COLL VENOUS BLD VENIPUNCTURE: CPT | Performed by: SURGERY

## 2024-09-02 PROCEDURE — 25000242 PHARM REV CODE 250 ALT 637 W/ HCPCS: Performed by: HOSPITALIST

## 2024-09-02 PROCEDURE — 86140 C-REACTIVE PROTEIN: CPT | Performed by: SURGERY

## 2024-09-02 PROCEDURE — 80053 COMPREHEN METABOLIC PANEL: CPT | Performed by: SURGERY

## 2024-09-02 PROCEDURE — 25000003 PHARM REV CODE 250: Performed by: SURGERY

## 2024-09-02 PROCEDURE — 94799 UNLISTED PULMONARY SVC/PX: CPT | Mod: XB

## 2024-09-02 PROCEDURE — 84100 ASSAY OF PHOSPHORUS: CPT | Performed by: SURGERY

## 2024-09-02 PROCEDURE — 25000003 PHARM REV CODE 250: Performed by: STUDENT IN AN ORGANIZED HEALTH CARE EDUCATION/TRAINING PROGRAM

## 2024-09-02 PROCEDURE — 85025 COMPLETE CBC W/AUTO DIFF WBC: CPT | Performed by: SURGERY

## 2024-09-02 PROCEDURE — 94761 N-INVAS EAR/PLS OXIMETRY MLT: CPT

## 2024-09-02 PROCEDURE — 11000001 HC ACUTE MED/SURG PRIVATE ROOM

## 2024-09-02 PROCEDURE — 83735 ASSAY OF MAGNESIUM: CPT | Performed by: SURGERY

## 2024-09-02 PROCEDURE — 63600175 PHARM REV CODE 636 W HCPCS: Performed by: SURGERY

## 2024-09-02 RX ORDER — LANOLIN ALCOHOL/MO/W.PET/CERES
800 CREAM (GRAM) TOPICAL
Status: DISCONTINUED | OUTPATIENT
Start: 2024-09-02 | End: 2024-09-08 | Stop reason: HOSPADM

## 2024-09-02 RX ORDER — OXYCODONE HCL 5 MG/5 ML
5 SOLUTION, ORAL ORAL EVERY 4 HOURS PRN
Status: DISCONTINUED | OUTPATIENT
Start: 2024-09-02 | End: 2024-09-03

## 2024-09-02 RX ORDER — HYDROMORPHONE HYDROCHLORIDE 2 MG/ML
1.5 INJECTION, SOLUTION INTRAMUSCULAR; INTRAVENOUS; SUBCUTANEOUS EVERY 4 HOURS PRN
Status: DISCONTINUED | OUTPATIENT
Start: 2024-09-02 | End: 2024-09-02

## 2024-09-02 RX ORDER — SODIUM,POTASSIUM PHOSPHATES 280-250MG
2 POWDER IN PACKET (EA) ORAL
Status: DISCONTINUED | OUTPATIENT
Start: 2024-09-02 | End: 2024-09-08 | Stop reason: HOSPADM

## 2024-09-02 RX ORDER — POTASSIUM CHLORIDE 7.45 MG/ML
10 INJECTION INTRAVENOUS
Status: COMPLETED | OUTPATIENT
Start: 2024-09-02 | End: 2024-09-02

## 2024-09-02 RX ORDER — HYDROMORPHONE HYDROCHLORIDE 1 MG/ML
0.5 INJECTION, SOLUTION INTRAMUSCULAR; INTRAVENOUS; SUBCUTANEOUS EVERY 4 HOURS PRN
Status: DISCONTINUED | OUTPATIENT
Start: 2024-09-02 | End: 2024-09-06

## 2024-09-02 RX ORDER — OXYCODONE HYDROCHLORIDE 5 MG/1
5 TABLET ORAL EVERY 6 HOURS PRN
Status: DISCONTINUED | OUTPATIENT
Start: 2024-09-02 | End: 2024-09-03

## 2024-09-02 RX ADMIN — BACLOFEN 5 MG: 5 TABLET ORAL at 04:09

## 2024-09-02 RX ADMIN — PIPERACILLIN SODIUM AND TAZOBACTAM SODIUM 4.5 G: 4; .5 INJECTION, POWDER, FOR SOLUTION INTRAVENOUS at 09:09

## 2024-09-02 RX ADMIN — OXYCODONE HYDROCHLORIDE 5 MG: 5 SOLUTION ORAL at 05:09

## 2024-09-02 RX ADMIN — POTASSIUM CHLORIDE 10 MEQ: 7.46 INJECTION, SOLUTION INTRAVENOUS at 06:09

## 2024-09-02 RX ADMIN — SODIUM CHLORIDE, SODIUM LACTATE, POTASSIUM CHLORIDE, CALCIUM CHLORIDE AND DEXTROSE MONOHYDRATE: 5; 600; 310; 30; 20 INJECTION, SOLUTION INTRAVENOUS at 06:09

## 2024-09-02 RX ADMIN — BACLOFEN 5 MG: 5 TABLET ORAL at 08:09

## 2024-09-02 RX ADMIN — POTASSIUM CHLORIDE 10 MEQ: 7.46 INJECTION, SOLUTION INTRAVENOUS at 12:09

## 2024-09-02 RX ADMIN — POTASSIUM CHLORIDE 10 MEQ: 7.46 INJECTION, SOLUTION INTRAVENOUS at 11:09

## 2024-09-02 RX ADMIN — HYDROMORPHONE HYDROCHLORIDE 0.5 MG: 1 INJECTION, SOLUTION INTRAMUSCULAR; INTRAVENOUS; SUBCUTANEOUS at 04:09

## 2024-09-02 RX ADMIN — HYDROMORPHONE HYDROCHLORIDE 1.5 MG: 2 INJECTION INTRAMUSCULAR; INTRAVENOUS; SUBCUTANEOUS at 09:09

## 2024-09-02 RX ADMIN — OXYCODONE HYDROCHLORIDE 5 MG: 5 SOLUTION ORAL at 12:09

## 2024-09-02 RX ADMIN — POTASSIUM BICARBONATE 35 MEQ: 391 TABLET, EFFERVESCENT ORAL at 06:09

## 2024-09-02 RX ADMIN — POTASSIUM & SODIUM PHOSPHATES POWDER PACK 280-160-250 MG 2 PACKET: 280-160-250 PACK at 05:09

## 2024-09-02 RX ADMIN — PIPERACILLIN SODIUM AND TAZOBACTAM SODIUM 4.5 G: 4; .5 INJECTION, POWDER, FOR SOLUTION INTRAVENOUS at 03:09

## 2024-09-02 RX ADMIN — POTASSIUM & SODIUM PHOSPHATES POWDER PACK 280-160-250 MG 2 PACKET: 280-160-250 PACK at 12:09

## 2024-09-02 RX ADMIN — HYDROMORPHONE HYDROCHLORIDE 1.5 MG: 2 INJECTION INTRAMUSCULAR; INTRAVENOUS; SUBCUTANEOUS at 03:09

## 2024-09-02 RX ADMIN — PIPERACILLIN SODIUM AND TAZOBACTAM SODIUM 4.5 G: 4; .5 INJECTION, POWDER, FOR SOLUTION INTRAVENOUS at 02:09

## 2024-09-02 RX ADMIN — IBUPROFEN 600 MG: 600 TABLET ORAL at 08:09

## 2024-09-02 RX ADMIN — POTASSIUM & SODIUM PHOSPHATES POWDER PACK 280-160-250 MG 2 PACKET: 280-160-250 PACK at 06:09

## 2024-09-02 RX ADMIN — ALUMINUM HYDROXIDE, MAGNESIUM HYDROXIDE, AND SIMETHICONE 30 ML: 1200; 120; 1200 SUSPENSION ORAL at 08:09

## 2024-09-02 RX ADMIN — HYDROMORPHONE HYDROCHLORIDE 0.5 MG: 1 INJECTION, SOLUTION INTRAMUSCULAR; INTRAVENOUS; SUBCUTANEOUS at 08:09

## 2024-09-02 RX ADMIN — KETOROLAC TROMETHAMINE 15 MG: 30 INJECTION, SOLUTION INTRAMUSCULAR at 04:09

## 2024-09-02 RX ADMIN — BISACODYL 5 MG: 5 TABLET, COATED ORAL at 08:09

## 2024-09-02 RX ADMIN — POTASSIUM CHLORIDE 10 MEQ: 7.46 INJECTION, SOLUTION INTRAVENOUS at 07:09

## 2024-09-02 RX ADMIN — IBUPROFEN 600 MG: 600 TABLET ORAL at 05:09

## 2024-09-02 NOTE — ASSESSMENT & PLAN NOTE
This patient does have evidence of infective focus  My overall impression is sepsis.  Source: Abdominal  Antibiotics given-   Antibiotics (72h ago, onward)      Start     Stop Route Frequency Ordered    08/30/24 1430  piperacillin-tazobactam (ZOSYN) 4.5 g in D5W 100 mL IVPB (MB+)         -- IV Every 8 hours (non-standard times) 08/30/24 1316          Patient is status post colonic perforation, continue IV antibiotics for now.  Defer to surgery for further surgical intervention.

## 2024-09-02 NOTE — NURSING
Spoke to Otis White MD, due to patient wanting to change oxycodone 5mg solution to oxycodone 5mg PO, MD will look at patient chart

## 2024-09-02 NOTE — CARE UPDATE
09/02/24 0741   Patient Assessment/Suction   Level of Consciousness (AVPU) alert   Respiratory Effort Unlabored   PRE-TX-O2   Device (Oxygen Therapy) room air   SpO2 (!) 94 %   Pulse Oximetry Type Intermittent   $ Pulse Oximetry - Multiple Charge Pulse Oximetry - Multiple   Incentive Spirometer   $ Incentive Spirometer Charges done with encouragement   Administration (IS) instruction provided, follow-up;mouthpiece utilized   Number of Repetitions (IS) 5   Level Incentive Spirometer (mL) 1000   Patient Tolerance (IS) fair;no adverse signs/symptoms present

## 2024-09-02 NOTE — ASSESSMENT & PLAN NOTE
-S/p sigmoid colectomy with anastomosis.  Continue to advance diet per General surgery recommendation.  Attempt to wean IV pain medications and transitioned to oral pain meds.

## 2024-09-02 NOTE — PROGRESS NOTES
Patient seen and examined.  Postop day 3 status post ex lap with colon resection x2     Patient doing well.  Reports bowel movement today.  Had light flatus as well.  Has been tolerating clears with no nausea and vomiting.  It was ambulating well.  GELY output serosanguineous    Wt Readings from Last 3 Encounters:   08/30/24 118.5 kg (261 lb 3.9 oz)   08/22/24 120.7 kg (266 lb 1.5 oz)   01/07/20 108.9 kg (240 lb)     Temp Readings from Last 3 Encounters:   09/02/24 98.2 °F (36.8 °C) (Oral)   08/27/24 98.6 °F (37 °C) (Oral)   01/07/20 98.8 °F (37.1 °C) (Oral)     BP Readings from Last 3 Encounters:   09/02/24 (!) 168/90   08/27/24 (!) 116/57   01/07/20 (!) 146/83     Pulse Readings from Last 3 Encounters:   09/02/24 110   08/27/24 78   01/07/20 82     AAOx3  Soft/nd/appt ttp  GELY serosang    Lab Results   Component Value Date    WBC 15.33 (H) 09/02/2024    HGB 8.5 (L) 09/02/2024    HCT 26.4 (L) 09/02/2024    MCV 89 09/02/2024     09/02/2024       BMP  Lab Results   Component Value Date     09/02/2024    K 3.4 (L) 09/02/2024    CL 96 09/02/2024    CO2 32 (H) 09/02/2024    BUN 12 09/02/2024    CREATININE 1.0 09/02/2024    CALCIUM 9.0 09/02/2024    ANIONGAP 9 09/02/2024    EGFRNORACEVR >60 09/02/2024     Lab Results   Component Value Date    WBC 15.33 (H) 09/02/2024    HGB 8.5 (L) 09/02/2024    HCT 26.4 (L) 09/02/2024    MCV 89 09/02/2024     09/02/2024        Lab Results   Component Value Date    .2 (H) 09/02/2024     A/p:  Status post ex lap with colon resection x2 for appendiceal abscess and perforation     Patient doing well.  We will advance to full liquids   If tolerated dietary advancement we will hopefully advance further tomorrow.  Potential discharge in the next 24-48 hours

## 2024-09-02 NOTE — PLAN OF CARE
Per Dr. White pt is not medically ready for discharge today. JANN updated. Therapy is recommending low intensity therapy at discharge. CM following.    09/02/24 1047   Discharge Assessment   Assessment Type Discharge Planning Reassessment

## 2024-09-02 NOTE — SUBJECTIVE & OBJECTIVE
Interval History:  Patient seen and examined.  No acute events overnight.  Pain remains controlled on IV medications.  Plan of care discussed with the patient and his sister at the bedside in detail.    Review of Systems  Objective:     Vital Signs (Most Recent):  Temp: 98.2 °F (36.8 °C) (09/02/24 1144)  Pulse: 110 (09/02/24 1144)  Resp: 20 (09/02/24 1257)  BP: (!) 168/90 (09/02/24 1144)  SpO2: (!) 94 % (09/02/24 1300) Vital Signs (24h Range):  Temp:  [98 °F (36.7 °C)-98.8 °F (37.1 °C)] 98.2 °F (36.8 °C)  Pulse:  [102-115] 110  Resp:  [15-20] 20  SpO2:  [94 %-98 %] 94 %  BP: (135-168)/(67-90) 168/90     Weight: 118.5 kg (261 lb 3.9 oz)  Body mass index is 36.44 kg/m².    Intake/Output Summary (Last 24 hours) at 9/2/2024 1307  Last data filed at 9/2/2024 0741  Gross per 24 hour   Intake 2460.83 ml   Output 430 ml   Net 2030.83 ml         Physical Exam  Vitals and nursing note reviewed.   Constitutional:       Appearance: He is obese.   Eyes:      Pupils: Pupils are equal, round, and reactive to light.   Neck:      Vascular: No JVD.   Cardiovascular:      Rate and Rhythm: Normal rate and regular rhythm.      Heart sounds: Normal heart sounds.   Pulmonary:      Effort: Pulmonary effort is normal.      Breath sounds: Normal breath sounds.   Abdominal:      General: Bowel sounds are normal. There is no distension.      Palpations: Abdomen is soft.      Tenderness: There is no abdominal tenderness.      Comments: 2 GELY drains present with minimal output.  Noted midline laparotomy scar.   Musculoskeletal:         General: No deformity.   Lymphadenopathy:      Cervical: No cervical adenopathy.   Skin:     Coloration: Skin is not pale.      Findings: No rash.   Neurological:      General: No focal deficit present.      Mental Status: He is oriented to person, place, and time.             Significant Labs: All pertinent labs within the past 24 hours have been reviewed.    Significant Imaging: I have reviewed all pertinent  imaging results/findings within the past 24 hours.

## 2024-09-02 NOTE — PLAN OF CARE
AAOX4. Pt has chronic difficulty swallowing pills consciously so meds orally adjusted to liquids versus pill/capsule form or IV. Pt c/o abd fullness and pain relieved with PRN pain meds (see mar) educated on IS. IV Fluids maintained. IV ATX's. Electrolyte replacement. Pt passing gas and had BM today. Attempt to advance diet to full liquids for dinner. Tele maintained. Safety maintained.

## 2024-09-02 NOTE — ASSESSMENT & PLAN NOTE
Anemia is likely due to acute blood loss which was from multiple surgeries . Most recent hemoglobin and hematocrit are listed below.  Recent Labs     09/01/24  0339 09/01/24  1142 09/02/24  0356   HGB 9.8* 9.3* 8.5*   HCT 31.0* 28.9* 26.4*     Plan  - Monitor serial CBC: Daily  - Transfuse PRBC if patient becomes hemodynamically unstable, symptomatic or H/H drops below 7/21.  - Patient has not received any PRBC transfusions to date  - Patient's anemia is currently worsening. Will continue to monitor closely for evidence of bleeding

## 2024-09-02 NOTE — ASSESSMENT & PLAN NOTE
S/p ileocolic colectomy with anastomosis.  Continue IV antibiotics per above under perforation, and monitor.

## 2024-09-02 NOTE — NURSING
Pt states he has chronic difficulty swalling PO Pills such as PO pain meds etc. PO pain med changed to liquid form per MD.   Pt unable to tolerate K replacement as ordered PRN. States he can't take large pill for replacement. IV replacement ordered.

## 2024-09-02 NOTE — PROGRESS NOTES
[facility]  Progress Note    Patient Name: Willian Ron  MRN: [id number]  Patient Class: IP- Inpatient   Admission Date: 8/30/2024  Length of Stay: 3 days  Attending Physician: Otis White MD  Primary Care Provider: Jennifer, Primary Doctor        Subjective:     Principal Problem:Sepsis        HPI:  Willian Ron is a 34 year old male with a past medical history of acute perforated appendicitis with abscess s/p IR drainage (E coli and Prevotella) on Augmentin and Levaquin given persistent abscesses who presents with worsening abdominal pain. CT in the ED shows acute appendicitis with abscess. Surgery was consulted from the ED for appendectomy. He received IV fluids, meropenem, hydromorphone, Protonix and Zofran in the ED. Hospital Medicine was consulted for admission.    Overview/Hospital Course:  Willian Ron is a 34 year old male with a past medical history of acute perforated appendicitis with abscess s/p IR drainage (E coli and Prevotella) on Augmentin and Levaquin who presented with persistent acute appendicitis with abscess. He is on Zosyn and IV fluids. Dr. Harris of Surgery took the patient to the OR for exploratory laparotomy 8/30 where the patient underwent drainage of the abdominal abscess with right hemicolectomy with ileocolic anastomosis as well as rectosigmoid resection with colorectal anastomosis with splenic flexure mobilization. Cultures are pending. He remains NPO. His course has been complicated by a post-operative anemia for which Hgb is being trended and Lovenox prophylaxis has been discontinued. PT/OT recommends low intensity therapy.    Interval History:  Patient seen and examined.  No acute events overnight.  Pain remains controlled on IV medications.  Plan of care discussed with the patient and his sister at the bedside in detail.    Review of Systems  Objective:     Vital Signs (Most Recent):  Temp: 98.2 °F (36.8 °C) (09/02/24 1144)  Pulse: 110  (09/02/24 1144)  Resp: 20 (09/02/24 1257)  BP: (!) 168/90 (09/02/24 1144)  SpO2: (!) 94 % (09/02/24 1300) Vital Signs (24h Range):  Temp:  [98 °F (36.7 °C)-98.8 °F (37.1 °C)] 98.2 °F (36.8 °C)  Pulse:  [102-115] 110  Resp:  [15-20] 20  SpO2:  [94 %-98 %] 94 %  BP: (135-168)/(67-90) 168/90     Weight: 118.5 kg (261 lb 3.9 oz)  Body mass index is 36.44 kg/m².    Intake/Output Summary (Last 24 hours) at 9/2/2024 1307  Last data filed at 9/2/2024 0741  Gross per 24 hour   Intake 2460.83 ml   Output 430 ml   Net 2030.83 ml         Physical Exam  Vitals and nursing note reviewed.   Constitutional:       Appearance: He is obese.   Eyes:      Pupils: Pupils are equal, round, and reactive to light.   Neck:      Vascular: No JVD.   Cardiovascular:      Rate and Rhythm: Normal rate and regular rhythm.      Heart sounds: Normal heart sounds.   Pulmonary:      Effort: Pulmonary effort is normal.      Breath sounds: Normal breath sounds.   Abdominal:      General: Bowel sounds are normal. There is no distension.      Palpations: Abdomen is soft.      Tenderness: There is no abdominal tenderness.      Comments: 2 GELY drains present with minimal output.  Noted midline laparotomy scar.   Musculoskeletal:         General: No deformity.   Lymphadenopathy:      Cervical: No cervical adenopathy.   Skin:     Coloration: Skin is not pale.      Findings: No rash.   Neurological:      General: No focal deficit present.      Mental Status: He is oriented to person, place, and time.             Significant Labs: All pertinent labs within the past 24 hours have been reviewed.    Significant Imaging: I have reviewed all pertinent imaging results/findings within the past 24 hours.    Assessment/Plan:      * Sepsis  This patient does have evidence of infective focus  My overall impression is sepsis.  Source: Abdominal  Antibiotics given-   Antibiotics (72h ago, onward)      Start     Stop Route Frequency Ordered    08/30/24 1430   piperacillin-tazobactam (ZOSYN) 4.5 g in D5W 100 mL IVPB (MB+)         -- IV Every 8 hours (non-standard times) 08/30/24 1316          Patient is status post colonic perforation, continue IV antibiotics for now.  Defer to surgery for further surgical intervention.        Perforation of sigmoid colon  -S/p sigmoid colectomy with anastomosis.  Continue to advance diet per General surgery recommendation.  Attempt to wean IV pain medications and transitioned to oral pain meds.      Acute appendicitis  S/p ileocolic colectomy with anastomosis.  Continue IV antibiotics per above under perforation, and monitor.     Abdominal visceral abscess  Treatment per above under perforation.      Postoperative anemia  Anemia is likely due to acute blood loss which was from multiple surgeries . Most recent hemoglobin and hematocrit are listed below.  Recent Labs     09/01/24  0339 09/01/24  1142 09/02/24  0356   HGB 9.8* 9.3* 8.5*   HCT 31.0* 28.9* 26.4*     Plan  - Monitor serial CBC: Daily  - Transfuse PRBC if patient becomes hemodynamically unstable, symptomatic or H/H drops below 7/21.  - Patient has not received any PRBC transfusions to date  - Patient's anemia is currently worsening. Will continue to monitor closely for evidence of bleeding      Obesity (BMI 30-39.9)  Body mass index is 36.44 kg/m². Morbid obesity complicates all aspects of disease management from diagnostic modalities to treatment.         Tobacco dependency  -Nicotine patch  -Patient to be counseled on cessation      VTE Risk Mitigation (From admission, onward)           Ordered     IP VTE HIGH RISK PATIENT  Once         08/30/24 1316     Place sequential compression device  Until discontinued         08/30/24 1316                    Discharge Planning   JANN: 9/3/2024     Code Status: Full Code   Is the patient medically ready for discharge?:     Reason for patient still in hospital (select all that apply): Treatment  Discharge Plan A: Home                   Otis White MD  Department of Hospital Medicine   [facility]/Surg

## 2024-09-02 NOTE — PLAN OF CARE
Problem: Occupational Therapy  Goal: Occupational Therapy Goal  Description: Goals to be met by: 9/15/2024     Patient will increase functional independence with ADLs by performing:    UE Dressing with Modified [place].  LE Dressing with Modified [place].  Grooming with Modified [place].  Toileting from toilet with Modified [place] for hygiene and clothing management.   Toilet transfer to toilet with Modified [place].    Outcome: Progressing    Bed Mobility:    Patient completed Supine to Sit with contact guard assistance  Patient completed Sit to Supine with contact guard assistance      Functional Mobility/Transfers:  Patient completed Sit <> Stand Transfer with contact guard assistance with hand-held assist.  Functional Mobility: 50 ft with HHA, CGA slow paced and steady gait; extended seated rest break   250 ft x 3 with no AD, CGA, slow paced, steady and improved tolerance without rest breaks     Activities of Daily Living:  Feeding:  stand by assistance seated EOB; Pt took a couple of sips of water and a few bites of jello during seated rest break.

## 2024-09-02 NOTE — PT/OT/SLP PROGRESS
"Occupational Therapy   Treatment    Name: Willian Ron  MRN: [id number]  Admitting Diagnosis:  Sepsis  3 Days Post-Op    Recommendations:     Discharge Recommendations: Low Intensity Therapy  Discharge Equipment Recommendations:  none  Barriers to discharge:  None    Assessment:     Willian Ron is a 34 y.o. male with a medical diagnosis of Sepsis.  Patient agreed to participate in OT. Pt initially reported 7/10 abdomen pain, then 10/10 pain after transitional movement supine to sit and ambulating 50 ft with CGA. Pt requested a seated rest break and nurse was notified of his increased pain. Pt completed sustained seated EOB, x 25 min with focus on pursed lip breathing and incentive spirometer breathing with nurse and respiratory therapist present. Patient reported 8/10 after rest break and after receiving pain meds and requested to try to walk again. Pt ambulated x 250 ft x 3 with CGA and occasionally used the wall hand rail. Pt put forth great effort in treatment activities. Pt continues to present with impaired self care skills, impaired functional mobility and pain. Pt is unable to fully participate in work, recreational activities, and home-based activities because of decreased functional endurance, limited positional tolerance and increased pain with transitional movements. Pt would continue to benefit from occupational therapy services to increase functional strength and endurance, improve functional activity tolerance and improve self care skills in order to meet functional goals.     Rehab Prognosis:  Good; patient would benefit from acute skilled OT services to address these deficits and reach maximum level of function.       Plan:     Patient to be seen 5 x/week to address the above listed problems via self-care/home management, therapeutic activities, therapeutic exercises  Plan of Care Expires: 09/15/24  Plan of Care Reviewed with: patient    Subjective     Pt stated he wanted to walk. " I marched " "in a band. "  Chief Complaint: Abdomen pain  Patient/Family Comments/goals: Pain relief  Pain/Comfort:  Pain Rating 1: 7/10  Location 1: abdomen  Pain Addressed 1: Nurse notified, Reposition    Objective:     Communicated with: nurse prior to session.  Patient found HOB elevated with GELY drain and peripheral IV upon OT entry to room.    General Precautions: Standard, fall    Orthopedic Precautions:N/A  Braces: N/A  Respiratory Status: Room air     Occupational Performance:     Bed Mobility:    Patient completed Rolling/Turning to Right with contact guard assistance  Patient completed Scooting/Bridging with contact guard assistance  Patient completed Supine to Sit with contact guard assistance  Patient completed Sit to Supine with contact guard assistance     Functional Mobility/Transfers:  Patient completed Sit <> Stand Transfer with contact guard assistance with hand-held assist   Functional Mobility: 50 ft with HHA, CGA slow paced and steady gait; extended seated rest break and nurse notified due to 10/10 abdomen pain; 25 minute seated rest break to take pain meds, with cues for pursed lip breathing and incentive spirometer breathing re instruction by respiratory therapist.   250 ft x 3 with no AD, CGA, slow paced, steady and improved tolerance without rest breaks and occasional use of wall hand rail    Activities of Daily Living:  Feeding:  stand by assistance seated EOB; Pt took a couple of sips of water and a few bites of jello during seated rest break.  Lower Body Dressing: total assistance to don socks due to inability to bend or lean forward secondary to abdomen pain    Treatment & Education:  OT ed pt on bed mobility techniques to increase independence with task.  Pt tolerated sitting EOB for ~25 mins with supervision while engaging in seated self-care tasks.   Pt completed ADLs and functional mobility for treatment session as noted above   Pt utilized energy conservation techniques during ADL task " performance with occasional cues.  OT ed pt on use of pursed lip breathing and activity pacing.     Patient left supine with all lines intact, call button in reach, bed alarm on, nurse notified and patient's mother present.    Multidisciplinary Problems       Occupational Therapy Goals          Problem: Occupational Therapy    Goal Priority Disciplines Outcome Interventions   Occupational Therapy Goal     OT, PT/OT Progressing    Description: Goals to be met by: 9/15/2024     Patient will increase functional independence with ADLs by performing:    UE Dressing with Modified [place].  LE Dressing with Modified [place].  Grooming with Modified [place].  Toileting from toilet with Modified [place] for hygiene and clothing management.   Toilet transfer to toilet with Modified [place].                         Time Tracking:     OT Date of Treatment: 09/02/24  OT Start Time: 1543  OT Stop Time: 1652  OT Total Time (min): 69 min    Billable Minutes:Self Care/Home Management 69               9/2/2024

## 2024-09-03 LAB
ALBUMIN SERPL BCP-MCNC: 2.6 G/DL (ref 3.5–5.2)
ALP SERPL-CCNC: 64 U/L (ref 55–135)
ALT SERPL W/O P-5'-P-CCNC: 25 U/L (ref 10–44)
ANION GAP SERPL CALC-SCNC: 10 MMOL/L (ref 8–16)
AST SERPL-CCNC: 21 U/L (ref 10–40)
BASOPHILS # BLD AUTO: 0.05 K/UL (ref 0–0.2)
BASOPHILS NFR BLD: 0.4 % (ref 0–1.9)
BILIRUB SERPL-MCNC: 0.7 MG/DL (ref 0.1–1)
BUN SERPL-MCNC: 8 MG/DL (ref 6–20)
CALCIUM SERPL-MCNC: 9.1 MG/DL (ref 8.7–10.5)
CHLORIDE SERPL-SCNC: 98 MMOL/L (ref 95–110)
CO2 SERPL-SCNC: 30 MMOL/L (ref 23–29)
CREAT SERPL-MCNC: 0.9 MG/DL (ref 0.5–1.4)
CRP SERPL-MCNC: 105.5 MG/L (ref 0–8.2)
DIFFERENTIAL METHOD BLD: ABNORMAL
EOSINOPHIL # BLD AUTO: 0.5 K/UL (ref 0–0.5)
EOSINOPHIL NFR BLD: 4.2 % (ref 0–8)
ERYTHROCYTE [DISTWIDTH] IN BLOOD BY AUTOMATED COUNT: 13.2 % (ref 11.5–14.5)
EST. GFR  (NO RACE VARIABLE): >60 ML/MIN/1.73 M^2
GLUCOSE SERPL-MCNC: 102 MG/DL (ref 70–110)
HCT VFR BLD AUTO: 27.1 % (ref 40–54)
HGB BLD-MCNC: 8.7 G/DL (ref 14–18)
IMM GRANULOCYTES # BLD AUTO: 0.13 K/UL (ref 0–0.04)
IMM GRANULOCYTES NFR BLD AUTO: 1.1 % (ref 0–0.5)
LYMPHOCYTES # BLD AUTO: 1.9 K/UL (ref 1–4.8)
LYMPHOCYTES NFR BLD: 16.2 % (ref 18–48)
MAGNESIUM SERPL-MCNC: 2.1 MG/DL (ref 1.6–2.6)
MCH RBC QN AUTO: 28.5 PG (ref 27–31)
MCHC RBC AUTO-ENTMCNC: 32.1 G/DL (ref 32–36)
MCV RBC AUTO: 89 FL (ref 82–98)
MONOCYTES # BLD AUTO: 1.3 K/UL (ref 0.3–1)
MONOCYTES NFR BLD: 11.2 % (ref 4–15)
NEUTROPHILS # BLD AUTO: 8 K/UL (ref 1.8–7.7)
NEUTROPHILS NFR BLD: 66.9 % (ref 38–73)
NRBC BLD-RTO: 0 /100 WBC
OHS QRS DURATION: 70 MS
OHS QRS DURATION: 80 MS
OHS QTC CALCULATION: 444 MS
OHS QTC CALCULATION: 456 MS
PHOSPHATE SERPL-MCNC: 2.9 MG/DL (ref 2.7–4.5)
PLATELET # BLD AUTO: 400 K/UL (ref 150–450)
PMV BLD AUTO: 10 FL (ref 9.2–12.9)
POTASSIUM SERPL-SCNC: 3.9 MMOL/L (ref 3.5–5.1)
PROT SERPL-MCNC: 6.5 G/DL (ref 6–8.4)
RBC # BLD AUTO: 3.05 M/UL (ref 4.6–6.2)
SODIUM SERPL-SCNC: 138 MMOL/L (ref 136–145)
WBC # BLD AUTO: 11.95 K/UL (ref 3.9–12.7)

## 2024-09-03 PROCEDURE — 63600175 PHARM REV CODE 636 W HCPCS: Performed by: HOSPITALIST

## 2024-09-03 PROCEDURE — 84100 ASSAY OF PHOSPHORUS: CPT | Performed by: SURGERY

## 2024-09-03 PROCEDURE — 21400001 HC TELEMETRY ROOM

## 2024-09-03 PROCEDURE — 11000001 HC ACUTE MED/SURG PRIVATE ROOM

## 2024-09-03 PROCEDURE — 25000003 PHARM REV CODE 250: Performed by: SURGERY

## 2024-09-03 PROCEDURE — 83735 ASSAY OF MAGNESIUM: CPT | Performed by: SURGERY

## 2024-09-03 PROCEDURE — 25000003 PHARM REV CODE 250: Performed by: HOSPITALIST

## 2024-09-03 PROCEDURE — 25000003 PHARM REV CODE 250: Performed by: STUDENT IN AN ORGANIZED HEALTH CARE EDUCATION/TRAINING PROGRAM

## 2024-09-03 PROCEDURE — 86140 C-REACTIVE PROTEIN: CPT | Performed by: SURGERY

## 2024-09-03 PROCEDURE — 80053 COMPREHEN METABOLIC PANEL: CPT | Performed by: SURGERY

## 2024-09-03 PROCEDURE — 63600175 PHARM REV CODE 636 W HCPCS: Performed by: SURGERY

## 2024-09-03 PROCEDURE — 36415 COLL VENOUS BLD VENIPUNCTURE: CPT | Performed by: SURGERY

## 2024-09-03 PROCEDURE — 85025 COMPLETE CBC W/AUTO DIFF WBC: CPT | Performed by: SURGERY

## 2024-09-03 PROCEDURE — 63600175 PHARM REV CODE 636 W HCPCS: Performed by: STUDENT IN AN ORGANIZED HEALTH CARE EDUCATION/TRAINING PROGRAM

## 2024-09-03 RX ORDER — METOCLOPRAMIDE HYDROCHLORIDE 5 MG/ML
10 INJECTION INTRAMUSCULAR; INTRAVENOUS EVERY 6 HOURS
Status: DISCONTINUED | OUTPATIENT
Start: 2024-09-03 | End: 2024-09-08

## 2024-09-03 RX ORDER — OXYCODONE HYDROCHLORIDE 5 MG/1
5 TABLET ORAL EVERY 4 HOURS PRN
Status: DISCONTINUED | OUTPATIENT
Start: 2024-09-03 | End: 2024-09-04

## 2024-09-03 RX ORDER — BENZONATATE 100 MG/1
100 CAPSULE ORAL 3 TIMES DAILY PRN
Status: DISCONTINUED | OUTPATIENT
Start: 2024-09-03 | End: 2024-09-08 | Stop reason: HOSPADM

## 2024-09-03 RX ADMIN — PIPERACILLIN SODIUM AND TAZOBACTAM SODIUM 4.5 G: 4; .5 INJECTION, POWDER, FOR SOLUTION INTRAVENOUS at 01:09

## 2024-09-03 RX ADMIN — IBUPROFEN 600 MG: 600 TABLET ORAL at 09:09

## 2024-09-03 RX ADMIN — IBUPROFEN 600 MG: 600 TABLET ORAL at 06:09

## 2024-09-03 RX ADMIN — PROCHLORPERAZINE EDISYLATE 2.5 MG: 5 INJECTION INTRAMUSCULAR; INTRAVENOUS at 08:09

## 2024-09-03 RX ADMIN — OXYCODONE HYDROCHLORIDE 5 MG: 5 TABLET ORAL at 02:09

## 2024-09-03 RX ADMIN — ONDANSETRON 4 MG: 2 INJECTION INTRAMUSCULAR; INTRAVENOUS at 04:09

## 2024-09-03 RX ADMIN — SODIUM CHLORIDE, SODIUM LACTATE, POTASSIUM CHLORIDE, CALCIUM CHLORIDE AND DEXTROSE MONOHYDRATE: 5; 600; 310; 30; 20 INJECTION, SOLUTION INTRAVENOUS at 02:09

## 2024-09-03 RX ADMIN — OXYCODONE HYDROCHLORIDE 5 MG: 5 TABLET ORAL at 05:09

## 2024-09-03 RX ADMIN — PIPERACILLIN SODIUM AND TAZOBACTAM SODIUM 4.5 G: 4; .5 INJECTION, POWDER, FOR SOLUTION INTRAVENOUS at 09:09

## 2024-09-03 RX ADMIN — PIPERACILLIN SODIUM AND TAZOBACTAM SODIUM 4.5 G: 4; .5 INJECTION, POWDER, FOR SOLUTION INTRAVENOUS at 05:09

## 2024-09-03 RX ADMIN — METOCLOPRAMIDE 10 MG: 5 INJECTION, SOLUTION INTRAMUSCULAR; INTRAVENOUS at 11:09

## 2024-09-03 RX ADMIN — BISACODYL 5 MG: 5 TABLET, COATED ORAL at 09:09

## 2024-09-03 RX ADMIN — BENZONATATE 100 MG: 100 CAPSULE ORAL at 02:09

## 2024-09-03 RX ADMIN — BACLOFEN 5 MG: 5 TABLET ORAL at 02:09

## 2024-09-03 RX ADMIN — HYDROMORPHONE HYDROCHLORIDE 0.5 MG: 1 INJECTION, SOLUTION INTRAMUSCULAR; INTRAVENOUS; SUBCUTANEOUS at 03:09

## 2024-09-03 RX ADMIN — IBUPROFEN 600 MG: 600 TABLET ORAL at 01:09

## 2024-09-03 RX ADMIN — METOCLOPRAMIDE 10 MG: 5 INJECTION, SOLUTION INTRAMUSCULAR; INTRAVENOUS at 06:09

## 2024-09-03 RX ADMIN — BACLOFEN 5 MG: 5 TABLET ORAL at 09:09

## 2024-09-03 RX ADMIN — HYDROMORPHONE HYDROCHLORIDE 0.5 MG: 1 INJECTION, SOLUTION INTRAMUSCULAR; INTRAVENOUS; SUBCUTANEOUS at 08:09

## 2024-09-03 RX ADMIN — BACLOFEN 5 MG: 5 TABLET ORAL at 08:09

## 2024-09-03 RX ADMIN — IBUPROFEN 600 MG: 600 TABLET ORAL at 08:09

## 2024-09-03 NOTE — PT/OT/SLP PROGRESS
"Occupational Therapy      Patient Name:  Willian Ron   MRN:  [id number]    Attempted OT tx. Patient's mother deferred OT tx stating "He had a bad night." Will follow-up later today if schedule permits.    9/3/2024  "

## 2024-09-03 NOTE — ASSESSMENT & PLAN NOTE
Anemia is likely due to acute blood loss which was from multiple surgeries . Most recent hemoglobin and hematocrit are listed below.  Recent Labs     09/01/24  1142 09/02/24  0356 09/03/24  0346   HGB 9.3* 8.5* 8.7*   HCT 28.9* 26.4* 27.1*       Plan  - Monitor serial CBC: Daily  - Transfuse PRBC if patient becomes hemodynamically unstable, symptomatic or H/H drops below 7/21.  - Patient has not received any PRBC transfusions to date  - Patient's anemia is currently worsening. Will continue to monitor closely for evidence of bleeding

## 2024-09-03 NOTE — NURSING
"Upon entering room for bedside shift report mother stated " he is still coughing up sputum and was coughing so much was unable catch his breath ." Mother stated she" had to put head of bed up and he aspirated."   When trying to get clarification and stated aspirated means it went into his lungs" Mother became very upset and stated " I know what aspirated means" I told her I would assess Patient,. .Returned to assess patient, had to wake patient,. Respirations even and unlabored Lungs clear. No distress noted. Day shift nurse at bedside stated will notify MD.Charge nurse and nurse manager aware.  "

## 2024-09-03 NOTE — NURSING
0405-Patient mother came to desk , patient complaining of nausea. Zofran 4mg IV given per charge nurse. 0425- charge nurse & floor nurse at bedside , patient vomited 500cc bile colored emesis per charge nurse. Patient cleaned up, sitting up in chair. No distress noted. No request at this time , no complaints of pain. 0545- Patient sitting in chair complained of abdominal pain,medicated with prn pain medication Mother at bedside asked about electrolyte  results & WBC results  updated on results. No need for replacements at this time. 0605-  patient in bed no distress noted.

## 2024-09-03 NOTE — SUBJECTIVE & OBJECTIVE
Interval History:  Patient seen and examined.  No acute events overnight.  Difficult time with pain. Had a minor aspiration event this morning. Significant emesis this AM.    Review of Systems  Objective:     Vital Signs (Most Recent):  Temp: 98.6 °F (37 °C) (09/03/24 1123)  Pulse: 81 (09/03/24 1123)  Resp: 18 (09/03/24 1418)  BP: (!) 142/82 (09/03/24 1123)  SpO2: 99 % (09/03/24 1123) Vital Signs (24h Range):  Temp:  [97 °F (36.1 °C)-98.6 °F (37 °C)] 98.6 °F (37 °C)  Pulse:  [] 81  Resp:  [14-20] 18  SpO2:  [95 %-100 %] 99 %  BP: (141-167)/() 142/82     Weight: 118.5 kg (261 lb 3.9 oz)  Body mass index is 36.44 kg/m².    Intake/Output Summary (Last 24 hours) at 9/3/2024 1456  Last data filed at 9/3/2024 1014  Gross per 24 hour   Intake 250 ml   Output 2015 ml   Net -1765 ml         Physical Exam  Vitals and nursing note reviewed.   Constitutional:       Appearance: He is obese.   Eyes:      Pupils: Pupils are equal, round, and reactive to light.   Neck:      Vascular: No JVD.   Cardiovascular:      Rate and Rhythm: Normal rate and regular rhythm.      Heart sounds: Normal heart sounds.   Pulmonary:      Effort: Pulmonary effort is normal.      Breath sounds: Normal breath sounds.   Abdominal:      General: Bowel sounds are normal. There is no distension.      Palpations: Abdomen is soft.      Tenderness: There is no abdominal tenderness.      Comments: 2 GELY drains present with minimal output.  Noted midline laparotomy scar.   Musculoskeletal:         General: No deformity.   Lymphadenopathy:      Cervical: No cervical adenopathy.   Skin:     Coloration: Skin is not pale.      Findings: No rash.   Neurological:      General: No focal deficit present.      Mental Status: He is oriented to person, place, and time.             Significant Labs: All pertinent labs within the past 24 hours have been reviewed.    Significant Imaging: I have reviewed all pertinent imaging results/findings within the past 24  hours.

## 2024-09-03 NOTE — PROGRESS NOTES
Pt seen and examined. Resting comfortably  Had nausea/ emesis this AM  No significant abd pain    Wt Readings from Last 3 Encounters:   08/30/24 118.5 kg (261 lb 3.9 oz)   08/22/24 120.7 kg (266 lb 1.5 oz)   01/07/20 108.9 kg (240 lb)     Temp Readings from Last 3 Encounters:   09/03/24 98.6 °F (37 °C) (Oral)   08/27/24 98.6 °F (37 °C) (Oral)   01/07/20 98.8 °F (37.1 °C) (Oral)     BP Readings from Last 3 Encounters:   09/03/24 (!) 142/82   08/27/24 (!) 116/57   01/07/20 (!) 146/83     Pulse Readings from Last 3 Encounters:   09/03/24 81   08/27/24 78   01/07/20 82     AAOx3  Soft/nd/ BS noted    Lab Results   Component Value Date    WBC 11.95 09/03/2024    HGB 8.7 (L) 09/03/2024    HCT 27.1 (L) 09/03/2024    MCV 89 09/03/2024     09/03/2024       BMP  Lab Results   Component Value Date     09/03/2024    K 3.9 09/03/2024    CL 98 09/03/2024    CO2 30 (H) 09/03/2024    BUN 8 09/03/2024    CREATININE 0.9 09/03/2024    CALCIUM 9.1 09/03/2024    ANIONGAP 10 09/03/2024    EGFRNORACEVR >60 09/03/2024     Lab Results   Component Value Date    .5 (H) 09/03/2024     A/P: s/p ex lap with colectomy x2  Doing well  Uncertain as to why he has nausea.  Ileus certainly possible.  Labs (crp, wbc) much improved and vitals stable  Will check abd xray and follow

## 2024-09-03 NOTE — PROGRESS NOTES
[facility] Medicine  Progress Note    Patient Name: Willian Ron  MRN: [id number]  Patient Class: IP- Inpatient   Admission Date: 8/30/2024  Length of Stay: 4 days  Attending Physician: Otis White MD  Primary Care Provider: Jennifer, Primary Doctor        Subjective:     Principal Problem:Sepsis        HPI:  Willian Ron is a 34 year old male with a past medical history of acute perforated appendicitis with abscess s/p IR drainage (E coli and Prevotella) on Augmentin and Levaquin given persistent abscesses who presents with worsening abdominal pain. CT in the ED shows acute appendicitis with abscess. Surgery was consulted from the ED for appendectomy. He received IV fluids, meropenem, hydromorphone, Protonix and Zofran in the ED. Hospital Medicine was consulted for admission.    Overview/Hospital Course:  Willian Ron is a 34 year old male with a past medical history of acute perforated appendicitis with abscess s/p IR drainage (E coli and Prevotella) on Augmentin and Levaquin who presented with persistent acute appendicitis with abscess. He is on Zosyn and IV fluids. Dr. Harris of Surgery took the patient to the OR for exploratory laparotomy 8/30 where the patient underwent drainage of the abdominal abscess with right hemicolectomy with ileocolic anastomosis as well as rectosigmoid resection with colorectal anastomosis with splenic flexure mobilization. Cultures are pending. He remains NPO. His course has been complicated by a post-operative anemia for which Hgb is being trended and Lovenox prophylaxis has been discontinued. PT/OT recommends low intensity therapy.    Interval History:  Patient seen and examined.  No acute events overnight.  Difficult time with pain. Had a minor aspiration event this morning. Significant emesis this AM.    Review of Systems  Objective:     Vital Signs (Most Recent):  Temp: 98.6 °F (37 °C) (09/03/24 1123)  Pulse: 81 (09/03/24 1123)  Resp: 18  (09/03/24 1418)  BP: (!) 142/82 (09/03/24 1123)  SpO2: 99 % (09/03/24 1123) Vital Signs (24h Range):  Temp:  [97 °F (36.1 °C)-98.6 °F (37 °C)] 98.6 °F (37 °C)  Pulse:  [] 81  Resp:  [14-20] 18  SpO2:  [95 %-100 %] 99 %  BP: (141-167)/() 142/82     Weight: 118.5 kg (261 lb 3.9 oz)  Body mass index is 36.44 kg/m².    Intake/Output Summary (Last 24 hours) at 9/3/2024 1456  Last data filed at 9/3/2024 1014  Gross per 24 hour   Intake 250 ml   Output 2015 ml   Net -1765 ml         Physical Exam  Vitals and nursing note reviewed.   Constitutional:       Appearance: He is obese.   Eyes:      Pupils: Pupils are equal, round, and reactive to light.   Neck:      Vascular: No JVD.   Cardiovascular:      Rate and Rhythm: Normal rate and regular rhythm.      Heart sounds: Normal heart sounds.   Pulmonary:      Effort: Pulmonary effort is normal.      Breath sounds: Normal breath sounds.   Abdominal:      General: Bowel sounds are normal. There is no distension.      Palpations: Abdomen is soft.      Tenderness: There is no abdominal tenderness.      Comments: 2 GELY drains present with minimal output.  Noted midline laparotomy scar.   Musculoskeletal:         General: No deformity.   Lymphadenopathy:      Cervical: No cervical adenopathy.   Skin:     Coloration: Skin is not pale.      Findings: No rash.   Neurological:      General: No focal deficit present.      Mental Status: He is oriented to person, place, and time.             Significant Labs: All pertinent labs within the past 24 hours have been reviewed.    Significant Imaging: I have reviewed all pertinent imaging results/findings within the past 24 hours.    Assessment/Plan:      * Sepsis  This patient does have evidence of infective focus  My overall impression is sepsis.  Source: Abdominal  Antibiotics given-   Antibiotics (72h ago, onward)      Start     Stop Route Frequency Ordered    08/30/24 1430  piperacillin-tazobactam (ZOSYN) 4.5 g in D5W 100 mL IVPB  (MB+)         -- IV Every 8 hours (non-standard times) 08/30/24 1316          Patient is status post colonic perforation, continue IV antibiotics for now.  Defer to surgery for further surgical intervention.        Perforation of sigmoid colon  -S/p sigmoid colectomy with anastomosis.  Continue to advance diet per General surgery recommendation.  Attempt to wean IV pain medications and transitioned to oral pain meds.      Acute appendicitis  S/p ileocolic colectomy with anastomosis.  Continue IV antibiotics per above under perforation, and monitor.     Abdominal visceral abscess  Treatment per above under perforation.      Postoperative anemia  Anemia is likely due to acute blood loss which was from multiple surgeries . Most recent hemoglobin and hematocrit are listed below.  Recent Labs     09/01/24  1142 09/02/24  0356 09/03/24  0346   HGB 9.3* 8.5* 8.7*   HCT 28.9* 26.4* 27.1*       Plan  - Monitor serial CBC: Daily  - Transfuse PRBC if patient becomes hemodynamically unstable, symptomatic or H/H drops below 7/21.  - Patient has not received any PRBC transfusions to date  - Patient's anemia is currently worsening. Will continue to monitor closely for evidence of bleeding      Obesity (BMI 30-39.9)  Body mass index is 36.44 kg/m². Morbid obesity complicates all aspects of disease management from diagnostic modalities to treatment.         Tobacco dependency  -Nicotine patch  -Patient to be counseled on cessation      VTE Risk Mitigation (From admission, onward)           Ordered     IP VTE HIGH RISK PATIENT  Once         08/30/24 1316     Place sequential compression device  Until discontinued         08/30/24 1316                    Discharge Planning   JANN: 9/5/2024     Code Status: Full Code   Is the patient medically ready for discharge?:     Reason for patient still in hospital (select all that apply): Treatment  Discharge Plan A: Home                  Otis White MD  Department of Hospital Medicine    [facility]/Surg

## 2024-09-03 NOTE — NURSING
Tech reported to nurse patient mother stated patient aspirated and in pain, Upon entering room patient was resting in bed with eyes closed, HOB elevated, patient asymptomatic, mom stated patient been up since 4AM, patient was nauseated and in pain, PM nurse gave zofran 4mg IV at 0410 and mom stated pain medication was given by PM nurse also, lungs clear, will cont to monitor

## 2024-09-03 NOTE — PLAN OF CARE
Problem: Adult Inpatient Plan of Care  Goal: Plan of Care Review  Outcome: Progressing  Goal: Patient-Specific Goal (Individualized)  Outcome: Progressing  Goal: Absence of Hospital-Acquired Illness or Injury  Outcome: Progressing  Goal: Optimal Comfort and Wellbeing  Outcome: Progressing  Goal: Readiness for Transition of Care  Outcome: Progressing     Problem: Sepsis/Septic Shock  Goal: Optimal Coping  Outcome: Progressing  Goal: Absence of Bleeding  Outcome: Progressing  Goal: Blood Glucose Level Within Targeted Range  Outcome: Progressing  Goal: Absence of Infection Signs and Symptoms  Outcome: Progressing  Goal: Optimal Nutrition Intake  Outcome: Progressing     Problem: Wound  Goal: Optimal Coping  Outcome: Progressing  Goal: Optimal Functional Ability  Outcome: Progressing  Goal: Absence of Infection Signs and Symptoms  Outcome: Progressing  Goal: Improved Oral Intake  Outcome: Progressing  Goal: Optimal Pain Control and Function  Outcome: Progressing  Goal: Skin Health and Integrity  Outcome: Progressing  Goal: Optimal Wound Healing  Outcome: Progressing     Problem: Pain Acute  Goal: Optimal Pain Control and Function  Outcome: Progressing     Problem: Fall Injury Risk  Goal: Absence of Fall and Fall-Related Injury  Outcome: Progressing   Plan of care reviewed with patient,verbalized understanding.  IV fluids/ IV antibiotics administered as per orders. SR on telemetry. ML incision with ss C/D/I. GELY x2 intact & patent. Medicated for pain during night, moderate relief obtained. Remains free from falls, injury. Instructed to call for assistance as needed ,verbalized understanding. Bed in low & locked position. Call light in reach, bed alarm on .

## 2024-09-03 NOTE — NURSING
Otis White MD notified d/t mom stating patient aspirated, patient asymptomatic, lungs clear, patient resting in bed with eyes close, no chest X-Ray needed at this timer per MD, MD will listen to lungs when he round on patient today, BETH Patterson ( charge nurse made aware)

## 2024-09-03 NOTE — PT/OT/SLP PROGRESS
Physical Therapy      Patient Name:  Willian Ron   MRN:  [id number]    Patient not seen today secondary to Other (Comment) (Patients mother refused therapy, reporting patient had a rough night and was up this a.m.). Will follow-up 9/4/24.     negative - no change in level of consciousness

## 2024-09-04 ENCOUNTER — TELEPHONE (OUTPATIENT)
Dept: SURGERY | Facility: CLINIC | Age: 34
End: 2024-09-04
Payer: MEDICAID

## 2024-09-04 PROBLEM — E87.6 HYPOKALEMIA: Status: ACTIVE | Noted: 2024-09-04

## 2024-09-04 LAB
ALBUMIN SERPL BCP-MCNC: 2.8 G/DL (ref 3.5–5.2)
ALP SERPL-CCNC: 82 U/L (ref 55–135)
ALT SERPL W/O P-5'-P-CCNC: 37 U/L (ref 10–44)
ANION GAP SERPL CALC-SCNC: 9 MMOL/L (ref 8–16)
AST SERPL-CCNC: 33 U/L (ref 10–40)
BACTERIA BLD CULT: NORMAL
BACTERIA BLD CULT: NORMAL
BACTERIA SPEC ANAEROBE CULT: ABNORMAL
BACTERIA SPEC ANAEROBE CULT: ABNORMAL
BASOPHILS # BLD AUTO: 0.05 K/UL (ref 0–0.2)
BASOPHILS NFR BLD: 0.5 % (ref 0–1.9)
BILIRUB SERPL-MCNC: 1.2 MG/DL (ref 0.1–1)
BUN SERPL-MCNC: 7 MG/DL (ref 6–20)
CALCIUM SERPL-MCNC: 9.2 MG/DL (ref 8.7–10.5)
CHLORIDE SERPL-SCNC: 99 MMOL/L (ref 95–110)
CO2 SERPL-SCNC: 29 MMOL/L (ref 23–29)
CREAT SERPL-MCNC: 0.9 MG/DL (ref 0.5–1.4)
DIFFERENTIAL METHOD BLD: ABNORMAL
EOSINOPHIL # BLD AUTO: 0.7 K/UL (ref 0–0.5)
EOSINOPHIL NFR BLD: 6.4 % (ref 0–8)
ERYTHROCYTE [DISTWIDTH] IN BLOOD BY AUTOMATED COUNT: 13.2 % (ref 11.5–14.5)
EST. GFR  (NO RACE VARIABLE): >60 ML/MIN/1.73 M^2
GLUCOSE SERPL-MCNC: 100 MG/DL (ref 70–110)
HCT VFR BLD AUTO: 27 % (ref 40–54)
HGB BLD-MCNC: 8.7 G/DL (ref 14–18)
IMM GRANULOCYTES # BLD AUTO: 0.12 K/UL (ref 0–0.04)
IMM GRANULOCYTES NFR BLD AUTO: 1.2 % (ref 0–0.5)
LYMPHOCYTES # BLD AUTO: 2.2 K/UL (ref 1–4.8)
LYMPHOCYTES NFR BLD: 21.2 % (ref 18–48)
MAGNESIUM SERPL-MCNC: 2 MG/DL (ref 1.6–2.6)
MCH RBC QN AUTO: 28.8 PG (ref 27–31)
MCHC RBC AUTO-ENTMCNC: 32.2 G/DL (ref 32–36)
MCV RBC AUTO: 89 FL (ref 82–98)
MONOCYTES # BLD AUTO: 1.4 K/UL (ref 0.3–1)
MONOCYTES NFR BLD: 13.2 % (ref 4–15)
NEUTROPHILS # BLD AUTO: 5.9 K/UL (ref 1.8–7.7)
NEUTROPHILS NFR BLD: 57.5 % (ref 38–73)
NRBC BLD-RTO: 0 /100 WBC
PHOSPHATE SERPL-MCNC: 2.9 MG/DL (ref 2.7–4.5)
PLATELET # BLD AUTO: 419 K/UL (ref 150–450)
PMV BLD AUTO: 10 FL (ref 9.2–12.9)
POTASSIUM SERPL-SCNC: 3.3 MMOL/L (ref 3.5–5.1)
PROT SERPL-MCNC: 6.7 G/DL (ref 6–8.4)
RBC # BLD AUTO: 3.02 M/UL (ref 4.6–6.2)
SODIUM SERPL-SCNC: 137 MMOL/L (ref 136–145)
WBC # BLD AUTO: 10.23 K/UL (ref 3.9–12.7)

## 2024-09-04 PROCEDURE — A9698 NON-RAD CONTRAST MATERIALNOC: HCPCS

## 2024-09-04 PROCEDURE — 63600175 PHARM REV CODE 636 W HCPCS: Performed by: HOSPITALIST

## 2024-09-04 PROCEDURE — 25000003 PHARM REV CODE 250: Performed by: SURGERY

## 2024-09-04 PROCEDURE — 63600175 PHARM REV CODE 636 W HCPCS: Performed by: SURGERY

## 2024-09-04 PROCEDURE — 63600175 PHARM REV CODE 636 W HCPCS

## 2024-09-04 PROCEDURE — 94799 UNLISTED PULMONARY SVC/PX: CPT

## 2024-09-04 PROCEDURE — 85025 COMPLETE CBC W/AUTO DIFF WBC: CPT | Performed by: SURGERY

## 2024-09-04 PROCEDURE — 36415 COLL VENOUS BLD VENIPUNCTURE: CPT | Performed by: SURGERY

## 2024-09-04 PROCEDURE — 80053 COMPREHEN METABOLIC PANEL: CPT | Performed by: SURGERY

## 2024-09-04 PROCEDURE — 97116 GAIT TRAINING THERAPY: CPT | Mod: CQ

## 2024-09-04 PROCEDURE — 94761 N-INVAS EAR/PLS OXIMETRY MLT: CPT

## 2024-09-04 PROCEDURE — 11000001 HC ACUTE MED/SURG PRIVATE ROOM

## 2024-09-04 PROCEDURE — 25500020 PHARM REV CODE 255

## 2024-09-04 PROCEDURE — 83735 ASSAY OF MAGNESIUM: CPT | Performed by: SURGERY

## 2024-09-04 PROCEDURE — 25000003 PHARM REV CODE 250: Performed by: HOSPITALIST

## 2024-09-04 PROCEDURE — 25000003 PHARM REV CODE 250: Performed by: STUDENT IN AN ORGANIZED HEALTH CARE EDUCATION/TRAINING PROGRAM

## 2024-09-04 PROCEDURE — 21400001 HC TELEMETRY ROOM

## 2024-09-04 PROCEDURE — 99900035 HC TECH TIME PER 15 MIN (STAT)

## 2024-09-04 PROCEDURE — 63600175 PHARM REV CODE 636 W HCPCS: Performed by: STUDENT IN AN ORGANIZED HEALTH CARE EDUCATION/TRAINING PROGRAM

## 2024-09-04 PROCEDURE — 84100 ASSAY OF PHOSPHORUS: CPT | Performed by: SURGERY

## 2024-09-04 RX ORDER — HYDROCODONE BITARTRATE AND ACETAMINOPHEN 7.5; 325 MG/1; MG/1
1 TABLET ORAL EVERY 4 HOURS PRN
Status: DISCONTINUED | OUTPATIENT
Start: 2024-09-04 | End: 2024-09-08 | Stop reason: HOSPADM

## 2024-09-04 RX ORDER — POTASSIUM CHLORIDE 7.45 MG/ML
10 INJECTION INTRAVENOUS
Status: DISPENSED | OUTPATIENT
Start: 2024-09-04 | End: 2024-09-04

## 2024-09-04 RX ORDER — POTASSIUM CHLORIDE 7.45 MG/ML
10 INJECTION INTRAVENOUS
Status: COMPLETED | OUTPATIENT
Start: 2024-09-04 | End: 2024-09-05

## 2024-09-04 RX ORDER — DIPHENHYDRAMINE HCL 25 MG
25 CAPSULE ORAL EVERY 6 HOURS PRN
Status: DISCONTINUED | OUTPATIENT
Start: 2024-09-04 | End: 2024-09-08 | Stop reason: HOSPADM

## 2024-09-04 RX ADMIN — METOCLOPRAMIDE 10 MG: 5 INJECTION, SOLUTION INTRAMUSCULAR; INTRAVENOUS at 11:09

## 2024-09-04 RX ADMIN — SODIUM CHLORIDE, SODIUM LACTATE, POTASSIUM CHLORIDE, CALCIUM CHLORIDE AND DEXTROSE MONOHYDRATE: 5; 600; 310; 30; 20 INJECTION, SOLUTION INTRAVENOUS at 04:09

## 2024-09-04 RX ADMIN — PROCHLORPERAZINE EDISYLATE 2.5 MG: 5 INJECTION INTRAMUSCULAR; INTRAVENOUS at 09:09

## 2024-09-04 RX ADMIN — BACLOFEN 5 MG: 5 TABLET ORAL at 02:09

## 2024-09-04 RX ADMIN — IOHEXOL 100 ML: 350 INJECTION, SOLUTION INTRAVENOUS at 09:09

## 2024-09-04 RX ADMIN — POTASSIUM CHLORIDE 10 MEQ: 7.46 INJECTION, SOLUTION INTRAVENOUS at 02:09

## 2024-09-04 RX ADMIN — METOCLOPRAMIDE 10 MG: 5 INJECTION, SOLUTION INTRAMUSCULAR; INTRAVENOUS at 05:09

## 2024-09-04 RX ADMIN — HYDROCODONE BITARTRATE AND ACETAMINOPHEN 1 TABLET: 7.5; 325 TABLET ORAL at 02:09

## 2024-09-04 RX ADMIN — IBUPROFEN 600 MG: 600 TABLET ORAL at 09:09

## 2024-09-04 RX ADMIN — OXYCODONE HYDROCHLORIDE 5 MG: 5 TABLET ORAL at 04:09

## 2024-09-04 RX ADMIN — IBUPROFEN 600 MG: 600 TABLET ORAL at 12:09

## 2024-09-04 RX ADMIN — METOCLOPRAMIDE 10 MG: 5 INJECTION, SOLUTION INTRAMUSCULAR; INTRAVENOUS at 06:09

## 2024-09-04 RX ADMIN — IBUPROFEN 600 MG: 600 TABLET ORAL at 05:09

## 2024-09-04 RX ADMIN — PIPERACILLIN SODIUM AND TAZOBACTAM SODIUM 4.5 G: 4; .5 INJECTION, POWDER, FOR SOLUTION INTRAVENOUS at 05:09

## 2024-09-04 RX ADMIN — IOHEXOL 500 ML: 12 SOLUTION ORAL at 07:09

## 2024-09-04 RX ADMIN — BACLOFEN 5 MG: 5 TABLET ORAL at 09:09

## 2024-09-04 RX ADMIN — POTASSIUM CHLORIDE 10 MEQ: 7.46 INJECTION, SOLUTION INTRAVENOUS at 10:09

## 2024-09-04 RX ADMIN — HYDROMORPHONE HYDROCHLORIDE 0.5 MG: 1 INJECTION, SOLUTION INTRAMUSCULAR; INTRAVENOUS; SUBCUTANEOUS at 05:09

## 2024-09-04 RX ADMIN — METOCLOPRAMIDE 10 MG: 5 INJECTION, SOLUTION INTRAMUSCULAR; INTRAVENOUS at 02:09

## 2024-09-04 RX ADMIN — ONDANSETRON 4 MG: 2 INJECTION INTRAMUSCULAR; INTRAVENOUS at 05:09

## 2024-09-04 NOTE — ASSESSMENT & PLAN NOTE
Patient's most recent potassium results are listed below.   Recent Labs     09/02/24  0356 09/03/24  0346 09/04/24  0338   K 3.4* 3.9 3.3*     Plan  - Replete potassium per protocol  - Monitor potassium Daily  - Patient's hypokalemia is worsening. Will continue current treatment

## 2024-09-04 NOTE — PT/OT/SLP PROGRESS
Occupational Therapy      Patient Name:  Willian Ron   MRN:  [id number]    Patient not seen today secondary to patient was with PT at 11:39 and at 1605 patient was asleep and patient's mother requested to let patient sleep. Will follow-up 9/5/2024.    9/4/2024

## 2024-09-04 NOTE — PLAN OF CARE
Problem: Physical Therapy  Goal: Physical Therapy Goal  Description: Goals to be met by: 24     Patient will increase functional independence with mobility by performin. Supine to sit with [place]  2. Sit to stand transfer with [place]  3. Bed to chair transfer with Modified [place] using Rolling Walker  4. Gait  x 500 feet with Modified [place] using Rolling Walker.   5. Lower extremity exercise program x30 reps per handout, with independence    Outcome: Progressing   Ambulate with assistance for safety.

## 2024-09-04 NOTE — NURSING
20 gauge PIV placed under US guidance into the patient's left forearm per request of RN. Patient tolerated well.

## 2024-09-04 NOTE — PLAN OF CARE
Problem: Adult Inpatient Plan of Care  Goal: Plan of Care Review  9/4/2024 0304 by Terri Ng RN  Outcome: Progressing  9/4/2024 0258 by Terri Ng RN  Outcome: Progressing     Problem: Adult Inpatient Plan of Care  Goal: Absence of Hospital-Acquired Illness or Injury  9/4/2024 0304 by Terri Ng RN  Outcome: Progressing  9/4/2024 0258 by Terri Ng RN  Outcome: Progressing     Problem: Adult Inpatient Plan of Care  Goal: Optimal Comfort and Wellbeing  9/4/2024 0304 by Terri Ng RN  Outcome: Progressing  9/4/2024 0258 by Terri Ng RN  Outcome: Progressing     Problem: Wound  Goal: Absence of Infection Signs and Symptoms  9/4/2024 0304 by Terri Ng RN  Outcome: Progressing  9/4/2024 0258 by Terri Ng RN  Outcome: Progressing     Problem: Pain Acute  Goal: Optimal Pain Control and Function  9/4/2024 0304 by Terri Ng RN  Outcome: Progressing  9/4/2024 0258 by Terri Ng RN  Outcome: Progressing

## 2024-09-04 NOTE — PT/OT/SLP PROGRESS
Physical Therapy Treatment    Patient Name:  Willian Ron   MRN:  [id number]    Recommendations:     Discharge Recommendations: Low Intensity Therapy  Discharge Equipment Recommendations: none  Barriers to discharge: None    Assessment:     Willian Ron is a 34 y.o. male admitted with a medical diagnosis of Sepsis.  He presents with the following impairments/functional limitations: weakness, impaired functional mobility, pain . Awake,alert, supine in bed with mother present.  Agreed to mobilize. Declines use of gait belt and rw.  Reports some abdominal discomfort and nausea ( emesis bag on hand).      Rehab Prognosis: Good; patient would benefit from acute skilled PT services to address these deficits and reach maximum level of function.    Recent Surgery: Procedure(s) (LRB):  LAPAROTOMY, EXPLORATORY (N/A)  COLON RESECTION (Bilateral)  INCISION AND DRAINAGE, ABSCESS (N/A) 5 Days Post-Op    Plan:     During this hospitalization, patient to be seen 5 x/week to address the identified rehab impairments via gait training, therapeutic activities, therapeutic exercises and progress toward the following goals:    Plan of Care Expires:  09/28/24    Subjective     Chief Complaint: nausea  Patient/Family Comments/goals: to return home  Pain/Comfort:  Pain Rating 1: other (see comments) (did not rate)  Location - Orientation 1: generalized  Location 1: abdomen  Pain Addressed 1: Reposition, Nurse notified      Objective:     Communicated with nurse Lane prior to session.  Patient found supine with GELY drain upon PT entry to room.     General Precautions: Standard, fall  Orthopedic Precautions: N/A  Braces: N/A  Respiratory Status: Room air     Functional Mobility:  Bed Mobility:     Supine to Sit: contact guard assistance  Transfers:     Sit to Stand:  contact guard assistance with no AD  Gait: 250' x 2 with SBA.      AM-PAC 6 CLICK MOBILITY          Treatment & Education:  Ambulate with assistance for safety.     Patient  left up in chair with all lines intact, call button in reach, chair alarm on, nurse Ariana notified, and mother present..    GOALS:   Multidisciplinary Problems       Physical Therapy Goals          Problem: Physical Therapy    Goal Priority Disciplines Outcome Goal Variances Interventions   Physical Therapy Goal     PT, PT/OT Progressing     Description: Goals to be met by: 24     Patient will increase functional independence with mobility by performin. Supine to sit with [place]  2. Sit to stand transfer with [place]  3. Bed to chair transfer with Modified [place] using Rolling Walker  4. Gait  x 500 feet with Modified [place] using Rolling Walker.   5. Lower extremity exercise program x30 reps per handout, with independence                         Time Tracking:     PT Received On: 24  PT Start Time: 1130     PT Stop Time: 1140  PT Total Time (min): 10 min     Billable Minutes: Gait Training 10min    Treatment Type: Treatment  PT/PTA: PTA     Number of PTA visits since last PT visit: 2024

## 2024-09-04 NOTE — NURSING
"[place] bed alarm going off from the nurses station, walked in to find patients mother helping patient into the bathroom. I asked if they had called the nurses station to ask for assistance, mother stated "no, I was just fixing to turn the bed alarm off." Educated both mother and patient on importance of calling for assistance and letting staff help patient to the restroom. Straightened patients bed, picked up some trash off the floor, allowed patient to wash hands, assisted back to bed, call light in reach, bed alarm reactivated.  "

## 2024-09-04 NOTE — PLAN OF CARE
Problem: Sepsis/Septic Shock  Goal: Optimal Coping  9/4/2024 0258 by Terri Ng RN  Outcome: Progressing    Problem: Sepsis/Septic Shock  Goal: Blood Glucose Level Within Targeted Range  9/4/2024 0258 by Terri Ng RN  Outcome: Progressing     Problem: Sepsis/Septic Shock  Goal: Optimal Nutrition Intake  9/4/2024 0258 by Terri Ng RN  Outcome: Progressing     Problem: Adult Inpatient Plan of Care  Goal: Absence of Hospital-Acquired Illness or Injury  9/4/2024 0258 by Terri Ng RN  Outcome: Progressing     Problem: Wound  Goal: Optimal Pain Control and Function  9/4/2024 0258 by Terri Ng RN  Outcome: Progressing

## 2024-09-04 NOTE — TELEPHONE ENCOUNTER
----- Message from Ann-Marie Oconnor RN sent at 9/4/2024  2:20 PM CDT -----  Regarding: post op visit  Good afternoon    This patient is likely discharging from [facility] on Friday and needs a post op apt scheduled, please.     Thank you  Ann-Marie

## 2024-09-04 NOTE — NURSING
Primary nurse was notified by PCT that pt wants something for pain. RN offered PO Oxycodone that was in the order. Pt refused the pill and replied that it doesn't work for him and he needs Dilaudid, plan of care explained to the pt and made him aware that Dilaudid is ordered only for break through pain and can only given if the pill didn't reduce the pain, pt still refused the pill. Resource nurse notified. Pt agrees to discuss the issue with the provider about changing the pain medicine.

## 2024-09-04 NOTE — PROGRESS NOTES
Patient seen and examined.  He was resting comfortably in bed.  Reports continued bowel movements.  No nausea or vomiting since yesterday morning.  Abdominal x-ray yesterday with nonobstructive bowel gas pattern.  No evidence of an ileus    Wt Readings from Last 3 Encounters:   08/30/24 118.5 kg (261 lb 3.9 oz)   08/22/24 120.7 kg (266 lb 1.5 oz)   01/07/20 108.9 kg (240 lb)     Temp Readings from Last 3 Encounters:   09/04/24 97.3 °F (36.3 °C) (Oral)   08/27/24 98.6 °F (37 °C) (Oral)   01/07/20 98.8 °F (37.1 °C) (Oral)     BP Readings from Last 3 Encounters:   09/04/24 (!) 169/92   08/27/24 (!) 116/57   01/07/20 (!) 146/83     Pulse Readings from Last 3 Encounters:   09/04/24 96   08/27/24 78   01/07/20 82   SUBJE  AAOx3  Soft/nd/appt ttp  GELY serosang    Lab Results   Component Value Date    WBC 10.23 09/04/2024    HGB 8.7 (L) 09/04/2024    HCT 27.0 (L) 09/04/2024    MCV 89 09/04/2024     09/04/2024       BMP  Lab Results   Component Value Date     09/04/2024    K 3.3 (L) 09/04/2024    CL 99 09/04/2024    CO2 29 09/04/2024    BUN 7 09/04/2024    CREATININE 0.9 09/04/2024    CALCIUM 9.2 09/04/2024    ANIONGAP 9 09/04/2024    EGFRNORACEVR >60 09/04/2024     A/P; s/p bowel resection x2  Ambulate  Aggressive IS  Start to adv diet  Adv as tolerated   Potential d/c in next 24 -48  hours if tolerates

## 2024-09-04 NOTE — TELEPHONE ENCOUNTER
I messaged Ann-Marie Oconnor, RN to give her patients 1wk post op appointment on Tuesday, 9/10/24 @ 2pm in [place].  Jony

## 2024-09-04 NOTE — PROGRESS NOTES
[facility] Medicine  Progress Note    Patient Name: Willian Ron  MRN: [id number]  Patient Class: IP- Inpatient   Admission Date: 8/30/2024  Length of Stay: 5 days  Attending Physician: Otis White MD  Primary Care Provider: Jennifer, Primary Doctor        Subjective:     Principal Problem:Sepsis        HPI:  Willian Ron is a 34 year old male with a past medical history of acute perforated appendicitis with abscess s/p IR drainage (E coli and Prevotella) on Augmentin and Levaquin given persistent abscesses who presents with worsening abdominal pain. CT in the ED shows acute appendicitis with abscess. Surgery was consulted from the ED for appendectomy. He received IV fluids, meropenem, hydromorphone, Protonix and Zofran in the ED. Hospital Medicine was consulted for admission.    Overview/Hospital Course:  Willian Ron is a 34 year old male with a past medical history of acute perforated appendicitis with abscess s/p IR drainage (E coli and Prevotella) on Augmentin and Levaquin who presented with persistent acute appendicitis with abscess. He is on Zosyn and IV fluids. Dr. Harris of Surgery took the patient to the OR for exploratory laparotomy 8/30 where the patient underwent drainage of the abdominal abscess with right hemicolectomy with ileocolic anastomosis as well as rectosigmoid resection with colorectal anastomosis with splenic flexure mobilization. Cultures are pending. He remains NPO. His course has been complicated by a post-operative anemia for which Hgb is being trended and Lovenox prophylaxis has been discontinued. PT/OT recommends low intensity therapy.    Interval History:  Patient seen and examined.  Had a rough night last night.  Pain seems to be uncontrolled.  Plan of care discussed with the patient and his mom at the bedside.    Review of Systems  Objective:     Vital Signs (Most Recent):  Temp: 97.3 °F (36.3 °C) (09/04/24 1122)  Pulse: 96 (09/04/24 1122)  Resp: 16  (09/04/24 1122)  BP: (!) 169/92 (09/04/24 1122)  SpO2: 98 % (09/04/24 1122) Vital Signs (24h Range):  Temp:  [97.3 °F (36.3 °C)-98.9 °F (37.2 °C)] 97.3 °F (36.3 °C)  Pulse:  [] 96  Resp:  [16-20] 16  SpO2:  [95 %-99 %] 98 %  BP: (141-176)/(73-96) 169/92     Weight: 118.5 kg (261 lb 3.9 oz)  Body mass index is 36.44 kg/m².    Intake/Output Summary (Last 24 hours) at 9/4/2024 1226  Last data filed at 9/4/2024 1152  Gross per 24 hour   Intake 1805 ml   Output 290 ml   Net 1515 ml         Physical Exam  Vitals and nursing note reviewed.   Constitutional:       Appearance: He is obese.   Eyes:      Pupils: Pupils are equal, round, and reactive to light.   Neck:      Vascular: No JVD.   Cardiovascular:      Rate and Rhythm: Normal rate and regular rhythm.      Heart sounds: Normal heart sounds.   Pulmonary:      Effort: Pulmonary effort is normal.      Breath sounds: Normal breath sounds.   Abdominal:      General: Bowel sounds are normal. There is no distension.      Palpations: Abdomen is soft.      Tenderness: There is no abdominal tenderness.      Comments: 2 GELY drains present with minimal output.  Noted midline laparotomy scar.   Musculoskeletal:         General: No deformity.   Lymphadenopathy:      Cervical: No cervical adenopathy.   Skin:     Coloration: Skin is not pale.      Findings: No rash.   Neurological:      General: No focal deficit present.      Mental Status: He is oriented to person, place, and time.             Significant Labs: All pertinent labs within the past 24 hours have been reviewed.    Significant Imaging: I have reviewed all pertinent imaging results/findings within the past 24 hours.    Assessment/Plan:      * Sepsis  This patient does have evidence of infective focus  My overall impression is sepsis.  Source: Abdominal  Antibiotics given-   Antibiotics (72h ago, onward)      Start     Stop Route Frequency Ordered    08/30/24 1430  piperacillin-tazobactam (ZOSYN) 4.5 g in D5W 100 mL  IVPB (MB+)         -- IV Every 8 hours (non-standard times) 08/30/24 1316          Patient is status post colonic perforation, continue IV antibiotics for now.  Defer to surgery for further surgical intervention.        Perforation of sigmoid colon  -S/p sigmoid colectomy with anastomosis.  Continue to advance diet per General surgery recommendation.  Attempt to wean IV pain medications and transitioned to oral pain meds.      Acute appendicitis  S/p ileocolic colectomy with anastomosis.  Continue IV antibiotics per above under perforation, and monitor.     Abdominal visceral abscess  Treatment per above under perforation.      Hypokalemia  Patient's most recent potassium results are listed below.   Recent Labs     09/02/24  0356 09/03/24 0346 09/04/24  0338   K 3.4* 3.9 3.3*     Plan  - Replete potassium per protocol  - Monitor potassium Daily  - Patient's hypokalemia is worsening. Will continue current treatment      Postoperative anemia  Anemia is likely due to acute blood loss which was from multiple surgeries . Most recent hemoglobin and hematocrit are listed below.  Recent Labs     09/02/24 0356 09/03/24 0346 09/04/24  0338   HGB 8.5* 8.7* 8.7*   HCT 26.4* 27.1* 27.0*       Plan  - Monitor serial CBC: Daily  - Transfuse PRBC if patient becomes hemodynamically unstable, symptomatic or H/H drops below 7/21.  - Patient has not received any PRBC transfusions to date  - Patient's anemia is currently worsening. Will continue to monitor closely for evidence of bleeding      Obesity (BMI 30-39.9)  Body mass index is 36.44 kg/m². Morbid obesity complicates all aspects of disease management from diagnostic modalities to treatment.         Tobacco dependency  -Nicotine patch  -Patient to be counseled on cessation      VTE Risk Mitigation (From admission, onward)           Ordered     IP VTE HIGH RISK PATIENT  Once         08/30/24 1316     Place sequential compression device  Until discontinued         08/30/24 1316                     Discharge Planning   JANN: 9/6/2024     Code Status: Full Code   Is the patient medically ready for discharge?:     Reason for patient still in hospital (select all that apply): Treatment  Discharge Plan A: Home                  Otis White MD  Department of Hospital Medicine   [facility]/Surg

## 2024-09-04 NOTE — PLAN OF CARE
Sitting up in chair, no acute distress noted, mom at bedside, plan of care reviewed, patient appetite very poor, patient only ate  2 scoops of vanilla pudding, and 2 popsicles, pain controlled, patient vomit large green bile, zofran IV per order was given, MD made aware, patient requested CT scan, MD made aware and order was place, tele maintained, IV in place infusing zosyn, potassium 3.3, K-Twin given per order, see flow sheet for full assessment, safety maintained

## 2024-09-04 NOTE — PLAN OF CARE
CM requested follow up apt at [facility] from [facility]. [facility] will contact pt to schedule    Requested post op apt via in basket

## 2024-09-04 NOTE — ASSESSMENT & PLAN NOTE
Anemia is likely due to acute blood loss which was from multiple surgeries . Most recent hemoglobin and hematocrit are listed below.  Recent Labs     09/02/24  0356 09/03/24  0346 09/04/24  0338   HGB 8.5* 8.7* 8.7*   HCT 26.4* 27.1* 27.0*       Plan  - Monitor serial CBC: Daily  - Transfuse PRBC if patient becomes hemodynamically unstable, symptomatic or H/H drops below 7/21.  - Patient has not received any PRBC transfusions to date  - Patient's anemia is currently worsening. Will continue to monitor closely for evidence of bleeding

## 2024-09-04 NOTE — SUBJECTIVE & OBJECTIVE
Interval History:  Patient seen and examined.  Had a rough night last night.  Pain seems to be uncontrolled.  Plan of care discussed with the patient and his mom at the bedside.    Review of Systems  Objective:     Vital Signs (Most Recent):  Temp: 97.3 °F (36.3 °C) (09/04/24 1122)  Pulse: 96 (09/04/24 1122)  Resp: 16 (09/04/24 1122)  BP: (!) 169/92 (09/04/24 1122)  SpO2: 98 % (09/04/24 1122) Vital Signs (24h Range):  Temp:  [97.3 °F (36.3 °C)-98.9 °F (37.2 °C)] 97.3 °F (36.3 °C)  Pulse:  [] 96  Resp:  [16-20] 16  SpO2:  [95 %-99 %] 98 %  BP: (141-176)/(73-96) 169/92     Weight: 118.5 kg (261 lb 3.9 oz)  Body mass index is 36.44 kg/m².    Intake/Output Summary (Last 24 hours) at 9/4/2024 1226  Last data filed at 9/4/2024 1152  Gross per 24 hour   Intake 1805 ml   Output 290 ml   Net 1515 ml         Physical Exam  Vitals and nursing note reviewed.   Constitutional:       Appearance: He is obese.   Eyes:      Pupils: Pupils are equal, round, and reactive to light.   Neck:      Vascular: No JVD.   Cardiovascular:      Rate and Rhythm: Normal rate and regular rhythm.      Heart sounds: Normal heart sounds.   Pulmonary:      Effort: Pulmonary effort is normal.      Breath sounds: Normal breath sounds.   Abdominal:      General: Bowel sounds are normal. There is no distension.      Palpations: Abdomen is soft.      Tenderness: There is no abdominal tenderness.      Comments: 2 GELY drains present with minimal output.  Noted midline laparotomy scar.   Musculoskeletal:         General: No deformity.   Lymphadenopathy:      Cervical: No cervical adenopathy.   Skin:     Coloration: Skin is not pale.      Findings: No rash.   Neurological:      General: No focal deficit present.      Mental Status: He is oriented to person, place, and time.             Significant Labs: All pertinent labs within the past 24 hours have been reviewed.    Significant Imaging: I have reviewed all pertinent imaging results/findings within the  past 24 hours.

## 2024-09-05 LAB
ALBUMIN SERPL BCP-MCNC: 2.9 G/DL (ref 3.5–5.2)
ALP SERPL-CCNC: 93 U/L (ref 55–135)
ALT SERPL W/O P-5'-P-CCNC: 85 U/L (ref 10–44)
ANION GAP SERPL CALC-SCNC: 12 MMOL/L (ref 8–16)
AST SERPL-CCNC: 68 U/L (ref 10–40)
BASOPHILS # BLD AUTO: 0.06 K/UL (ref 0–0.2)
BASOPHILS NFR BLD: 0.5 % (ref 0–1.9)
BILIRUB SERPL-MCNC: 2.2 MG/DL (ref 0.1–1)
BUN SERPL-MCNC: 7 MG/DL (ref 6–20)
CALCIUM SERPL-MCNC: 9.2 MG/DL (ref 8.7–10.5)
CHLORIDE SERPL-SCNC: 101 MMOL/L (ref 95–110)
CO2 SERPL-SCNC: 24 MMOL/L (ref 23–29)
CREAT SERPL-MCNC: 0.8 MG/DL (ref 0.5–1.4)
DIFFERENTIAL METHOD BLD: ABNORMAL
EOSINOPHIL # BLD AUTO: 0.4 K/UL (ref 0–0.5)
EOSINOPHIL NFR BLD: 2.8 % (ref 0–8)
ERYTHROCYTE [DISTWIDTH] IN BLOOD BY AUTOMATED COUNT: 13.2 % (ref 11.5–14.5)
EST. GFR  (NO RACE VARIABLE): >60 ML/MIN/1.73 M^2
GLUCOSE SERPL-MCNC: 92 MG/DL (ref 70–110)
HCT VFR BLD AUTO: 28.9 % (ref 40–54)
HGB BLD-MCNC: 9.3 G/DL (ref 14–18)
IMM GRANULOCYTES # BLD AUTO: 0.18 K/UL (ref 0–0.04)
IMM GRANULOCYTES NFR BLD AUTO: 1.4 % (ref 0–0.5)
LYMPHOCYTES # BLD AUTO: 1.3 K/UL (ref 1–4.8)
LYMPHOCYTES NFR BLD: 9.8 % (ref 18–48)
MAGNESIUM SERPL-MCNC: 2 MG/DL (ref 1.6–2.6)
MCH RBC QN AUTO: 28.2 PG (ref 27–31)
MCHC RBC AUTO-ENTMCNC: 32.2 G/DL (ref 32–36)
MCV RBC AUTO: 88 FL (ref 82–98)
MONOCYTES # BLD AUTO: 1.7 K/UL (ref 0.3–1)
MONOCYTES NFR BLD: 12.9 % (ref 4–15)
NEUTROPHILS # BLD AUTO: 9.5 K/UL (ref 1.8–7.7)
NEUTROPHILS NFR BLD: 72.6 % (ref 38–73)
NRBC BLD-RTO: 0 /100 WBC
PHOSPHATE SERPL-MCNC: 2.8 MG/DL (ref 2.7–4.5)
PLATELET # BLD AUTO: 432 K/UL (ref 150–450)
PMV BLD AUTO: 9.7 FL (ref 9.2–12.9)
POTASSIUM SERPL-SCNC: 3.9 MMOL/L (ref 3.5–5.1)
PROT SERPL-MCNC: 6.9 G/DL (ref 6–8.4)
RBC # BLD AUTO: 3.3 M/UL (ref 4.6–6.2)
SODIUM SERPL-SCNC: 137 MMOL/L (ref 136–145)
WBC # BLD AUTO: 13.12 K/UL (ref 3.9–12.7)

## 2024-09-05 PROCEDURE — 94761 N-INVAS EAR/PLS OXIMETRY MLT: CPT

## 2024-09-05 PROCEDURE — 63600175 PHARM REV CODE 636 W HCPCS: Performed by: STUDENT IN AN ORGANIZED HEALTH CARE EDUCATION/TRAINING PROGRAM

## 2024-09-05 PROCEDURE — 25000003 PHARM REV CODE 250: Performed by: STUDENT IN AN ORGANIZED HEALTH CARE EDUCATION/TRAINING PROGRAM

## 2024-09-05 PROCEDURE — 99900035 HC TECH TIME PER 15 MIN (STAT)

## 2024-09-05 PROCEDURE — 97116 GAIT TRAINING THERAPY: CPT | Mod: CQ

## 2024-09-05 PROCEDURE — 94799 UNLISTED PULMONARY SVC/PX: CPT

## 2024-09-05 PROCEDURE — 80053 COMPREHEN METABOLIC PANEL: CPT | Performed by: SURGERY

## 2024-09-05 PROCEDURE — 25000003 PHARM REV CODE 250: Performed by: SURGERY

## 2024-09-05 PROCEDURE — 25000003 PHARM REV CODE 250: Performed by: HOSPITALIST

## 2024-09-05 PROCEDURE — 63600175 PHARM REV CODE 636 W HCPCS: Performed by: SURGERY

## 2024-09-05 PROCEDURE — 85025 COMPLETE CBC W/AUTO DIFF WBC: CPT | Performed by: SURGERY

## 2024-09-05 PROCEDURE — 63600175 PHARM REV CODE 636 W HCPCS

## 2024-09-05 PROCEDURE — 63600175 PHARM REV CODE 636 W HCPCS: Performed by: HOSPITALIST

## 2024-09-05 PROCEDURE — 36415 COLL VENOUS BLD VENIPUNCTURE: CPT | Performed by: SURGERY

## 2024-09-05 PROCEDURE — 11000001 HC ACUTE MED/SURG PRIVATE ROOM

## 2024-09-05 PROCEDURE — 83735 ASSAY OF MAGNESIUM: CPT | Performed by: SURGERY

## 2024-09-05 PROCEDURE — 84100 ASSAY OF PHOSPHORUS: CPT | Performed by: SURGERY

## 2024-09-05 PROCEDURE — 25000003 PHARM REV CODE 250

## 2024-09-05 RX ORDER — SODIUM CHLORIDE, SODIUM LACTATE, POTASSIUM CHLORIDE, CALCIUM CHLORIDE 600; 310; 30; 20 MG/100ML; MG/100ML; MG/100ML; MG/100ML
INJECTION, SOLUTION INTRAVENOUS CONTINUOUS
Status: ACTIVE | OUTPATIENT
Start: 2024-09-05 | End: 2024-09-06

## 2024-09-05 RX ADMIN — ONDANSETRON 4 MG: 2 INJECTION INTRAMUSCULAR; INTRAVENOUS at 07:09

## 2024-09-05 RX ADMIN — ONDANSETRON 4 MG: 2 INJECTION INTRAMUSCULAR; INTRAVENOUS at 12:09

## 2024-09-05 RX ADMIN — PIPERACILLIN SODIUM AND TAZOBACTAM SODIUM 4.5 G: 4; .5 INJECTION, POWDER, FOR SOLUTION INTRAVENOUS at 11:09

## 2024-09-05 RX ADMIN — PIPERACILLIN SODIUM AND TAZOBACTAM SODIUM 4.5 G: 4; .5 INJECTION, POWDER, FOR SOLUTION INTRAVENOUS at 06:09

## 2024-09-05 RX ADMIN — POTASSIUM CHLORIDE 10 MEQ: 7.46 INJECTION, SOLUTION INTRAVENOUS at 12:09

## 2024-09-05 RX ADMIN — IBUPROFEN 600 MG: 600 TABLET ORAL at 03:09

## 2024-09-05 RX ADMIN — BACLOFEN 5 MG: 5 TABLET ORAL at 08:09

## 2024-09-05 RX ADMIN — PROCHLORPERAZINE EDISYLATE 2.5 MG: 5 INJECTION INTRAMUSCULAR; INTRAVENOUS at 05:09

## 2024-09-05 RX ADMIN — METOCLOPRAMIDE 10 MG: 5 INJECTION, SOLUTION INTRAMUSCULAR; INTRAVENOUS at 05:09

## 2024-09-05 RX ADMIN — BACLOFEN 5 MG: 5 TABLET ORAL at 03:09

## 2024-09-05 RX ADMIN — METOCLOPRAMIDE 10 MG: 5 INJECTION, SOLUTION INTRAMUSCULAR; INTRAVENOUS at 12:09

## 2024-09-05 RX ADMIN — ALUMINUM HYDROXIDE, MAGNESIUM HYDROXIDE, AND SIMETHICONE 30 ML: 1200; 120; 1200 SUSPENSION ORAL at 10:09

## 2024-09-05 RX ADMIN — METOCLOPRAMIDE 10 MG: 5 INJECTION, SOLUTION INTRAMUSCULAR; INTRAVENOUS at 11:09

## 2024-09-05 RX ADMIN — PIPERACILLIN SODIUM AND TAZOBACTAM SODIUM 4.5 G: 4; .5 INJECTION, POWDER, FOR SOLUTION INTRAVENOUS at 04:09

## 2024-09-05 RX ADMIN — METOCLOPRAMIDE 10 MG: 5 INJECTION, SOLUTION INTRAMUSCULAR; INTRAVENOUS at 06:09

## 2024-09-05 RX ADMIN — HYDROMORPHONE HYDROCHLORIDE 0.5 MG: 1 INJECTION, SOLUTION INTRAMUSCULAR; INTRAVENOUS; SUBCUTANEOUS at 05:09

## 2024-09-05 RX ADMIN — PROCHLORPERAZINE EDISYLATE 2.5 MG: 5 INJECTION INTRAMUSCULAR; INTRAVENOUS at 03:09

## 2024-09-05 RX ADMIN — IBUPROFEN 600 MG: 600 TABLET ORAL at 08:09

## 2024-09-05 RX ADMIN — HYDROCODONE BITARTRATE AND ACETAMINOPHEN 1 TABLET: 7.5; 325 TABLET ORAL at 03:09

## 2024-09-05 RX ADMIN — SODIUM CHLORIDE, POTASSIUM CHLORIDE, SODIUM LACTATE AND CALCIUM CHLORIDE: 600; 310; 30; 20 INJECTION, SOLUTION INTRAVENOUS at 04:09

## 2024-09-05 NOTE — PLAN OF CARE
Problem: Sepsis/Septic Shock  Goal: Optimal Coping  Outcome: Progressing  Goal: Absence of Bleeding  Outcome: Progressing  Goal: Blood Glucose Level Within Targeted Range  Outcome: Progressing  Goal: Absence of Infection Signs and Symptoms  Outcome: Progressing  Goal: Optimal Nutrition Intake  Outcome: Progressing     Problem: Adult Inpatient Plan of Care  Goal: Plan of Care Review  Outcome: Progressing  Goal: Patient-Specific Goal (Individualized)  Outcome: Progressing  Goal: Absence of Hospital-Acquired Illness or Injury  Outcome: Progressing  Goal: Optimal Comfort and Wellbeing  Outcome: Progressing  Goal: Readiness for Transition of Care  Outcome: Progressing     Problem: Wound  Goal: Optimal Coping  Outcome: Progressing  Goal: Optimal Functional Ability  Outcome: Progressing  Goal: Absence of Infection Signs and Symptoms  Outcome: Progressing  Goal: Improved Oral Intake  Outcome: Progressing  Goal: Optimal Pain Control and Function  Outcome: Progressing  Goal: Skin Health and Integrity  Outcome: Progressing  Goal: Optimal Wound Healing  Outcome: Progressing     Problem: Infection  Goal: Absence of Infection Signs and Symptoms  Outcome: Progressing     Problem: Pain Acute  Goal: Optimal Pain Control and Function  Outcome: Progressing     Problem: Fall Injury Risk  Goal: Absence of Fall and Fall-Related Injury  Outcome: Progressing     Problem: Skin Injury Risk Increased  Goal: Skin Health and Integrity  Outcome: Progressing     Problem: Nausea and Vomiting  Goal: Nausea and Vomiting Relief  Outcome: Progressing

## 2024-09-05 NOTE — ASSESSMENT & PLAN NOTE
Patient's most recent potassium results are listed below.   Recent Labs     09/03/24  0346 09/04/24  0338 09/05/24  0423   K 3.9 3.3* 3.9       Plan  - Replete potassium per protocol  - Monitor potassium Daily  - Patient's hypokalemia is worsening. Will continue current treatment

## 2024-09-05 NOTE — SUBJECTIVE & OBJECTIVE
Interval History:  Patient seen and examined.  Had a rough night again last night. Vomited- CT scan looks stable  Pain remains uncontrolled.  Plan of care discussed with the patient and his mom at the bedside.    Review of Systems  Objective:     Vital Signs (Most Recent):  Temp: 98.1 °F (36.7 °C) (09/05/24 1138)  Pulse: 102 (09/05/24 1138)  Resp: 16 (09/05/24 1138)  BP: (!) 143/83 (09/05/24 1138)  SpO2: 98 % (09/05/24 1138) Vital Signs (24h Range):  Temp:  [97.3 °F (36.3 °C)-99.7 °F (37.6 °C)] 98.1 °F (36.7 °C)  Pulse:  [] 102  Resp:  [16-20] 16  SpO2:  [96 %-99 %] 98 %  BP: (135-176)/(80-88) 143/83     Weight: 118.5 kg (261 lb 3.9 oz)  Body mass index is 36.44 kg/m².    Intake/Output Summary (Last 24 hours) at 9/5/2024 1218  Last data filed at 9/5/2024 0601  Gross per 24 hour   Intake 200 ml   Output 260 ml   Net -60 ml         Physical Exam  Vitals and nursing note reviewed.   Constitutional:       Appearance: He is obese.   Eyes:      Pupils: Pupils are equal, round, and reactive to light.   Neck:      Vascular: No JVD.   Cardiovascular:      Rate and Rhythm: Normal rate and regular rhythm.      Heart sounds: Normal heart sounds.   Pulmonary:      Effort: Pulmonary effort is normal.      Breath sounds: Normal breath sounds.   Abdominal:      General: Bowel sounds are normal. There is no distension.      Palpations: Abdomen is soft.      Tenderness: There is no abdominal tenderness.      Comments: 2 GELY drains present with minimal output.  Noted midline laparotomy scar.   Musculoskeletal:         General: No deformity.   Lymphadenopathy:      Cervical: No cervical adenopathy.   Skin:     Coloration: Skin is not pale.      Findings: No rash.   Neurological:      General: No focal deficit present.      Mental Status: He is oriented to person, place, and time.             Significant Labs: All pertinent labs within the past 24 hours have been reviewed.    Significant Imaging: I have reviewed all pertinent  imaging results/findings within the past 24 hours.

## 2024-09-05 NOTE — PT/OT/SLP PROGRESS
Occupational Therapy      Patient Name:  Willian Ron   MRN:  [id number]    Patient not seen today secondary to patient was unwilling to participate due to fatigue at 9:44 and 1441. Will follow-up 9/6/2024.    9/5/2024

## 2024-09-05 NOTE — PLAN OF CARE
Problem: Adult Inpatient Plan of Care  Goal: Plan of Care Review  Outcome: Progressing     Problem: Adult Inpatient Plan of Care  Goal: Optimal Comfort and Wellbeing  Outcome: Progressing     Problem: Skin Injury Risk Increased  Goal: Skin Health and Integrity  Outcome: Progressing      Problem: Infection  Goal: Absence of Infection Signs and Symptoms  Outcome: Progressing     Problem: Sepsis/Septic Shock  Goal: Optimal Nutrition Intake  Outcome: Progressing     Problem: Nausea and Vomiting  Goal: Nausea and Vomiting Relief  Outcome: Progressing     Problem: Fall Injury Risk  Goal: Absence of Fall and Fall-Related Injury  Outcome: Progressing

## 2024-09-05 NOTE — PLAN OF CARE
Problem: Physical Therapy  Goal: Physical Therapy Goal  Description: Goals to be met by: 24     Patient will increase functional independence with mobility by performin. Supine to sit with [place]  2. Sit to stand transfer with [place]  3. Bed to chair transfer with Modified [place] using Rolling Walker  4. Gait  x 500 feet with Modified [place] using Rolling Walker.   5. Lower extremity exercise program x30 reps per handout, with independence    Outcome: Progressing   Ambulate with assistance for safety.

## 2024-09-05 NOTE — PT/OT/SLP PROGRESS
Physical Therapy Treatment    Patient Name:  Willian Ron   MRN:  [id number]    Recommendations:     Discharge Recommendations: Low Intensity Therapy  Discharge Equipment Recommendations: none  Barriers to discharge: None    Assessment:     Willian Ron is a 34 y.o. male admitted with a medical diagnosis of Sepsis.  He presents with the following impairments/functional limitations: weakness, impaired endurance, pain . Required 3 attempts to participate. Reported nausea, nurse at bedside medicating at this time.  Sup > sit with SBA.  Sit >stand with CGA.  Ambulated 250' with SBA.  Returned to room to restroom.  On / off commode with S.  Ambulated to chair at bedside.      Rehab Prognosis: Good; patient would benefit from acute skilled PT services to address these deficits and reach maximum level of function.    Recent Surgery: Procedure(s) (LRB):  LAPAROTOMY, EXPLORATORY (N/A)  COLON RESECTION (Bilateral)  INCISION AND DRAINAGE, ABSCESS (N/A) 6 Days Post-Op    Plan:     During this hospitalization, patient to be seen 5 x/week to address the identified rehab impairments via gait training, therapeutic activities, therapeutic exercises and progress toward the following goals:    Plan of Care Expires:  09/28/24    Subjective     Chief Complaint: nausea  Patient/Family Comments/goals: to return home  Pain/Comfort:  Pain Rating 1: other (see comments) (did not rate)  Location - Orientation 1: generalized  Location 1: abdomen  Pain Addressed 1: Reposition, Nurse notified      Objective:     Communicated with nurse Moss prior to session.  Patient found supine with bed alarm, GELY drain upon PT entry to room.     General Precautions: Standard, fall  Orthopedic Precautions: N/A  Braces: N/A  Respiratory Status: Room air     Functional Mobility:  Bed Mobility:     Supine to Sit: stand by assistance  Transfers:     Sit to Stand:  contact guard assistance with no AD  Gait: 250' with SBA      AM-PAC 6 CLICK MOBILITY           Treatment & Education:  Ambulated 250' with SBA for safety.  Returned to restroom ,on / off commode with S.  Performed hygiene post void without difficulty.   Ambulated to chair at bedside.     Patient left up in chair with all lines intact, call button in reach, nurse Isa notified, and mother present..    GOALS:   Multidisciplinary Problems       Physical Therapy Goals          Problem: Physical Therapy    Goal Priority Disciplines Outcome Goal Variances Interventions   Physical Therapy Goal     PT, PT/OT Progressing     Description: Goals to be met by: 24     Patient will increase functional independence with mobility by performin. Supine to sit with [place]  2. Sit to stand transfer with [place]  3. Bed to chair transfer with Modified [place] using Rolling Walker  4. Gait  x 500 feet with Modified [place] using Rolling Walker.   5. Lower extremity exercise program x30 reps per handout, with independence                         Time Tracking:     PT Received On: 24  PT Start Time: 1303     PT Stop Time: 1315  PT Total Time (min): 12 min     Billable Minutes: Gait Training 12min    Treatment Type: Treatment  PT/PTA: PTA     Number of PTA visits since last PT visit: 2     2024

## 2024-09-05 NOTE — PROGRESS NOTES
[facility]  Progress Note    Patient Name: Willian Ron  MRN: [id number]  Patient Class: IP- Inpatient   Admission Date: 8/30/2024  Length of Stay: 6 days  Attending Physician: Otis White MD  Primary Care Provider: Jennifer, Primary Doctor        Subjective:     Principal Problem:Sepsis        HPI:  Willian Ron is a 34 year old male with a past medical history of acute perforated appendicitis with abscess s/p IR drainage (E coli and Prevotella) on Augmentin and Levaquin given persistent abscesses who presents with worsening abdominal pain. CT in the ED shows acute appendicitis with abscess. Surgery was consulted from the ED for appendectomy. He received IV fluids, meropenem, hydromorphone, Protonix and Zofran in the ED. Hospital Medicine was consulted for admission.    Overview/Hospital Course:  Willian Ron is a 34 year old male with a past medical history of acute perforated appendicitis with abscess s/p IR drainage (E coli and Prevotella) on Augmentin and Levaquin who presented with persistent acute appendicitis with abscess. He is on Zosyn and IV fluids. Dr. Harris of Surgery took the patient to the OR for exploratory laparotomy 8/30 where the patient underwent drainage of the abdominal abscess with right hemicolectomy with ileocolic anastomosis as well as rectosigmoid resection with colorectal anastomosis with splenic flexure mobilization. Cultures are pending. He remains NPO. His course has been complicated by a post-operative anemia for which Hgb is being trended and Lovenox prophylaxis has been discontinued. PT/OT recommends low intensity therapy.    Interval History:  Patient seen and examined.  Had a rough night again last night. Vomited- CT scan looks stable  Pain remains uncontrolled.  Plan of care discussed with the patient and his mom at the bedside.    Review of Systems  Objective:     Vital Signs (Most Recent):  Temp: 98.1 °F (36.7 °C) (09/05/24  1138)  Pulse: 102 (09/05/24 1138)  Resp: 16 (09/05/24 1138)  BP: (!) 143/83 (09/05/24 1138)  SpO2: 98 % (09/05/24 1138) Vital Signs (24h Range):  Temp:  [97.3 °F (36.3 °C)-99.7 °F (37.6 °C)] 98.1 °F (36.7 °C)  Pulse:  [] 102  Resp:  [16-20] 16  SpO2:  [96 %-99 %] 98 %  BP: (135-176)/(80-88) 143/83     Weight: 118.5 kg (261 lb 3.9 oz)  Body mass index is 36.44 kg/m².    Intake/Output Summary (Last 24 hours) at 9/5/2024 1218  Last data filed at 9/5/2024 0601  Gross per 24 hour   Intake 200 ml   Output 260 ml   Net -60 ml         Physical Exam  Vitals and nursing note reviewed.   Constitutional:       Appearance: He is obese.   Eyes:      Pupils: Pupils are equal, round, and reactive to light.   Neck:      Vascular: No JVD.   Cardiovascular:      Rate and Rhythm: Normal rate and regular rhythm.      Heart sounds: Normal heart sounds.   Pulmonary:      Effort: Pulmonary effort is normal.      Breath sounds: Normal breath sounds.   Abdominal:      General: Bowel sounds are normal. There is no distension.      Palpations: Abdomen is soft.      Tenderness: There is no abdominal tenderness.      Comments: 2 GELY drains present with minimal output.  Noted midline laparotomy scar.   Musculoskeletal:         General: No deformity.   Lymphadenopathy:      Cervical: No cervical adenopathy.   Skin:     Coloration: Skin is not pale.      Findings: No rash.   Neurological:      General: No focal deficit present.      Mental Status: He is oriented to person, place, and time.             Significant Labs: All pertinent labs within the past 24 hours have been reviewed.    Significant Imaging: I have reviewed all pertinent imaging results/findings within the past 24 hours.    Assessment/Plan:      * Sepsis  This patient does have evidence of infective focus  My overall impression is sepsis.  Source: Abdominal  Antibiotics given-   Antibiotics (72h ago, onward)      Start     Stop Route Frequency Ordered    08/30/24 1430   piperacillin-tazobactam (ZOSYN) 4.5 g in D5W 100 mL IVPB (MB+)         -- IV Every 8 hours (non-standard times) 08/30/24 1316          Patient is status post colonic perforation, continue IV antibiotics for now.  Defer to surgery for further surgical intervention. CT scan done 9/4 looks stable. Wean pain meds.        Perforation of sigmoid colon  -S/p sigmoid colectomy with anastomosis.  Continue to advance diet per General surgery recommendation.  Attempt to wean IV pain medications and transitioned to oral pain meds.      Acute appendicitis  S/p ileocolic colectomy with anastomosis.  Continue IV antibiotics per above under perforation, and monitor.     Abdominal visceral abscess  Treatment per above under perforation.      Hypokalemia  Patient's most recent potassium results are listed below.   Recent Labs     09/03/24  0346 09/04/24 0338 09/05/24  0423   K 3.9 3.3* 3.9       Plan  - Replete potassium per protocol  - Monitor potassium Daily  - Patient's hypokalemia is worsening. Will continue current treatment      Postoperative anemia  Anemia is likely due to acute blood loss which was from multiple surgeries . Most recent hemoglobin and hematocrit are listed below.  Recent Labs     09/03/24 0346 09/04/24 0338 09/05/24  0423   HGB 8.7* 8.7* 9.3*   HCT 27.1* 27.0* 28.9*       Plan  - Monitor serial CBC: Daily  - Transfuse PRBC if patient becomes hemodynamically unstable, symptomatic or H/H drops below 7/21.  - Patient has not received any PRBC transfusions to date  - Patient's anemia is currently worsening. Will continue to monitor closely for evidence of bleeding      Obesity (BMI 30-39.9)  Body mass index is 36.44 kg/m². Morbid obesity complicates all aspects of disease management from diagnostic modalities to treatment.         Tobacco dependency  -Nicotine patch  -Patient to be counseled on cessation      VTE Risk Mitigation (From admission, onward)           Ordered     IP VTE HIGH RISK PATIENT  Once          08/30/24 1316     Place sequential compression device  Until discontinued         08/30/24 1316                    Discharge Planning   JANN: 9/8/2024     Code Status: Full Code   Is the patient medically ready for discharge?:     Reason for patient still in hospital (select all that apply): Treatment  Discharge Plan A: Home                  Otis White MD  Department of Hospital Medicine   [facility]/Surg

## 2024-09-05 NOTE — PROGRESS NOTES
pt was given oral contrast and given 3 hours to drink it.  pt told tech he drank it but i did not physically check the bottles, therefore, scan was done.  I did not see oral contrast on scan.  Brought pt back and inspected oral contrast bottles to find only approx 50ml at the most was consumed from the 1000 ml provided.

## 2024-09-05 NOTE — PROGRESS NOTES
Patient seen and examined.  Passing flatus.  Still belching and intermittent nausea.  Poor p.o. tolerance.      Mild tachycardia  Afebrile  Abdomen soft appropriately tender    Labs reviewed.  Mild leukocytosis  Mild elevation in LFTs    CT scan was reviewed.  Dilation of the small bowel.  Official read was read as a possible small bowel obstruction.    Suspect patient has an ileus related to perforation.  Still passing flatus but given limited p.o. intake will restart IV fluids  Trend labs  Continue to ambulate

## 2024-09-05 NOTE — NURSING
Message on call NP Sadafo regarding order for IV Potassium, order not completed d/t only having one access, therefore order , second IV access was obtained NP made aware, NP submitted new order for 3 ridders of IV Potassium to complete original order, Potassium is running at a slower rate d/t vein irritation, patient loss first IV access d/t infiltration, patient currently has only one access.

## 2024-09-05 NOTE — CARE UPDATE
Spoke with Dr. Sabillon with [facility] regarding CT abdomen and pelvis findings:    FINDINGS/IMPRESSION: There are postop changes compatible with interval right hemicolectomy and ileocolic anastomosis, and sigmoid resection with colorectal anastomosis, and there are two percutaneous surgical drainage tubes, both coursing primarily through the paracolic gutters, and the left-sided tube coursing across the midline with tip in the right false pelvis.     There is a small to moderate amount of hemoperitoneum, most notably collections of blood inferior to the body of the stomach and adjacent to the spleen with extension through the left paracolic gutter, without evidence for active bleeding, and there is also moderate mesenteric edema and minimal pneumoperitoneum.     There are scattered air-fluid levels within multiple prominent loops of small bowel, some of which are mildly dilated, consistent with postop ileus, with no transition point to suggest obstruction, and there is also some small bowel wall thickening, which can be seen with enteritis but is nonspecific postoperatively and considering the other aforementioned findings.     There is also some flattening of the IVC and renal veins, suggesting hypovolemia or dehydration.     2 mm nonobstructing left lower pole renal caliceal stone again noted.     The abdominal and pelvic organs, vascular and intestinal structures, osseous, muscular, and soft tissue structures are otherwise unremarkable, and the kidneys exhibit symmetric IV contrast enhancement; no abdominopelvic abscess, soft tissue mass, or lymphadenopathy.     Visualized portions of the heart and lung bases demonstrate mild bilateral lower lobe atelectasis, but are otherwise unremarkable.

## 2024-09-05 NOTE — ASSESSMENT & PLAN NOTE
Anemia is likely due to acute blood loss which was from multiple surgeries . Most recent hemoglobin and hematocrit are listed below.  Recent Labs     09/03/24  0346 09/04/24  0338 09/05/24  0423   HGB 8.7* 8.7* 9.3*   HCT 27.1* 27.0* 28.9*       Plan  - Monitor serial CBC: Daily  - Transfuse PRBC if patient becomes hemodynamically unstable, symptomatic or H/H drops below 7/21.  - Patient has not received any PRBC transfusions to date  - Patient's anemia is currently worsening. Will continue to monitor closely for evidence of bleeding

## 2024-09-05 NOTE — ASSESSMENT & PLAN NOTE
This patient does have evidence of infective focus  My overall impression is sepsis.  Source: Abdominal  Antibiotics given-   Antibiotics (72h ago, onward)      Start     Stop Route Frequency Ordered    08/30/24 1430  piperacillin-tazobactam (ZOSYN) 4.5 g in D5W 100 mL IVPB (MB+)         -- IV Every 8 hours (non-standard times) 08/30/24 1316          Patient is status post colonic perforation, continue IV antibiotics for now.  Defer to surgery for further surgical intervention. CT scan done 9/4 looks stable. Wean pain meds.

## 2024-09-06 LAB
ALBUMIN SERPL BCP-MCNC: 2.9 G/DL (ref 3.5–5.2)
ALP SERPL-CCNC: 82 U/L (ref 55–135)
ALT SERPL W/O P-5'-P-CCNC: 109 U/L (ref 10–44)
ANION GAP SERPL CALC-SCNC: 12 MMOL/L (ref 8–16)
AST SERPL-CCNC: 71 U/L (ref 10–40)
BASOPHILS # BLD AUTO: 0.05 K/UL (ref 0–0.2)
BASOPHILS NFR BLD: 0.4 % (ref 0–1.9)
BILIRUB SERPL-MCNC: 1.2 MG/DL (ref 0.1–1)
BUN SERPL-MCNC: 7 MG/DL (ref 6–20)
CALCIUM SERPL-MCNC: 9.1 MG/DL (ref 8.7–10.5)
CHLORIDE SERPL-SCNC: 101 MMOL/L (ref 95–110)
CO2 SERPL-SCNC: 24 MMOL/L (ref 23–29)
CREAT SERPL-MCNC: 0.9 MG/DL (ref 0.5–1.4)
DIFFERENTIAL METHOD BLD: ABNORMAL
EOSINOPHIL # BLD AUTO: 0.8 K/UL (ref 0–0.5)
EOSINOPHIL NFR BLD: 5.3 % (ref 0–8)
ERYTHROCYTE [DISTWIDTH] IN BLOOD BY AUTOMATED COUNT: 13.4 % (ref 11.5–14.5)
EST. GFR  (NO RACE VARIABLE): >60 ML/MIN/1.73 M^2
GLUCOSE SERPL-MCNC: 93 MG/DL (ref 70–110)
HCT VFR BLD AUTO: 28.4 % (ref 40–54)
HGB BLD-MCNC: 9.2 G/DL (ref 14–18)
IMM GRANULOCYTES # BLD AUTO: 0.25 K/UL (ref 0–0.04)
IMM GRANULOCYTES NFR BLD AUTO: 1.8 % (ref 0–0.5)
LYMPHOCYTES # BLD AUTO: 2.3 K/UL (ref 1–4.8)
LYMPHOCYTES NFR BLD: 16.4 % (ref 18–48)
MAGNESIUM SERPL-MCNC: 2 MG/DL (ref 1.6–2.6)
MCH RBC QN AUTO: 28.4 PG (ref 27–31)
MCHC RBC AUTO-ENTMCNC: 32.4 G/DL (ref 32–36)
MCV RBC AUTO: 88 FL (ref 82–98)
MONOCYTES # BLD AUTO: 1.7 K/UL (ref 0.3–1)
MONOCYTES NFR BLD: 12.1 % (ref 4–15)
NEUTROPHILS # BLD AUTO: 9.1 K/UL (ref 1.8–7.7)
NEUTROPHILS NFR BLD: 64 % (ref 38–73)
NRBC BLD-RTO: 0 /100 WBC
PHOSPHATE SERPL-MCNC: 2.4 MG/DL (ref 2.7–4.5)
PLATELET # BLD AUTO: 388 K/UL (ref 150–450)
PMV BLD AUTO: 10.9 FL (ref 9.2–12.9)
POTASSIUM SERPL-SCNC: 3.3 MMOL/L (ref 3.5–5.1)
PROT SERPL-MCNC: 6.8 G/DL (ref 6–8.4)
RBC # BLD AUTO: 3.24 M/UL (ref 4.6–6.2)
SODIUM SERPL-SCNC: 137 MMOL/L (ref 136–145)
WBC # BLD AUTO: 14.23 K/UL (ref 3.9–12.7)

## 2024-09-06 PROCEDURE — 80053 COMPREHEN METABOLIC PANEL: CPT | Performed by: SURGERY

## 2024-09-06 PROCEDURE — 25000003 PHARM REV CODE 250

## 2024-09-06 PROCEDURE — 63600175 PHARM REV CODE 636 W HCPCS: Performed by: HOSPITALIST

## 2024-09-06 PROCEDURE — 63600175 PHARM REV CODE 636 W HCPCS: Performed by: SURGERY

## 2024-09-06 PROCEDURE — 36415 COLL VENOUS BLD VENIPUNCTURE: CPT | Performed by: SURGERY

## 2024-09-06 PROCEDURE — 85025 COMPLETE CBC W/AUTO DIFF WBC: CPT | Performed by: SURGERY

## 2024-09-06 PROCEDURE — 25000003 PHARM REV CODE 250: Performed by: HOSPITALIST

## 2024-09-06 PROCEDURE — 25000003 PHARM REV CODE 250: Performed by: STUDENT IN AN ORGANIZED HEALTH CARE EDUCATION/TRAINING PROGRAM

## 2024-09-06 PROCEDURE — C1751 CATH, INF, PER/CENT/MIDLINE: HCPCS

## 2024-09-06 PROCEDURE — 94799 UNLISTED PULMONARY SVC/PX: CPT | Mod: XB

## 2024-09-06 PROCEDURE — B4185 PARENTERAL SOL 10 GM LIPIDS: HCPCS | Performed by: HOSPITALIST

## 2024-09-06 PROCEDURE — 99900035 HC TECH TIME PER 15 MIN (STAT)

## 2024-09-06 PROCEDURE — 97116 GAIT TRAINING THERAPY: CPT

## 2024-09-06 PROCEDURE — 84100 ASSAY OF PHOSPHORUS: CPT | Performed by: SURGERY

## 2024-09-06 PROCEDURE — 25000003 PHARM REV CODE 250: Performed by: SURGERY

## 2024-09-06 PROCEDURE — 76937 US GUIDE VASCULAR ACCESS: CPT

## 2024-09-06 PROCEDURE — 83735 ASSAY OF MAGNESIUM: CPT | Performed by: SURGERY

## 2024-09-06 PROCEDURE — 36410 VNPNXR 3YR/> PHY/QHP DX/THER: CPT

## 2024-09-06 PROCEDURE — 63600175 PHARM REV CODE 636 W HCPCS: Performed by: STUDENT IN AN ORGANIZED HEALTH CARE EDUCATION/TRAINING PROGRAM

## 2024-09-06 PROCEDURE — 94761 N-INVAS EAR/PLS OXIMETRY MLT: CPT

## 2024-09-06 PROCEDURE — 11000001 HC ACUTE MED/SURG PRIVATE ROOM

## 2024-09-06 RX ORDER — HYDROMORPHONE HYDROCHLORIDE 1 MG/ML
0.5 INJECTION, SOLUTION INTRAMUSCULAR; INTRAVENOUS; SUBCUTANEOUS EVERY 8 HOURS PRN
Status: DISCONTINUED | OUTPATIENT
Start: 2024-09-06 | End: 2024-09-08 | Stop reason: HOSPADM

## 2024-09-06 RX ORDER — ENOXAPARIN SODIUM 100 MG/ML
40 INJECTION SUBCUTANEOUS EVERY 24 HOURS
Status: DISCONTINUED | OUTPATIENT
Start: 2024-09-06 | End: 2024-09-08 | Stop reason: HOSPADM

## 2024-09-06 RX ADMIN — PIPERACILLIN SODIUM AND TAZOBACTAM SODIUM 4.5 G: 4; .5 INJECTION, POWDER, FOR SOLUTION INTRAVENOUS at 02:09

## 2024-09-06 RX ADMIN — ONDANSETRON 4 MG: 2 INJECTION INTRAMUSCULAR; INTRAVENOUS at 12:09

## 2024-09-06 RX ADMIN — ENOXAPARIN SODIUM 40 MG: 40 INJECTION SUBCUTANEOUS at 05:09

## 2024-09-06 RX ADMIN — IBUPROFEN 600 MG: 600 TABLET ORAL at 05:09

## 2024-09-06 RX ADMIN — IBUPROFEN 600 MG: 600 TABLET ORAL at 09:09

## 2024-09-06 RX ADMIN — HYDROCODONE BITARTRATE AND ACETAMINOPHEN 1 TABLET: 7.5; 325 TABLET ORAL at 03:09

## 2024-09-06 RX ADMIN — BACLOFEN 5 MG: 5 TABLET ORAL at 08:09

## 2024-09-06 RX ADMIN — IBUPROFEN 600 MG: 600 TABLET ORAL at 02:09

## 2024-09-06 RX ADMIN — METOCLOPRAMIDE 10 MG: 5 INJECTION, SOLUTION INTRAMUSCULAR; INTRAVENOUS at 11:09

## 2024-09-06 RX ADMIN — LEUCINE, PHENYLALANINE, LYSINE, METHIONINE, ISOLEUCINE, VALINE, HISTIDINE, THREONINE, TRYPTOPHAN, ALANINE, GLYCINE, ARGININE, PROLINE, SERINE, TYROSINE, SODIUM ACETATE, DIBASIC POTASSIUM PHOSPHATE, MAGNESIUM CHLORIDE, SODIUM CHLORIDE, CALCIUM CHLORIDE, DEXTROSE
311; 238; 247; 170; 255; 247; 204; 179; 77; 880; 438; 489; 289; 213; 17; 297; 261; 51; 77; 33; 5 INJECTION INTRAVENOUS at 04:09

## 2024-09-06 RX ADMIN — PROCHLORPERAZINE EDISYLATE 2.5 MG: 5 INJECTION INTRAMUSCULAR; INTRAVENOUS at 04:09

## 2024-09-06 RX ADMIN — POTASSIUM BICARBONATE 35 MEQ: 391 TABLET, EFFERVESCENT ORAL at 12:09

## 2024-09-06 RX ADMIN — BACLOFEN 5 MG: 5 TABLET ORAL at 02:09

## 2024-09-06 RX ADMIN — POTASSIUM BICARBONATE 35 MEQ: 391 TABLET, EFFERVESCENT ORAL at 09:09

## 2024-09-06 RX ADMIN — METOCLOPRAMIDE 10 MG: 5 INJECTION, SOLUTION INTRAMUSCULAR; INTRAVENOUS at 06:09

## 2024-09-06 RX ADMIN — IBUPROFEN 600 MG: 600 TABLET ORAL at 08:09

## 2024-09-06 RX ADMIN — HYDROMORPHONE HYDROCHLORIDE 0.5 MG: 1 INJECTION, SOLUTION INTRAMUSCULAR; INTRAVENOUS; SUBCUTANEOUS at 05:09

## 2024-09-06 RX ADMIN — PIPERACILLIN SODIUM AND TAZOBACTAM SODIUM 4.5 G: 4; .5 INJECTION, POWDER, FOR SOLUTION INTRAVENOUS at 06:09

## 2024-09-06 RX ADMIN — I.V. FAT EMULSION 250 ML: 20 EMULSION INTRAVENOUS at 10:09

## 2024-09-06 RX ADMIN — METOCLOPRAMIDE 10 MG: 5 INJECTION, SOLUTION INTRAMUSCULAR; INTRAVENOUS at 12:09

## 2024-09-06 RX ADMIN — BISACODYL 5 MG: 5 TABLET, COATED ORAL at 08:09

## 2024-09-06 RX ADMIN — ONDANSETRON 4 MG: 2 INJECTION INTRAMUSCULAR; INTRAVENOUS at 03:09

## 2024-09-06 RX ADMIN — METOCLOPRAMIDE 10 MG: 5 INJECTION, SOLUTION INTRAMUSCULAR; INTRAVENOUS at 05:09

## 2024-09-06 RX ADMIN — HYDROMORPHONE HYDROCHLORIDE 0.5 MG: 1 INJECTION, SOLUTION INTRAMUSCULAR; INTRAVENOUS; SUBCUTANEOUS at 10:09

## 2024-09-06 RX ADMIN — PIPERACILLIN SODIUM AND TAZOBACTAM SODIUM 4.5 G: 4; .5 INJECTION, POWDER, FOR SOLUTION INTRAVENOUS at 12:09

## 2024-09-06 RX ADMIN — ONDANSETRON 4 MG: 2 INJECTION INTRAMUSCULAR; INTRAVENOUS at 06:09

## 2024-09-06 RX ADMIN — HYDROCODONE BITARTRATE AND ACETAMINOPHEN 1 TABLET: 7.5; 325 TABLET ORAL at 08:09

## 2024-09-06 RX ADMIN — BACLOFEN 5 MG: 5 TABLET ORAL at 09:09

## 2024-09-06 NOTE — PT/OT/SLP PROGRESS
"Occupational Therapy      Patient Name:  Willian Ron   MRN:  [id number]    Patient not seen today secondary to patient declined and stated, " I already did my therapy. " Will follow-up 9/7/2024.     9/6/2024  "

## 2024-09-06 NOTE — PROGRESS NOTES
Patient seen and examined.  He was resting comfortably in the chair.  Denies any nausea or vomiting today.  Did have some in the early morning hours.  CT scan from yesterday is reviewed.  Does suggest findings consistent with possible ileus.  Today however patient notes he has had significant flatus as well as bowel movements.  Notes he was feeling better.  Would like to try food with more consistency    Wt Readings from Last 3 Encounters:   08/30/24 118.5 kg (261 lb 3.9 oz)   08/22/24 120.7 kg (266 lb 1.5 oz)   01/07/20 108.9 kg (240 lb)     Temp Readings from Last 3 Encounters:   09/06/24 98.2 °F (36.8 °C)   08/27/24 98.6 °F (37 °C) (Oral)   01/07/20 98.8 °F (37.1 °C) (Oral)     BP Readings from Last 3 Encounters:   09/06/24 135/74   08/27/24 (!) 116/57   01/07/20 (!) 146/83     Pulse Readings from Last 3 Encounters:   09/06/24 84   08/27/24 78   01/07/20 82     AAOx3  Soft/nd/nt  BS are present  No resp distress    Lab Results   Component Value Date    WBC 14.23 (H) 09/06/2024    HGB 9.2 (L) 09/06/2024    HCT 28.4 (L) 09/06/2024    MCV 88 09/06/2024     09/06/2024       BMP  Lab Results   Component Value Date     09/06/2024    K 3.3 (L) 09/06/2024     09/06/2024    CO2 24 09/06/2024    BUN 7 09/06/2024    CREATININE 0.9 09/06/2024    CALCIUM 9.1 09/06/2024    ANIONGAP 12 09/06/2024    EGFRNORACEVR >60 09/06/2024     A/P:  Status post ex lap with bowel resection x2     Patient continued to progress.  Having better bowel function.  Okay to advance diet as tolerated.  If patient was unable to tolerated dietary advancement would agree with beginning TPN.  We will continue to follow

## 2024-09-06 NOTE — CONSULTS
18 gauge x 10cm Midline placed to patient's right CEPHALIC vein with the use of ultrasound guidance.     Ultrasound guidance: yes  Vessel Caliber: large and patent, compressibility normal  Needle advanced into vessel with real time Ultrasound guidance.  Guidewire confirmed in vessel.  Image recorded and saved.  Sterile sheath used.  Sterile dressing applied  Arm circumference- 39CM  Dressing dated   Limb alert applied.

## 2024-09-06 NOTE — SUBJECTIVE & OBJECTIVE
"Interval History: see "Hospital Course"    Review of Systems   Gastrointestinal:  Positive for abdominal pain.   Skin:  Positive for wound.     Objective:     Vital Signs (Most Recent):  Temp: 98.2 °F (36.8 °C) (09/06/24 1134)  Pulse: 84 (09/06/24 1134)  Resp: 18 (09/06/24 1134)  BP: 135/74 (09/06/24 1134)  SpO2: 98 % (09/06/24 1134) Vital Signs (24h Range):  Temp:  [98.2 °F (36.8 °C)-98.5 °F (36.9 °C)] 98.2 °F (36.8 °C)  Pulse:  [] 84  Resp:  [16-18] 18  SpO2:  [97 %-99 %] 98 %  BP: (135-166)/(74-88) 135/74     Weight: 118.5 kg (261 lb 3.9 oz)  Body mass index is 36.44 kg/m².    Intake/Output Summary (Last 24 hours) at 9/6/2024 1227  Last data filed at 9/6/2024 0622  Gross per 24 hour   Intake 579.5 ml   Output 120 ml   Net 459.5 ml         Physical Exam  Vitals and nursing note reviewed.   Constitutional:       General: He is not in acute distress.     Appearance: He is obese. He is ill-appearing.   HENT:      Head: Normocephalic and atraumatic.      Right Ear: External ear normal.      Left Ear: External ear normal.      Nose: Nose normal.      Mouth/Throat:      Mouth: Mucous membranes are moist.      Pharynx: Oropharynx is clear.   Eyes:      Extraocular Movements: Extraocular movements intact.      Conjunctiva/sclera: Conjunctivae normal.   Cardiovascular:      Rate and Rhythm: Regular rhythm. Tachycardia present.      Pulses: Normal pulses.      Heart sounds: Normal heart sounds.   Pulmonary:      Effort: Pulmonary effort is normal.      Breath sounds: Normal breath sounds.   Abdominal:      Tenderness: There is abdominal tenderness.      Comments: Two GELY drains. Dressings clean, dry and intact.   Musculoskeletal:         General: Normal range of motion.      Cervical back: Normal range of motion and neck supple.      Right lower leg: No edema.      Left lower leg: No edema.   Skin:     General: Skin is warm and dry.   Neurological:      Mental Status: He is alert. Mental status is at baseline. "   Psychiatric:         Mood and Affect: Mood normal.         Behavior: Behavior normal.             Significant Labs: All pertinent labs within the past 24 hours have been reviewed.    Significant Imaging: I have reviewed all pertinent imaging results/findings within the past 24 hours.

## 2024-09-06 NOTE — CARE UPDATE
09/06/24 0727   Patient Assessment/Suction   Level of Consciousness (AVPU) alert   Respiratory Effort Unlabored;Shallow   Expansion/Accessory Muscles/Retractions no use of accessory muscles;no retractions;expansion symmetric   Rhythm/Pattern, Respiratory unlabored;shallow   PRE-TX-O2   Device (Oxygen Therapy) room air   SpO2 98 %   Pulse Oximetry Type Intermittent   $ Pulse Oximetry - Multiple Charge Pulse Oximetry - Multiple   Pulse 97   Resp 16   Incentive Spirometer   $ Incentive Spirometer Charges done with encouragement   Incentive Spirometer Predicted Level (mL) 2400   Administration (IS) mouthpiece utilized   Number of Repetitions (IS) 6   Level Incentive Spirometer (mL) 2500   Patient Tolerance (IS) good;no adverse signs/symptoms present

## 2024-09-06 NOTE — PLAN OF CARE
[facility]/Surg  Discharge Reassessment    Primary Care Provider: Jennifer, Primary Doctor    Expected Discharge Date: 9/10/2024    Case Management continuing to follow and will assist with discharge planning as needed. DC dispo changed today from home to LTAC. TPN being initiated today along d/t poor nutritional status. Pt still unable tolerate diet. Pt and mother in agreement with LTAC. Would like [facility] if possible. Referral sent via careport. Pt has appropriate rev codes     Reassessment (most recent)       Discharge Reassessment - 09/06/24 1329          Discharge Reassessment    Assessment Type Discharge Planning Reassessment     Did the patient's condition or plan change since previous assessment? Yes     Discharge Plan discussed with: Patient;Parent(s)     Communicated JANN with patient/caregiver Yes     Discharge Plan A Long-term acute care facility (LTAC)     Discharge Plan B Home     DME Needed Upon Discharge  none     Transition of Care Barriers None     Why the patient remains in the hospital Requires continued medical care        Post-Acute Status    Post-Acute Authorization Placement     Post-Acute Placement Status Referrals Sent     Patient choice form signed by patient/caregiver List from System Post-Acute Care

## 2024-09-06 NOTE — PT/OT/SLP PROGRESS
Physical Therapy Treatment    Patient Name:  Willian Ron   MRN:  [id number]    Recommendations:     Discharge Recommendations: Low Intensity Therapy  Discharge Equipment Recommendations: none  Barriers to discharge: None    Assessment:     Willian Ron is a 34 y.o. male admitted with a medical diagnosis of Sepsis.  He presents with the following impairments/functional limitations: weakness, impaired endurance, pain, impaired functional mobility, gait instability, impaired balance .  Patient agreeable to PT treatment for gait training this morning.  Patient presented sitting EOB and was able to stand with independence.  Patient then ambulated x 750 feet no AD SBA.    Rehab Prognosis: Good; patient would benefit from acute skilled PT services to address these deficits and reach maximum level of function.    Recent Surgery: Procedure(s) (LRB):  LAPAROTOMY, EXPLORATORY (N/A)  COLON RESECTION (Bilateral)  INCISION AND DRAINAGE, ABSCESS (N/A) 7 Days Post-Op    Plan:     During this hospitalization, patient to be seen 5 x/week to address the identified rehab impairments via gait training, therapeutic activities, therapeutic exercises and progress toward the following goals:    Plan of Care Expires:  09/28/24    Subjective     Chief Complaint: pain  Patient/Family Comments/goals: go home  Pain/Comfort:  Pain Rating 1: 1/10  Location - Side 1: Bilateral  Location - Orientation 1: anterior  Location 1: abdomen  Pain Addressed 1: Reposition, Cessation of Activity  Pain Rating Post-Intervention 1: 4/10      Objective:     Communicated with nurse prior to session.  Patient found sitting edge of bed with bed alarm, GELY drain upon PT entry to room.     General Precautions: Standard, fall  Orthopedic Precautions: N/A  Braces: N/A  Respiratory Status: Room air     Functional Mobility:  Transfers:     Sit to Stand:  independence with no AD  Gait: x 750 feet no aD SBA      AM-PAC 6 CLICK MOBILITY          Treatment &  Education:  Gait training x 750 feet  no AD SBA    Patient left sitting edge of bed with call button in reach, nurse notified, and family present..    GOALS:   Multidisciplinary Problems       Physical Therapy Goals          Problem: Physical Therapy    Goal Priority Disciplines Outcome Goal Variances Interventions   Physical Therapy Goal     PT, PT/OT Progressing     Description: Goals to be met by: 24     Patient will increase functional independence with mobility by performin. Supine to sit with [place]  2. Sit to stand transfer with [place]  3. Bed to chair transfer with Modified [place] using Rolling Walker  4. Gait  x 500 feet with Modified [place] using Rolling Walker.   5. Lower extremity exercise program x30 reps per handout, with independence                         Time Tracking:     PT Received On: 24  PT Start Time: 826     PT Stop Time: 835  PT Total Time (min): 9 min     Billable Minutes: Gait Training 9    Treatment Type: Treatment  PT/PTA: PT     Number of PTA visits since last PT visit: 0     2024

## 2024-09-06 NOTE — PLAN OF CARE
Problem: Sepsis/Septic Shock  Goal: Optimal Coping  Outcome: Progressing  Goal: Absence of Bleeding  Outcome: Progressing     Problem: Adult Inpatient Plan of Care  Goal: Plan of Care Review  Outcome: Progressing     Problem: Wound  Goal: Optimal Coping  Outcome: Progressing     Problem: Infection  Goal: Absence of Infection Signs and Symptoms  Outcome: Progressing

## 2024-09-06 NOTE — PROGRESS NOTES
Pharmacist Renal Dose Adjustment Note    Willian Ron is a 34 y.o. male being treated with the medication Enoxaparin    Patient Data:    Vital Signs (Most Recent):  Temp: 98.2 °F (36.8 °C) (09/06/24 1134)  Pulse: 84 (09/06/24 1134)  Resp: 18 (09/06/24 1134)  BP: 135/74 (09/06/24 1134)  SpO2: 98 % (09/06/24 1134) Vital Signs (72h Range):  Temp:  [97.3 °F (36.3 °C)-99.7 °F (37.6 °C)]   Pulse:  []   Resp:  [16-20]   BP: (135-176)/(73-96)   SpO2:  [95 %-99 %]      Recent Labs   Lab 09/04/24  0338 09/05/24  0423 09/06/24  0408   CREATININE 0.9 0.8 0.9     Serum creatinine: 0.9 mg/dL 09/06/24 0408  Estimated creatinine clearance: 150 mL/min    The enoxaparin dose has been adjusted for Willian Ron [id number] according to the pharmacy practice protocol.      Based on the patient's Estimated Creatinine Clearance: 150 mL/min (based on SCr of 0.9 mg/dL)., Body mass index is 36.44 kg/m²., and weight= 118.5 kg (261 lb 3.9 oz), the enoxaparin dose has been adjusted 40 mg every 24 hours.    Thank you,  Sarath Hernandes

## 2024-09-07 PROBLEM — E87.6 HYPOKALEMIA: Status: RESOLVED | Noted: 2024-09-04 | Resolved: 2024-09-07

## 2024-09-07 PROBLEM — A41.9 SEPSIS: Status: RESOLVED | Noted: 2024-08-14 | Resolved: 2024-09-07

## 2024-09-07 LAB
ALBUMIN SERPL BCP-MCNC: 2.9 G/DL (ref 3.5–5.2)
ALP SERPL-CCNC: 73 U/L (ref 55–135)
ALT SERPL W/O P-5'-P-CCNC: 97 U/L (ref 10–44)
ANION GAP SERPL CALC-SCNC: 11 MMOL/L (ref 8–16)
AST SERPL-CCNC: 45 U/L (ref 10–40)
BASOPHILS # BLD AUTO: 0.07 K/UL (ref 0–0.2)
BASOPHILS NFR BLD: 0.5 % (ref 0–1.9)
BILIRUB SERPL-MCNC: 1 MG/DL (ref 0.1–1)
BUN SERPL-MCNC: 7 MG/DL (ref 6–20)
CALCIUM SERPL-MCNC: 9.2 MG/DL (ref 8.7–10.5)
CHLORIDE SERPL-SCNC: 102 MMOL/L (ref 95–110)
CO2 SERPL-SCNC: 26 MMOL/L (ref 23–29)
CREAT SERPL-MCNC: 1 MG/DL (ref 0.5–1.4)
DIFFERENTIAL METHOD BLD: ABNORMAL
EOSINOPHIL # BLD AUTO: 0.9 K/UL (ref 0–0.5)
EOSINOPHIL NFR BLD: 6.7 % (ref 0–8)
ERYTHROCYTE [DISTWIDTH] IN BLOOD BY AUTOMATED COUNT: 13.6 % (ref 11.5–14.5)
EST. GFR  (NO RACE VARIABLE): >60 ML/MIN/1.73 M^2
GLUCOSE SERPL-MCNC: 107 MG/DL (ref 70–110)
HCT VFR BLD AUTO: 27.8 % (ref 40–54)
HGB BLD-MCNC: 9 G/DL (ref 14–18)
IMM GRANULOCYTES # BLD AUTO: 0.35 K/UL (ref 0–0.04)
IMM GRANULOCYTES NFR BLD AUTO: 2.7 % (ref 0–0.5)
LYMPHOCYTES # BLD AUTO: 2 K/UL (ref 1–4.8)
LYMPHOCYTES NFR BLD: 15.3 % (ref 18–48)
MAGNESIUM SERPL-MCNC: 2.1 MG/DL (ref 1.6–2.6)
MCH RBC QN AUTO: 28.3 PG (ref 27–31)
MCHC RBC AUTO-ENTMCNC: 32.4 G/DL (ref 32–36)
MCV RBC AUTO: 87 FL (ref 82–98)
MONOCYTES # BLD AUTO: 1.8 K/UL (ref 0.3–1)
MONOCYTES NFR BLD: 13.5 % (ref 4–15)
NEUTROPHILS # BLD AUTO: 8.1 K/UL (ref 1.8–7.7)
NEUTROPHILS NFR BLD: 61.3 % (ref 38–73)
NRBC BLD-RTO: 0 /100 WBC
PHOSPHATE SERPL-MCNC: 3.5 MG/DL (ref 2.7–4.5)
PLATELET # BLD AUTO: 434 K/UL (ref 150–450)
PMV BLD AUTO: 9.9 FL (ref 9.2–12.9)
POTASSIUM SERPL-SCNC: 3.5 MMOL/L (ref 3.5–5.1)
PREALB SERPL-MCNC: 21 MG/DL (ref 20–43)
PROT SERPL-MCNC: 6.7 G/DL (ref 6–8.4)
RBC # BLD AUTO: 3.18 M/UL (ref 4.6–6.2)
SODIUM SERPL-SCNC: 139 MMOL/L (ref 136–145)
TRIGL SERPL-MCNC: 173 MG/DL (ref 30–150)
WBC # BLD AUTO: 13.16 K/UL (ref 3.9–12.7)

## 2024-09-07 PROCEDURE — 94761 N-INVAS EAR/PLS OXIMETRY MLT: CPT

## 2024-09-07 PROCEDURE — 25000003 PHARM REV CODE 250: Performed by: SURGERY

## 2024-09-07 PROCEDURE — 11000001 HC ACUTE MED/SURG PRIVATE ROOM

## 2024-09-07 PROCEDURE — 94799 UNLISTED PULMONARY SVC/PX: CPT | Mod: XB

## 2024-09-07 PROCEDURE — 63600175 PHARM REV CODE 636 W HCPCS: Performed by: HOSPITALIST

## 2024-09-07 PROCEDURE — B4185 PARENTERAL SOL 10 GM LIPIDS: HCPCS | Performed by: STUDENT IN AN ORGANIZED HEALTH CARE EDUCATION/TRAINING PROGRAM

## 2024-09-07 PROCEDURE — 63600175 PHARM REV CODE 636 W HCPCS: Performed by: SURGERY

## 2024-09-07 PROCEDURE — 97116 GAIT TRAINING THERAPY: CPT | Mod: CQ

## 2024-09-07 PROCEDURE — 83735 ASSAY OF MAGNESIUM: CPT | Performed by: SURGERY

## 2024-09-07 PROCEDURE — 85025 COMPLETE CBC W/AUTO DIFF WBC: CPT | Performed by: SURGERY

## 2024-09-07 PROCEDURE — 80053 COMPREHEN METABOLIC PANEL: CPT | Performed by: SURGERY

## 2024-09-07 PROCEDURE — 25000003 PHARM REV CODE 250

## 2024-09-07 PROCEDURE — 25000003 PHARM REV CODE 250: Performed by: HOSPITALIST

## 2024-09-07 PROCEDURE — 25000003 PHARM REV CODE 250: Performed by: STUDENT IN AN ORGANIZED HEALTH CARE EDUCATION/TRAINING PROGRAM

## 2024-09-07 PROCEDURE — 36415 COLL VENOUS BLD VENIPUNCTURE: CPT | Performed by: HOSPITALIST

## 2024-09-07 PROCEDURE — 84478 ASSAY OF TRIGLYCERIDES: CPT | Performed by: HOSPITALIST

## 2024-09-07 PROCEDURE — 84134 ASSAY OF PREALBUMIN: CPT | Performed by: HOSPITALIST

## 2024-09-07 PROCEDURE — 84100 ASSAY OF PHOSPHORUS: CPT | Performed by: SURGERY

## 2024-09-07 RX ORDER — AMOXICILLIN 250 MG
1 CAPSULE ORAL 2 TIMES DAILY
Status: DISCONTINUED | OUTPATIENT
Start: 2024-09-07 | End: 2024-09-08 | Stop reason: HOSPADM

## 2024-09-07 RX ADMIN — ONDANSETRON 4 MG: 2 INJECTION INTRAMUSCULAR; INTRAVENOUS at 06:09

## 2024-09-07 RX ADMIN — IBUPROFEN 600 MG: 600 TABLET ORAL at 09:09

## 2024-09-07 RX ADMIN — METOCLOPRAMIDE 10 MG: 5 INJECTION, SOLUTION INTRAMUSCULAR; INTRAVENOUS at 12:09

## 2024-09-07 RX ADMIN — PIPERACILLIN SODIUM AND TAZOBACTAM SODIUM 4.5 G: 4; .5 INJECTION, POWDER, FOR SOLUTION INTRAVENOUS at 10:09

## 2024-09-07 RX ADMIN — ENOXAPARIN SODIUM 40 MG: 40 INJECTION SUBCUTANEOUS at 06:09

## 2024-09-07 RX ADMIN — ALUMINUM HYDROXIDE, MAGNESIUM HYDROXIDE, AND SIMETHICONE 30 ML: 1200; 120; 1200 SUSPENSION ORAL at 11:09

## 2024-09-07 RX ADMIN — PIPERACILLIN SODIUM AND TAZOBACTAM SODIUM 4.5 G: 4; .5 INJECTION, POWDER, FOR SOLUTION INTRAVENOUS at 06:09

## 2024-09-07 RX ADMIN — METOCLOPRAMIDE 10 MG: 5 INJECTION, SOLUTION INTRAMUSCULAR; INTRAVENOUS at 05:09

## 2024-09-07 RX ADMIN — HYDROCODONE BITARTRATE AND ACETAMINOPHEN 1 TABLET: 7.5; 325 TABLET ORAL at 05:09

## 2024-09-07 RX ADMIN — METOCLOPRAMIDE 10 MG: 5 INJECTION, SOLUTION INTRAMUSCULAR; INTRAVENOUS at 06:09

## 2024-09-07 RX ADMIN — DOCUSATE SODIUM AND SENNOSIDES 1 TABLET: 8.6; 5 TABLET, FILM COATED ORAL at 09:09

## 2024-09-07 RX ADMIN — PIPERACILLIN SODIUM AND TAZOBACTAM SODIUM 4.5 G: 4; .5 INJECTION, POWDER, FOR SOLUTION INTRAVENOUS at 02:09

## 2024-09-07 RX ADMIN — METOCLOPRAMIDE 10 MG: 5 INJECTION, SOLUTION INTRAMUSCULAR; INTRAVENOUS at 11:09

## 2024-09-07 RX ADMIN — I.V. FAT EMULSION 250 ML: 20 EMULSION INTRAVENOUS at 09:09

## 2024-09-07 RX ADMIN — HYDROMORPHONE HYDROCHLORIDE 0.5 MG: 1 INJECTION, SOLUTION INTRAMUSCULAR; INTRAVENOUS; SUBCUTANEOUS at 09:09

## 2024-09-07 RX ADMIN — IBUPROFEN 600 MG: 600 TABLET ORAL at 08:09

## 2024-09-07 RX ADMIN — LEUCINE, PHENYLALANINE, LYSINE, METHIONINE, ISOLEUCINE, VALINE, HISTIDINE, THREONINE, TRYPTOPHAN, ALANINE, GLYCINE, ARGININE, PROLINE, SERINE, TYROSINE, SODIUM ACETATE, DIBASIC POTASSIUM PHOSPHATE, MAGNESIUM CHLORIDE, SODIUM CHLORIDE, CALCIUM CHLORIDE, DEXTROSE
311; 238; 247; 170; 255; 247; 204; 179; 77; 880; 438; 489; 289; 213; 17; 297; 261; 51; 77; 33; 5 INJECTION INTRAVENOUS at 05:09

## 2024-09-07 RX ADMIN — IBUPROFEN 600 MG: 600 TABLET ORAL at 02:09

## 2024-09-07 RX ADMIN — HYDROCODONE BITARTRATE AND ACETAMINOPHEN 1 TABLET: 7.5; 325 TABLET ORAL at 06:09

## 2024-09-07 RX ADMIN — HYDROCODONE BITARTRATE AND ACETAMINOPHEN 1 TABLET: 7.5; 325 TABLET ORAL at 10:09

## 2024-09-07 RX ADMIN — IBUPROFEN 600 MG: 600 TABLET ORAL at 06:09

## 2024-09-07 RX ADMIN — ONDANSETRON 4 MG: 2 INJECTION INTRAMUSCULAR; INTRAVENOUS at 10:09

## 2024-09-07 NOTE — PLAN OF CARE
Problem: Sepsis/Septic Shock  Goal: Optimal Coping  Outcome: Progressing  Goal: Absence of Bleeding  Outcome: Progressing     Problem: Adult Inpatient Plan of Care  Goal: Plan of Care Review  Outcome: Progressing     Problem: Wound  Goal: Optimal Coping  Outcome: Progressing     Problem: Infection  Goal: Absence of Infection Signs and Symptoms  Outcome: Progressing     Problem: Pain Acute  Goal: Optimal Pain Control and Function  Outcome: Progressing

## 2024-09-07 NOTE — NURSING
Epic malfunction with scanning clinamix unable to scan clinamix with multiple attempts with rover and wow.

## 2024-09-07 NOTE — PROGRESS NOTES
S/p ex lap with bowel resection x2  Pt dong well  TOlerated regular diet.   NO longer having n/v  Cont with good bowel function    Wt Readings from Last 3 Encounters:   08/30/24 118.5 kg (261 lb 3.9 oz)   08/22/24 120.7 kg (266 lb 1.5 oz)   01/07/20 108.9 kg (240 lb)     Temp Readings from Last 3 Encounters:   09/07/24 98.3 °F (36.8 °C) (Oral)   08/27/24 98.6 °F (37 °C) (Oral)   01/07/20 98.8 °F (37.1 °C) (Oral)     BP Readings from Last 3 Encounters:   09/07/24 (!) 145/91   08/27/24 (!) 116/57   01/07/20 (!) 146/83     Pulse Readings from Last 3 Encounters:   09/07/24 101   08/27/24 78   01/07/20 82     AAox3  Soft/nd/nt  No resp distress    Lab Results   Component Value Date    WBC 13.16 (H) 09/07/2024    HGB 9.0 (L) 09/07/2024    HCT 27.8 (L) 09/07/2024    MCV 87 09/07/2024     09/07/2024       BMP  Lab Results   Component Value Date     09/07/2024    K 3.5 09/07/2024     09/07/2024    CO2 26 09/07/2024    BUN 7 09/07/2024    CREATININE 1.0 09/07/2024    CALCIUM 9.2 09/07/2024    ANIONGAP 11 09/07/2024    EGFRNORACEVR >60 09/07/2024     A/p: s/p ex lap with bowel resection  Doing well  Cont regular diet  Ok to d/c home with regular diet when cleared from medical perspective.

## 2024-09-07 NOTE — PROGRESS NOTES
"[facility] Medicine  Progress Note    Patient Name: Willian Ron  MRN: [id number]  Patient Class: IP- Inpatient   Admission Date: 8/30/2024  Length of Stay: 8 days  Attending Physician: Jesse Ross MD  Primary Care Provider: Jennifer, Primary Doctor        Subjective:     Principal Problem:Sepsis        HPI:  Willian Ron is a 34 year old male with a past medical history of acute perforated appendicitis with abscess s/p IR drainage (E coli and Prevotella) on Augmentin and Levaquin given persistent abscesses who presents with worsening abdominal pain. CT in the ED shows acute appendicitis with abscess. Surgery was consulted from the ED for appendectomy. He received IV fluids, meropenem, hydromorphone, Protonix and Zofran in the ED. Hospital Medicine was consulted for admission.    Overview/Hospital Course:  Willian Ron is a 34 year old male with a past medical history of acute perforated appendicitis with abscess s/p IR drainage (E coli and Prevotella) on Augmentin and Levaquin who presented with persistent acute appendicitis with abscess. He was placed on Zosyn and IV fluids. Dr. Harris of General Surgery took the patient to the OR for exploratory laparotomy 8/30 where the patient underwent drainage of the abdominal abscess with right hemicolectomy with ileocolic anastomosis as well as rectosigmoid resection with colorectal anastomosis with splenic flexure mobilization. Cultures show anaerobes. He is tolerating a PO diet after much difficulty during his course and is also on PPN. His course has been complicated by a post-operative anemia for which Hgb is being trended and Lovenox prophylaxis was briefly held and restarted 9/6.    Interval History: see "Hospital Course"    Review of Systems   Gastrointestinal:  Positive for abdominal pain.   Skin:  Positive for wound.     Objective:     Vital Signs (Most Recent):  Temp: 98.2 °F (36.8 °C) (09/06/24 1134)  Pulse: 84 (09/06/24 " 1134)  Resp: 18 (09/06/24 1134)  BP: 135/74 (09/06/24 1134)  SpO2: 98 % (09/06/24 1134) Vital Signs (24h Range):  Temp:  [98.2 °F (36.8 °C)-98.5 °F (36.9 °C)] 98.2 °F (36.8 °C)  Pulse:  [] 84  Resp:  [16-18] 18  SpO2:  [97 %-99 %] 98 %  BP: (135-166)/(74-88) 135/74     Weight: 118.5 kg (261 lb 3.9 oz)  Body mass index is 36.44 kg/m².    Intake/Output Summary (Last 24 hours) at 9/6/2024 1227  Last data filed at 9/6/2024 0622  Gross per 24 hour   Intake 579.5 ml   Output 120 ml   Net 459.5 ml         Physical Exam  Vitals and nursing note reviewed.   Constitutional:       General: He is not in acute distress.     Appearance: He is obese. He is ill-appearing.   HENT:      Head: Normocephalic and atraumatic.      Right Ear: External ear normal.      Left Ear: External ear normal.      Nose: Nose normal.      Mouth/Throat:      Mouth: Mucous membranes are moist.      Pharynx: Oropharynx is clear.   Eyes:      Extraocular Movements: Extraocular movements intact.      Conjunctiva/sclera: Conjunctivae normal.   Cardiovascular:      Rate and Rhythm: Regular rhythm. Tachycardia present.      Pulses: Normal pulses.      Heart sounds: Normal heart sounds.   Pulmonary:      Effort: Pulmonary effort is normal.      Breath sounds: Normal breath sounds.   Abdominal:      Tenderness: There is abdominal tenderness.      Comments: Two GELY drains. Dressings clean, dry and intact.   Musculoskeletal:         General: Normal range of motion.      Cervical back: Normal range of motion and neck supple.      Right lower leg: No edema.      Left lower leg: No edema.   Skin:     General: Skin is warm and dry.   Neurological:      Mental Status: He is alert. Mental status is at baseline.   Psychiatric:         Mood and Affect: Mood normal.         Behavior: Behavior normal.             Significant Labs: All pertinent labs within the past 24 hours have been reviewed.    Significant Imaging: I have reviewed all pertinent imaging  results/findings within the past 24 hours.    Assessment/Plan:      Postoperative anemia  Stable.  -Trend Hgb with CBC      Perforation of sigmoid colon  -S/p sigmoid colectomy with anastomosis.  Continue to advance diet per General surgery recommendation.  Attempt to wean IV pain medications and transitioned to oral pain meds.      Abdominal visceral abscess  Cultures with anaerobes.  -Continue Zosyn     Acute appendicitis  S/p ileocolic colectomy with anastomosis.  Continue IV antibiotics per above under perforation, and monitor.     Obesity (BMI 30-39.9)  Body mass index is 36.44 kg/m². Morbid obesity complicates all aspects of disease management from diagnostic modalities to treatment.         Tobacco dependency  -Patient counseled on cessation    Hepatic steatosis  -Trend LFTs        VTE Risk Mitigation (From admission, onward)           Ordered     enoxaparin injection 40 mg  Daily         09/06/24 1229     IP VTE HIGH RISK PATIENT  Once         08/30/24 1316     Place sequential compression device  Until discontinued         08/30/24 1316                    Discharge Planning   JANN: 9/10/2024     Code Status: Full Code   Is the patient medically ready for discharge?:     Reason for patient still in hospital (select all that apply): Patient trending condition, Treatment, and Consult recommendations  Discharge Plan A: Long-term acute care facility (LTAC)                  Jesse Ross MD  Department of Hospital Medicine   [facility]/Surg

## 2024-09-07 NOTE — CARE UPDATE
09/07/24 0752   Patient Assessment/Suction   Level of Consciousness (AVPU) alert   Respiratory Effort Normal;Unlabored   Expansion/Accessory Muscles/Retractions no use of accessory muscles;no retractions;expansion symmetric   Rhythm/Pattern, Respiratory unlabored;pattern regular;depth regular;no shortness of breath reported   PRE-TX-O2   Device (Oxygen Therapy) room air   SpO2 99 %   Pulse Oximetry Type Intermittent   $ Pulse Oximetry - Multiple Charge Pulse Oximetry - Multiple   Pulse 92   Resp 18   Incentive Spirometer   $ Incentive Spirometer Charges done with encouragement   Incentive Spirometer Predicted Level (mL) 2400   Administration (IS) mouthpiece utilized   Number of Repetitions (IS) 6   Level Incentive Spirometer (mL) 2000   Patient Tolerance (IS) good;no adverse signs/symptoms present

## 2024-09-07 NOTE — ASSESSMENT & PLAN NOTE
Patient's most recent potassium results are listed below.   Recent Labs     09/05/24  0423 09/06/24  0408 09/07/24  0450   K 3.9 3.3* 3.5       Plan  - Replete potassium per protocol  - Monitor potassium Daily  - Patient's hypokalemia is worsening. Will continue current treatment

## 2024-09-07 NOTE — PT/OT/SLP PROGRESS
Physical Therapy Treatment    Patient Name:  Willian Ron   MRN:  [id number]    Recommendations:     Discharge Recommendations: Low Intensity Therapy  Discharge Equipment Recommendations: none  Barriers to discharge:  weakness, impaired endurance, pain, impaired functional mobility, gait instability.     Assessment:     Willian Ron is a 34 y.o. male admitted with a medical diagnosis of Sepsis.  He presents with the following impairments/functional limitations: weakness, impaired endurance, pain, impaired functional mobility, gait instability, impaired balance.    Pt found resting with HOB elevated, agreeable to skilled physical therapy session today.     Bed mobility was performed with SBA requiring extended time secondary to abdominal pain and weakness.     He ambulated 3b650te without AD and with SBA. He demonstrated slow antalgic gait pattern throughout but otherwise ambulated with appropriate technique.     He ambulated back to his room where he transferred back to supine with SBA. Pt left with HOB elevated, bed alarm on, call light within reach, all needs met, and RN notified.     Rehab Prognosis: Fair; patient would benefit from acute skilled PT services to address these deficits and reach maximum level of function.    Recent Surgery: Procedure(s) (LRB):  LAPAROTOMY, EXPLORATORY (N/A)  COLON RESECTION (Bilateral)  INCISION AND DRAINAGE, ABSCESS (N/A) 8 Days Post-Op    Plan:     During this hospitalization, patient to be seen 5 x/week to address the identified rehab impairments via gait training, therapeutic activities, therapeutic exercises and progress toward the following goals:    Plan of Care Expires:  09/28/24    Subjective     Chief Complaint: abdominal pain  Patient/Family Comments/goals: to get better  Pain/Comfort:  Location - Side 1: Bilateral  Location - Orientation 1: generalized  Location 1: abdomen  Pain Addressed 1: Reposition, Distraction, Cessation of Activity      Objective:      Communicated with RN prior to session.  Patient found HOB elevated with bed alarm, GELY drain upon PT entry to room.     General Precautions: Standard, fall  Orthopedic Precautions: N/A  Braces: N/A  Respiratory Status: Room air     Functional Mobility:  Bed Mobility:     Supine to Sit: stand by assistance  Sit to Supine: stand by assistance  Transfers:     Sit to Stand:  stand by assistance with no AD  Gait: 4l256dn without AD and with SBA.      AM-PAC 6 CLICK MOBILITY          Treatment & Education:  Pt educated on importance of time OOB, importance of intermittent mobility, safe techniques for transfers/ambulation, discharge recommendations/options, and use of call light for assistance and fall prevention.      Patient left HOB elevated with all lines intact, call button in reach, and RN notified..    GOALS:   Multidisciplinary Problems       Physical Therapy Goals          Problem: Physical Therapy    Goal Priority Disciplines Outcome Goal Variances Interventions   Physical Therapy Goal     PT, PT/OT Progressing     Description: Goals to be met by: 24     Patient will increase functional independence with mobility by performin. Supine to sit with [place]  2. Sit to stand transfer with [place]  3. Bed to chair transfer with Modified [place] using Rolling Walker  4. Gait  x 500 feet with Modified [place] using Rolling Walker.   5. Lower extremity exercise program x30 reps per handout, with independence                         Time Tracking:     PT Received On: 24  PT Start Time: 1009     PT Stop Time: 1033  PT Total Time (min): 24 min     Billable Minutes: Gait Training 24    Treatment Type: Treatment  PT/PTA: PTA     Number of PTA visits since last PT visit: 2024

## 2024-09-08 VITALS
WEIGHT: 261.25 LBS | TEMPERATURE: 99 F | SYSTOLIC BLOOD PRESSURE: 144 MMHG | OXYGEN SATURATION: 96 % | RESPIRATION RATE: 16 BRPM | BODY MASS INDEX: 36.57 KG/M2 | HEIGHT: 71 IN | HEART RATE: 107 BPM | DIASTOLIC BLOOD PRESSURE: 73 MMHG

## 2024-09-08 PROBLEM — K35.80 ACUTE APPENDICITIS: Status: RESOLVED | Noted: 2024-08-14 | Resolved: 2024-09-08

## 2024-09-08 PROBLEM — K63.1 PERFORATION OF SIGMOID COLON: Status: RESOLVED | Noted: 2024-08-31 | Resolved: 2024-09-08

## 2024-09-08 LAB
ALBUMIN SERPL BCP-MCNC: 3.1 G/DL (ref 3.5–5.2)
ALP SERPL-CCNC: 74 U/L (ref 55–135)
ALT SERPL W/O P-5'-P-CCNC: 74 U/L (ref 10–44)
ANION GAP SERPL CALC-SCNC: 11 MMOL/L (ref 8–16)
AST SERPL-CCNC: 30 U/L (ref 10–40)
BASOPHILS # BLD AUTO: 0.05 K/UL (ref 0–0.2)
BASOPHILS NFR BLD: 0.4 % (ref 0–1.9)
BILIRUB SERPL-MCNC: 1 MG/DL (ref 0.1–1)
BUN SERPL-MCNC: 8 MG/DL (ref 6–20)
CALCIUM SERPL-MCNC: 9.3 MG/DL (ref 8.7–10.5)
CHLORIDE SERPL-SCNC: 99 MMOL/L (ref 95–110)
CO2 SERPL-SCNC: 28 MMOL/L (ref 23–29)
CREAT SERPL-MCNC: 1 MG/DL (ref 0.5–1.4)
DIFFERENTIAL METHOD BLD: ABNORMAL
EOSINOPHIL # BLD AUTO: 1 K/UL (ref 0–0.5)
EOSINOPHIL NFR BLD: 7.3 % (ref 0–8)
ERYTHROCYTE [DISTWIDTH] IN BLOOD BY AUTOMATED COUNT: 13.6 % (ref 11.5–14.5)
EST. GFR  (NO RACE VARIABLE): >60 ML/MIN/1.73 M^2
GLUCOSE SERPL-MCNC: 105 MG/DL (ref 70–110)
HCT VFR BLD AUTO: 28.7 % (ref 40–54)
HGB BLD-MCNC: 9.2 G/DL (ref 14–18)
IMM GRANULOCYTES # BLD AUTO: 0.27 K/UL (ref 0–0.04)
IMM GRANULOCYTES NFR BLD AUTO: 2 % (ref 0–0.5)
LYMPHOCYTES # BLD AUTO: 2.2 K/UL (ref 1–4.8)
LYMPHOCYTES NFR BLD: 16 % (ref 18–48)
MAGNESIUM SERPL-MCNC: 2 MG/DL (ref 1.6–2.6)
MCH RBC QN AUTO: 28.3 PG (ref 27–31)
MCHC RBC AUTO-ENTMCNC: 32.1 G/DL (ref 32–36)
MCV RBC AUTO: 88 FL (ref 82–98)
MONOCYTES # BLD AUTO: 1.7 K/UL (ref 0.3–1)
MONOCYTES NFR BLD: 12.5 % (ref 4–15)
NEUTROPHILS # BLD AUTO: 8.4 K/UL (ref 1.8–7.7)
NEUTROPHILS NFR BLD: 61.8 % (ref 38–73)
NRBC BLD-RTO: 0 /100 WBC
PHOSPHATE SERPL-MCNC: 3.9 MG/DL (ref 2.7–4.5)
PLATELET # BLD AUTO: 420 K/UL (ref 150–450)
PMV BLD AUTO: 10.3 FL (ref 9.2–12.9)
POTASSIUM SERPL-SCNC: 3.2 MMOL/L (ref 3.5–5.1)
PROT SERPL-MCNC: 7 G/DL (ref 6–8.4)
RBC # BLD AUTO: 3.25 M/UL (ref 4.6–6.2)
SODIUM SERPL-SCNC: 138 MMOL/L (ref 136–145)
WBC # BLD AUTO: 13.62 K/UL (ref 3.9–12.7)

## 2024-09-08 PROCEDURE — 80053 COMPREHEN METABOLIC PANEL: CPT | Performed by: SURGERY

## 2024-09-08 PROCEDURE — 94761 N-INVAS EAR/PLS OXIMETRY MLT: CPT

## 2024-09-08 PROCEDURE — 83735 ASSAY OF MAGNESIUM: CPT | Performed by: SURGERY

## 2024-09-08 PROCEDURE — 25000003 PHARM REV CODE 250: Performed by: SURGERY

## 2024-09-08 PROCEDURE — 84100 ASSAY OF PHOSPHORUS: CPT | Performed by: SURGERY

## 2024-09-08 PROCEDURE — 63600175 PHARM REV CODE 636 W HCPCS: Performed by: SURGERY

## 2024-09-08 PROCEDURE — 36415 COLL VENOUS BLD VENIPUNCTURE: CPT | Performed by: SURGERY

## 2024-09-08 PROCEDURE — 25000003 PHARM REV CODE 250: Performed by: STUDENT IN AN ORGANIZED HEALTH CARE EDUCATION/TRAINING PROGRAM

## 2024-09-08 PROCEDURE — 85025 COMPLETE CBC W/AUTO DIFF WBC: CPT | Performed by: SURGERY

## 2024-09-08 PROCEDURE — 25000003 PHARM REV CODE 250

## 2024-09-08 RX ORDER — AMOXICILLIN AND CLAVULANATE POTASSIUM 875; 125 MG/1; MG/1
1 TABLET, FILM COATED ORAL EVERY 12 HOURS
Qty: 14 TABLET | Refills: 0 | Status: SHIPPED | OUTPATIENT
Start: 2024-09-08 | End: 2024-09-15

## 2024-09-08 RX ORDER — METOCLOPRAMIDE 5 MG/1
5 TABLET ORAL
Status: DISCONTINUED | OUTPATIENT
Start: 2024-09-08 | End: 2024-09-08 | Stop reason: HOSPADM

## 2024-09-08 RX ORDER — HYDROCODONE BITARTRATE AND ACETAMINOPHEN 7.5; 325 MG/1; MG/1
1 TABLET ORAL EVERY 6 HOURS PRN
Qty: 20 TABLET | Refills: 0 | Status: SHIPPED | OUTPATIENT
Start: 2024-09-08 | End: 2024-09-08

## 2024-09-08 RX ORDER — ONDANSETRON 4 MG/1
4 TABLET, ORALLY DISINTEGRATING ORAL EVERY 6 HOURS PRN
Qty: 20 TABLET | Refills: 0 | Status: SHIPPED | OUTPATIENT
Start: 2024-09-08 | End: 2024-09-08

## 2024-09-08 RX ORDER — AMOXICILLIN 250 MG
1 CAPSULE ORAL 2 TIMES DAILY
Qty: 10 TABLET | Refills: 0 | Status: SHIPPED | OUTPATIENT
Start: 2024-09-08 | End: 2024-09-08

## 2024-09-08 RX ORDER — HYDROCODONE BITARTRATE AND ACETAMINOPHEN 7.5; 325 MG/1; MG/1
1 TABLET ORAL EVERY 6 HOURS PRN
Qty: 20 TABLET | Refills: 0 | Status: SHIPPED | OUTPATIENT
Start: 2024-09-08 | End: 2024-09-13

## 2024-09-08 RX ORDER — ONDANSETRON 4 MG/1
4 TABLET, ORALLY DISINTEGRATING ORAL EVERY 6 HOURS PRN
Qty: 20 TABLET | Refills: 0 | Status: SHIPPED | OUTPATIENT
Start: 2024-09-08 | End: 2024-09-13

## 2024-09-08 RX ORDER — IBUPROFEN 600 MG/1
600 TABLET ORAL EVERY 6 HOURS PRN
Status: DISCONTINUED | OUTPATIENT
Start: 2024-09-08 | End: 2024-09-08 | Stop reason: HOSPADM

## 2024-09-08 RX ORDER — AMOXICILLIN 250 MG
1 CAPSULE ORAL 2 TIMES DAILY
Qty: 10 TABLET | Refills: 0 | Status: SHIPPED | OUTPATIENT
Start: 2024-09-08 | End: 2024-09-13

## 2024-09-08 RX ADMIN — METOCLOPRAMIDE 10 MG: 5 INJECTION, SOLUTION INTRAMUSCULAR; INTRAVENOUS at 05:09

## 2024-09-08 RX ADMIN — ALUMINUM HYDROXIDE, MAGNESIUM HYDROXIDE, AND SIMETHICONE 30 ML: 1200; 120; 1200 SUSPENSION ORAL at 07:09

## 2024-09-08 RX ADMIN — PIPERACILLIN SODIUM AND TAZOBACTAM SODIUM 4.5 G: 4; .5 INJECTION, POWDER, FOR SOLUTION INTRAVENOUS at 02:09

## 2024-09-08 RX ADMIN — METOCLOPRAMIDE 5 MG: 5 TABLET ORAL at 10:09

## 2024-09-08 RX ADMIN — ONDANSETRON 4 MG: 2 INJECTION INTRAMUSCULAR; INTRAVENOUS at 03:09

## 2024-09-08 RX ADMIN — POTASSIUM BICARBONATE 35 MEQ: 391 TABLET, EFFERVESCENT ORAL at 06:09

## 2024-09-08 NOTE — NURSING
Pts mother called to state [facility] would not give them the hydrocodone RX until they received a code?? Called [facility] on [address] and gave them icd 9 icd 10 code for intra abdominal abscess from pts chart.

## 2024-09-08 NOTE — CARE UPDATE
09/08/24 0735   Patient Assessment/Suction   Level of Consciousness (AVPU) responds to voice   Respiratory Effort Unlabored   Expansion/Accessory Muscles/Retractions no use of accessory muscles;no retractions;expansion symmetric   Rhythm/Pattern, Respiratory unlabored;depth regular;pattern regular;no shortness of breath reported   PRE-TX-O2   Device (Oxygen Therapy) room air   SpO2 98 %   Pulse Oximetry Type Intermittent   $ Pulse Oximetry - Multiple Charge Pulse Oximetry - Multiple   Pulse 96   Resp 16   Incentive Spirometer   Administration (IS) other (see comments)  (pt sleeping)

## 2024-09-08 NOTE — PLAN OF CARE
HH packet, orders, DC summary and AVS sent to multiple HH agencies for review. CM following.    09/08/24 0922   Post-Acute Status   Post-Acute Authorization Home Health   Home Health Status Referrals Sent   Patient choice form signed by patient/caregiver List with quality metrics by geographic area provided

## 2024-09-08 NOTE — DISCHARGE SUMMARY
[facility]  Discharge Summary      Patient Name: Willian Ron  MRN: [id number]  KAVYA: [id number]  Patient Class: IP- Inpatient  Admission Date: 8/30/2024  Hospital Length of Stay: 9 days  Discharge Date and Time: No discharge date for patient encounter.  Attending Physician: Jesse Ross MD   Discharging Provider: Jesse Ross MD  Primary Care Provider: Jennifer, Primary Doctor    Primary Care Team: Networked reference to record PCT     HPI:   Willian Ron is a 34 year old male with a past medical history of acute perforated appendicitis with abscess s/p IR drainage (E coli and Prevotella) on Augmentin and Levaquin given persistent abscesses who presents with worsening abdominal pain. CT in the ED shows acute appendicitis with abscess. Surgery was consulted from the ED for appendectomy. He received IV fluids, meropenem, hydromorphone, Protonix and Zofran in the ED. Hospital Medicine was consulted for admission.    Procedure(s) (LRB):  LAPAROTOMY, EXPLORATORY (N/A)  COLON RESECTION (Bilateral)  INCISION AND DRAINAGE, ABSCESS (N/A)      Hospital Course:   Willian Ron is a 34 year old male with a past medical history of acute perforated appendicitis with abscess s/p IR drainage (E coli and Prevotella) on Augmentin and Levaquin who presented with persistent acute appendicitis with abscess. He was placed on Zosyn and IV fluids. Dr. Harris of General Surgery took the patient to the OR for exploratory laparotomy 8/30 where the patient underwent drainage of the abdominal abscess with right hemicolectomy with ileocolic anastomosis as well as rectosigmoid resection with colorectal anastomosis with splenic flexure mobilization. Cultures showed anaerobes. He tolerated a PO diet after much difficulty during his course and was also on PPN which was discontinued on discharge. His course was complicated by a post-operative anemia for which Hgb was trended and Lovenox prophylaxis was  briefly held and restarted 9/6. He was able to be discharged 9/8 with Augmentin to complete a two week course of antibiotics. He will follow up with Surgery. HH PT/OT was ordered on discharge.     Goals of Care Treatment Preferences:  Code Status: Full Code         Consults:   Consults (From admission, onward)          Status Ordering Provider     Inpatient consult to Midline team  Once        Provider:  (Not yet assigned)    JARED Urena            Oncology  Postoperative anemia  Stable.  -Trend Hgb with CBC      Endocrine  Obesity (BMI 30-39.9)  Body mass index is 36.44 kg/m². Morbid obesity complicates all aspects of disease management from diagnostic modalities to treatment.         GI  Hepatic steatosis  Improving.  -Trend LFTs      Abdominal visceral abscess  Cultures with anaerobes.  -Continue Zosyn to Augmentin for two weeks    Other  Tobacco dependency  -Patient counseled on cessation      Final Active Diagnoses:    Diagnosis Date Noted POA    Postoperative anemia [D64.9] 09/01/2024 No    Abdominal visceral abscess [K65.1] 08/31/2024 Yes    Obesity (BMI 30-39.9) [E66.9] 08/30/2024 Yes    Tobacco dependency [F17.200] 08/18/2024 Yes    Hepatic steatosis [K76.0] 08/14/2024 Yes      Problems Resolved During this Admission:    Diagnosis Date Noted Date Resolved POA    PRINCIPAL PROBLEM:  Sepsis [A41.9] 08/14/2024 09/07/2024 Yes    Perforation of sigmoid colon [K63.1] 08/31/2024 09/08/2024 Yes    Acute appendicitis [K35.80] 08/14/2024 09/08/2024 Yes    Hypokalemia [E87.6] 09/04/2024 09/07/2024 Yes       Discharged Condition: stable    Disposition: [facility]-Health Care [facility]    Follow Up:   Follow-up Information       [facility] Follow up.    Contact information:  [address]  [facility] [address]  [phone]               Chaz Harris MD .    Specialties: General Surgery, Colon and Rectal Surgery, Surgery  Contact information:  [address]  [place]  [address]  [facility] [address]  [phone]                           Patient Instructions:      Ambulatory referral/consult to Home Health   Standing Status: Future   Referral Priority: Routine Referral Type: Home Health   Referral Reason: Specialty Services Required   Requested Specialty: Home Health Services   Number of Visits Requested: 1     Diet Adult Regular     Notify your health care provider if you experience any of the following:  increased confusion or weakness     Notify your health care provider if you experience any of the following:  persistent dizziness, light-headedness, or visual disturbances     Notify your health care provider if you experience any of the following:  severe persistent headache     Notify your health care provider if you experience any of the following:  difficulty breathing or increased cough     Notify your health care provider if you experience any of the following:  severe uncontrolled pain     Notify your health care provider if you experience any of the following:  persistent nausea and vomiting or diarrhea     Notify your health care provider if you experience any of the following:  redness, tenderness, or signs of infection (pain, swelling, redness, odor or green/yellow discharge around incision site)     Notify your health care provider if you experience any of the following:  temperature >100.4     Activity as tolerated       Significant Diagnostic Studies: Labs: All labs within the past 24 hours have been reviewed    Pending Diagnostic Studies:       None           Medications:  Reconciled Home Medications:      Medication List        START taking these medications      HYDROcodone-acetaminophen 7.5-325 mg per tablet  Commonly known as: NORCO  Take 1 tablet by mouth every 6 (six) hours as needed for Pain.     ondansetron 4 MG Tbdl  Commonly known as: ZOFRAN-ODT  Take 1 tablet (4 mg total) by mouth every 6 (six) hours as needed (nausea).     senna-docusate 8.6-50 mg 8.6-50 mg  per tablet  Commonly known as: PERICOLACE  Take 1 tablet by mouth 2 (two) times daily. for 5 days            CONTINUE taking these medications      amoxicillin-clavulanate 875-125mg 875-125 mg per tablet  Commonly known as: AUGMENTIN  Take 1 tablet by mouth every 12 (twelve) hours. for 7 days     famotidine 40 MG tablet  Commonly known as: PEPCID  Take 1 tablet (40 mg total) by mouth once daily.     nicotine 14 mg/24 hr  Commonly known as: NICODERM CQ  Place 1 patch onto the skin once daily            STOP taking these medications      levoFLOXacin 750 MG tablet  Commonly known as: LEVAQUIN     oxyCODONE 5 MG immediate release tablet  Commonly known as: ROXICODONE              Indwelling Lines/Drains at time of discharge:   Lines/Drains/Airways       Drain  Duration                  Closed/Suction Drain 08/30/24 Tube - 1 Anterior Abdomen Bulb 9 days         Closed/Suction Drain 08/30/24 Tube - 2 Anterior Abdomen Bulb 9 days                    Time spent on the discharge of patient: 31 minutes         Jesse Ross MD  Department of Hospital Medicine  [facility]/Surg

## 2024-09-08 NOTE — PLAN OF CARE
The pt is accepted by [facility]. AVS updated and discharge plan closed in [facility].    09/08/24 6446   Post-Acute Status   Post-Acute Authorization [facility] Status Set-up Complete/Auth obtained

## 2024-09-08 NOTE — PLAN OF CARE
09/08/24 0943   Final Note   Assessment Type Final Discharge Note   Anticipated Discharge Disposition Home-Health   What phone number can be called within the next 1-3 days to see how you are doing after discharge? [phone]   Post-Acute Status   Discharge Delays None known at this time

## 2024-09-08 NOTE — PLAN OF CARE
The pt is cleared for discharge home from case management with [facility].    09/08/24 0938   Final Note   Anticipated Discharge Disposition Home-Health   What phone number can be called within the next 1-3 days to see how you are doing after discharge? [phone]   Hospital Resources/Appts/Education Provided Post-Acute resouces added to AVS;Appointments scheduled and added to AVS;Community resources provided   Post-Acute Status   Post-Acute Authorization Home Health   Home Health Status Set-up Complete/Auth obtained   Patient choice form signed by patient/caregiver List with quality metrics by geographic area provided   Discharge Delays None known at this time

## 2024-09-08 NOTE — PROGRESS NOTES
Patient seen and examined.  He was resting comfortably.  He was ready to go home.  As tolerated diet.  Labs reviewed and are all normal.  Patient will be discharged home with drain in place.  He will follow up with me in 1-2 weeks for drain removal

## 2024-09-09 ENCOUNTER — TELEPHONE (OUTPATIENT)
Dept: INFECTIOUS DISEASES | Facility: CLINIC | Age: 34
End: 2024-09-09
Payer: MEDICAID

## 2024-09-09 ENCOUNTER — TELEPHONE (OUTPATIENT)
Dept: SURGERY | Facility: CLINIC | Age: 34
End: 2024-09-09
Payer: MEDICAID

## 2024-09-09 ENCOUNTER — PATIENT MESSAGE (OUTPATIENT)
Dept: INFECTIOUS DISEASES | Facility: CLINIC | Age: 34
End: 2024-09-09
Payer: MEDICAID

## 2024-09-09 NOTE — TELEPHONE ENCOUNTER
Patients Mother called back and she states that he's doing okay.  He cannot make the appointment on Tuesday, 9/10/24 @ 2pm in [place].  I rescheduled him to see Dr. Harris on Wednesday, 9/18/24 @ 11am, but told his Mother for him to come in @ 10:45am in [place].  Jony

## 2024-09-09 NOTE — TELEPHONE ENCOUNTER
I called and spoke to patients Mother and she stated that he wasn't aware of the appointment tomorrow with Dr. Harris @ 2pm in [place].  Jony
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
## 2024-09-18 ENCOUNTER — OFFICE VISIT (OUTPATIENT)
Dept: SURGERY | Facility: CLINIC | Age: 34
End: 2024-09-18
Payer: MEDICAID

## 2024-09-18 VITALS
BODY MASS INDEX: 33.18 KG/M2 | TEMPERATURE: 97 F | HEIGHT: 71 IN | HEART RATE: 95 BPM | SYSTOLIC BLOOD PRESSURE: 149 MMHG | WEIGHT: 237 LBS | DIASTOLIC BLOOD PRESSURE: 78 MMHG

## 2024-09-18 DIAGNOSIS — Z09 POSTOP CHECK: Primary | ICD-10-CM

## 2024-09-18 PROCEDURE — 3077F SYST BP >= 140 MM HG: CPT | Mod: CPTII,,, | Performed by: SURGERY

## 2024-09-18 PROCEDURE — 3078F DIAST BP <80 MM HG: CPT | Mod: CPTII,,, | Performed by: SURGERY

## 2024-09-18 PROCEDURE — 99213 OFFICE O/P EST LOW 20 MIN: CPT | Mod: PBBFAC,PO | Performed by: SURGERY

## 2024-09-18 PROCEDURE — 99024 POSTOP FOLLOW-UP VISIT: CPT | Mod: ,,, | Performed by: SURGERY

## 2024-09-18 PROCEDURE — 99999 PR PBB SHADOW E&M-EST. PATIENT-LVL III: CPT | Mod: PBBFAC,,, | Performed by: SURGERY

## 2024-09-18 PROCEDURE — 1159F MED LIST DOCD IN RCRD: CPT | Mod: CPTII,,, | Performed by: SURGERY

## 2024-09-18 NOTE — PROGRESS NOTES
Cc: post op    HPI: 34 y.o.  male  3 weeks s/p ex lap with R hemia and REctosigmoid resection.   Pt doing well.  No n/v.  Bowels working well occasionally pains.      PE: AFVSS    AAOx3  CTA  Soft/NT/nd  Inc: c/d/i        Path:     1. TERMINAL ILEUM, CECUM, APPENDIX, AND ASCENDING COLON:     - ACUTE APPENDICITIS WITH RUPTURE AND PERIAPPENDICEAL ABSCESS FORMATION.     - ADJACENT BOWEL SHOWS ACUTE AND CHRONIC SEROSITIS AND ADHESIONS.     2. RECTOSIGMOID COLON:     - SEVERE ACUTE AND CHRONIC SEROSITIS AND ADHESIONS._______________________________________________________________________________________________________       A/P:   Pt doing well post surgery.   F/U with me  prn  No heavy lifting for 4 weeks.

## 2024-09-24 ENCOUNTER — PATIENT OUTREACH (OUTPATIENT)
Dept: ADMINISTRATIVE | Facility: OTHER | Age: 34
End: 2024-09-24
Payer: MEDICAID

## 2024-09-24 NOTE — PROGRESS NOTES
CHW - Outreach Attempt    Community Health Worker left a voicemail message for 1st attempt to contact patient regarding: SDOH completion and assessment   Community Health Worker to attempt to contact patient on:9/24/24

## 2024-09-25 ENCOUNTER — PATIENT OUTREACH (OUTPATIENT)
Dept: ADMINISTRATIVE | Facility: OTHER | Age: 34
End: 2024-09-25
Payer: MEDICAID

## 2024-09-25 ENCOUNTER — OFFICE VISIT (OUTPATIENT)
Dept: PRIMARY CARE CLINIC | Facility: CLINIC | Age: 34
End: 2024-09-25
Payer: MEDICAID

## 2024-09-25 ENCOUNTER — LAB VISIT (OUTPATIENT)
Dept: LAB | Facility: HOSPITAL | Age: 34
End: 2024-09-25
Attending: FAMILY MEDICINE
Payer: MEDICAID

## 2024-09-25 VITALS
DIASTOLIC BLOOD PRESSURE: 85 MMHG | WEIGHT: 238 LBS | HEIGHT: 71 IN | HEART RATE: 99 BPM | OXYGEN SATURATION: 100 % | TEMPERATURE: 99 F | SYSTOLIC BLOOD PRESSURE: 126 MMHG | BODY MASS INDEX: 33.32 KG/M2

## 2024-09-25 VITALS
TEMPERATURE: 99 F | OXYGEN SATURATION: 100 % | BODY MASS INDEX: 32.94 KG/M2 | SYSTOLIC BLOOD PRESSURE: 152 MMHG | HEIGHT: 71 IN | DIASTOLIC BLOOD PRESSURE: 78 MMHG | HEART RATE: 99 BPM | WEIGHT: 235.25 LBS

## 2024-09-25 DIAGNOSIS — I10 PRIMARY HYPERTENSION: Primary | ICD-10-CM

## 2024-09-25 DIAGNOSIS — Z86.59 HX OF MAJOR DEPRESSION: ICD-10-CM

## 2024-09-25 DIAGNOSIS — Z00.00 WELLNESS EXAMINATION: Primary | ICD-10-CM

## 2024-09-25 DIAGNOSIS — Z11.3 SCREENING EXAMINATION FOR STI: ICD-10-CM

## 2024-09-25 DIAGNOSIS — Z71.85 VACCINE COUNSELING: ICD-10-CM

## 2024-09-25 DIAGNOSIS — I10 PRIMARY HYPERTENSION: ICD-10-CM

## 2024-09-25 DIAGNOSIS — E66.09 CLASS 1 OBESITY DUE TO EXCESS CALORIES WITH BODY MASS INDEX (BMI) OF 32.0 TO 32.9 IN ADULT, UNSPECIFIED WHETHER SERIOUS COMORBIDITY PRESENT: ICD-10-CM

## 2024-09-25 DIAGNOSIS — Z28.82 VACCINE REFUSED BY PARENT: ICD-10-CM

## 2024-09-25 PROBLEM — K65.1 ABDOMINAL VISCERAL ABSCESS: Status: RESOLVED | Noted: 2024-08-31 | Resolved: 2024-09-25

## 2024-09-25 PROBLEM — D64.9 POSTOPERATIVE ANEMIA: Status: RESOLVED | Noted: 2024-09-01 | Resolved: 2024-09-25

## 2024-09-25 LAB
CHOLEST SERPL-MCNC: 155 MG/DL (ref 120–199)
CHOLEST/HDLC SERPL: 4 {RATIO} (ref 2–5)
ESTIMATED AVG GLUCOSE: 91 MG/DL (ref 68–131)
HBA1C MFR BLD: 4.8 % (ref 4–5.6)
HCV AB SERPL QL IA: NORMAL
HDLC SERPL-MCNC: 39 MG/DL (ref 40–75)
HDLC SERPL: 25.2 % (ref 20–50)
HIV 1+2 AB+HIV1 P24 AG SERPL QL IA: NORMAL
LDLC SERPL CALC-MCNC: 86.2 MG/DL (ref 63–159)
NONHDLC SERPL-MCNC: 116 MG/DL
TREPONEMA PALLIDUM IGG+IGM AB [PRESENCE] IN SERUM OR PLASMA BY IMMUNOASSAY: NONREACTIVE
TRIGL SERPL-MCNC: 149 MG/DL (ref 30–150)

## 2024-09-25 PROCEDURE — 99213 OFFICE O/P EST LOW 20 MIN: CPT | Mod: PBBFAC,PN | Performed by: FAMILY MEDICINE

## 2024-09-25 PROCEDURE — 87389 HIV-1 AG W/HIV-1&-2 AB AG IA: CPT | Performed by: FAMILY MEDICINE

## 2024-09-25 PROCEDURE — 3078F DIAST BP <80 MM HG: CPT | Mod: CPTII,,, | Performed by: FAMILY MEDICINE

## 2024-09-25 PROCEDURE — 36415 COLL VENOUS BLD VENIPUNCTURE: CPT | Mod: PO | Performed by: FAMILY MEDICINE

## 2024-09-25 PROCEDURE — 99999 PR PBB SHADOW E&M-EST. PATIENT-LVL III: CPT | Mod: PBBFAC,,, | Performed by: FAMILY MEDICINE

## 2024-09-25 PROCEDURE — 83036 HEMOGLOBIN GLYCOSYLATED A1C: CPT | Performed by: FAMILY MEDICINE

## 2024-09-25 PROCEDURE — 1159F MED LIST DOCD IN RCRD: CPT | Mod: CPTII,,, | Performed by: FAMILY MEDICINE

## 2024-09-25 PROCEDURE — 3074F SYST BP LT 130 MM HG: CPT | Mod: CPTII,,, | Performed by: FAMILY MEDICINE

## 2024-09-25 PROCEDURE — 99203 OFFICE O/P NEW LOW 30 MIN: CPT | Mod: S$PBB,,, | Performed by: FAMILY MEDICINE

## 2024-09-25 PROCEDURE — 86593 SYPHILIS TEST NON-TREP QUANT: CPT | Performed by: FAMILY MEDICINE

## 2024-09-25 PROCEDURE — 3008F BODY MASS INDEX DOCD: CPT | Mod: CPTII,,, | Performed by: FAMILY MEDICINE

## 2024-09-25 PROCEDURE — 86803 HEPATITIS C AB TEST: CPT | Performed by: FAMILY MEDICINE

## 2024-09-25 PROCEDURE — 1111F DSCHRG MED/CURRENT MED MERGE: CPT | Mod: CPTII,,, | Performed by: FAMILY MEDICINE

## 2024-09-25 PROCEDURE — 99499 UNLISTED E&M SERVICE: CPT | Mod: S$PBB,,, | Performed by: FAMILY MEDICINE

## 2024-09-25 PROCEDURE — G2211 COMPLEX E/M VISIT ADD ON: HCPCS | Mod: S$PBB,,, | Performed by: FAMILY MEDICINE

## 2024-09-25 PROCEDURE — 80061 LIPID PANEL: CPT | Performed by: FAMILY MEDICINE

## 2024-09-25 RX ORDER — NEBULIZER AND COMPRESSOR
1 EACH MISCELLANEOUS 2 TIMES DAILY
Qty: 1 EACH | Refills: 0 | Status: SHIPPED | OUTPATIENT
Start: 2024-09-25

## 2024-09-25 RX ORDER — HYDROCHLOROTHIAZIDE 12.5 MG/1
12.5 TABLET ORAL DAILY
Qty: 30 TABLET | Refills: 1 | Status: SHIPPED | OUTPATIENT
Start: 2024-09-25 | End: 2025-09-25

## 2024-09-25 NOTE — PROGRESS NOTES
"Subjective:       Patient ID: Willian Ron is a 34 y.o. male.    Chief Complaint: Establish Care    34 yr old AA male with hx of ruptured appendicitis s/p IR drainage now post op from OR for exploratory laparotomy 8/30 where the patient underwent drainage of an abdominal abscess with right hemicolectomy with ileocolic anastomosis as well as rectosigmoid resection with colorectal anastomosis with splenic flexure mobilization. Discharged on 9/8/2024. Completed course of augmentin and levofloxacin pos discharge. Here to establish care.   Some persistent mid lower abdominal or right lower quad pain with sneezing otherwise no issues post surgery. Regular daily Bms.   No fevers or chills. Here with partner and 3 children, including a 4 month old. Has stopped smoking tobacco and marijuana since the surgery.       Review of Systems    Objective:      Vitals:    09/25/24 0937 09/25/24 1034   BP: 126/85 (!) 152/78   BP Location: Right arm    Patient Position: Sitting    BP Method: Large (Automatic)    Pulse: 99    Temp: 98.8 °F (37.1 °C)    TempSrc: Oral    SpO2: 100%    Weight: 106.7 kg (235 lb 3.7 oz)    Height: 5' 11" (1.803 m)      Physical Exam  Vitals and nursing note reviewed.   Constitutional:       Appearance: He is well-developed. He is obese.   HENT:      Head: Normocephalic and atraumatic.      Nose: Nose normal.   Eyes:      Conjunctiva/sclera: Conjunctivae normal.   Cardiovascular:      Rate and Rhythm: Normal rate and regular rhythm.      Heart sounds: Normal heart sounds.   Pulmonary:      Effort: Pulmonary effort is normal. No respiratory distress.      Breath sounds: Normal breath sounds. No wheezing or rales.   Abdominal:      General: There is no distension.      Palpations: Abdomen is soft.      Tenderness: There is no abdominal tenderness.   Skin:            Comments: Mid abdominal surgical scars well healed no drainage. Non TTP.    Neurological:      Mental Status: He is alert.      Cranial Nerves: " "No cranial nerve deficit.             Lab Results   Component Value Date     09/08/2024    K 3.2 (L) 09/08/2024    CL 99 09/08/2024    CO2 28 09/08/2024    BUN 8 09/08/2024    CREATININE 1.0 09/08/2024    ANIONGAP 11 09/08/2024     No results found for: "HGBA1C"  Lab Results   Component Value Date    BNP 49 08/14/2024       Lab Results   Component Value Date    WBC 13.62 (H) 09/08/2024    HGB 9.2 (L) 09/08/2024    HCT 28.7 (L) 09/08/2024     09/08/2024    GRAN 8.4 (H) 09/08/2024    GRAN 61.8 09/08/2024     Lab Results   Component Value Date    TRIG 173 (H) 09/07/2024          Current Outpatient Medications:     BLOOD PRESSURE CUFF Misc, 1 Package by Misc.(Non-Drug; Combo Route) route 2 (two) times a day., Disp: 1 each, Rfl: 0    famotidine (PEPCID) 40 MG tablet, Take 1 tablet (40 mg total) by mouth once daily. (Patient not taking: Reported on 9/25/2024), Disp: 30 tablet, Rfl: 2    hydroCHLOROthiazide (HYDRODIURIL) 12.5 MG Tab, Take 1 tablet (12.5 mg total) by mouth once daily., Disp: 30 tablet, Rfl: 1        Assessment:       1. Primary hypertension    2. Hx of major depression    3. Class 1 obesity due to excess calories with body mass index (BMI) of 32.0 to 32.9 in adult, unspecified whether serious comorbidity present    4. Screening examination for STI    5. Vaccine refused by parent    6. Vaccine counseling           Plan:       Primary hypertension  -     LIPID PANEL; Future; Expected date: 09/25/2024  -     hydroCHLOROthiazide (HYDRODIURIL) 12.5 MG Tab; Take 1 tablet (12.5 mg total) by mouth once daily.  Dispense: 30 tablet; Refill: 1  -     Microalbumin/Creatinine Ratio, Urine; Future; Expected date: 09/25/2024  -     BLOOD PRESSURE CUFF Misc; 1 Package by Misc.(Non-Drug; Combo Route) route 2 (two) times a day.  Dispense: 1 each; Refill: 0  Elevated Bps  SBP of 150s today. Asymptomtatic. Due to recurrent documented elevation recommending to start antihypetensive which he is agreeable to. F/ui n " 2 weeks.    Hx of major depression  Denies currently but self medicating with marijuana which he is no longer using now. Encouraged ongoing cessation for mental health purposes. Has tried an anti-anxiety med in the past he can't remember. Opposed to medicine or therapy currently. No SI or hx of self harm.     Class 1 obesity due to excess calories with body mass index (BMI) of 32.0 to 32.9 in adult, unspecified whether serious comorbidity present  Reviewed diet and lifestyle changes. Discussed target goals, including cutting out sugar beverages and unhealthy snacks. Planning for healthy meal prep. Goal of exercising at least 20 min a day. Vigorous exercise.     Screening examination for STI  -     Hepatitis C Antibody; Future; Expected date: 09/25/2024  -     HIV 1/2 Ag/Ab (4th Gen); Future; Expected date: 09/25/2024  -     Hemoglobin A1C; Future; Expected date: 09/25/2024  -     C. trachomatis/N. gonorrhoeae by AMP DNA [facility]; Urine; Future; Expected date: 09/25/2024  -     Treponema Pallidium Antibodies IgG, IgM; Future; Expected date: 09/25/2024  Routine screening     Vaccine refused by parent  Counseled on benefits of all vaccines due including PCV, flu, and Covid. He refuses all.

## 2024-09-25 NOTE — PROGRESS NOTES
CHW - Initial Contact    This Community Health Worker completed the Social Determinant of Health questionnaire with patient via telephone today.    Pt identified barriers of most importance are: No barriers reported.    Referrals to community agencies completed with patient/caregiver consent outside of [facility] include: No.  Referrals were put through [facility] - no:   Support and Services: No.  Other information discussed the patient needs / wants help with: SDOH completed. Patient did not report any barriers at this time, case will be closed.    Follow up required: No.

## 2024-09-27 PROBLEM — Z00.00 WELLNESS EXAMINATION: Status: ACTIVE | Noted: 2024-09-27

## 2024-10-07 ENCOUNTER — EXTERNAL HOME HEALTH (OUTPATIENT)
Dept: HOME HEALTH SERVICES | Facility: HOSPITAL | Age: 34
End: 2024-10-07
Payer: MEDICAID

## 2024-10-19 ENCOUNTER — HOSPITAL ENCOUNTER (EMERGENCY)
Facility: HOSPITAL | Age: 34
Discharge: ANOTHER HOSPITAL | End: 2024-10-19
Attending: EMERGENCY MEDICINE
Payer: MEDICAID

## 2024-10-19 ENCOUNTER — HOSPITAL ENCOUNTER (INPATIENT)
Facility: HOSPITAL | Age: 34
LOS: 2 days | Discharge: HOME HEALTH | DRG: 862 | End: 2024-10-21
Attending: STUDENT IN AN ORGANIZED HEALTH CARE EDUCATION/TRAINING PROGRAM | Admitting: STUDENT IN AN ORGANIZED HEALTH CARE EDUCATION/TRAINING PROGRAM
Payer: MEDICAID

## 2024-10-19 VITALS
TEMPERATURE: 98 F | OXYGEN SATURATION: 100 % | DIASTOLIC BLOOD PRESSURE: 77 MMHG | HEIGHT: 71 IN | SYSTOLIC BLOOD PRESSURE: 135 MMHG | WEIGHT: 235 LBS | BODY MASS INDEX: 32.9 KG/M2 | HEART RATE: 80 BPM | RESPIRATION RATE: 13 BRPM

## 2024-10-19 DIAGNOSIS — I10 HTN (HYPERTENSION): Primary | ICD-10-CM

## 2024-10-19 DIAGNOSIS — R10.12 LEFT UPPER QUADRANT ABDOMINAL PAIN: ICD-10-CM

## 2024-10-19 DIAGNOSIS — T81.43XA POSTPROCEDURAL INTRAABDOMINAL ABSCESS: Primary | ICD-10-CM

## 2024-10-19 DIAGNOSIS — R10.9 ACUTE POSTOPERATIVE ABDOMINAL PAIN: ICD-10-CM

## 2024-10-19 DIAGNOSIS — R10.13 EPIGASTRIC ABDOMINAL PAIN: ICD-10-CM

## 2024-10-19 DIAGNOSIS — K65.1 INTRA-ABDOMINAL ABSCESS: ICD-10-CM

## 2024-10-19 DIAGNOSIS — R07.9 CHEST PAIN: ICD-10-CM

## 2024-10-19 DIAGNOSIS — K65.1 POSTPROCEDURAL INTRAABDOMINAL ABSCESS: Primary | ICD-10-CM

## 2024-10-19 DIAGNOSIS — G89.18 ACUTE POSTOPERATIVE ABDOMINAL PAIN: ICD-10-CM

## 2024-10-19 LAB
ALBUMIN SERPL-MCNC: 3.9 G/DL (ref 3.3–5.5)
ALBUMIN SERPL-MCNC: 4 G/DL (ref 3.3–5.5)
ALLENS TEST: ABNORMAL
ALP SERPL-CCNC: 63 U/L (ref 42–141)
ALP SERPL-CCNC: 63 U/L (ref 42–141)
BILIRUB SERPL-MCNC: 0.5 MG/DL (ref 0.2–1.6)
BILIRUB SERPL-MCNC: 0.7 MG/DL (ref 0.2–1.6)
BILIRUBIN, POC UA: NEGATIVE
BLOOD, POC UA: NEGATIVE
BUN SERPL-MCNC: 8 MG/DL (ref 7–22)
CALCIUM SERPL-MCNC: 10.2 MG/DL (ref 8–10.3)
CHLORIDE SERPL-SCNC: 109 MMOL/L (ref 98–108)
CLARITY, UA: CLEAR
COLOR, UA: YELLOW
CREAT SERPL-MCNC: 0.7 MG/DL (ref 0.6–1.2)
CRP SERPL-MCNC: 2.6 MG/L (ref 0–8.2)
ERYTHROCYTE [DISTWIDTH] IN BLOOD BY AUTOMATED COUNT: 13.2 % (ref 11.5–14.5)
GLUCOSE SERPL-MCNC: 96 MG/DL (ref 73–118)
GLUCOSE, POC UA: NEGATIVE
HCO3 UR-SCNC: 27.8 MMOL/L (ref 24–28)
HCT VFR BLD AUTO: 39.5 % (ref 40–54)
HCT, POC: NORMAL
HGB BLD-MCNC: 12.6 G/DL (ref 14–18)
HGB, POC: NORMAL (ref 14–18)
KETONES, POC UA: NEGATIVE
LACTATE SERPL-SCNC: 0.8 MMOL/L (ref 0.5–2.2)
LDH SERPL L TO P-CCNC: 1 MMOL/L (ref 0.5–2.2)
LEUKOCYTE EST, POC UA: NEGATIVE
MCH RBC QN AUTO: 27.4 PG (ref 27–31)
MCH, POC: NORMAL
MCHC RBC AUTO-ENTMCNC: 31.9 G/DL (ref 32–36)
MCHC, POC: NORMAL
MCV RBC AUTO: 86 FL (ref 82–98)
MCV, POC: NORMAL
MPV, POC: NORMAL
NITRITE, POC UA: NEGATIVE
PCO2 BLDA: 45.2 MMHG (ref 35–45)
PH SMN: 7.4 [PH] (ref 7.35–7.45)
PH UR STRIP: 5.5 [PH] (ref 5–8)
PLATELET # BLD AUTO: 266 K/UL (ref 150–450)
PMV BLD AUTO: 10.9 FL (ref 9.2–12.9)
PO2 BLDA: 46 MMHG (ref 40–60)
POC ALT (SGPT): 14 U/L (ref 10–47)
POC ALT (SGPT): 17 U/L (ref 10–47)
POC AMYLASE: 35 U/L (ref 14–97)
POC AST (SGOT): 19 U/L (ref 11–38)
POC AST (SGOT): 19 U/L (ref 11–38)
POC BE: 2 MMOL/L
POC CARDIAC TROPONIN I: 0.01 NG/ML (ref 0–0.08)
POC GGT: 26 U/L (ref 5–65)
POC PLATELET COUNT: NORMAL
POC SATURATED O2: 81 % (ref 95–100)
POC TCO2: 28 MMOL/L (ref 18–33)
POC TCO2: 29 MMOL/L (ref 24–29)
POTASSIUM BLD-SCNC: 3.6 MMOL/L (ref 3.6–5.1)
PROTEIN, POC UA: NEGATIVE
PROTEIN, POC: 7.1 G/DL (ref 6.4–8.1)
PROTEIN, POC: 7.3 G/DL (ref 6.4–8.1)
RBC # BLD AUTO: 4.6 M/UL (ref 4.6–6.2)
RBC, POC: NORMAL
RDW, POC: NORMAL
SAMPLE: ABNORMAL
SAMPLE: NORMAL
SITE: ABNORMAL
SODIUM BLD-SCNC: 142 MMOL/L (ref 128–145)
SPECIFIC GRAVITY, POC UA: >=1.03 (ref 1–1.03)
UROBILINOGEN, POC UA: 0.2 E.U./DL
WBC # BLD AUTO: 6.61 K/UL (ref 3.9–12.7)
WBC, POC: NORMAL

## 2024-10-19 PROCEDURE — 82803 BLOOD GASES ANY COMBINATION: CPT | Mod: ER

## 2024-10-19 PROCEDURE — 82040 ASSAY OF SERUM ALBUMIN: CPT | Mod: ER

## 2024-10-19 PROCEDURE — 93005 ELECTROCARDIOGRAM TRACING: CPT | Mod: ER

## 2024-10-19 PROCEDURE — 80053 COMPREHEN METABOLIC PANEL: CPT | Mod: ER

## 2024-10-19 PROCEDURE — 11000001 HC ACUTE MED/SURG PRIVATE ROOM

## 2024-10-19 PROCEDURE — 96366 THER/PROPH/DIAG IV INF ADDON: CPT | Mod: ER

## 2024-10-19 PROCEDURE — 85025 COMPLETE CBC W/AUTO DIFF WBC: CPT | Mod: ER

## 2024-10-19 PROCEDURE — 96375 TX/PRO/DX INJ NEW DRUG ADDON: CPT | Mod: ER

## 2024-10-19 PROCEDURE — 99285 EMERGENCY DEPT VISIT HI MDM: CPT | Mod: 25,ER

## 2024-10-19 PROCEDURE — 96367 TX/PROPH/DG ADDL SEQ IV INF: CPT | Mod: ER

## 2024-10-19 PROCEDURE — 25000003 PHARM REV CODE 250

## 2024-10-19 PROCEDURE — 63600175 PHARM REV CODE 636 W HCPCS

## 2024-10-19 PROCEDURE — 96365 THER/PROPH/DIAG IV INF INIT: CPT | Mod: ER

## 2024-10-19 PROCEDURE — 83605 ASSAY OF LACTIC ACID: CPT

## 2024-10-19 PROCEDURE — 25000003 PHARM REV CODE 250: Mod: ER | Performed by: EMERGENCY MEDICINE

## 2024-10-19 PROCEDURE — 85027 COMPLETE CBC AUTOMATED: CPT

## 2024-10-19 PROCEDURE — 93010 ELECTROCARDIOGRAM REPORT: CPT | Mod: ,,, | Performed by: INTERNAL MEDICINE

## 2024-10-19 PROCEDURE — 63600175 PHARM REV CODE 636 W HCPCS: Mod: ER | Performed by: EMERGENCY MEDICINE

## 2024-10-19 PROCEDURE — 87040 BLOOD CULTURE FOR BACTERIA: CPT | Performed by: EMERGENCY MEDICINE

## 2024-10-19 PROCEDURE — 86140 C-REACTIVE PROTEIN: CPT

## 2024-10-19 PROCEDURE — 25500020 PHARM REV CODE 255: Mod: ER | Performed by: EMERGENCY MEDICINE

## 2024-10-19 PROCEDURE — 84484 ASSAY OF TROPONIN QUANT: CPT | Mod: ER

## 2024-10-19 RX ORDER — HYDROCHLOROTHIAZIDE 12.5 MG/1
12.5 TABLET ORAL DAILY
Status: DISCONTINUED | OUTPATIENT
Start: 2024-10-20 | End: 2024-10-21 | Stop reason: HOSPADM

## 2024-10-19 RX ORDER — HYDROCODONE BITARTRATE AND ACETAMINOPHEN 10; 325 MG/1; MG/1
1 TABLET ORAL EVERY 6 HOURS PRN
Status: DISCONTINUED | OUTPATIENT
Start: 2024-10-19 | End: 2024-10-20

## 2024-10-19 RX ORDER — ONDANSETRON HYDROCHLORIDE 2 MG/ML
8 INJECTION, SOLUTION INTRAVENOUS EVERY 6 HOURS PRN
Status: DISCONTINUED | OUTPATIENT
Start: 2024-10-19 | End: 2024-10-19 | Stop reason: HOSPADM

## 2024-10-19 RX ORDER — SIMETHICONE 80 MG
1 TABLET,CHEWABLE ORAL 4 TIMES DAILY PRN
Status: DISCONTINUED | OUTPATIENT
Start: 2024-10-19 | End: 2024-10-21 | Stop reason: HOSPADM

## 2024-10-19 RX ORDER — ALUMINUM HYDROXIDE, MAGNESIUM HYDROXIDE, AND SIMETHICONE 1200; 120; 1200 MG/30ML; MG/30ML; MG/30ML
30 SUSPENSION ORAL 4 TIMES DAILY PRN
Status: DISCONTINUED | OUTPATIENT
Start: 2024-10-19 | End: 2024-10-21 | Stop reason: HOSPADM

## 2024-10-19 RX ORDER — FAMOTIDINE 10 MG/ML
40 INJECTION INTRAVENOUS
Status: COMPLETED | OUTPATIENT
Start: 2024-10-19 | End: 2024-10-19

## 2024-10-19 RX ORDER — GLUCAGON 1 MG
1 KIT INJECTION
Status: DISCONTINUED | OUTPATIENT
Start: 2024-10-19 | End: 2024-10-21 | Stop reason: HOSPADM

## 2024-10-19 RX ORDER — ONDANSETRON HYDROCHLORIDE 2 MG/ML
8 INJECTION, SOLUTION INTRAVENOUS
Status: COMPLETED | OUTPATIENT
Start: 2024-10-19 | End: 2024-10-19

## 2024-10-19 RX ORDER — IBUPROFEN 200 MG
16 TABLET ORAL
Status: DISCONTINUED | OUTPATIENT
Start: 2024-10-19 | End: 2024-10-21 | Stop reason: HOSPADM

## 2024-10-19 RX ORDER — PROCHLORPERAZINE EDISYLATE 5 MG/ML
5 INJECTION INTRAMUSCULAR; INTRAVENOUS EVERY 6 HOURS PRN
Status: DISCONTINUED | OUTPATIENT
Start: 2024-10-19 | End: 2024-10-21 | Stop reason: HOSPADM

## 2024-10-19 RX ORDER — LANOLIN ALCOHOL/MO/W.PET/CERES
800 CREAM (GRAM) TOPICAL
Status: DISCONTINUED | OUTPATIENT
Start: 2024-10-19 | End: 2024-10-21 | Stop reason: HOSPADM

## 2024-10-19 RX ORDER — MORPHINE SULFATE 4 MG/ML
4 INJECTION, SOLUTION INTRAMUSCULAR; INTRAVENOUS EVERY 6 HOURS PRN
Status: DISCONTINUED | OUTPATIENT
Start: 2024-10-19 | End: 2024-10-21 | Stop reason: HOSPADM

## 2024-10-19 RX ORDER — IBUPROFEN 200 MG
24 TABLET ORAL
Status: DISCONTINUED | OUTPATIENT
Start: 2024-10-19 | End: 2024-10-21 | Stop reason: HOSPADM

## 2024-10-19 RX ORDER — NALOXONE HCL 0.4 MG/ML
0.02 VIAL (ML) INJECTION
Status: DISCONTINUED | OUTPATIENT
Start: 2024-10-19 | End: 2024-10-21 | Stop reason: HOSPADM

## 2024-10-19 RX ORDER — SODIUM CHLORIDE 0.9 % (FLUSH) 0.9 %
10 SYRINGE (ML) INJECTION EVERY 12 HOURS PRN
Status: DISCONTINUED | OUTPATIENT
Start: 2024-10-19 | End: 2024-10-21 | Stop reason: HOSPADM

## 2024-10-19 RX ORDER — SODIUM,POTASSIUM PHOSPHATES 280-250MG
2 POWDER IN PACKET (EA) ORAL
Status: DISCONTINUED | OUTPATIENT
Start: 2024-10-19 | End: 2024-10-21 | Stop reason: HOSPADM

## 2024-10-19 RX ORDER — FAMOTIDINE 20 MG/1
40 TABLET, FILM COATED ORAL DAILY
Status: DISCONTINUED | OUTPATIENT
Start: 2024-10-20 | End: 2024-10-21 | Stop reason: HOSPADM

## 2024-10-19 RX ORDER — KETOROLAC TROMETHAMINE 30 MG/ML
15 INJECTION, SOLUTION INTRAMUSCULAR; INTRAVENOUS
Status: COMPLETED | OUTPATIENT
Start: 2024-10-19 | End: 2024-10-19

## 2024-10-19 RX ORDER — HYDROCODONE BITARTRATE AND ACETAMINOPHEN 5; 325 MG/1; MG/1
1 TABLET ORAL EVERY 6 HOURS PRN
Status: DISCONTINUED | OUTPATIENT
Start: 2024-10-19 | End: 2024-10-20

## 2024-10-19 RX ORDER — SODIUM CHLORIDE 9 MG/ML
1000 INJECTION, SOLUTION INTRAVENOUS CONTINUOUS
Status: DISCONTINUED | OUTPATIENT
Start: 2024-10-19 | End: 2024-10-19 | Stop reason: HOSPADM

## 2024-10-19 RX ORDER — ONDANSETRON HYDROCHLORIDE 2 MG/ML
4 INJECTION, SOLUTION INTRAVENOUS EVERY 8 HOURS PRN
Status: DISCONTINUED | OUTPATIENT
Start: 2024-10-19 | End: 2024-10-21 | Stop reason: HOSPADM

## 2024-10-19 RX ORDER — MORPHINE SULFATE 4 MG/ML
4 INJECTION, SOLUTION INTRAMUSCULAR; INTRAVENOUS
Status: DISCONTINUED | OUTPATIENT
Start: 2024-10-19 | End: 2024-10-19 | Stop reason: HOSPADM

## 2024-10-19 RX ORDER — MORPHINE SULFATE 4 MG/ML
4 INJECTION, SOLUTION INTRAMUSCULAR; INTRAVENOUS
Status: COMPLETED | OUTPATIENT
Start: 2024-10-19 | End: 2024-10-19

## 2024-10-19 RX ADMIN — FAMOTIDINE 40 MG: 10 INJECTION, SOLUTION INTRAVENOUS at 01:10

## 2024-10-19 RX ADMIN — SODIUM CHLORIDE 1000 ML: 9 INJECTION, SOLUTION INTRAVENOUS at 06:10

## 2024-10-19 RX ADMIN — ONDANSETRON 8 MG: 2 INJECTION INTRAMUSCULAR; INTRAVENOUS at 01:10

## 2024-10-19 RX ADMIN — VANCOMYCIN HYDROCHLORIDE 2000 MG: 1 INJECTION, POWDER, LYOPHILIZED, FOR SOLUTION INTRAVENOUS at 04:10

## 2024-10-19 RX ADMIN — MORPHINE SULFATE 4 MG: 4 INJECTION, SOLUTION INTRAMUSCULAR; INTRAVENOUS at 03:10

## 2024-10-19 RX ADMIN — SODIUM CHLORIDE 1000 ML: 9 INJECTION, SOLUTION INTRAVENOUS at 03:10

## 2024-10-19 RX ADMIN — KETOROLAC TROMETHAMINE 15 MG: 30 INJECTION, SOLUTION INTRAMUSCULAR at 01:10

## 2024-10-19 RX ADMIN — PIPERACILLIN SODIUM AND TAZOBACTAM SODIUM 4.5 G: 4; .5 INJECTION, POWDER, FOR SOLUTION INTRAVENOUS at 11:10

## 2024-10-19 RX ADMIN — IOHEXOL 100 ML: 350 INJECTION, SOLUTION INTRAVENOUS at 02:10

## 2024-10-19 RX ADMIN — PIPERACILLIN AND TAZOBACTAM 4.5 G: 4; .5 INJECTION, POWDER, LYOPHILIZED, FOR SOLUTION INTRAVENOUS; PARENTERAL at 03:10

## 2024-10-19 NOTE — ED NOTES
Attempted to call report; notified receiving staff that pt has been arranged for STAT transport by transfer center. Placed on hold x 10 minutes. Will attempt again in approx 15 minutes.

## 2024-10-19 NOTE — ED NOTES
ED charge RN attempting to contact receiving staff at [phone]. Phone rang x 3 minutes with no response.     Receiving staff now answering and stating to call report either when the ambulance arrives for transport (receiving facility aware that pt is a STAT transfer and may leave soon) or call after 7:30 pm.

## 2024-10-19 NOTE — PROVIDER TRANSFER
Outside Transfer Acceptance Note / Regional Referral Center    Referring facility: [facility]   Referring provider: DARLINE MILLER  Accepting facility: [facility]  Accepting provider: Linda Brown MD  [facility]  Reason for transfer:  hospitalist  Transfer diagnosis: post-op pain  Transfer specialty requested: General Surgery  Transfer specialty notified: Yes  Transfer level: NUMBER 1-5: 2  Bed type requested: [facility]  Isolation status: No active isolations   Admission class or status: IP- Inpatient      Narrative   33 yo M with PMHx of acute perforated appendicitis with abscess s/p IR drainage 8/14 and exploratory laparotomy 8/30 presents to the ED with sharp/stabbing LUQ and midline abdominal pain at surgical scars since this morning. Denies chance of eating any bad foods recently. LBM this morning was normal. No other exacerbating or alleviating factors. Denies fever, nausea, vomiting, dysuria, cough, headache, body aches, or other associated symptoms. Pt is a poor historian. Per chart review, pt underwent IR drainage 8/14 (E coli and Prevotella), was on augmentin and levaquin, presented again to ED 8/30 with persistent acute appendicitis with abscess He underwent exploratory laparotomy, bilateral colon resection, and underwent drainage of the abdominal abscess with right hemicolectomy with ileocolic anastomosis as well as rectosigmoid resection with colorectal anastomosis with splenic flexure mobilization by Dr. Harris at [facility]. Cultures grew bacteroides and Phocaeicola vulgatus.  He was able to be discharged 9/8 with Augmentin to complete a two week course of antibiotics. In ER WBC 8.6. CT abdominal scan showed normal caliber of stomach and loops of bowel and several bowel anastomosis. There were a few focal fluid collection in the left upper quadrant - one anterior to pancreas 4.4 x 3.5 cm and 2nd inferior to  spleen 5 x 1.7 cm. Given pain medications and zosyn. Blood cultures pending.    Objective     Vitals: Temp: 97.8 °F (36.6 °C) (10/19/24 1455)  Pulse: 72 (10/19/24 1455)  Resp: 17 (10/19/24 1544)  BP: 131/71 (10/19/24 1455)  SpO2: 100 % (10/19/24 1455)      Admission on 10/19/2024   Component Date Value Ref Range Status    Albumin, POC 10/19/2024 4.0  3.3 - 5.5 g/dL Final    Alkaline Phosphatase, POC 10/19/2024 63  42 - 141 U/L Final    ALT (SGPT), POC 10/19/2024 17  10 - 47 U/L Final    Amylase, POC 10/19/2024 35  14 - 97 U/L Final    AST (SGOT), POC 10/19/2024 19  11 - 38 U/L Final    POC GGT 10/19/2024 26  5 - 65 U/L Final    Bilirubin, POC 10/19/2024 0.5  0.2 - 1.6 mg/dL Final    Protein, POC 10/19/2024 7.1  6.4 - 8.1 g/dL Final    Albumin, POC 10/19/2024 3.9  3.3 - 5.5 g/dL Final    Alkaline Phosphatase, POC 10/19/2024 63  42 - 141 U/L Final    ALT (SGPT), POC 10/19/2024 14  10 - 47 U/L Final    AST (SGOT), POC 10/19/2024 19  11 - 38 U/L Final    POC BUN 10/19/2024 8  7 - 22 mg/dL Final    Calcium, POC 10/19/2024 10.2  8.0 - 10.3 mg/dL Final    POC Chloride 10/19/2024 109 (H)  98 - 108 mmol/L Final    POC Creatinine 10/19/2024 0.7  0.6 - 1.2 mg/dL Final    POC Glucose 10/19/2024 96  73 - 118 mg/dL Final    POC Potassium 10/19/2024 3.6  3.6 - 5.1 mmol/L Final    POC Sodium 10/19/2024 142  128 - 145 mmol/L Final    Bilirubin, POC 10/19/2024 0.7  0.2 - 1.6 mg/dL Final    POC TCO2 10/19/2024 28  18 - 33 mmol/L Final    Protein, POC 10/19/2024 7.3  6.4 - 8.1 g/dL Final    POC Cardiac Troponin I 10/19/2024 0.01  0.00 - 0.08 ng/mL Final    Sample 10/19/2024 unknown    Final     Comment: A single negative troponin is insufficient to rule out myocardial infarction.  The use of a serial sampling protocol is recommended practice. Correlate results with reference intervals established for methodology used. Point of care and core laboratory   troponin results are not interchangeable.       POC PH 10/19/2024 7.397  7.35 -  7.45 Final    POC PCO2 10/19/2024 45.2 (H)  35 - 45 mmHg Final    POC PO2 10/19/2024 46  40 - 60 mmHg Final    POC HCO3 10/19/2024 27.8  24 - 28 mmol/L Final    POC BE 10/19/2024 2  -2 to 2 mmol/L Final    POC SATURATED O2 10/19/2024 81  95 - 100 % Final    POC Lactate 10/19/2024 1.00  0.5 - 2.2 mmol/L Final    POC TCO2 10/19/2024 29  24 - 29 mmol/L Final    Sample 10/19/2024 VENOUS    Final    Site 10/19/2024 Other    Final    Allens Test 10/19/2024 N/A    Final          Recent imaging:CT Abdomen Pelvis With IV Contrast NO Oral Contrast  Narrative: EXAMINATION:  CT ABDOMEN PELVIS WITH IV CONTRAST    CLINICAL HISTORY:  Epigastric pain;    TECHNIQUE:  Low dose axial images, sagittal and coronal reformations were obtained from the lung bases to the pubic symphysis following the IV administration of 100 mL of Omnipaque 350.    COMPARISON:  09/04/2024    FINDINGS:  The lung bases are clear.    The liver parenchyma shows diffuse decreased attenuation.  Gallbladder is unremarkable.  Spleen and pancreas are unremarkable.    Adrenal glands are normal.  Kidneys concentrate contrast appropriately.  Punctate stone lower pole left kidney.  The urinary bladder is unremarkable.    Aorta is normal caliber.  No retroperitoneal or mesenteric lymph node enlargement seen.    Stomach and loops of bowel are normal caliber.  Several bowel anastomoses.    A few focal fluid collections are seen in the left upper quadrant.  For instance, a collection just beneath the stomach, anterior to the pancreas measures 4.4 x 3.5 cm.  Organized collection inferior to the spleen measures 5 x 1.7 cm as measured on coronal series 601, image 28.    Regional skeleton is intact.  Impression: Interval development of approximately 2 fairly well defined fluid collections in the left upper quadrant suspicious for abscesses as above.    Multiple bowel anastomoses with no dilatation to suggest obstruction.    Suspected hepatic steatosis.    This report was  flagged in Epic as abnormal.    Electronically signed by: Marisol Skinner  Date:    10/19/2024  Time:    14:46    Airway:   room air    IV access:        Peripheral IV - Single Lumen 10/19/24 1315 20 G Right Antecubital (Active)   Site Assessment Clean;Dry;Intact;No redness;No swelling 10/19/24 1315   Extremity Assessment Distal to IV No abnormal discoloration;No redness;No swelling 10/19/24 1315   Line Status Blood return noted;Saline locked;Flushed 10/19/24 1315   Dressing Status Clean;Dry;Intact 10/19/24 1315   Dressing Intervention First dressing 10/19/24 1315            Peripheral IV - Single Lumen 10/19/24 1518 18 G Left Antecubital (Active)   Site Assessment Clean;Dry;Intact;No redness;No swelling 10/19/24 1519   Extremity Assessment Distal to IV No swelling;No warmth;No redness;No abnormal discoloration 10/19/24 1519   Line Status Blood return noted;Flushed;Saline locked 10/19/24 1519   Dressing Status Clean;Intact;Dry 10/19/24 1519   Dressing Intervention First dressing 10/19/24 1519     Allergies:   Review of patient's allergies indicates:   Allergen Reactions    Grass pollen     Grass pollen-yuri grass standard     Pollen extracts       NPO: Yes    Anticoagulation:   Anticoagulants       None             Instructions      [facility]  Admit to Hospital Medicine  Upon patient arrival to floor, please contact Hospital Medicine on call.

## 2024-10-19 NOTE — ED NOTES
Attempted to call report x 2 at [phone]. Phone rang multiple times with no response.    ED charge notified.

## 2024-10-19 NOTE — ED PROVIDER NOTES
Encounter Date: 10/19/2024    SCRIBE #1 NOTE: I, Edilma Ross, am scribing for, and in the presence of,  Valarie James DO.       History     Chief Complaint   Patient presents with    Abdominal Pain     Patient presents w/ a c/o of epigastric abdominal pain since today. Denies any fever, or n/v/d. Appendectomy on 8/30.     33 yo M with PMHx of acute perforated appendicitis with abscess s/p IR drainage 8/14 and exploratory laparotomy 8/30 presents to the ED with sharp/stabbing LUQ and midline abdominal pain at surgical scars since this morning. Denies chance of eating any bad foods recently. LBM this morning was normal. Denies use of any antihypertensives. No other exacerbating or alleviating factors. Denies fever, nausea, vomiting, dysuria, cough, headache, body aches, or other associated symptoms.     Pt is a poor historian.     Per chart review, pt underwent IR drainage 8/14 (E coli and Prevotella), was on augmentin and levaquin, presented again to ED 8/30 with persistent acute appendicitis with abscess. Pt admitted to  again for exploratory laparotomy, bilateral colon resection, and underwent drainage of the abdominal abscess with right hemicolectomy with ileocolic anastomosis as well as rectosigmoid resection with colorectal anastomosis with splenic flexure mobilization. Cultures showed anaerobes.  He was able to be discharged 9/8 with Augmentin to complete a two week course of antibiotics. He will follow up with Surgery.  PT/OT was ordered on discharge.     The history is provided by the patient and medical records. No  was used.     Review of patient's allergies indicates:   Allergen Reactions    Grass pollen     Grass pollen-yuri grass standard     Pollen extracts      Past Medical History:   Diagnosis Date    Nausea & vomiting 8/16/2024     Past Surgical History:   Procedure Laterality Date    COLON RESECTION Bilateral 8/30/2024    Procedure: COLON RESECTION;  Surgeon: Chaz Harris  MD BRENDA;  Location: [facility] OR;  Service: General;  Laterality: Bilateral;    INCISION AND DRAINAGE OF ABSCESS N/A 8/30/2024    Procedure: INCISION AND DRAINAGE, ABSCESS;  Surgeon: Chaz Harris MD;  Location: [facility] OR;  Service: General;  Laterality: N/A;    LAPAROTOMY, EXPLORATORY N/A 8/30/2024    Procedure: LAPAROTOMY, EXPLORATORY;  Surgeon: Chaz Harris MD;  Location: [facility] OR;  Service: General;  Laterality: N/A;     No family history on file.  Social History     Tobacco Use    Smoking status: Some Days    Smokeless tobacco: Never    Tobacco comments:     Smokes marijuana    Substance Use Topics    Alcohol use: No    Drug use: No     Review of Systems   Constitutional:  Negative for fever.   HENT:  Negative for rhinorrhea and sore throat.    Respiratory:  Negative for cough and shortness of breath.    Cardiovascular:  Negative for leg swelling.   Gastrointestinal:  Positive for abdominal pain. Negative for blood in stool, constipation, diarrhea, nausea and vomiting.   Musculoskeletal:  Negative for myalgias.   Skin:  Negative for rash.   Neurological:  Negative for numbness and headaches.   All other systems reviewed and are negative.      Physical Exam     Initial Vitals [10/19/24 1219]   BP Pulse Resp Temp SpO2   (!) 155/100 90 20 98.4 °F (36.9 °C) 99 %      MAP       --         Physical Exam    Nursing note and vitals reviewed.  Constitutional: He appears well-developed and well-nourished.   HENT:   Head: Normocephalic and atraumatic.   Right Ear: External ear normal.   Left Ear: External ear normal.   Nose: Nose normal. Mouth/Throat: Oropharynx is clear and moist.   Eyes: Conjunctivae and EOM are normal. Pupils are equal, round, and reactive to light.   Neck: Neck supple.   Normal range of motion.  Cardiovascular:  Normal rate, regular rhythm and normal heart sounds.     Exam reveals no gallop and no friction rub.       No murmur heard.  Pulmonary/Chest: Breath sounds normal. No respiratory distress. He  has no wheezes. He has no rhonchi. He has no rales.   Abdominal: Abdomen is soft. Bowel sounds are normal. There is abdominal tenderness.   Point tenderness at midline and LUQ surgical incisions There is no rebound and no guarding.   Musculoskeletal:         General: No tenderness or edema. Normal range of motion.      Cervical back: Normal range of motion and neck supple.     Neurological: He is alert and oriented to person, place, and time. No cranial nerve deficit.   Skin: Skin is warm and dry. Capillary refill takes less than 2 seconds. No rash noted.   Psychiatric: He has a normal mood and affect. His behavior is normal.       Patient gave consent to have physical exam performed.      ED Course   Critical Care    Date/Time: 10/19/2024 4:57 PM    Performed by: Valarie Jamse DO  Authorized by: Valarie James DO  Direct patient critical care time: 11 minutes  Additional history critical care time: 8 minutes  Ordering / reviewing critical care time: 8 minutes  Documentation critical care time: 8 minutes  Consulting other physicians critical care time: 12 minutes  Total critical care time (exclusive of procedural time) : 47 minutes  Critical care was necessary to treat or prevent imminent or life-threatening deterioration of the following conditions: sepsis and dehydration.  Critical care was time spent personally by me on the following activities: evaluation of patient's response to treatment, examination of patient, obtaining history from patient or surrogate, ordering and performing treatments and interventions, ordering and review of laboratory studies, ordering and review of radiographic studies, pulse oximetry, re-evaluation of patient's condition and review of old charts.        Labs Reviewed   POCT URINALYSIS W/O SCOPE - Abnormal       Result Value    Glucose, UA Negative      Bilirubin, UA Negative      Ketones, UA Negative      Spec Grav UA >=1.030 (*)     Blood, UA Negative      PH, UA 5.5      Protein, UA  Negative      Urobilinogen, UA 0.2      Nitrite, UA Negative      Leukocytes, UA Negative      Color, UA POC Yellow      Clarity, UA, POC Clear     POCT CMP - Abnormal    Albumin, POC 3.9      Alkaline Phosphatase, POC 63      ALT (SGPT), POC 14      AST (SGOT), POC 19      POC BUN 8      Calcium, POC 10.2      POC Chloride 109 (*)     POC Creatinine 0.7      POC Glucose 96      POC Potassium 3.6      POC Sodium 142      Bilirubin, POC 0.7      POC TCO2 28      Protein, POC 7.3     ISTAT PROCEDURE - Abnormal    POC PH 7.397      POC PCO2 45.2 (*)     POC PO2 46      POC HCO3 27.8      POC BE 2      POC SATURATED O2 81      POC Lactate 1.00      POC TCO2 29      Sample VENOUS      Site Other      Allens Test N/A     CULTURE, BLOOD   CULTURE, BLOOD   TROPONIN ISTAT    POC Cardiac Troponin I 0.01      Sample unknown     POCT CBC    Hematocrit        Hemoglobin        RBC        WBC        MCV        MCH, POC        MCHC        RDW-CV        Platelet Count, POC        MPV       POCT URINALYSIS W/O SCOPE   POCT CMP   POCT LIVER PANEL   POCT TROPONIN   POCT LIVER PANEL    Albumin, POC 4.0      Alkaline Phosphatase, POC 63      ALT (SGPT), POC 17      Amylase, POC 35      AST (SGOT), POC 19      POC GGT 26      Bilirubin, POC 0.5      Protein, POC 7.1       EKG Readings: (Independently Interpreted)   No STEMI. Rate of 81. Normal Sinus Rhythm. Normal Axis. Abnormal EKG. QTc normal at 418. When compared to prior EKG dated 8/30/2024 rate decreased by 44 bpm.          Imaging Results               CT Abdomen Pelvis With IV Contrast NO Oral Contrast (Final result)  Result time 10/19/24 14:46:08      Final result by Marisol Skinner MD (10/19/24 14:46:08)                   Impression:      Interval development of approximately 2 fairly well defined fluid collections in the left upper quadrant suspicious for abscesses as above.    Multiple bowel anastomoses with no dilatation to suggest obstruction.    Suspected hepatic  steatosis.    This report was flagged in Epic as abnormal.      Electronically signed by: Marisol Skinner  Date:    10/19/2024  Time:    14:46               Narrative:    EXAMINATION:  CT ABDOMEN PELVIS WITH IV CONTRAST    CLINICAL HISTORY:  Epigastric pain;    TECHNIQUE:  Low dose axial images, sagittal and coronal reformations were obtained from the lung bases to the pubic symphysis following the IV administration of 100 mL of Omnipaque 350.    COMPARISON:  09/04/2024    FINDINGS:  The lung bases are clear.    The liver parenchyma shows diffuse decreased attenuation.  Gallbladder is unremarkable.  Spleen and pancreas are unremarkable.    Adrenal glands are normal.  Kidneys concentrate contrast appropriately.  Punctate stone lower pole left kidney.  The urinary bladder is unremarkable.    Aorta is normal caliber.  No retroperitoneal or mesenteric lymph node enlargement seen.    Stomach and loops of bowel are normal caliber.  Several bowel anastomoses.    A few focal fluid collections are seen in the left upper quadrant.  For instance, a collection just beneath the stomach, anterior to the pancreas measures 4.4 x 3.5 cm.  Organized collection inferior to the spleen measures 5 x 1.7 cm as measured on coronal series 601, image 28.    Regional skeleton is intact.                                       Medications   vancomycin (VANCOCIN) 2,000 mg in D5W 500 mL IVPB (2,000 mg Intravenous New Bag 10/19/24 1620)   0.9%  NaCl infusion (1,000 mLs Intravenous New Bag 10/19/24 1541)   ketorolac injection 15 mg (15 mg Intravenous Given 10/19/24 1323)   ondansetron injection 8 mg (8 mg Intravenous Given 10/19/24 1320)   famotidine (PF) injection 40 mg (40 mg Intravenous Given 10/19/24 1321)   iohexoL (OMNIPAQUE 350) injection 100 mL (100 mLs Intravenous Given 10/19/24 1435)   piperacillin-tazobactam (ZOSYN) 4.5 g in D5W 100 mL IVPB (MB+) (0 g Intravenous Stopped 10/19/24 1615)   morphine injection 4 mg (4 mg Intravenous Given  10/19/24 1544)     Medical Decision Making  Amount and/or Complexity of Data Reviewed  External Data Reviewed: ECG and notes.     Details: See HPI  External documents reviewed for previous EKG comparison: see ED course.      Labs: ordered. Decision-making details documented in ED Course.  Radiology: ordered. Decision-making details documented in ED Course.  ECG/medicine tests: ordered and independent interpretation performed. Decision-making details documented in ED Course.     Details: EKGs independently interpreted by Dr. James reads: see ED course. External documents reviewed for previous EKG comparison: see ED course.       Risk  Prescription drug management.    Medical Decision Making:    This is an evaluation of a 34 y.o. male that presents to the Emergency Department for   Chief Complaint   Patient presents with    Abdominal Pain     Patient presents w/ a c/o of epigastric abdominal pain since today. Denies any fever, or n/v/d. Appendectomy on 8/30.       The patient is a non-toxic and well appearing patient. On physical exam, patient appears well hydrated with moist mucus membranes. Breath sounds are clear and equal bilaterally with no adventitious breath sounds, tachypnea or respiratory distress. Regular rate and rhythm. No murmurs. Point tenderness at midline and LUQ surgical incisions. Patient is tolerating PO without difficulty. Physical exam otherwise as above.     I have reviewed vital signs and nursing notes.   Vital Signs Are Reassuring.     Based on the patient's symptoms, I am considering and evaluating for the following differential diagnoses: pancreatitis, post-op complication, post-op infection, abscess, HTN, uncontrolled HTN, UTI    Consider hospitalization for:  Severe abdominal pain    Patient is agreeable to transfer and admission to [facility] or [facility].  Patient states he is not agreeable to admission to [facility] under any circumstances due to having  had a bad experience there in the past.    ED Course:Treatment in the ED included Physical Exam and medications given in ED  Medications   vancomycin (VANCOCIN) 2,000 mg in D5W 500 mL IVPB (2,000 mg Intravenous New Bag 10/19/24 1620)   0.9%  NaCl infusion (1,000 mLs Intravenous New Bag 10/19/24 1541)   ketorolac injection 15 mg (15 mg Intravenous Given 10/19/24 1323)   ondansetron injection 8 mg (8 mg Intravenous Given 10/19/24 1320)   famotidine (PF) injection 40 mg (40 mg Intravenous Given 10/19/24 1321)   iohexoL (OMNIPAQUE 350) injection 100 mL (100 mLs Intravenous Given 10/19/24 1435)   piperacillin-tazobactam (ZOSYN) 4.5 g in D5W 100 mL IVPB (MB+) (0 g Intravenous Stopped 10/19/24 1615)   morphine injection 4 mg (4 mg Intravenous Given 10/19/24 1544)   .   Patient reports feeling better after treatment in the ER.       External Data/Documents Reviewed: Previous medical records and vital signs reviewed, see HPI and Physical exam.   Labs: ordered and reviewed.  Initial lactic acid is not elevated at 1.0.  Radiology: ordered as indicated and reviewed.  CT read is abnormal concerning for multiple intra-abdominal abscesses.  ECG/medicine tests: ordered and independent interpretation performed by Dr. Valarie James DO. Decision-making details documented in ED Course.   Cardiac monitor placed for severe abdominal pain. Monitor shows Normal Sinus Rhythm with  rate of 84. Interpreted by Dr. Valarie James DO.    Risk  Diagnosis or treatment significantly limited by the following social determinants of health: Body mass index is 32.78 kg/m².     In shared decision making with the patient, we discussed treatment, prescriptions, labs, and imaging results.    I contacted Transfer center at time 15:02, requesting consult with General surgery for emergent consult.  Completed consultation with Dr Francois with General surgery for services not available at this facility.   Discussed patient's presentation, past medical history,  physical exam, labs, radiology results, vital signs, and ED course.  Specialist recommends admit to Hospital Medicine.  And they will follow patient as a consult.  Consultation placed to Hospital Medicine.  We discussed patient's presentation, past medical history, ED course, labs, and General surgery recommendations.  Patient accepted by Hospital Medicine Dr Harley for transfer and admission at time 4:55 p.m.     At this time patient will be transferred & admitted.  Patient will be transferred via EMS to accepting facility.      At time of transfer patient is awake alert oriented x4 speaking clearly in full sentences and moving all 4 extremities.     The following labs and imaging were reviewed:        Admission on 10/19/2024   Component Date Value Ref Range Status    Albumin, POC 10/19/2024 4.0  3.3 - 5.5 g/dL Final    Alkaline Phosphatase, POC 10/19/2024 63  42 - 141 U/L Final    ALT (SGPT), POC 10/19/2024 17  10 - 47 U/L Final    Amylase, POC 10/19/2024 35  14 - 97 U/L Final    AST (SGOT), POC 10/19/2024 19  11 - 38 U/L Final    POC GGT 10/19/2024 26  5 - 65 U/L Final    Bilirubin, POC 10/19/2024 0.5  0.2 - 1.6 mg/dL Final    Protein, POC 10/19/2024 7.1  6.4 - 8.1 g/dL Final    Albumin, POC 10/19/2024 3.9  3.3 - 5.5 g/dL Final    Alkaline Phosphatase, POC 10/19/2024 63  42 - 141 U/L Final    ALT (SGPT), POC 10/19/2024 14  10 - 47 U/L Final    AST (SGOT), POC 10/19/2024 19  11 - 38 U/L Final    POC BUN 10/19/2024 8  7 - 22 mg/dL Final    Calcium, POC 10/19/2024 10.2  8.0 - 10.3 mg/dL Final    POC Chloride 10/19/2024 109 (H)  98 - 108 mmol/L Final    POC Creatinine 10/19/2024 0.7  0.6 - 1.2 mg/dL Final    POC Glucose 10/19/2024 96  73 - 118 mg/dL Final    POC Potassium 10/19/2024 3.6  3.6 - 5.1 mmol/L Final    POC Sodium 10/19/2024 142  128 - 145 mmol/L Final    Bilirubin, POC 10/19/2024 0.7  0.2 - 1.6 mg/dL Final    POC TCO2 10/19/2024 28  18 - 33 mmol/L Final    Protein, POC 10/19/2024 7.3  6.4 - 8.1 g/dL Final     POC Cardiac Troponin I 10/19/2024 0.01  0.00 - 0.08 ng/mL Final    Sample 10/19/2024 unknown   Final    Comment: A single negative troponin is insufficient to rule out myocardial infarction.  The use of a serial sampling protocol is recommended practice. Correlate results with reference intervals established for methodology used. Point of care and core laboratory   troponin results are not interchangeable.      POC PH 10/19/2024 7.397  7.35 - 7.45 Final    POC PCO2 10/19/2024 45.2 (H)  35 - 45 mmHg Final    POC PO2 10/19/2024 46  40 - 60 mmHg Final    POC HCO3 10/19/2024 27.8  24 - 28 mmol/L Final    POC BE 10/19/2024 2  -2 to 2 mmol/L Final    POC SATURATED O2 10/19/2024 81  95 - 100 % Final    POC Lactate 10/19/2024 1.00  0.5 - 2.2 mmol/L Final    POC TCO2 10/19/2024 29  24 - 29 mmol/L Final    Sample 10/19/2024 VENOUS   Final    Site 10/19/2024 Other   Final    Allens Test 10/19/2024 N/A   Final    Glucose, UA 10/19/2024 Negative  Negative Final    Bilirubin, UA 10/19/2024 Negative  Negative Final    Ketones, UA 10/19/2024 Negative  Negative Final    Spec Grav UA 10/19/2024 >=1.030 (>)  1.005 - 1.030 Final    Blood, UA 10/19/2024 Negative  Negative Final    PH, UA 10/19/2024 5.5  5.0 - 8.0 Final    Protein, UA 10/19/2024 Negative  Negative Final    Urobilinogen, UA 10/19/2024 0.2  <=1.0 E.U./dL Final    Nitrite, UA 10/19/2024 Negative  Negative Final    Leukocytes, UA 10/19/2024 Negative  Negative Final    Color, UA POC 10/19/2024 Yellow  Yellow, Straw, Suzie Final    Clarity, UA, POC 10/19/2024 Clear  Clear Final        Imaging Results               CT Abdomen Pelvis With IV Contrast NO Oral Contrast (Final result)  Result time 10/19/24 14:46:08      Final result by Marisol Skinner MD (10/19/24 14:46:08)                   Impression:      Interval development of approximately 2 fairly well defined fluid collections in the left upper quadrant suspicious for abscesses as above.    Multiple bowel anastomoses  with no dilatation to suggest obstruction.    Suspected hepatic steatosis.    This report was flagged in Epic as abnormal.      Electronically signed by: Marisol Brody  Date:    10/19/2024  Time:    14:46               Narrative:    EXAMINATION:  CT ABDOMEN PELVIS WITH IV CONTRAST    CLINICAL HISTORY:  Epigastric pain;    TECHNIQUE:  Low dose axial images, sagittal and coronal reformations were obtained from the lung bases to the pubic symphysis following the IV administration of 100 mL of Omnipaque 350.    COMPARISON:  09/04/2024    FINDINGS:  The lung bases are clear.    The liver parenchyma shows diffuse decreased attenuation.  Gallbladder is unremarkable.  Spleen and pancreas are unremarkable.    Adrenal glands are normal.  Kidneys concentrate contrast appropriately.  Punctate stone lower pole left kidney.  The urinary bladder is unremarkable.    Aorta is normal caliber.  No retroperitoneal or mesenteric lymph node enlargement seen.    Stomach and loops of bowel are normal caliber.  Several bowel anastomoses.    A few focal fluid collections are seen in the left upper quadrant.  For instance, a collection just beneath the stomach, anterior to the pancreas measures 4.4 x 3.5 cm.  Organized collection inferior to the spleen measures 5 x 1.7 cm as measured on coronal series 601, image 28.    Regional skeleton is intact.                                            Scribe Attestation:   Scribe #1: I performed the above scribed service and the documentation accurately describes the services I performed. I attest to the accuracy of the note.        ED Course as of 10/19/24 1724   Sat Oct 19, 2024   1525 Consulted General surgery.  Spoke with Dr. Campo who recommends admit to Hospital Medicine for IV antibiotics and pain control.  With General surgery consult. [RF]   9525 Patient accepted for admission by Hospital Medicine .  Awaiting EMS transfer and admission to hospital. [RF]      ED Course User  Index  [RF] Valarie James DO                        I, Dr. Valarie James, personally performed the services described in this documentation. This document was produced by a scribe under my direction and in my presence. All medical record entries made by the scribe were at my direction and in my presence.  I have reviewed the chart and agree that the record reflects my personal performance and is accurate and complete. Valarie James DO.     10/19/2024 4:57 PM      Clinical Impression:  Final diagnoses:  [I10] HTN (hypertension) (Primary)  [R10.13] Epigastric abdominal pain  [R10.12] Left upper quadrant abdominal pain  [K65.1] Intra-abdominal abscess          ED Disposition Condition    Transfer to Another Facility Stable                Valarie James DO  10/19/24 [id number]

## 2024-10-19 NOTE — ED NOTES
Pt states that he is not in any pain at this time; medication held. Pt reports that he will notify RN if morphine is needed.

## 2024-10-20 PROBLEM — E83.42 HYPOMAGNESEMIA: Status: ACTIVE | Noted: 2024-10-20

## 2024-10-20 LAB
ALBUMIN SERPL BCP-MCNC: 3.4 G/DL (ref 3.5–5.2)
ALP SERPL-CCNC: 53 U/L (ref 40–150)
ALT SERPL W/O P-5'-P-CCNC: 13 U/L (ref 10–44)
ANION GAP SERPL CALC-SCNC: 11 MMOL/L (ref 8–16)
AST SERPL-CCNC: 12 U/L (ref 10–40)
BASOPHILS # BLD AUTO: 0.03 K/UL (ref 0–0.2)
BASOPHILS NFR BLD: 0.6 % (ref 0–1.9)
BILIRUB SERPL-MCNC: 0.7 MG/DL (ref 0.1–1)
BILIRUB UR QL STRIP: NEGATIVE
BUN SERPL-MCNC: 5 MG/DL (ref 6–20)
CALCIUM SERPL-MCNC: 8.8 MG/DL (ref 8.7–10.5)
CHLORIDE SERPL-SCNC: 105 MMOL/L (ref 95–110)
CLARITY UR: CLEAR
CO2 SERPL-SCNC: 25 MMOL/L (ref 23–29)
COLOR UR: COLORLESS
CREAT SERPL-MCNC: 0.9 MG/DL (ref 0.5–1.4)
DIFFERENTIAL METHOD BLD: ABNORMAL
EOSINOPHIL # BLD AUTO: 0.3 K/UL (ref 0–0.5)
EOSINOPHIL NFR BLD: 6 % (ref 0–8)
ERYTHROCYTE [DISTWIDTH] IN BLOOD BY AUTOMATED COUNT: 13.4 % (ref 11.5–14.5)
EST. GFR  (NO RACE VARIABLE): >60 ML/MIN/1.73 M^2
GLUCOSE SERPL-MCNC: 78 MG/DL (ref 70–110)
GLUCOSE UR QL STRIP: NEGATIVE
HCT VFR BLD AUTO: 38.4 % (ref 40–54)
HGB BLD-MCNC: 12.1 G/DL (ref 14–18)
HGB UR QL STRIP: NEGATIVE
IMM GRANULOCYTES # BLD AUTO: 0.01 K/UL (ref 0–0.04)
IMM GRANULOCYTES NFR BLD AUTO: 0.2 % (ref 0–0.5)
KETONES UR QL STRIP: NEGATIVE
LEUKOCYTE ESTERASE UR QL STRIP: NEGATIVE
LYMPHOCYTES # BLD AUTO: 2.1 K/UL (ref 1–4.8)
LYMPHOCYTES NFR BLD: 38.6 % (ref 18–48)
MAGNESIUM SERPL-MCNC: 1.5 MG/DL (ref 1.6–2.6)
MCH RBC QN AUTO: 27.3 PG (ref 27–31)
MCHC RBC AUTO-ENTMCNC: 31.5 G/DL (ref 32–36)
MCV RBC AUTO: 87 FL (ref 82–98)
MONOCYTES # BLD AUTO: 0.7 K/UL (ref 0.3–1)
MONOCYTES NFR BLD: 12.2 % (ref 4–15)
NEUTROPHILS # BLD AUTO: 2.3 K/UL (ref 1.8–7.7)
NEUTROPHILS NFR BLD: 42.4 % (ref 38–73)
NITRITE UR QL STRIP: NEGATIVE
NRBC BLD-RTO: 0 /100 WBC
OHS QRS DURATION: 86 MS
OHS QTC CALCULATION: 418 MS
PH UR STRIP: 5 [PH] (ref 5–8)
PHOSPHATE SERPL-MCNC: 4.2 MG/DL (ref 2.7–4.5)
PLATELET # BLD AUTO: 238 K/UL (ref 150–450)
PMV BLD AUTO: 11.9 FL (ref 9.2–12.9)
POTASSIUM SERPL-SCNC: 3.3 MMOL/L (ref 3.5–5.1)
PROT SERPL-MCNC: 6 G/DL (ref 6–8.4)
PROT UR QL STRIP: NEGATIVE
RBC # BLD AUTO: 4.43 M/UL (ref 4.6–6.2)
SODIUM SERPL-SCNC: 141 MMOL/L (ref 136–145)
SP GR UR STRIP: 1 (ref 1–1.03)
URN SPEC COLLECT METH UR: ABNORMAL
UROBILINOGEN UR STRIP-ACNC: NEGATIVE EU/DL
WBC # BLD AUTO: 5.34 K/UL (ref 3.9–12.7)

## 2024-10-20 PROCEDURE — 11000001 HC ACUTE MED/SURG PRIVATE ROOM

## 2024-10-20 PROCEDURE — 85025 COMPLETE CBC W/AUTO DIFF WBC: CPT

## 2024-10-20 PROCEDURE — 81003 URINALYSIS AUTO W/O SCOPE: CPT

## 2024-10-20 PROCEDURE — 80053 COMPREHEN METABOLIC PANEL: CPT

## 2024-10-20 PROCEDURE — 63600175 PHARM REV CODE 636 W HCPCS

## 2024-10-20 PROCEDURE — 25000003 PHARM REV CODE 250: Performed by: SURGERY

## 2024-10-20 PROCEDURE — 84100 ASSAY OF PHOSPHORUS: CPT

## 2024-10-20 PROCEDURE — 83735 ASSAY OF MAGNESIUM: CPT

## 2024-10-20 PROCEDURE — 25000003 PHARM REV CODE 250

## 2024-10-20 PROCEDURE — 36415 COLL VENOUS BLD VENIPUNCTURE: CPT

## 2024-10-20 RX ORDER — HYDROCODONE BITARTRATE AND ACETAMINOPHEN 5; 325 MG/1; MG/1
1 TABLET ORAL EVERY 4 HOURS PRN
Status: DISCONTINUED | OUTPATIENT
Start: 2024-10-20 | End: 2024-10-21 | Stop reason: HOSPADM

## 2024-10-20 RX ORDER — MAGNESIUM SULFATE 1 G/100ML
1 INJECTION INTRAVENOUS ONCE
Status: COMPLETED | OUTPATIENT
Start: 2024-10-20 | End: 2024-10-20

## 2024-10-20 RX ORDER — ALUMINUM HYDROXIDE, MAGNESIUM HYDROXIDE, AND SIMETHICONE 1200; 120; 1200 MG/30ML; MG/30ML; MG/30ML
30 SUSPENSION ORAL ONCE
Status: COMPLETED | OUTPATIENT
Start: 2024-10-20 | End: 2024-10-20

## 2024-10-20 RX ORDER — POTASSIUM CHLORIDE 20 MEQ/1
40 TABLET, EXTENDED RELEASE ORAL ONCE
Status: COMPLETED | OUTPATIENT
Start: 2024-10-20 | End: 2024-10-20

## 2024-10-20 RX ORDER — LIDOCAINE HYDROCHLORIDE 20 MG/ML
15 SOLUTION OROPHARYNGEAL ONCE
Status: COMPLETED | OUTPATIENT
Start: 2024-10-20 | End: 2024-10-20

## 2024-10-20 RX ORDER — HYDROCODONE BITARTRATE AND ACETAMINOPHEN 10; 325 MG/1; MG/1
1 TABLET ORAL EVERY 4 HOURS PRN
Status: DISCONTINUED | OUTPATIENT
Start: 2024-10-20 | End: 2024-10-21 | Stop reason: HOSPADM

## 2024-10-20 RX ADMIN — POTASSIUM CHLORIDE 40 MEQ: 1500 TABLET, EXTENDED RELEASE ORAL at 01:10

## 2024-10-20 RX ADMIN — PIPERACILLIN SODIUM AND TAZOBACTAM SODIUM 4.5 G: 4; .5 INJECTION, POWDER, FOR SOLUTION INTRAVENOUS at 06:10

## 2024-10-20 RX ADMIN — ALUMINUM HYDROXIDE, MAGNESIUM HYDROXIDE, AND SIMETHICONE 30 ML: 1200; 120; 1200 SUSPENSION ORAL at 05:10

## 2024-10-20 RX ADMIN — PIPERACILLIN SODIUM AND TAZOBACTAM SODIUM 4.5 G: 4; .5 INJECTION, POWDER, FOR SOLUTION INTRAVENOUS at 03:10

## 2024-10-20 RX ADMIN — FAMOTIDINE 40 MG: 20 TABLET, FILM COATED ORAL at 10:10

## 2024-10-20 RX ADMIN — HYDROCODONE BITARTRATE AND ACETAMINOPHEN 1 TABLET: 5; 325 TABLET ORAL at 11:10

## 2024-10-20 RX ADMIN — HYDROCHLOROTHIAZIDE 12.5 MG: 12.5 TABLET ORAL at 10:10

## 2024-10-20 RX ADMIN — LIDOCAINE HYDROCHLORIDE 15 ML: 20 SOLUTION ORAL at 05:10

## 2024-10-20 RX ADMIN — PIPERACILLIN SODIUM AND TAZOBACTAM SODIUM 4.5 G: 4; .5 INJECTION, POWDER, FOR SOLUTION INTRAVENOUS at 11:10

## 2024-10-20 RX ADMIN — Medication 800 MG: at 06:10

## 2024-10-20 RX ADMIN — MORPHINE SULFATE 4 MG: 4 INJECTION INTRAVENOUS at 01:10

## 2024-10-20 RX ADMIN — HYDROCODONE BITARTRATE AND ACETAMINOPHEN 1 TABLET: 5; 325 TABLET ORAL at 04:10

## 2024-10-20 RX ADMIN — MAGNESIUM SULFATE IN DEXTROSE 1 G: 10 INJECTION, SOLUTION INTRAVENOUS at 10:10

## 2024-10-20 RX ADMIN — HYDROCODONE BITARTRATE AND ACETAMINOPHEN 1 TABLET: 5; 325 TABLET ORAL at 03:10

## 2024-10-20 NOTE — ASSESSMENT & PLAN NOTE
Patient's most recent potassium results are listed below.   Recent Labs     10/20/24  0418   K 3.3*     Plan  - Replete potassium per protocol  - Monitor potassium Daily  - Patient's hypokalemia is stable  - prn replacements

## 2024-10-20 NOTE — SUBJECTIVE & OBJECTIVE
Interval History:  Patient seen and examined.  Overnight notes reviewed.  Patient reports denies abdominal pain.  No nausea or vomiting.  Diet ordered.  Magnesium and potassium noted to be low and replacements ordered.  General surgery consulted.    Review of Systems   Respiratory:  Negative for shortness of breath.    Cardiovascular:  Negative for chest pain.   Gastrointestinal:  Positive for abdominal pain. Negative for nausea.     Objective:     Vital Signs (Most Recent):  Temp: 96.7 °F (35.9 °C) (10/20/24 1135)  Pulse: 73 (10/20/24 1135)  Resp: 18 (10/20/24 1332)  BP: 136/85 (10/20/24 1135)  SpO2: 100 % (10/20/24 1135) Vital Signs (24h Range):  Temp:  [96.7 °F (35.9 °C)-98.9 °F (37.2 °C)] 96.7 °F (35.9 °C)  Pulse:  [] 73  Resp:  [10-20] 18  SpO2:  [96 %-100 %] 100 %  BP: (127-158)/(66-96) 136/85     Weight: 111.4 kg (245 lb 9.5 oz)  Body mass index is 34.25 kg/m².    Intake/Output Summary (Last 24 hours) at 10/20/2024 1409  Last data filed at 10/20/2024 0723  Gross per 24 hour   Intake 181.74 ml   Output --   Net 181.74 ml         Physical Exam  Vitals and nursing note reviewed.   Constitutional:       General: He is not in acute distress.     Appearance: Normal appearance. He is obese.   HENT:      Head: Normocephalic and atraumatic.   Cardiovascular:      Rate and Rhythm: Normal rate and regular rhythm.   Pulmonary:      Effort: Pulmonary effort is normal.      Breath sounds: Normal breath sounds.   Abdominal:      General: Abdomen is flat. Bowel sounds are normal.      Palpations: Abdomen is soft.      Tenderness: There is generalized abdominal tenderness. There is no guarding or rebound.   Neurological:      General: No focal deficit present.      Mental Status: He is alert and oriented to person, place, and time. Mental status is at baseline.   Psychiatric:         Mood and Affect: Mood normal.         Behavior: Behavior normal.             Significant Labs: All pertinent labs within the past 24 hours  have been reviewed.  CBC:   Recent Labs   Lab 10/19/24  2038 10/20/24  0418   WBC 6.61 5.34   HGB 12.6* 12.1*   HCT 39.5* 38.4*    238     CMP:   Recent Labs   Lab 10/20/24  0418      K 3.3*      CO2 25   GLU 78   BUN 5*   CREATININE 0.9   CALCIUM 8.8   PROT 6.0   ALBUMIN 3.4*   BILITOT 0.7   ALKPHOS 53   AST 12   ALT 13   ANIONGAP 11     Magnesium:   Recent Labs   Lab 10/20/24  0418   MG 1.5*       Significant Imaging: I have reviewed all pertinent imaging results/findings within the past 24 hours.

## 2024-10-20 NOTE — PLAN OF CARE
Pt alert and oriented x4. VSS and on RA. Independent in room. Pt NPO since midnight. Complaints of pain in LUQ and meds given per MAR. IV abx given per MAR. Fall and safety precautions in place. Q2 hourly rounding complete. Call bell within reach and no needs at this time.

## 2024-10-20 NOTE — HPI
Per  NOTE: Willian Ron is a 35 yo M with PMHx of acute perforated appendicitis with abscess s/p IR drainage 8/14 and exploratory laparotomy 8/30 presents to the ED with sharp/stabbing LUQ and midline abdominal pain at surgical scars since this morning. Denies chance of eating any bad foods recently. LBM this morning was normal. No other exacerbating or alleviating factors. Denies fever, nausea, vomiting, dysuria, cough, headache, body aches, or other associated symptoms. Pt is a poor historian. Per chart review, pt underwent IR drainage 8/14 (E coli and Prevotella), was on augmentin and levaquin, presented again to ED 8/30 with persistent acute appendicitis with abscess He underwent exploratory laparotomy, bilateral colon resection, and underwent drainage of the abdominal abscess with right hemicolectomy with ileocolic anastomosis as well as rectosigmoid resection with colorectal anastomosis with splenic flexure mobilization by Dr. Harris at [facility]. Cultures grew bacteroides and Phocaeicola vulgatus. He was able to be discharged 9/8 with Augmentin to complete a two week course of antibiotics. In ER WBC 8.6. CT abdominal scan showed normal caliber of stomach and loops of bowel and several bowel anastomosis. There were a few focal fluid collection in the left upper quadrant - one anterior to pancreas 4.4 x 3.5 cm and 2nd inferior to spleen 5 x 1.7 cm. Given pain medications and zosyn. Blood cultures pending. Patient seen an examined upon arrival to room 307 and AAOx4, ambulatory and in NAD. He reports that after his discharge on 9/8 he finished what narcotic pain medication was prescribed and did not require any additional prescriptions. He had purchased some OTC ibuprofen but reports not needing it. Abdominal pain was epigastric and left side of abdomen but would move around and described as intermittent. RUQ pain with palpation. IP consult placed to general surgery to be called at  0600. Zosyn continued with lactic and CRP obtained upon arrival. Patient admitted by hospital medicine for further evaluation and management.

## 2024-10-20 NOTE — PLAN OF CARE
[facility]/Surg  Initial Discharge Assessment       Primary Care Provider: Dell Tran MD    Admission Diagnosis: Acute postoperative abdominal pain [G89.18, R10.9]    Admission Date: 10/19/2024  Expected Discharge Date: 10/24/2024    Transition of Care Barriers: None    Payor: MEDICAID / Plan: [place] / Product Type: Managed Medicaid /     Extended Emergency Contact Information  Primary Emergency Contact: Stephanie Ron   [place]  Mobile Phone: [phone]  Relation: Mother  Secondary Emergency Contact: Hilda Calero   [place]  Mobile Phone: [phone]  Relation: Sister    Discharge Plan A: Home Health  Discharge Plan B: Home with family      [id number] [address] EXP AT [facility] & [address] [address]  Phone: [phone] Fax: [phone]    SW met with patient at bedside to complete discharge planning assessment.  Patient alert and oriented xs 4.  Patient verified all demographic information on facesheet is correct.  Patient verified PCP is Dr. Tran.  Patient verified primary health insurance is [employer] [place] Medicaid.  Patient active with [employer] but has NO DME.  Patient with NO POA or Living Will.  Patient not on dialysis or medication coumadin.  Patient with no 30 day admission.  Patient with no financial issues at this time.  Patient family will provide transportation upon discharge from facility.  Patient independent with ADLs, live with significant other and 6 minor children, drives self.      Initial Assessment (most recent)       Adult Discharge Assessment - 10/20/24 1506          Discharge Assessment    Assessment Type Discharge Planning Assessment     Confirmed/corrected address, phone number and insurance Yes     Confirmed Demographics Correct on Facesheet     Source of Information patient     Communicated JANN with  patient/caregiver Date not available/Unable to determine     People in Home significant other;child(shivam), dependent     Facility Arrived From: home     Do you expect to return to your current living situation? Yes     Do you have help at home or someone to help you manage your care at home? Yes     Who are your caregiver(s) and their phone number(s)? significant other     Prior to hospitilization cognitive status: Alert/Oriented     Current cognitive status: Alert/Oriented     Walking or Climbing Stairs Difficulty no     Dressing/Bathing Difficulty no     Equipment Currently Used at Home none     Readmission within 30 days? No     Patient currently being followed by outpatient case management? No     Do you currently have service(s) that help you manage your care at home? Yes     Name and Contact number of agency Cm RN only     Is the pt/caregiver preference to resume services with current agency Yes     Do you take prescription medications? Yes     Do you have prescription coverage? Yes     Do you have any problems affording any of your prescribed medications? No     Is the patient taking medications as prescribed? yes     Who is going to help you get home at discharge? significant other     How do you get to doctors appointments? car, drives self     Are you on dialysis? No     Discharge Plan A Home Health     Discharge Plan B Home with family     DME Needed Upon Discharge  none     Discharge Plan discussed with: Patient     Transition of Care Barriers None

## 2024-10-20 NOTE — H&P
[facility] Medicine  History & Physical    Patient Name: Willian Ron  MRN: [id number]  Patient Class: OP- Observation  Admission Date: 10/19/2024  Attending Physician: Jesse Ross MD   Primary Care Provider: Jennifer, Primary Doctor         Patient information was obtained from patient, past medical records, and ER records.     Subjective:     Principal Problem:Postprocedural intraabdominal abscess    Chief Complaint: No chief complaint on file.       HPI: Per  NOTE: Willian Ron is a 35 yo M with PMHx of acute perforated appendicitis with abscess s/p IR drainage 8/14 and exploratory laparotomy 8/30 presents to the ED with sharp/stabbing LUQ and midline abdominal pain at surgical scars since this morning. Denies chance of eating any bad foods recently. LBM this morning was normal. No other exacerbating or alleviating factors. Denies fever, nausea, vomiting, dysuria, cough, headache, body aches, or other associated symptoms. Pt is a poor historian. Per chart review, pt underwent IR drainage 8/14 (E coli and Prevotella), was on augmentin and levaquin, presented again to ED 8/30 with persistent acute appendicitis with abscess He underwent exploratory laparotomy, bilateral colon resection, and underwent drainage of the abdominal abscess with right hemicolectomy with ileocolic anastomosis as well as rectosigmoid resection with colorectal anastomosis with splenic flexure mobilization by Dr. Harris at [facility]. Cultures grew bacteroides and Phocaeicola vulgatus. He was able to be discharged 9/8 with Augmentin to complete a two week course of antibiotics. In ER WBC 8.6. CT abdominal scan showed normal caliber of stomach and loops of bowel and several bowel anastomosis. There were a few focal fluid collection in the left upper quadrant - one anterior to pancreas 4.4 x 3.5 cm and 2nd inferior to spleen 5 x 1.7 cm. Given pain medications and zosyn. Blood cultures  pending. Patient seen an examined upon arrival to room 307 and AAOx4, ambulatory and in NAD. He reports that after his discharge on 9/8 he finished what narcotic pain medication was prescribed and did not require any additional prescriptions. He had purchased some OTC ibuprofen but reports not needing it. Abdominal pain was epigastric and left side of abdomen but would move around and described as intermittent. RUQ pain with palpation. IP consult placed to general surgery to be called at 0600. Zosyn continued with lactic and CRP obtained upon arrival. Patient admitted by hospital medicine for further evaluation and management.         Past Medical History:   Diagnosis Date    Nausea & vomiting 8/16/2024       Past Surgical History:   Procedure Laterality Date    COLON RESECTION Bilateral 8/30/2024    Procedure: COLON RESECTION;  Surgeon: Chaz Harris MD;  Location: [facility] OR;  Service: General;  Laterality: Bilateral;    INCISION AND DRAINAGE OF ABSCESS N/A 8/30/2024    Procedure: INCISION AND DRAINAGE, ABSCESS;  Surgeon: Chaz Harris MD;  Location: [facility] OR;  Service: General;  Laterality: N/A;    LAPAROTOMY, EXPLORATORY N/A 8/30/2024    Procedure: LAPAROTOMY, EXPLORATORY;  Surgeon: Chaz Harris MD;  Location: [facility] OR;  Service: General;  Laterality: N/A;       Review of patient's allergies indicates:   Allergen Reactions    Grass pollen     Grass pollen-yuri grass standard     Pollen extracts        Current Facility-Administered Medications on File Prior to Encounter   Medication    [COMPLETED] famotidine (PF) injection 40 mg    [COMPLETED] iohexoL (OMNIPAQUE 350) injection 100 mL    [COMPLETED] ketorolac injection 15 mg    [COMPLETED] morphine injection 4 mg    [COMPLETED] ondansetron injection 8 mg    [COMPLETED] piperacillin-tazobactam (ZOSYN) 4.5 g in D5W 100 mL IVPB (MB+)    [COMPLETED] vancomycin (VANCOCIN) 2,000 mg in D5W 500 mL IVPB    [DISCONTINUED] 0.9%  NaCl infusion    [DISCONTINUED] 0.9%   NaCl infusion    [DISCONTINUED] morphine injection 4 mg    [DISCONTINUED] ondansetron injection 8 mg    [DISCONTINUED] vancomycin (VANCOCIN) 1,000 mg in D5W 250 mL IVPB (admixture device)     Current Outpatient Medications on File Prior to Encounter   Medication Sig    BLOOD PRESSURE CUFF Misc 1 Package by Misc.(Non-Drug; Combo Route) route 2 (two) times a day.    famotidine (PEPCID) 40 MG tablet Take 1 tablet (40 mg total) by mouth once daily. (Patient not taking: Reported on 9/25/2024)    hydroCHLOROthiazide (HYDRODIURIL) 12.5 MG Tab Take 1 tablet (12.5 mg total) by mouth once daily.     Family History    None       Tobacco Use    Smoking status: Some Days    Smokeless tobacco: Never    Tobacco comments:     Smokes marijuana    Substance and Sexual Activity    Alcohol use: No    Drug use: No    Sexual activity: Yes     Partners: Female     Birth control/protection: Condom     Review of Systems   Constitutional:  Positive for chills.   Gastrointestinal:  Positive for abdominal pain.   All other systems reviewed and are negative.    Objective:     Vital Signs (Most Recent):  Temp: 98 °F (36.7 °C) (10/19/24 2007)  Pulse: 76 (10/19/24 2007)  Resp: 20 (10/19/24 2007)  BP: (!) 158/96 (10/19/24 2007)  SpO2: 100 % (10/19/24 2007) Vital Signs (24h Range):  Temp:  [97.8 °F (36.6 °C)-98.4 °F (36.9 °C)] 98 °F (36.7 °C)  Pulse:  [66-96] 76  Resp:  [10-20] 20  SpO2:  [96 %-100 %] 100 %  BP: (127-158)/() 158/96        There is no height or weight on file to calculate BMI.     Physical Exam  Vitals reviewed.   Constitutional:       Appearance: Normal appearance.   HENT:      Head: Normocephalic and atraumatic.      Nose: Nose normal.      Mouth/Throat:      Mouth: Mucous membranes are moist.      Pharynx: Oropharynx is clear.   Eyes:      Conjunctiva/sclera: Conjunctivae normal.   Cardiovascular:      Rate and Rhythm: Normal rate and regular rhythm.      Pulses: Normal pulses.      Heart sounds: Normal heart sounds.    Pulmonary:      Effort: Pulmonary effort is normal.      Breath sounds: Normal breath sounds.   Abdominal:      General: Bowel sounds are normal. There is no distension.      Palpations: Abdomen is soft.      Tenderness: There is abdominal tenderness in the right upper quadrant.   Musculoskeletal:         General: Normal range of motion.      Cervical back: Normal range of motion and neck supple.   Skin:     General: Skin is warm and dry.      Capillary Refill: Capillary refill takes less than 2 seconds.   Neurological:      General: No focal deficit present.      Mental Status: He is alert and oriented to person, place, and time. Mental status is at baseline.   Psychiatric:         Mood and Affect: Mood normal.         Behavior: Behavior normal.         Thought Content: Thought content normal.         Judgment: Judgment normal.                Significant Labs: All pertinent labs within the past 24 hours have been reviewed.  A1C:   Recent Labs   Lab 09/25/24  1640   HGBA1C 4.8     ABGs:   Recent Labs   Lab 10/19/24  1332   PH 7.397   PCO2 45.2*   HCO3 27.8   POCSATURATED 81   BE 2   PO2 46       Urine Studies:   Recent Labs   Lab 10/19/24  1657   COLORU Yellow   SPECGRAV >=1.030*   NITRITE Negative   UROBILINOGEN 0.2   LEUKOCYTESUR Negative   Urinalysis CBC, CMP, Trop, Liver panel all POCT at [facility] and reviewed.    Significant Imaging: I have reviewed all pertinent imaging results/findings within the past 24 hours.    EXAMINATION:  CT ABDOMEN PELVIS WITH IV CONTRAST     CLINICAL HISTORY:  Epigastric pain;     TECHNIQUE:  Low dose axial images, sagittal and coronal reformations were obtained from the lung bases to the pubic symphysis following the IV administration of 100 mL of Omnipaque 350.     COMPARISON:  09/04/2024     FINDINGS:  The lung bases are clear.     The liver parenchyma shows diffuse decreased attenuation.  Gallbladder is unremarkable.  Spleen and pancreas are unremarkable.     Adrenal  glands are normal.  Kidneys concentrate contrast appropriately.  Punctate stone lower pole left kidney.  The urinary bladder is unremarkable.     Aorta is normal caliber.  No retroperitoneal or mesenteric lymph node enlargement seen.     Stomach and loops of bowel are normal caliber.  Several bowel anastomoses.     A few focal fluid collections are seen in the left upper quadrant.  For instance, a collection just beneath the stomach, anterior to the pancreas measures 4.4 x 3.5 cm.  Organized collection inferior to the spleen measures 5 x 1.7 cm as measured on coronal series 601, image 28.     Regional skeleton is intact.     Impression:     Interval development of approximately 2 fairly well defined fluid collections in the left upper quadrant suspicious for abscesses as above.     Multiple bowel anastomoses with no dilatation to suggest obstruction.     Suspected hepatic steatosis.     This report was flagged in Epic as abnormal.        Electronically signed by:Marisol Skinner  Date:                                            10/19/2024  Time:                                           14:46  Assessment/Plan:     * Postprocedural intraabdominal abscess  -General Surgery Consulted  -IV zosyn  -Pain/Nausea control  -NPO at midnight  -Monitor for s/s of sepsis  -Daily CBC, CMP, Mag, Phos  -Lactic and CRP obtained upon admit        VTE Risk Mitigation (From admission, onward)           Ordered     IP VTE HIGH RISK PATIENT  Once         10/19/24 2019     Place sequential compression device  Until discontinued         10/19/24 2019     Reason for No Pharmacological VTE Prophylaxis  Once        Question:  Reasons:  Answer:  Patient is Ambulatory    10/19/24 2019                         On 10/19/2024, patient should be placed in hospital observation services under my care in collaboration with Dr. Jesse Ross MD.           Rajni Da Silva NP  Department of Hospital Medicine  [facility]/Surg

## 2024-10-20 NOTE — PROGRESS NOTES
[place] Medicine  Progress Note    Patient Name: Willian Ron  MRN: [id number]  Patient Class: IP- Inpatient   Admission Date: 10/19/2024  Length of Stay: 1 days  Attending Physician: Jesse Ross MD  Primary Care Provider: Jennifer, Primary Doctor        Subjective:     Principal Problem:Postprocedural intraabdominal abscess        HPI:  Per  NOTE: Willian Ron is a 35 yo M with PMHx of acute perforated appendicitis with abscess s/p IR drainage 8/14 and exploratory laparotomy 8/30 presents to the ED with sharp/stabbing LUQ and midline abdominal pain at surgical scars since this morning. Denies chance of eating any bad foods recently. LBM this morning was normal. No other exacerbating or alleviating factors. Denies fever, nausea, vomiting, dysuria, cough, headache, body aches, or other associated symptoms. Pt is a poor historian. Per chart review, pt underwent IR drainage 8/14 (E coli and Prevotella), was on augmentin and levaquin, presented again to ED 8/30 with persistent acute appendicitis with abscess He underwent exploratory laparotomy, bilateral colon resection, and underwent drainage of the abdominal abscess with right hemicolectomy with ileocolic anastomosis as well as rectosigmoid resection with colorectal anastomosis with splenic flexure mobilization by Dr. Harris at [facility]. Cultures grew bacteroides and Phocaeicola vulgatus. He was able to be discharged 9/8 with Augmentin to complete a two week course of antibiotics. In ER WBC 8.6. CT abdominal scan showed normal caliber of stomach and loops of bowel and several bowel anastomosis. There were a few focal fluid collection in the left upper quadrant - one anterior to pancreas 4.4 x 3.5 cm and 2nd inferior to spleen 5 x 1.7 cm. Given pain medications and zosyn. Blood cultures pending. Patient seen an examined upon arrival to room 307 and AAOx4, ambulatory and in NAD. He reports that after his  discharge on 9/8 he finished what narcotic pain medication was prescribed and did not require any additional prescriptions. He had purchased some OTC ibuprofen but reports not needing it. Abdominal pain was epigastric and left side of abdomen but would move around and described as intermittent. RUQ pain with palpation. IP consult placed to general surgery to be called at 0600. Zosyn continued with lactic and CRP obtained upon arrival. Patient admitted by hospital medicine for further evaluation and management.         Overview/Hospital Course:  No notes on file    Interval History:  Patient seen and examined.  Overnight notes reviewed.  Patient reports denies abdominal pain.  No nausea or vomiting.  Diet ordered.  Magnesium and potassium noted to be low and replacements ordered.  General surgery consulted.    Review of Systems   Respiratory:  Negative for shortness of breath.    Cardiovascular:  Negative for chest pain.   Gastrointestinal:  Positive for abdominal pain. Negative for nausea.     Objective:     Vital Signs (Most Recent):  Temp: 96.7 °F (35.9 °C) (10/20/24 1135)  Pulse: 73 (10/20/24 1135)  Resp: 18 (10/20/24 1332)  BP: 136/85 (10/20/24 1135)  SpO2: 100 % (10/20/24 1135) Vital Signs (24h Range):  Temp:  [96.7 °F (35.9 °C)-98.9 °F (37.2 °C)] 96.7 °F (35.9 °C)  Pulse:  [] 73  Resp:  [10-20] 18  SpO2:  [96 %-100 %] 100 %  BP: (127-158)/(66-96) 136/85     Weight: 111.4 kg (245 lb 9.5 oz)  Body mass index is 34.25 kg/m².    Intake/Output Summary (Last 24 hours) at 10/20/2024 1409  Last data filed at 10/20/2024 0723  Gross per 24 hour   Intake 181.74 ml   Output --   Net 181.74 ml         Physical Exam  Vitals and nursing note reviewed.   Constitutional:       General: He is not in acute distress.     Appearance: Normal appearance. He is obese.   HENT:      Head: Normocephalic and atraumatic.   Cardiovascular:      Rate and Rhythm: Normal rate and regular rhythm.   Pulmonary:      Effort: Pulmonary  effort is normal.      Breath sounds: Normal breath sounds.   Abdominal:      General: Abdomen is flat. Bowel sounds are normal.      Palpations: Abdomen is soft.      Tenderness: There is generalized abdominal tenderness. There is no guarding or rebound.   Neurological:      General: No focal deficit present.      Mental Status: He is alert and oriented to person, place, and time. Mental status is at baseline.   Psychiatric:         Mood and Affect: Mood normal.         Behavior: Behavior normal.             Significant Labs: All pertinent labs within the past 24 hours have been reviewed.  CBC:   Recent Labs   Lab 10/19/24  2038 10/20/24  0418   WBC 6.61 5.34   HGB 12.6* 12.1*   HCT 39.5* 38.4*    238     CMP:   Recent Labs   Lab 10/20/24  0418      K 3.3*      CO2 25   GLU 78   BUN 5*   CREATININE 0.9   CALCIUM 8.8   PROT 6.0   ALBUMIN 3.4*   BILITOT 0.7   ALKPHOS 53   AST 12   ALT 13   ANIONGAP 11     Magnesium:   Recent Labs   Lab 10/20/24  0418   MG 1.5*       Significant Imaging: I have reviewed all pertinent imaging results/findings within the past 24 hours.    Assessment/Plan:      * Postprocedural intraabdominal abscess  -General Surgery Consulted  -IV zosyn  -Pain/Nausea control  -NPO at midnight  -Monitor for s/s of sepsis      Hypomagnesemia  Patient has Abnormal Magnesium: hypomagnesemia. Will continue to monitor electrolytes closely. Will replace the affected electrolytes and repeat labs to be done after interventions completed. The patient's magnesium results have been reviewed and are listed below.  Recent Labs   Lab 10/20/24  0418   MG 1.5*        Hypokalemia  Patient's most recent potassium results are listed below.   Recent Labs     10/20/24  0418   K 3.3*     Plan  - Replete potassium per protocol  - Monitor potassium Daily  - Patient's hypokalemia is stable  - prn replacements       VTE Risk Mitigation (From admission, onward)           Ordered     IP VTE HIGH RISK PATIENT   Once         10/19/24 2019     Place sequential compression device  Until discontinued         10/19/24 2019     Reason for No Pharmacological VTE Prophylaxis  Once        Question:  Reasons:  Answer:  Patient is Ambulatory    10/19/24 2019                    Discharge Planning   JANN: 10/24/2024     Code Status: Full Code   Is the patient medically ready for discharge?:     Reason for patient still in hospital (select all that apply): Patient trending condition, Laboratory test, Treatment, Consult recommendations, and Pending disposition                     Hallie Guajardo PA-C  Department of Hospital Medicine   [facility]/Surg

## 2024-10-20 NOTE — ASSESSMENT & PLAN NOTE
-General Surgery Consulted  -IV zosyn  -Pain/Nausea control  -NPO at midnight  -Monitor for s/s of sepsis

## 2024-10-20 NOTE — NURSING
Pt verbalized concern that previously his pain was controlled with PO pain meds. Just after lunch, he noted an increase in pain to LUQ abd (and he had just taken the Norco one hour prior). Morphine was effective for breakthrough pain, however pt verbalized concern. Lunch was his first solid meal since being NPO upon admission to unit at approx 2000 yesterday. Provider Hallie Vaughan notified of pt's concern.

## 2024-10-20 NOTE — SUBJECTIVE & OBJECTIVE
Past Medical History:   Diagnosis Date    Nausea & vomiting 8/16/2024       Past Surgical History:   Procedure Laterality Date    COLON RESECTION Bilateral 8/30/2024    Procedure: COLON RESECTION;  Surgeon: Chaz Harris MD;  Location: [facility] OR;  Service: General;  Laterality: Bilateral;    INCISION AND DRAINAGE OF ABSCESS N/A 8/30/2024    Procedure: INCISION AND DRAINAGE, ABSCESS;  Surgeon: Chaz Harris MD;  Location: [facility] OR;  Service: General;  Laterality: N/A;    LAPAROTOMY, EXPLORATORY N/A 8/30/2024    Procedure: LAPAROTOMY, EXPLORATORY;  Surgeon: Chaz Harris MD;  Location: [facility] OR;  Service: General;  Laterality: N/A;       Review of patient's allergies indicates:   Allergen Reactions    Grass pollen     Grass pollen-yuri grass standard     Pollen extracts        Current Facility-Administered Medications on File Prior to Encounter   Medication    [COMPLETED] famotidine (PF) injection 40 mg    [COMPLETED] iohexoL (OMNIPAQUE 350) injection 100 mL    [COMPLETED] ketorolac injection 15 mg    [COMPLETED] morphine injection 4 mg    [COMPLETED] ondansetron injection 8 mg    [COMPLETED] piperacillin-tazobactam (ZOSYN) 4.5 g in D5W 100 mL IVPB (MB+)    [COMPLETED] vancomycin (VANCOCIN) 2,000 mg in D5W 500 mL IVPB    [DISCONTINUED] 0.9%  NaCl infusion    [DISCONTINUED] 0.9%  NaCl infusion    [DISCONTINUED] morphine injection 4 mg    [DISCONTINUED] ondansetron injection 8 mg    [DISCONTINUED] vancomycin (VANCOCIN) 1,000 mg in D5W 250 mL IVPB (admixture device)     Current Outpatient Medications on File Prior to Encounter   Medication Sig    BLOOD PRESSURE CUFF Misc 1 Package by Misc.(Non-Drug; Combo Route) route 2 (two) times a day.    famotidine (PEPCID) 40 MG tablet Take 1 tablet (40 mg total) by mouth once daily. (Patient not taking: Reported on 9/25/2024)    hydroCHLOROthiazide (HYDRODIURIL) 12.5 MG Tab Take 1 tablet (12.5 mg total) by mouth once daily.     Family History    None       Tobacco Use     Smoking status: Some Days    Smokeless tobacco: Never    Tobacco comments:     Smokes marijuana    Substance and Sexual Activity    Alcohol use: No    Drug use: No    Sexual activity: Yes     Partners: Female     Birth control/protection: Condom     Review of Systems   Constitutional:  Positive for chills.   Gastrointestinal:  Positive for abdominal pain.   All other systems reviewed and are negative.    Objective:     Vital Signs (Most Recent):  Temp: 98 °F (36.7 °C) (10/19/24 2007)  Pulse: 76 (10/19/24 2007)  Resp: 20 (10/19/24 2007)  BP: (!) 158/96 (10/19/24 2007)  SpO2: 100 % (10/19/24 2007) Vital Signs (24h Range):  Temp:  [97.8 °F (36.6 °C)-98.4 °F (36.9 °C)] 98 °F (36.7 °C)  Pulse:  [66-96] 76  Resp:  [10-20] 20  SpO2:  [96 %-100 %] 100 %  BP: (127-158)/() 158/96        There is no height or weight on file to calculate BMI.     Physical Exam  Vitals reviewed.   Constitutional:       Appearance: Normal appearance.   HENT:      Head: Normocephalic and atraumatic.      Nose: Nose normal.      Mouth/Throat:      Mouth: Mucous membranes are moist.      Pharynx: Oropharynx is clear.   Eyes:      Conjunctiva/sclera: Conjunctivae normal.   Cardiovascular:      Rate and Rhythm: Normal rate and regular rhythm.      Pulses: Normal pulses.      Heart sounds: Normal heart sounds.   Pulmonary:      Effort: Pulmonary effort is normal.      Breath sounds: Normal breath sounds.   Abdominal:      General: Bowel sounds are normal. There is no distension.      Palpations: Abdomen is soft.      Tenderness: There is abdominal tenderness in the right upper quadrant.   Musculoskeletal:         General: Normal range of motion.      Cervical back: Normal range of motion and neck supple.   Skin:     General: Skin is warm and dry.      Capillary Refill: Capillary refill takes less than 2 seconds.   Neurological:      General: No focal deficit present.      Mental Status: He is alert and oriented to person, place, and time. Mental  status is at baseline.   Psychiatric:         Mood and Affect: Mood normal.         Behavior: Behavior normal.         Thought Content: Thought content normal.         Judgment: Judgment normal.                Significant Labs: All pertinent labs within the past 24 hours have been reviewed.  A1C:   Recent Labs   Lab 09/25/24  1640   HGBA1C 4.8     ABGs:   Recent Labs   Lab 10/19/24  1332   PH 7.397   PCO2 45.2*   HCO3 27.8   POCSATURATED 81   BE 2   PO2 46       Urine Studies:   Recent Labs   Lab 10/19/24  1657   COLORU Yellow   SPECGRAV >=1.030*   NITRITE Negative   UROBILINOGEN 0.2   LEUKOCYTESUR Negative   Urinalysis CBC, CMP, Trop, Liver panel all POCT at [facility] and reviewed.    Significant Imaging: I have reviewed all pertinent imaging results/findings within the past 24 hours.    EXAMINATION:  CT ABDOMEN PELVIS WITH IV CONTRAST     CLINICAL HISTORY:  Epigastric pain;     TECHNIQUE:  Low dose axial images, sagittal and coronal reformations were obtained from the lung bases to the pubic symphysis following the IV administration of 100 mL of Omnipaque 350.     COMPARISON:  09/04/2024     FINDINGS:  The lung bases are clear.     The liver parenchyma shows diffuse decreased attenuation.  Gallbladder is unremarkable.  Spleen and pancreas are unremarkable.     Adrenal glands are normal.  Kidneys concentrate contrast appropriately.  Punctate stone lower pole left kidney.  The urinary bladder is unremarkable.     Aorta is normal caliber.  No retroperitoneal or mesenteric lymph node enlargement seen.     Stomach and loops of bowel are normal caliber.  Several bowel anastomoses.     A few focal fluid collections are seen in the left upper quadrant.  For instance, a collection just beneath the stomach, anterior to the pancreas measures 4.4 x 3.5 cm.  Organized collection inferior to the spleen measures 5 x 1.7 cm as measured on coronal series 601, image 28.     Regional skeleton is intact.     Impression:      Interval development of approximately 2 fairly well defined fluid collections in the left upper quadrant suspicious for abscesses as above.     Multiple bowel anastomoses with no dilatation to suggest obstruction.     Suspected hepatic steatosis.     This report was flagged in Epic as abnormal.        Electronically signed by:Marisol Skinner  Date:                                            10/19/2024  Time:                                           14:46

## 2024-10-20 NOTE — ASSESSMENT & PLAN NOTE
Patient has Abnormal Magnesium: hypomagnesemia. Will continue to monitor electrolytes closely. Will replace the affected electrolytes and repeat labs to be done after interventions completed. The patient's magnesium results have been reviewed and are listed below.  Recent Labs   Lab 10/20/24  0418   MG 1.5*

## 2024-10-20 NOTE — CONSULTS
GENERAL SURGERY  INPATIENT CONSULT    REASON FOR CONSULT: Abdominal pain, fluid collections    HPI: Willian Ron is a 34 y.o. male with a history of perforated appendicitis status post exploratory laparotomy with drainage of abdominal abscess, right hemicolectomy with ileocolonic anastomosis, rectosigmoid resection with colorectal anastomosis on 08/30/2024. Patient was subsequently discharged home and doing relatively well until we developed acute pain which led him to outside ER. Labs were unremarkable. It was having severe pain at the surgical incision sites. This was new and not present a few days prior.  Underwent CT imaging which showed concerns for intra-abdominal fluid collection/abscess.  It was transferred to [facility] for further care.  Started on antibiotics.  General surgery has been consulted for evaluation.  Patient reports pain has improved currently.  Pain did improve after bowel function.  Denies fevers or chills.  Has been tolerating diet and having bowel movements.    I have reviewed the patient's chart including prior progress notes, procedures and testing.     ROS:   Review of Systems    PROBLEM LIST:  Patient Active Problem List   Diagnosis    Hepatic steatosis    Marijuana use    Tobacco dependency    Obesity (BMI 30-39.9)    Hx of major depression    Postprocedural intraabdominal abscess         HISTORY  Past Medical History:   Diagnosis Date    Nausea & vomiting 8/16/2024       Past Surgical History:   Procedure Laterality Date    COLON RESECTION Bilateral 8/30/2024    Procedure: COLON RESECTION;  Surgeon: Chaz Harris MD;  Location: [facility] OR;  Service: General;  Laterality: Bilateral;    INCISION AND DRAINAGE OF ABSCESS N/A 8/30/2024    Procedure: INCISION AND DRAINAGE, ABSCESS;  Surgeon: Chaz Harris MD;  Location: [facility] OR;  Service: General;  Laterality: N/A;    LAPAROTOMY, EXPLORATORY N/A 8/30/2024    Procedure: LAPAROTOMY, EXPLORATORY;  Surgeon: Chaz Harris  MD BRENDA;  Location: [facility];  Service: General;  Laterality: N/A;       Social History     Tobacco Use    Smoking status: Some Days    Smokeless tobacco: Never    Tobacco comments:     Smokes marijuana    Substance Use Topics    Alcohol use: No    Drug use: No       No family history on file.      MEDS:  Current Facility-Administered Medications on File Prior to Encounter   Medication Dose Route Frequency Provider Last Rate Last Admin    [COMPLETED] famotidine (PF) injection 40 mg  40 mg Intravenous ED 1 Time Valarie James DO   40 mg at 10/19/24 1321    [COMPLETED] iohexoL (OMNIPAQUE 350) injection 100 mL  100 mL Intravenous ONCE PRN Valarie James DO   100 mL at 10/19/24 1435    [COMPLETED] ketorolac injection 15 mg  15 mg Intravenous ED 1 Time Valarie James DO   15 mg at 10/19/24 1323    [COMPLETED] morphine injection 4 mg  4 mg Intravenous ED 1 Time Valarie James DO   4 mg at 10/19/24 1544    [COMPLETED] ondansetron injection 8 mg  8 mg Intravenous ED 1 Time Valarie James DO   8 mg at 10/19/24 1320    [COMPLETED] piperacillin-tazobactam (ZOSYN) 4.5 g in D5W 100 mL IVPB (MB+)  4.5 g Intravenous ED 1 Time Valarie James DO   Stopped at 10/19/24 1615    [COMPLETED] vancomycin (VANCOCIN) 2,000 mg in D5W 500 mL IVPB  2,000 mg Intravenous ED 1 Time Valarie James DO   Stopped at 10/19/24 1820    [DISCONTINUED] 0.9%  NaCl infusion  1,000 mL Intravenous Continuous Valarie James  mL/hr at 10/19/24 1541 1,000 mL at 10/19/24 1541    [DISCONTINUED] 0.9%  NaCl infusion  1,000 mL Intravenous Continuous Valarie James  mL/hr at 10/19/24 1824 1,000 mL at 10/19/24 1824    [DISCONTINUED] morphine injection 4 mg  4 mg Intravenous Q2H PRN Valarie James DO        [DISCONTINUED] ondansetron injection 8 mg  8 mg Intravenous Q6H PRN Valarie James DO        [DISCONTINUED] vancomycin (VANCOCIN) 1,000 mg in D5W 250 mL IVPB (admixture device)  1,000 mg Intravenous ED 1 Time Foster, Valarie, DO         Current Outpatient Medications on  File Prior to Encounter   Medication Sig Dispense Refill    BLOOD PRESSURE CUFF Misc 1 Package by Misc.(Non-Drug; Combo Route) route 2 (two) times a day. 1 each 0    famotidine (PEPCID) 40 MG tablet Take 1 tablet (40 mg total) by mouth once daily. (Patient not taking: Reported on 9/25/2024) 30 tablet 2    hydroCHLOROthiazide (HYDRODIURIL) 12.5 MG Tab Take 1 tablet (12.5 mg total) by mouth once daily. 30 tablet 1       ALLERGIES:  Review of patient's allergies indicates:   Allergen Reactions    Grass pollen     Grass pollen-june grass standard     Pollen extracts          VITALS:  Temp:  [97.8 °F (36.6 °C)-98.9 °F (37.2 °C)] 98.2 °F (36.8 °C)  Pulse:  [] 77  Resp:  [10-20] 18  SpO2:  [96 %-100 %] 100 %  BP: (127-158)/() 129/66    No intake/output data recorded.      PHYSICAL EXAM:  Physical Exam  Vitals reviewed.   Constitutional:       General: He is not in acute distress.     Appearance: Normal appearance. He is well-developed.   HENT:      Head: Normocephalic and atraumatic.   Eyes:      General: No scleral icterus.  Neck:      Trachea: No tracheal deviation.   Cardiovascular:      Rate and Rhythm: Normal rate and regular rhythm.      Pulses: Normal pulses.   Pulmonary:      Effort: Pulmonary effort is normal. No respiratory distress.      Breath sounds: Normal breath sounds.   Abdominal:      General: There is no distension.      Palpations: Abdomen is soft.      Tenderness: There is abdominal tenderness (mild, diffuse, no rebound or guarding).      Comments: Incisions intact, no evidence of hernia   Musculoskeletal:         General: No swelling or tenderness. Normal range of motion.      Cervical back: Normal range of motion and neck supple. No rigidity.   Skin:     General: Skin is warm and dry.      Coloration: Skin is not jaundiced.      Findings: No erythema.   Neurological:      General: No focal deficit present.      Mental Status: He is alert and oriented to person, place, and time. He is not  disoriented.      Motor: No weakness or abnormal muscle tone.   Psychiatric:         Mood and Affect: Mood normal.         Behavior: Behavior normal.         Thought Content: Thought content normal.         Judgment: Judgment normal.           LABS:  Lab Results   Component Value Date    WBC 5.34 10/20/2024    RBC 4.43 (L) 10/20/2024    HGB 12.1 (L) 10/20/2024    HCT 38.4 (L) 10/20/2024     10/20/2024     Lab Results   Component Value Date    GLU 78 10/20/2024     10/20/2024    K 3.3 (L) 10/20/2024     10/20/2024    CO2 25 10/20/2024    BUN 5 (L) 10/20/2024    CREATININE 0.9 10/20/2024    CALCIUM 8.8 10/20/2024     Lab Results   Component Value Date    ALT 13 10/20/2024    AST 12 10/20/2024    ALKPHOS 53 10/20/2024    BILITOT 0.7 10/20/2024     Lab Results   Component Value Date    MG 1.5 (L) 10/20/2024    PHOS 4.2 10/20/2024       STUDIES:  Images and reports were personally reviewed.    CT abdomen pelvis  FINDINGS:  The lung bases are clear.     The liver parenchyma shows diffuse decreased attenuation.  Gallbladder is unremarkable.  Spleen and pancreas are unremarkable.     Adrenal glands are normal.  Kidneys concentrate contrast appropriately.  Punctate stone lower pole left kidney.  The urinary bladder is unremarkable.     Aorta is normal caliber.  No retroperitoneal or mesenteric lymph node enlargement seen.     Stomach and loops of bowel are normal caliber.  Several bowel anastomoses.     A few focal fluid collections are seen in the left upper quadrant.  For instance, a collection just beneath the stomach, anterior to the pancreas measures 4.4 x 3.5 cm.  Organized collection inferior to the spleen measures 5 x 1.7 cm as measured on coronal series 601, image 28.     Regional skeleton is intact.     Impression:     Interval development of approximately 2 fairly well defined fluid collections in the left upper quadrant suspicious for abscesses as above.     Multiple bowel anastomoses with no  dilatation to suggest obstruction.     Suspected hepatic steatosis.        ASSESSMENT & PLAN:  34 y.o. male with status post extensive intra-abdominal surgery for perforated appendicitis with acute abdominal pain  -etiology of pain uncertain, seems improved now, patient reports improvement after bowel movement, review of imaging shows 2 small fluid collections concerning for abscesses of the severe likely to have occurred acutely, and no other concerning findings on imaging or based off of labs  -no immediate surgical intervention recommended  -okay for diet as tolerated  -if pain adequately controlled I would be okay with discharge home with Cipro and Flagyl times 10 days with plans to follow up with Dr. Harris as an outpatient

## 2024-10-21 VITALS
OXYGEN SATURATION: 98 % | BODY MASS INDEX: 34.38 KG/M2 | TEMPERATURE: 98 F | HEART RATE: 96 BPM | SYSTOLIC BLOOD PRESSURE: 138 MMHG | RESPIRATION RATE: 17 BRPM | DIASTOLIC BLOOD PRESSURE: 82 MMHG | WEIGHT: 245.56 LBS | HEIGHT: 71 IN

## 2024-10-21 LAB
ALBUMIN SERPL BCP-MCNC: 3.6 G/DL (ref 3.5–5.2)
ALP SERPL-CCNC: 55 U/L (ref 40–150)
ALT SERPL W/O P-5'-P-CCNC: 13 U/L (ref 10–44)
ANION GAP SERPL CALC-SCNC: 9 MMOL/L (ref 8–16)
AST SERPL-CCNC: 11 U/L (ref 10–40)
BASOPHILS # BLD AUTO: 0.02 K/UL (ref 0–0.2)
BASOPHILS NFR BLD: 0.3 % (ref 0–1.9)
BILIRUB SERPL-MCNC: 0.5 MG/DL (ref 0.1–1)
BUN SERPL-MCNC: 7 MG/DL (ref 6–20)
CALCIUM SERPL-MCNC: 9.1 MG/DL (ref 8.7–10.5)
CHLORIDE SERPL-SCNC: 104 MMOL/L (ref 95–110)
CO2 SERPL-SCNC: 26 MMOL/L (ref 23–29)
CREAT SERPL-MCNC: 0.9 MG/DL (ref 0.5–1.4)
DIFFERENTIAL METHOD BLD: ABNORMAL
EOSINOPHIL # BLD AUTO: 0.4 K/UL (ref 0–0.5)
EOSINOPHIL NFR BLD: 6.2 % (ref 0–8)
ERYTHROCYTE [DISTWIDTH] IN BLOOD BY AUTOMATED COUNT: 13.2 % (ref 11.5–14.5)
EST. GFR  (NO RACE VARIABLE): >60 ML/MIN/1.73 M^2
GLUCOSE SERPL-MCNC: 87 MG/DL (ref 70–110)
HCT VFR BLD AUTO: 39.2 % (ref 40–54)
HGB BLD-MCNC: 12.5 G/DL (ref 14–18)
IMM GRANULOCYTES # BLD AUTO: 0.03 K/UL (ref 0–0.04)
IMM GRANULOCYTES NFR BLD AUTO: 0.5 % (ref 0–0.5)
LYMPHOCYTES # BLD AUTO: 2.2 K/UL (ref 1–4.8)
LYMPHOCYTES NFR BLD: 35.4 % (ref 18–48)
MAGNESIUM SERPL-MCNC: 1.8 MG/DL (ref 1.6–2.6)
MCH RBC QN AUTO: 26.9 PG (ref 27–31)
MCHC RBC AUTO-ENTMCNC: 31.9 G/DL (ref 32–36)
MCV RBC AUTO: 84 FL (ref 82–98)
MONOCYTES # BLD AUTO: 0.7 K/UL (ref 0.3–1)
MONOCYTES NFR BLD: 10.7 % (ref 4–15)
NEUTROPHILS # BLD AUTO: 2.9 K/UL (ref 1.8–7.7)
NEUTROPHILS NFR BLD: 46.9 % (ref 38–73)
NRBC BLD-RTO: 0 /100 WBC
PHOSPHATE SERPL-MCNC: 3.6 MG/DL (ref 2.7–4.5)
PLATELET # BLD AUTO: 263 K/UL (ref 150–450)
PMV BLD AUTO: 11.4 FL (ref 9.2–12.9)
POTASSIUM SERPL-SCNC: 3.5 MMOL/L (ref 3.5–5.1)
PROT SERPL-MCNC: 6.3 G/DL (ref 6–8.4)
RBC # BLD AUTO: 4.65 M/UL (ref 4.6–6.2)
SODIUM SERPL-SCNC: 139 MMOL/L (ref 136–145)
WBC # BLD AUTO: 6.1 K/UL (ref 3.9–12.7)

## 2024-10-21 PROCEDURE — 63600175 PHARM REV CODE 636 W HCPCS

## 2024-10-21 PROCEDURE — 25000003 PHARM REV CODE 250

## 2024-10-21 PROCEDURE — 84100 ASSAY OF PHOSPHORUS: CPT

## 2024-10-21 PROCEDURE — 83735 ASSAY OF MAGNESIUM: CPT

## 2024-10-21 PROCEDURE — 85025 COMPLETE CBC W/AUTO DIFF WBC: CPT

## 2024-10-21 PROCEDURE — 80053 COMPREHEN METABOLIC PANEL: CPT

## 2024-10-21 PROCEDURE — 36415 COLL VENOUS BLD VENIPUNCTURE: CPT

## 2024-10-21 RX ORDER — HYDROCODONE BITARTRATE AND ACETAMINOPHEN 5; 325 MG/1; MG/1
1 TABLET ORAL EVERY 6 HOURS PRN
Qty: 12 TABLET | Refills: 0 | Status: SHIPPED | OUTPATIENT
Start: 2024-10-21 | End: 2024-10-24

## 2024-10-21 RX ORDER — HYDROCODONE BITARTRATE AND ACETAMINOPHEN 5; 325 MG/1; MG/1
1 TABLET ORAL EVERY 6 HOURS PRN
Qty: 12 TABLET | Refills: 0 | Status: SHIPPED | OUTPATIENT
Start: 2024-10-21 | End: 2024-10-21

## 2024-10-21 RX ORDER — METRONIDAZOLE 500 MG/1
500 TABLET ORAL EVERY 8 HOURS
Qty: 30 TABLET | Refills: 0 | Status: SHIPPED | OUTPATIENT
Start: 2024-10-21 | End: 2024-10-31

## 2024-10-21 RX ORDER — CIPROFLOXACIN 500 MG/1
500 TABLET ORAL EVERY 12 HOURS
Qty: 20 TABLET | Refills: 0 | Status: SHIPPED | OUTPATIENT
Start: 2024-10-21 | End: 2024-10-31

## 2024-10-21 RX ORDER — ENOXAPARIN SODIUM 100 MG/ML
40 INJECTION SUBCUTANEOUS EVERY 24 HOURS
Status: DISCONTINUED | OUTPATIENT
Start: 2024-10-21 | End: 2024-10-21 | Stop reason: HOSPADM

## 2024-10-21 RX ORDER — POTASSIUM CHLORIDE 20 MEQ/1
40 TABLET, EXTENDED RELEASE ORAL ONCE
Status: COMPLETED | OUTPATIENT
Start: 2024-10-21 | End: 2024-10-21

## 2024-10-21 RX ADMIN — HYDROCHLOROTHIAZIDE 12.5 MG: 12.5 TABLET ORAL at 08:10

## 2024-10-21 RX ADMIN — FAMOTIDINE 40 MG: 20 TABLET, FILM COATED ORAL at 08:10

## 2024-10-21 RX ADMIN — Medication 800 MG: at 06:10

## 2024-10-21 RX ADMIN — POTASSIUM BICARBONATE 50 MEQ: 977.5 TABLET, EFFERVESCENT ORAL at 06:10

## 2024-10-21 RX ADMIN — POTASSIUM CHLORIDE 40 MEQ: 1500 TABLET, EXTENDED RELEASE ORAL at 08:10

## 2024-10-21 RX ADMIN — Medication 800 MG: at 11:10

## 2024-10-21 RX ADMIN — PIPERACILLIN SODIUM AND TAZOBACTAM SODIUM 4.5 G: 4; .5 INJECTION, POWDER, FOR SOLUTION INTRAVENOUS at 06:10

## 2024-10-21 NOTE — NURSING
Pt cleared for Dc but has no ride. CM arranged ride. Dc instructions given and reviewed with pt. When ride arrives IV will be removed.

## 2024-10-21 NOTE — PLAN OF CARE
10/21/24 1223   Post-Acute Status   Post-Acute Authorization Home Health   Home Health Status Referrals Sent     Referral sent to [facility]  to resume care Referral [facility] [id number]    [id number] received response from [facility] that they are willing to accept and resume care.

## 2024-10-21 NOTE — DISCHARGE INSTRUCTIONS
Follow up with PCP and Dr. Harris.    Take antibiotics as prescribed until completed.  Pain medications prescribed at discharge.  Do not take while driving or operating heavy machinery.  Do not combine with benzos.    Discharge Instructions, [facility] Medicine    Thank you for choosing [facility] for your medical care. The primary doctor who is taking care of you at the time of your discharge is Jesse Ross MD.     You were admitted to the hospital with Postprocedural intraabdominal abscess.     Please note your discharge instructions, including diet/activity restrictions, follow-up appointments, and medication changes.  If you have any questions about your medical issues, prescriptions, or any other questions, please feel free to contact the [facility] Medicine Dept at [phone] and we will help.    If you are previously with Home health, outpatient PT/OT or under a therapy program, you are cleared to return to those programs.    Please direct all long term medication refills and follow up to your primary care provider, Dell Tran MD. Thank you again for letting us take care of your health care needs.    Please note the following discharge instructions per your discharging physician-  Hallie Guajardo PA-C

## 2024-10-21 NOTE — HOSPITAL COURSE
Patient was admitted to the hospital with postsurgical abdominal pain.  Patient was monitored closely throughout hospital stay.  General surgery was consulted admission.  CT abdomen was obtained and was concerning for interval development of approximately 2 fairly well defined fluid collections. Patient was started on IV antibiotics.  Blood cultures were obtained on admission remained in process with no growth to date.  Patient remained afebrile throughout his stay.  No leukocytosis.  Labs were trended.  Pain was well controlled and diet was advanced and tolerated.  Potassium was noted to be low on a.m. labs and was replaced.  Potassium normalized. Patient was seen on day of discharge.  Patient was cleared for discharge by General surgery.  Patient was follow up with PCP and General surgery.  Patient was prescribed Cipro and Flagyl per General surgery recommendations.  Patient was prescribed short course of narcotics and dangers of narcotics narcotics discussed with the patient.  Return precautions discussed and patient was voiced understanding.  All questions answered.

## 2024-10-21 NOTE — PLAN OF CARE
PCP apt scheduled and added to AVS    Requested follow up apt with Dr. Harris via in basket. Office to contact pt for scheduling       10/21/24 8832   Post-Acute Status   Hospital Resources/Appts/Education Provided Appointments scheduled and added to AVS

## 2024-10-21 NOTE — PLAN OF CARE
10/21/24 1408   Final Note   Assessment Type Final Discharge Note   Anticipated Discharge Disposition Home-Health     Pt clear for DC from case management standpoint. Discharging to home with [facility]  services to resume care. Transportation arranged via pt insurance and will arrive before 1530 to transport patient home.

## 2024-10-21 NOTE — PLAN OF CARE
POC reviewed with pt, verbalized understanding. Pt AAO x 4, able to make needs known.  Meds given per MAR. IV intact and patent. Purposeful q2hr rounding done. Safety maintained with side rails up x3, bed wheels locked, bed in lowest position, and call light in reach. Pt educated to call for assistance with ambulation, verbalized understanding. Pt remains free of falls. No further needs expressed at this time. Will continue to monitor.

## 2024-10-21 NOTE — PLAN OF CARE
POC reviewed with pt. Pt verbalized understanding. PIV remains intact and patent. Pt has maintained adequate fluid hydration with good UOP. Pain controlled with prn meds. Fall precautions maintained. Bed in lowest position, locked, call light within reach. Side rails up x2. Antislip socks maintained.    Problem: Adult Inpatient Plan of Care  Goal: Plan of Care Review  Outcome: Progressing     Problem: Adult Inpatient Plan of Care  Goal: Optimal Comfort and Wellbeing  Outcome: Progressing     Problem: Wound  Goal: Optimal Functional Ability  Outcome: Progressing

## 2024-10-21 NOTE — PROGRESS NOTES
Patient seen and examined.  Resting comfortably in bed.  No acute events overnight.  Labs and vitals reviewed.  All relatively stable.  No acute process appears to be going on.  No indication for any surgical intervention at all.  If if there is significant concern about the fluid collection the left upper abdomen would consider IR drainage.  I have discussed with Radiology who was willing to do this.  Unfortunately patient was not made NPO.  Therefore this would not be able to be done today.  Can keep overnight keep patient NPO at midnight for IR drainage.  If decision was made to discharge home would be reasonable with discharge with oral antibiotics and follow-up CT scan in 2-3 weeks.

## 2024-10-21 NOTE — DISCHARGE SUMMARY
[facility] Medicine  Discharge Summary      Patient Name: Willian Ron  MRN: [id number]  KAVYA: [id number]  Patient Class: IP- Inpatient  Admission Date: 10/19/2024  Hospital Length of Stay: 2 days  Discharge Date and Time:  10/21/2024 12:42 PM  Attending Physician: Jesse Ross MD   Discharging Provider: Hallie Guajardo PA-C  Primary Care Provider: Dell Tran MD    Primary Care Team: Networked reference to record PCT     HPI:   Per  NOTE: Willian Ron is a 35 yo M with PMHx of acute perforated appendicitis with abscess s/p IR drainage 8/14 and exploratory laparotomy 8/30 presents to the ED with sharp/stabbing LUQ and midline abdominal pain at surgical scars since this morning. Denies chance of eating any bad foods recently. LBM this morning was normal. No other exacerbating or alleviating factors. Denies fever, nausea, vomiting, dysuria, cough, headache, body aches, or other associated symptoms. Pt is a poor historian. Per chart review, pt underwent IR drainage 8/14 (E coli and Prevotella), was on augmentin and levaquin, presented again to ED 8/30 with persistent acute appendicitis with abscess He underwent exploratory laparotomy, bilateral colon resection, and underwent drainage of the abdominal abscess with right hemicolectomy with ileocolic anastomosis as well as rectosigmoid resection with colorectal anastomosis with splenic flexure mobilization by Dr. Harris at [facility]. Cultures grew bacteroides and Phocaeicola vulgatus. He was able to be discharged 9/8 with Augmentin to complete a two week course of antibiotics. In ER WBC 8.6. CT abdominal scan showed normal caliber of stomach and loops of bowel and several bowel anastomosis. There were a few focal fluid collection in the left upper quadrant - one anterior to pancreas 4.4 x 3.5 cm and 2nd inferior to spleen 5 x 1.7 cm. Given pain medications and zosyn. Blood cultures pending. Patient  seen an examined upon arrival to room 307 and AAOx4, ambulatory and in NAD. He reports that after his discharge on 9/8 he finished what narcotic pain medication was prescribed and did not require any additional prescriptions. He had purchased some OTC ibuprofen but reports not needing it. Abdominal pain was epigastric and left side of abdomen but would move around and described as intermittent. RUQ pain with palpation. IP consult placed to general surgery to be called at 0600. Zosyn continued with lactic and CRP obtained upon arrival. Patient admitted by hospital medicine for further evaluation and management.         * No surgery found *      Hospital Course:   Patient was admitted to the hospital with postsurgical abdominal pain.  Patient was monitored closely throughout hospital stay.  General surgery was consulted admission.  CT abdomen was obtained and was concerning for interval development of approximately 2 fairly well defined fluid collections. Patient was started on IV antibiotics.  Blood cultures were obtained on admission remained in process with no growth to date.  Patient remained afebrile throughout his stay.  No leukocytosis.  Labs were trended.  Pain was well controlled and diet was advanced and tolerated.  Potassium was noted to be low on a.m. labs and was replaced.  Potassium normalized. Patient was seen on day of discharge.  Patient was cleared for discharge by General surgery.  Patient was follow up with PCP and General surgery.  Patient was prescribed Cipro and Flagyl per General surgery recommendations.  Patient was prescribed short course of narcotics and dangers of narcotics narcotics discussed with the patient.  Return precautions discussed and patient was voiced understanding.  All questions answered.     Goals of Care Treatment Preferences:  Code Status: Full Code      SDOH Screening:  The patient was screened for utility difficulties, food insecurity, transport difficulties, housing  insecurity, and interpersonal safety and there were no concerns identified this admission.     Consults:   Consults (From admission, onward)          Status Ordering Provider     Inpatient consult to General Surgery  Once        Provider:  Poncho Bonilla Jr., MD    Completed PAYAL WALKER            No new Assessment & Plan notes have been filed under this hospital service since the last note was generated.  Service: Hospital Medicine    Final Active Diagnoses:    Diagnosis Date Noted POA    PRINCIPAL PROBLEM:  Postprocedural intraabdominal abscess [T81.43XA, K65.1] 10/19/2024 Yes    Hypomagnesemia [E83.42] 10/20/2024 Yes    Hypokalemia [E87.6] 09/04/2024 Yes      Problems Resolved During this Admission:       Discharged Condition: good    Disposition: Home-Health Care [facility]    Follow Up:   Follow-up Information       Dell Tran MD. Go on 10/28/2024.    Specialty: Family Medicine  Why: at 11 AM for hospital follow up  Contact information:  [address]  [facility] [address] [phone]               Chaz Harris MD Follow up.    Specialties: General Surgery, Colon and Rectal Surgery, Surgery  Why: requested apt- office to contact you for scheduling, if you do not hear from them in next few days, please call to schedule  Contact information:  [address] [place]  [address] [address] [phone]                           Patient Instructions:      Ambulatory referral/consult to Smoking Cessation Program   Standing Status: Future   Referral Priority: Routine Referral Type: Consultation   Referral Reason: Specialty Services Required   Requested Specialty: CTTS   Number of Visits Requested: 1     SUBSEQUENT HOME HEALTH ORDERS     Order Specific Question Answer Comments   What Home Health Agency is the patient currently using? [facility]      Notify your health care provider if you experience any of the following:  temperature >100.4     Notify your health care provider if you  experience any of the following:  persistent nausea and vomiting or diarrhea     Notify your health care provider if you experience any of the following:  severe uncontrolled pain     Notify your health care provider if you experience any of the following:  redness, tenderness, or signs of infection (pain, swelling, redness, odor or green/yellow discharge around incision site)     Notify your health care provider if you experience any of the following:  increased confusion or weakness     Notify your health care provider if you experience any of the following:  difficulty breathing or increased cough     Reason for not Prescribing Nicotine Replacement     Order Specific Question Answer Comments   Reason for not Prescribing: Not medically appropriate at this time      Activity as tolerated       Significant Diagnostic Studies: Labs: CMP   Recent Labs   Lab 10/20/24  0418 10/21/24  0445    139   K 3.3* 3.5    104   CO2 25 26   GLU 78 87   BUN 5* 7   CREATININE 0.9 0.9   CALCIUM 8.8 9.1   PROT 6.0 6.3   ALBUMIN 3.4* 3.6   BILITOT 0.7 0.5   ALKPHOS 53 55   AST 12 11   ALT 13 13   ANIONGAP 11 9    and CBC   Recent Labs   Lab 10/19/24  2038 10/20/24  0418 10/21/24  0445   WBC 6.61 5.34 6.10   HGB 12.6* 12.1* 12.5*   HCT 39.5* 38.4* 39.2*    238 263       Pending Diagnostic Studies:       None           Medications:  Reconciled Home Medications:      Medication List        START taking these medications      ciprofloxacin HCl 500 MG tablet  Commonly known as: CIPRO  Take 1 tablet (500 mg total) by mouth every 12 (twelve) hours. for 10 days     HYDROcodone-acetaminophen 5-325 mg per tablet  Commonly known as: NORCO  Take 1 tablet by mouth every 6 (six) hours as needed for Pain.     metroNIDAZOLE 500 MG tablet  Commonly known as: FLAGYL  Take 1 tablet (500 mg total) by mouth every 8 (eight) hours. for 10 days            CONTINUE taking these medications      BLOOD PRESSURE CUFF Misc  Generic drug:  miscellaneous medical supply  1 Package by Misc.(Non-Drug; Combo Route) route 2 (two) times a day.     hydroCHLOROthiazide 12.5 MG Tab  Commonly known as: HYDRODIURIL  Take 1 tablet (12.5 mg total) by mouth once daily.            ASK your doctor about these medications      famotidine 40 MG tablet  Commonly known as: PEPCID  Take 1 tablet (40 mg total) by mouth once daily.              Indwelling Lines/Drains at time of discharge:   Lines/Drains/Airways       None                   Time spent on the discharge of patient: 35 minutes         Hallie Guajardo PA-C  Department of Hospital Medicine  [facility]/Surg

## 2024-10-21 NOTE — NURSING
Pts transportation arrived. IV in right ac removed with cath intact.Gauze applied and secured with coflex . No bleeding from site. Pts dcd instructions given and reviewed. Pt verbalized understanding. Pt dcd to transportation and going home.    Adequate: hears normal conversation without difficulty

## 2024-10-21 NOTE — PLAN OF CARE
Transportation home scheduled with pt's insurance. [phone].  States they have a 3hr  window. Pt can be picked up anytime between now and 3:30. States  will call nursing station when they are in route. Confirmation # [id number]

## 2024-10-23 LAB
BACTERIA BLD CULT: NORMAL
BACTERIA BLD CULT: NORMAL

## 2024-11-01 ENCOUNTER — E-VISIT (OUTPATIENT)
Dept: FAMILY MEDICINE | Facility: CLINIC | Age: 34
End: 2024-11-01
Payer: MEDICAID

## 2024-11-01 DIAGNOSIS — M54.9 BACK PAIN, UNSPECIFIED BACK LOCATION, UNSPECIFIED BACK PAIN LATERALITY, UNSPECIFIED CHRONICITY: Primary | ICD-10-CM

## 2024-11-01 RX ORDER — TIZANIDINE 2 MG/1
2 TABLET ORAL EVERY 8 HOURS PRN
Qty: 60 TABLET | Refills: 1 | Status: SHIPPED | OUTPATIENT
Start: 2024-11-01 | End: 2024-12-01

## 2024-11-01 RX ORDER — METHYLPREDNISOLONE 4 MG/1
TABLET ORAL
Qty: 21 TABLET | Refills: 0 | Status: SHIPPED | OUTPATIENT
Start: 2024-11-01

## 2024-11-01 RX ORDER — MELOXICAM 15 MG/1
15 TABLET ORAL DAILY PRN
Qty: 30 TABLET | Refills: 1 | Status: SHIPPED | OUTPATIENT
Start: 2024-11-01 | End: 2024-12-01

## 2024-11-05 ENCOUNTER — OFFICE VISIT (OUTPATIENT)
Dept: PRIMARY CARE CLINIC | Facility: CLINIC | Age: 34
End: 2024-11-05
Payer: MEDICAID

## 2024-11-05 VITALS
SYSTOLIC BLOOD PRESSURE: 139 MMHG | WEIGHT: 241.88 LBS | DIASTOLIC BLOOD PRESSURE: 84 MMHG | RESPIRATION RATE: 18 BRPM | TEMPERATURE: 99 F | HEIGHT: 71 IN | BODY MASS INDEX: 33.86 KG/M2 | OXYGEN SATURATION: 98 % | HEART RATE: 93 BPM

## 2024-11-05 DIAGNOSIS — F12.90 MARIJUANA USE: ICD-10-CM

## 2024-11-05 DIAGNOSIS — G89.29 CHRONIC BILATERAL LOW BACK PAIN WITHOUT SCIATICA: ICD-10-CM

## 2024-11-05 DIAGNOSIS — M54.50 CHRONIC BILATERAL LOW BACK PAIN WITHOUT SCIATICA: ICD-10-CM

## 2024-11-05 DIAGNOSIS — Z87.891 HISTORY OF TOBACCO USE: ICD-10-CM

## 2024-11-05 DIAGNOSIS — E66.9 OBESITY (BMI 30-39.9): ICD-10-CM

## 2024-11-05 DIAGNOSIS — F32.1 CURRENT MODERATE EPISODE OF MAJOR DEPRESSIVE DISORDER WITHOUT PRIOR EPISODE: Primary | ICD-10-CM

## 2024-11-05 PROBLEM — F17.200 TOBACCO DEPENDENCY: Status: RESOLVED | Noted: 2024-08-18 | Resolved: 2024-11-05

## 2024-11-05 PROCEDURE — 1159F MED LIST DOCD IN RCRD: CPT | Mod: CPTII,,, | Performed by: FAMILY MEDICINE

## 2024-11-05 PROCEDURE — 1111F DSCHRG MED/CURRENT MED MERGE: CPT | Mod: CPTII,,, | Performed by: FAMILY MEDICINE

## 2024-11-05 PROCEDURE — 99215 OFFICE O/P EST HI 40 MIN: CPT | Mod: S$PBB,,, | Performed by: FAMILY MEDICINE

## 2024-11-05 PROCEDURE — 99999 PR PBB SHADOW E&M-EST. PATIENT-LVL IV: CPT | Mod: PBBFAC,,, | Performed by: FAMILY MEDICINE

## 2024-11-05 PROCEDURE — 3079F DIAST BP 80-89 MM HG: CPT | Mod: CPTII,,, | Performed by: FAMILY MEDICINE

## 2024-11-05 PROCEDURE — 3008F BODY MASS INDEX DOCD: CPT | Mod: CPTII,,, | Performed by: FAMILY MEDICINE

## 2024-11-05 PROCEDURE — 3075F SYST BP GE 130 - 139MM HG: CPT | Mod: CPTII,,, | Performed by: FAMILY MEDICINE

## 2024-11-05 PROCEDURE — 99214 OFFICE O/P EST MOD 30 MIN: CPT | Mod: PBBFAC,PN | Performed by: FAMILY MEDICINE

## 2024-11-05 PROCEDURE — 3044F HG A1C LEVEL LT 7.0%: CPT | Mod: CPTII,,, | Performed by: FAMILY MEDICINE

## 2024-11-05 PROCEDURE — G2211 COMPLEX E/M VISIT ADD ON: HCPCS | Mod: S$PBB,,, | Performed by: FAMILY MEDICINE

## 2024-11-05 RX ORDER — FLUOXETINE HYDROCHLORIDE 20 MG/1
20 CAPSULE ORAL DAILY
Qty: 30 CAPSULE | Refills: 1 | Status: SHIPPED | OUTPATIENT
Start: 2024-11-05 | End: 2025-11-05

## 2024-11-05 RX ORDER — CYCLOBENZAPRINE HCL 5 MG
5 TABLET ORAL 3 TIMES DAILY PRN
COMMUNITY
End: 2024-11-05

## 2024-11-06 ENCOUNTER — PATIENT MESSAGE (OUTPATIENT)
Dept: PRIMARY CARE CLINIC | Facility: CLINIC | Age: 34
End: 2024-11-06
Payer: MEDICAID

## 2024-11-07 DIAGNOSIS — M54.50 CHRONIC BILATERAL LOW BACK PAIN WITHOUT SCIATICA: Primary | ICD-10-CM

## 2024-11-07 DIAGNOSIS — G89.29 CHRONIC BILATERAL LOW BACK PAIN WITHOUT SCIATICA: Primary | ICD-10-CM

## 2024-11-13 ENCOUNTER — OFFICE VISIT (OUTPATIENT)
Dept: SURGERY | Facility: CLINIC | Age: 34
End: 2024-11-13
Payer: MEDICAID

## 2024-11-13 VITALS — BODY MASS INDEX: 34.23 KG/M2 | HEIGHT: 71 IN | WEIGHT: 244.5 LBS

## 2024-11-13 DIAGNOSIS — R18.8 ABDOMINAL FLUID COLLECTION: ICD-10-CM

## 2024-11-13 DIAGNOSIS — Z98.890 POST-OPERATIVE STATE: Primary | ICD-10-CM

## 2024-11-13 PROCEDURE — 99999 PR PBB SHADOW E&M-EST. PATIENT-LVL III: CPT | Mod: PBBFAC,,, | Performed by: SURGERY

## 2024-11-13 PROCEDURE — 99213 OFFICE O/P EST LOW 20 MIN: CPT | Mod: PBBFAC,PO | Performed by: SURGERY

## 2024-11-14 ENCOUNTER — TELEPHONE (OUTPATIENT)
Dept: SURGERY | Facility: CLINIC | Age: 34
End: 2024-11-14
Payer: MEDICAID

## 2024-11-14 NOTE — PROGRESS NOTES
Cc: post op    HPI: 34 y.o.  male   2-1/2 months status post ex lap with appendectomy and colon resection for perforated appendicitis. Pt patient did well and was discharged home.  He returned to the hospital mid October in his CT scan demonstrated small fluid collection just anterior to the pancreas.  He was admitted and started on antibiotics.  He was since been discharge.  At that time it was not amenable to surgical or interventional radiology drainage.  He presents today for follow-up.  He notes he has been feeling well.  Does have some occasional soreness and discomfort.    PE: AFVSS    AAOx3  CTA  Soft/NT/nd  Inc: c/d/i            A/P:   Pt doing well post surgery.   F/U with me prn  We will arrange for CT scan to ensure resolution of the fluid collection.  If fluid collection persist will discuss with Radiology about potential for drainage

## 2024-11-14 NOTE — TELEPHONE ENCOUNTER
I called patient to inform him that I scheduled patient for his CT Abd/Pelvis w/wo IV contrast @ the outpatient imaging center on [facility].  It is scheduled on Tuesday, 11/26/24 @ 1pm.  Patient is to arrive @ 12pm and fast for 4hrs prior.  Patient understood.  Jony

## 2024-11-26 ENCOUNTER — OFFICE VISIT (OUTPATIENT)
Dept: PRIMARY CARE CLINIC | Facility: CLINIC | Age: 34
End: 2024-11-26
Payer: MEDICAID

## 2024-11-26 ENCOUNTER — HOSPITAL ENCOUNTER (OUTPATIENT)
Dept: RADIOLOGY | Facility: HOSPITAL | Age: 34
Discharge: HOME | End: 2024-11-26
Attending: SURGERY
Payer: MEDICAID

## 2024-11-26 VITALS
WEIGHT: 246.5 LBS | OXYGEN SATURATION: 100 % | BODY MASS INDEX: 34.51 KG/M2 | DIASTOLIC BLOOD PRESSURE: 92 MMHG | HEIGHT: 71 IN | SYSTOLIC BLOOD PRESSURE: 137 MMHG | HEART RATE: 76 BPM

## 2024-11-26 DIAGNOSIS — F32.1 CURRENT MODERATE EPISODE OF MAJOR DEPRESSIVE DISORDER WITHOUT PRIOR EPISODE: Primary | ICD-10-CM

## 2024-11-26 DIAGNOSIS — I10 PRIMARY HYPERTENSION: ICD-10-CM

## 2024-11-26 DIAGNOSIS — R18.8 ABDOMINAL FLUID COLLECTION: ICD-10-CM

## 2024-11-26 PROCEDURE — 25500020 PHARM REV CODE 255: Performed by: SURGERY

## 2024-11-26 PROCEDURE — 74177 CT ABD & PELVIS W/CONTRAST: CPT | Mod: 26,,, | Performed by: RADIOLOGY

## 2024-11-26 PROCEDURE — 3080F DIAST BP >= 90 MM HG: CPT | Mod: CPTII,,, | Performed by: FAMILY MEDICINE

## 2024-11-26 PROCEDURE — 1159F MED LIST DOCD IN RCRD: CPT | Mod: CPTII,,, | Performed by: FAMILY MEDICINE

## 2024-11-26 PROCEDURE — A9698 NON-RAD CONTRAST MATERIALNOC: HCPCS | Performed by: SURGERY

## 2024-11-26 PROCEDURE — G2211 COMPLEX E/M VISIT ADD ON: HCPCS | Mod: S$PBB,,, | Performed by: FAMILY MEDICINE

## 2024-11-26 PROCEDURE — 99999 PR PBB SHADOW E&M-EST. PATIENT-LVL III: CPT | Mod: PBBFAC,,, | Performed by: FAMILY MEDICINE

## 2024-11-26 PROCEDURE — 74177 CT ABD & PELVIS W/CONTRAST: CPT | Mod: TC

## 2024-11-26 PROCEDURE — 3008F BODY MASS INDEX DOCD: CPT | Mod: CPTII,,, | Performed by: FAMILY MEDICINE

## 2024-11-26 PROCEDURE — 3044F HG A1C LEVEL LT 7.0%: CPT | Mod: CPTII,,, | Performed by: FAMILY MEDICINE

## 2024-11-26 PROCEDURE — 99214 OFFICE O/P EST MOD 30 MIN: CPT | Mod: S$PBB,,, | Performed by: FAMILY MEDICINE

## 2024-11-26 PROCEDURE — 3075F SYST BP GE 130 - 139MM HG: CPT | Mod: CPTII,,, | Performed by: FAMILY MEDICINE

## 2024-11-26 PROCEDURE — 99213 OFFICE O/P EST LOW 20 MIN: CPT | Mod: PBBFAC,PN,25 | Performed by: FAMILY MEDICINE

## 2024-11-26 RX ORDER — FLUOXETINE HYDROCHLORIDE 40 MG/1
40 CAPSULE ORAL DAILY
Qty: 30 CAPSULE | Refills: 2 | Status: SHIPPED | OUTPATIENT
Start: 2024-11-26 | End: 2024-12-26

## 2024-11-26 RX ORDER — VALSARTAN AND HYDROCHLOROTHIAZIDE 80; 12.5 MG/1; MG/1
1 TABLET, FILM COATED ORAL DAILY
Qty: 30 TABLET | Refills: 1 | Status: SHIPPED | OUTPATIENT
Start: 2024-11-26 | End: 2025-11-26

## 2024-11-26 RX ADMIN — IOHEXOL 100 ML: 350 INJECTION, SOLUTION INTRAVENOUS at 12:11

## 2024-11-26 RX ADMIN — BARIUM SULFATE 450 ML: 21 SUSPENSION ORAL at 12:11

## 2024-11-26 NOTE — PROGRESS NOTES
"Subjective:       Patient ID: Willian Ron is a 34 y.o. male.    Chief Complaint: Follow-up    34 yr old obese AA male here for follow up on depression and insomnia. Thinks he is taking the fluoxetine 20 mg but cannot say for sure. Taking zanaflex daily for his nerves and sleep. Difficulty going to sleep and staying asleep. Attributes most of his stress to  issues.     Follow-up      Review of Systems    Objective:      Vitals:    11/26/24 0952   BP: (!) 137/92   Pulse: 76   SpO2: 100%   Weight: 111.8 kg (246 lb 7.6 oz)   Height: 5' 11" (1.803 m)     Physical Exam  Vitals and nursing note reviewed.   Constitutional:       Appearance: He is well-developed. He is not ill-appearing.   HENT:      Head: Normocephalic and atraumatic.      Nose: Nose normal.   Eyes:      Conjunctiva/sclera: Conjunctivae normal.   Cardiovascular:      Heart sounds: Normal heart sounds.   Pulmonary:      Effort: Pulmonary effort is normal. No respiratory distress.      Breath sounds: Normal breath sounds.   Abdominal:      General: There is no distension.   Neurological:      Mental Status: He is alert.      Cranial Nerves: No cranial nerve deficit.   Psychiatric:         Mood and Affect: Mood is depressed.             Lab Results   Component Value Date     10/21/2024    K 3.5 10/21/2024     10/21/2024    CO2 26 10/21/2024    BUN 7 10/21/2024    CREATININE 0.9 10/21/2024    ANIONGAP 9 10/21/2024     Lab Results   Component Value Date    HGBA1C 4.8 09/25/2024     Lab Results   Component Value Date    BNP 49 08/14/2024       Lab Results   Component Value Date    WBC 6.10 10/21/2024    HGB 12.5 (L) 10/21/2024    HCT 39.2 (L) 10/21/2024     10/21/2024    GRAN 2.9 10/21/2024    GRAN 46.9 10/21/2024     Lab Results   Component Value Date    CHOL 155 09/25/2024    HDL 39 (L) 09/25/2024    LDLCALC 86.2 09/25/2024    TRIG 149 09/25/2024          Current Outpatient Medications:     BLOOD PRESSURE CUFF Misc, 1 Package " by Misc.(Non-Drug; Combo Route) route 2 (two) times a day., Disp: 1 each, Rfl: 0    meloxicam (MOBIC) 15 MG tablet, Take 1 tablet (15 mg total) by mouth daily as needed for Pain., Disp: 30 tablet, Rfl: 1    tiZANidine (ZANAFLEX) 2 MG tablet, Take 1 tablet (2 mg total) by mouth every 8 (eight) hours as needed (muscle spasms)., Disp: 60 tablet, Rfl: 1    famotidine (PEPCID) 40 MG tablet, Take 1 tablet (40 mg total) by mouth once daily., Disp: 30 tablet, Rfl: 2    FLUoxetine 40 MG capsule, Take 1 capsule (40 mg total) by mouth once daily., Disp: 30 capsule, Rfl: 2    valsartan-hydrochlorothiazide (DIOVAN-HCT) 80-12.5 mg per tablet, Take 1 tablet by mouth once daily., Disp: 30 tablet, Rfl: 1        Assessment:       1. Current moderate episode of major depressive disorder without prior episode    2. Primary hypertension           Plan:       Current moderate episode of major depressive disorder without prior episode  -     FLUoxetine 40 MG capsule; Take 1 capsule (40 mg total) by mouth once daily.  Dispense: 30 capsule; Refill: 2  Reviewed sleep hygiene as well as exercise/activity. Unsure if taking but he believes so and pharmacy dispense report supporting. Will increase from 20 mg to 40 mg. Follow up in 2 weeks. He has not been interested in therapy. Encouraged to avoid marijuana which he denies using since last visit.     Primary hypertension  -     valsartan-hydrochlorothiazide (DIOVAN-HCT) 80-12.5 mg per tablet; Take 1 tablet by mouth once daily.  Dispense: 30 tablet; Refill: 1     Remains elevated 130s/90s. Asymptomatic. Dc hctz 12.5 mg and adding valsartan 80 mg-hctz 12.5 mg.   F/u in 2 weeks.

## 2024-11-28 DIAGNOSIS — I10 PRIMARY HYPERTENSION: ICD-10-CM

## 2024-11-29 ENCOUNTER — PATIENT MESSAGE (OUTPATIENT)
Dept: SURGERY | Facility: CLINIC | Age: 34
End: 2024-11-29
Payer: MEDICAID

## 2024-11-29 ENCOUNTER — PATIENT MESSAGE (OUTPATIENT)
Dept: PRIMARY CARE CLINIC | Facility: CLINIC | Age: 34
End: 2024-11-29
Payer: MEDICAID

## 2024-12-02 RX ORDER — HYDROCHLOROTHIAZIDE 12.5 MG/1
TABLET ORAL
Qty: 30 TABLET | Refills: 1 | OUTPATIENT
Start: 2024-12-02

## 2024-12-03 ENCOUNTER — OFFICE VISIT (OUTPATIENT)
Dept: PRIMARY CARE CLINIC | Facility: CLINIC | Age: 34
End: 2024-12-03
Payer: MEDICAID

## 2024-12-03 VITALS
DIASTOLIC BLOOD PRESSURE: 96 MMHG | TEMPERATURE: 98 F | BODY MASS INDEX: 35.53 KG/M2 | HEIGHT: 69 IN | HEART RATE: 93 BPM | SYSTOLIC BLOOD PRESSURE: 144 MMHG | OXYGEN SATURATION: 100 % | WEIGHT: 239.88 LBS | RESPIRATION RATE: 17 BRPM

## 2024-12-03 DIAGNOSIS — F41.9 ANXIETY: ICD-10-CM

## 2024-12-03 DIAGNOSIS — I10 PRIMARY HYPERTENSION: Primary | ICD-10-CM

## 2024-12-03 PROCEDURE — 3008F BODY MASS INDEX DOCD: CPT | Mod: CPTII,,, | Performed by: FAMILY MEDICINE

## 2024-12-03 PROCEDURE — 99213 OFFICE O/P EST LOW 20 MIN: CPT | Mod: PBBFAC,PN | Performed by: FAMILY MEDICINE

## 2024-12-03 PROCEDURE — 3044F HG A1C LEVEL LT 7.0%: CPT | Mod: CPTII,,, | Performed by: FAMILY MEDICINE

## 2024-12-03 PROCEDURE — 1159F MED LIST DOCD IN RCRD: CPT | Mod: CPTII,,, | Performed by: FAMILY MEDICINE

## 2024-12-03 PROCEDURE — 3080F DIAST BP >= 90 MM HG: CPT | Mod: CPTII,,, | Performed by: FAMILY MEDICINE

## 2024-12-03 PROCEDURE — G2211 COMPLEX E/M VISIT ADD ON: HCPCS | Mod: S$PBB,,, | Performed by: FAMILY MEDICINE

## 2024-12-03 PROCEDURE — 99999 PR PBB SHADOW E&M-EST. PATIENT-LVL III: CPT | Mod: PBBFAC,,, | Performed by: FAMILY MEDICINE

## 2024-12-03 PROCEDURE — 99214 OFFICE O/P EST MOD 30 MIN: CPT | Mod: S$PBB,,, | Performed by: FAMILY MEDICINE

## 2024-12-03 PROCEDURE — 3077F SYST BP >= 140 MM HG: CPT | Mod: CPTII,,, | Performed by: FAMILY MEDICINE

## 2024-12-03 NOTE — PROGRESS NOTES
"Subjective:       Patient ID: Willian Ron is a 34 y.o. male.    Chief Complaint: Abdominal Pain (C/o pain in LUQ of ABD) and Follow-up (Does not understand CT scan results from 11- and wants MD to explain)    34 yr old with depression and HTN here for follow up post CT scan. He has been extremely anxious looking at the results online and comparing them to what his online searches reveal. Feeling well otherwise. Did not  his anxiety medicine he admits nor his new bp medicine due to concerns to his health.       Review of Systems    Objective:      Vitals:    12/03/24 1041 12/03/24 1047   BP: (!) 150/91 (!) 144/96   BP Location: Right arm Right arm   Patient Position: Sitting Sitting   Pulse: 93    Resp: 17    Temp: 98.1 °F (36.7 °C)    TempSrc: Oral    SpO2: 100%    Weight: 108.8 kg (239 lb 13.8 oz)    Height: 5' 9" (1.753 m)      Physical Exam  Vitals and nursing note reviewed.   Constitutional:       Appearance: He is well-developed.   HENT:      Head: Normocephalic and atraumatic.      Nose: Nose normal.   Eyes:      Conjunctiva/sclera: Conjunctivae normal.   Pulmonary:      Effort: Pulmonary effort is normal. No respiratory distress.      Breath sounds: Normal breath sounds. No wheezing or rales.   Abdominal:      General: There is no distension.      Palpations: Abdomen is soft. There is no mass.      Tenderness: There is no abdominal tenderness.   Neurological:      Mental Status: He is alert.      Cranial Nerves: No cranial nerve deficit.             Lab Results   Component Value Date     10/21/2024    K 3.5 10/21/2024     10/21/2024    CO2 26 10/21/2024    BUN 7 10/21/2024    CREATININE 0.9 10/21/2024    ANIONGAP 9 10/21/2024     Lab Results   Component Value Date    HGBA1C 4.8 09/25/2024     Lab Results   Component Value Date    BNP 49 08/14/2024       Lab Results   Component Value Date    WBC 6.10 10/21/2024    HGB 12.5 (L) 10/21/2024    HCT 39.2 (L) 10/21/2024     " 10/21/2024    GRAN 2.9 10/21/2024    GRAN 46.9 10/21/2024     Lab Results   Component Value Date    CHOL 155 09/25/2024    HDL 39 (L) 09/25/2024    LDLCALC 86.2 09/25/2024    TRIG 149 09/25/2024          Current Outpatient Medications:     BLOOD PRESSURE CUFF Misc, 1 Package by Misc.(Non-Drug; Combo Route) route 2 (two) times a day. (Patient not taking: Reported on 12/3/2024), Disp: 1 each, Rfl: 0    famotidine (PEPCID) 40 MG tablet, Take 1 tablet (40 mg total) by mouth once daily., Disp: 30 tablet, Rfl: 2    valsartan-hydrochlorothiazide (DIOVAN-HCT) 80-12.5 mg per tablet, Take 1 tablet by mouth once daily. (Patient not taking: Reported on 12/3/2024), Disp: 30 tablet, Rfl: 1        Assessment:       1. Primary hypertension    2. Anxiety           Plan:       Primary hypertension  Uncontrolled today SBP of 150s however did not  bp meds prescribed valsartan/hctz. Counseled at length regarding risks. States he will pick them up today with follow up in 2 weeks.     Anxiety  Remains an issue. Reassured today with abd ct read showing overall improvements in fluid collection/inflammation post colon resection x2. Normal physical exam. He is not interested in taking anxiety medicine and never tried prozac prescribed. He is looking to exercise more and take better care of himself.

## 2024-12-03 NOTE — LETTER
December 3, 2024      [facility]  [address] [place] [address]       Patient: Willian Ron  YOB: 1990  Date of Visit: 12/03/2024    To Whom It May Concern:    Willian Ron was at [facility] on 12/03/2024. {HE SHE CAPITAL LETTER:94701} may return to work/school on *** {With/no:91565} restrictions. If you have any questions or concerns, or if I can be of further assistance, please do not hesitate to contact my office.    Sincerely,    Jame Luna LPN

## 2024-12-20 DIAGNOSIS — M54.9 BACK PAIN, UNSPECIFIED BACK LOCATION, UNSPECIFIED BACK PAIN LATERALITY, UNSPECIFIED CHRONICITY: ICD-10-CM

## 2024-12-20 RX ORDER — TIZANIDINE 2 MG/1
TABLET ORAL
Qty: 30 TABLET | Refills: 0 | Status: SHIPPED | OUTPATIENT
Start: 2024-12-20

## 2024-12-27 DIAGNOSIS — M54.9 BACK PAIN, UNSPECIFIED BACK LOCATION, UNSPECIFIED BACK PAIN LATERALITY, UNSPECIFIED CHRONICITY: ICD-10-CM

## 2024-12-27 RX ORDER — TIZANIDINE 2 MG/1
TABLET ORAL
Qty: 30 TABLET | Refills: 0 | OUTPATIENT
Start: 2024-12-27

## 2024-12-31 DIAGNOSIS — M54.9 BACK PAIN, UNSPECIFIED BACK LOCATION, UNSPECIFIED BACK PAIN LATERALITY, UNSPECIFIED CHRONICITY: ICD-10-CM

## 2025-01-02 RX ORDER — MELOXICAM 15 MG/1
15 TABLET ORAL DAILY PRN
Qty: 30 TABLET | Refills: 1 | OUTPATIENT
Start: 2025-01-02

## 2025-02-13 ENCOUNTER — PATIENT MESSAGE (OUTPATIENT)
Dept: ADMINISTRATIVE | Facility: HOSPITAL | Age: 35
End: 2025-02-13
Payer: MEDICAID

## 2025-02-26 ENCOUNTER — PATIENT MESSAGE (OUTPATIENT)
Dept: PRIMARY CARE CLINIC | Facility: CLINIC | Age: 35
End: 2025-02-26
Payer: MEDICAID

## 2025-02-26 DIAGNOSIS — I10 PRIMARY HYPERTENSION: ICD-10-CM

## 2025-02-26 RX ORDER — VALSARTAN AND HYDROCHLOROTHIAZIDE 80; 12.5 MG/1; MG/1
1 TABLET, FILM COATED ORAL
Qty: 30 TABLET | Refills: 0 | Status: SHIPPED | OUTPATIENT
Start: 2025-02-26

## 2025-03-03 DIAGNOSIS — F32.1 CURRENT MODERATE EPISODE OF MAJOR DEPRESSIVE DISORDER WITHOUT PRIOR EPISODE: ICD-10-CM

## 2025-03-05 RX ORDER — FLUOXETINE HYDROCHLORIDE 20 MG/1
CAPSULE ORAL
Qty: 30 CAPSULE | Refills: 1 | OUTPATIENT
Start: 2025-03-05

## 2025-03-17 ENCOUNTER — PATIENT MESSAGE (OUTPATIENT)
Dept: ADMINISTRATIVE | Facility: HOSPITAL | Age: 35
End: 2025-03-17
Payer: MEDICAID

## 2025-03-25 ENCOUNTER — OFFICE VISIT (OUTPATIENT)
Dept: PRIMARY CARE CLINIC | Facility: CLINIC | Age: 35
End: 2025-03-25
Payer: MEDICAID

## 2025-03-25 VITALS
OXYGEN SATURATION: 100 % | DIASTOLIC BLOOD PRESSURE: 86 MMHG | HEART RATE: 105 BPM | SYSTOLIC BLOOD PRESSURE: 139 MMHG | BODY MASS INDEX: 37.55 KG/M2 | WEIGHT: 253.5 LBS | HEIGHT: 69 IN

## 2025-03-25 DIAGNOSIS — R10.9 CHRONIC ABDOMINAL PAIN: ICD-10-CM

## 2025-03-25 DIAGNOSIS — G89.29 CHRONIC ABDOMINAL PAIN: ICD-10-CM

## 2025-03-25 DIAGNOSIS — Z86.59 HX OF MAJOR DEPRESSION: ICD-10-CM

## 2025-03-25 DIAGNOSIS — E66.9 OBESITY (BMI 30-39.9): ICD-10-CM

## 2025-03-25 DIAGNOSIS — M54.50 CHRONIC BILATERAL LOW BACK PAIN WITHOUT SCIATICA: ICD-10-CM

## 2025-03-25 DIAGNOSIS — G89.29 CHRONIC BILATERAL LOW BACK PAIN WITHOUT SCIATICA: ICD-10-CM

## 2025-03-25 DIAGNOSIS — L21.9 SEBORRHEIC DERMATITIS: ICD-10-CM

## 2025-03-25 DIAGNOSIS — I10 PRIMARY HYPERTENSION: Primary | ICD-10-CM

## 2025-03-25 PROBLEM — K65.1 POSTPROCEDURAL INTRAABDOMINAL ABSCESS: Status: RESOLVED | Noted: 2024-10-19 | Resolved: 2025-03-25

## 2025-03-25 PROBLEM — T81.43XA POSTPROCEDURAL INTRAABDOMINAL ABSCESS: Status: RESOLVED | Noted: 2024-10-19 | Resolved: 2025-03-25

## 2025-03-25 PROBLEM — Z00.00 WELLNESS EXAMINATION: Status: RESOLVED | Noted: 2024-09-27 | Resolved: 2025-03-25

## 2025-03-25 PROCEDURE — 99215 OFFICE O/P EST HI 40 MIN: CPT | Mod: S$PBB,,, | Performed by: FAMILY MEDICINE

## 2025-03-25 PROCEDURE — 3075F SYST BP GE 130 - 139MM HG: CPT | Mod: CPTII,,, | Performed by: FAMILY MEDICINE

## 2025-03-25 PROCEDURE — 3008F BODY MASS INDEX DOCD: CPT | Mod: CPTII,,, | Performed by: FAMILY MEDICINE

## 2025-03-25 PROCEDURE — G2211 COMPLEX E/M VISIT ADD ON: HCPCS | Mod: S$PBB,,, | Performed by: FAMILY MEDICINE

## 2025-03-25 PROCEDURE — 99213 OFFICE O/P EST LOW 20 MIN: CPT | Mod: PBBFAC,PN | Performed by: FAMILY MEDICINE

## 2025-03-25 PROCEDURE — 99999 PR PBB SHADOW E&M-EST. PATIENT-LVL III: CPT | Mod: PBBFAC,,, | Performed by: FAMILY MEDICINE

## 2025-03-25 PROCEDURE — 1159F MED LIST DOCD IN RCRD: CPT | Mod: CPTII,,, | Performed by: FAMILY MEDICINE

## 2025-03-25 PROCEDURE — 3079F DIAST BP 80-89 MM HG: CPT | Mod: CPTII,,, | Performed by: FAMILY MEDICINE

## 2025-03-25 RX ORDER — MELOXICAM 15 MG/1
TABLET ORAL
COMMUNITY
Start: 2024-12-03

## 2025-03-25 RX ORDER — VALSARTAN AND HYDROCHLOROTHIAZIDE 160; 12.5 MG/1; MG/1
1 TABLET, FILM COATED ORAL DAILY
Qty: 30 TABLET | Refills: 1 | Status: SHIPPED | OUTPATIENT
Start: 2025-03-25 | End: 2026-03-25

## 2025-03-25 RX ORDER — FLUOXETINE HYDROCHLORIDE 20 MG/1
20 CAPSULE ORAL
COMMUNITY
Start: 2025-01-28 | End: 2025-03-25 | Stop reason: SDUPTHER

## 2025-03-25 RX ORDER — FLUOXETINE HYDROCHLORIDE 20 MG/1
20 CAPSULE ORAL DAILY
Qty: 90 CAPSULE | Refills: 1 | Status: SHIPPED | OUTPATIENT
Start: 2025-03-25

## 2025-03-25 NOTE — PROGRESS NOTES
"Subjective:       Patient ID: Willian Ron is a 35 y.o. male.    Chief Complaint: Hypertension    34 yr old with depression, HTN, here for follow up with complaints of chronic low back pain primarily. He was referred for PT previously but he had a lapse in transportation options and was unable to go. Now that transportation barriers are not an issue he wishes to reschedule.     Also complaining of chronic abd discomfort which comes and goes since his surgery and worried he has another infection. No pain currently and afebrile. Was hospitalized for recurrent intrabdominal abscesses beginning in 8/2025-10/2025.     Lastly, complaining of right ear scaling. No pain or discomfort.     Abdominal Pain  This is a chronic problem. The current episode started more than 1 month ago. The onset quality is undetermined. The problem occurs daily. The most recent episode lasted 3 months. The problem has been waxing and waning. The pain is located in the epigastric region, generalized abdominal region, periumbilical region and rectum. The pain is at a severity of 6/10. The pain is moderate. The quality of the pain is aching and cramping. Associated symptoms include flatus and myalgias. The pain is aggravated by movement. The pain is relieved by Bowel movements, passing flatus and recumbency. He has tried acetaminophen and antibiotics for the symptoms. The treatment provided moderate relief. Prior diagnostic workup includes CT scan and surgery. His past medical history is significant for abdominal surgery. Patient's medical history includes kidney stones.   Hypertension      Review of Systems   Gastrointestinal:  Positive for abdominal pain and flatus.   Musculoskeletal:  Positive for myalgias.       Objective:      Vitals:    03/25/25 1104   BP: 139/86   BP Location: Left arm   Patient Position: Sitting   Pulse: 105   SpO2: 100%   Weight: 115 kg (253 lb 8.5 oz)   Height: 5' 9" (1.753 m)     Physical Exam  Vitals and nursing " note reviewed.   Constitutional:       Appearance: He is well-developed. He is obese.   HENT:      Head: Normocephalic and atraumatic.      Right Ear: Hearing normal. No decreased hearing noted. No drainage. Tympanic membrane is not injected, perforated, erythematous, retracted or bulging.      Left Ear: Hearing and external ear normal. No decreased hearing noted. No drainage. Tympanic membrane is not injected, perforated, erythematous, retracted or bulging.      Ears:      Comments: Wax obscuring bl TM partially.      Nose: Nose normal.   Eyes:      Conjunctiva/sclera: Conjunctivae normal.   Cardiovascular:      Rate and Rhythm: Normal rate and regular rhythm.      Heart sounds: Normal heart sounds.   Pulmonary:      Effort: Pulmonary effort is normal. No respiratory distress.      Breath sounds: Normal breath sounds. No wheezing or rales.   Abdominal:      General: There is no distension.      Palpations: Abdomen is soft.      Tenderness: There is no abdominal tenderness.      Hernia: No hernia is present.   Skin:     Comments: Mid vertical scar extending from below the sternum to navel.no erythema. Skin intact. Non TTP.    External right ear skin scaling. No erythematous. No induration.  Skin intact.    Neurological:      Mental Status: He is alert.      Cranial Nerves: No cranial nerve deficit.             Lab Results   Component Value Date     10/21/2024    K 3.5 10/21/2024     10/21/2024    CO2 26 10/21/2024    BUN 7 10/21/2024    CREATININE 0.9 10/21/2024    ANIONGAP 9 10/21/2024     Lab Results   Component Value Date    HGBA1C 4.8 09/25/2024     Lab Results   Component Value Date    BNP 49 08/14/2024       Lab Results   Component Value Date    WBC 6.10 10/21/2024    HGB 12.5 (L) 10/21/2024    HCT 39.2 (L) 10/21/2024     10/21/2024    GRAN 2.9 10/21/2024    GRAN 46.9 10/21/2024     Lab Results   Component Value Date    CHOL 155 09/25/2024    HDL 39 (L) 09/25/2024    LDLCALC 86.2 09/25/2024     TRIG 149 09/25/2024        Current Medications[1]        Assessment:       1. Primary hypertension    2. Chronic bilateral low back pain without sciatica    3. Seborrheic dermatitis    4. Obesity (BMI 30-39.9)    5. Hx of major depression    6. Chronic abdominal pain           Plan:       Primary hypertension  -     valsartan-hydrochlorothiazide (DIOVAN-HCT) 160-12.5 mg per tablet; Take 1 tablet by mouth once daily.  Dispense: 30 tablet; Refill: 1    Uncontrolled 139/86 which is c/w readings outside clinic. Has wrist cuff he is asked to replace with arm cuff for accuracy. Improved BP but not at goal of <130/80. Increasing to valsartan 160- hctz 12.5 mg with follow up in the coming weeks.    Chronic bilateral low back pain without sciatica  -     Ambulatory Referral/Consult to Physical Therapy; Future; Expected date: 04/01/2025  Chronic. No hx of trauma. Reviewed core strengthening/stretching and re-referred to PT. No neuropathies or falls.     Seborrheic dermatitis  Ear scaling c/w dandruff vs seborrheic dermatitis. Advised trial of head and shoulders shampoo.    Obesity (BMI 30-39.9)  Reviewed diet and lifestyle changes. Discussed target goals, including cutting out sugar beverages and unhealthy snacks. Planning for healthy meal prep. Goal of exercising at least 20 min a day. Vigorous exercise.      Hx of major depression  -     FLUoxetine 20 MG capsule; Take 1 capsule (20 mg total) by mouth once daily.  Dispense: 90 capsule; Refill: 1  Feels like current dose is adequate. Doing well. Refilled today.          Chronic abd pain off and on since surgery: he is most worried about a recurrent infection which he is reassured of is unlikely the case currently with normal exam. No clear exacerbating factors. No pain currently. Relieved but NSAID.            [1]   Current Outpatient Medications:     BLOOD PRESSURE CUFF Misc, 1 Package by Misc.(Non-Drug; Combo Route) route 2 (two) times a day., Disp: 1 each, Rfl: 0     meloxicam (MOBIC) 15 MG tablet, , Disp: , Rfl:     tiZANidine (ZANAFLEX) 2 MG tablet, TAKE 1 TABLET(2 MG) BY MOUTH EVERY 8 HOURS AS NEEDED FOR MUSCLE SPASMS, Disp: 30 tablet, Rfl: 0    famotidine (PEPCID) 40 MG tablet, Take 1 tablet (40 mg total) by mouth once daily., Disp: 30 tablet, Rfl: 2    FLUoxetine 20 MG capsule, Take 1 capsule (20 mg total) by mouth once daily., Disp: 90 capsule, Rfl: 1    valsartan-hydrochlorothiazide (DIOVAN-HCT) 160-12.5 mg per tablet, Take 1 tablet by mouth once daily., Disp: 30 tablet, Rfl: 1

## 2025-03-25 NOTE — LETTER
2025    Willian Ron  [address] [facility] [id number]             [facility]  [address] [place] [id number] To whom it may concern,    Willian Ron( 1990)    Has been a patient of mine since 2025.   If you have any questions or concerns, please don't hesitate to call.    Sincerely,        Dell Tran MD

## 2025-04-09 ENCOUNTER — EXTERNAL HOME HEALTH (OUTPATIENT)
Dept: HOME HEALTH SERVICES | Facility: HOSPITAL | Age: 35
End: 2025-04-09
Payer: MEDICAID

## (undated) DEVICE — SUT MONOCRYL 4-0 PS-2

## (undated) DEVICE — DRAPE THREE-QTR REINF 53X77IN

## (undated) DEVICE — STRAP OR TABLE 5IN X 72IN

## (undated) DEVICE — ELECTRODE BLADE TEFLON 6

## (undated) DEVICE — NDL SAFETY 21G X 1 1/2 ECLPSE

## (undated) DEVICE — APPLICATOR CHLORAPREP ORN 26ML

## (undated) DEVICE — SUT SILK 0 STRANDS 30IN BLK

## (undated) DEVICE — DRAPE UINDERBUT GRAD PCH

## (undated) DEVICE — SUT 2-0 ETHILON 18 FS

## (undated) DEVICE — SOL NACL IRR 1000ML BTL

## (undated) DEVICE — SUT VLOC 90 3-0 V-20 NDL 9

## (undated) DEVICE — SYR 30CC LUER LOCK

## (undated) DEVICE — SLEEVE SCD EXPRESS KNEE MEDIUM

## (undated) DEVICE — DRESSING ABSRBNT ISLAND 3.6X8

## (undated) DEVICE — PACK CUSTOM UNIV BASIN SLI

## (undated) DEVICE — PACK SIRUS BASIC V SURG STRL

## (undated) DEVICE — DRAPE MAJOR ABD 102X122X78IN

## (undated) DEVICE — STAPLER SKIN ROTATING HEAD

## (undated) DEVICE — ELECTRODE REM PLYHSV RETURN 9

## (undated) DEVICE — LINER SUCTION 3000CC

## (undated) DEVICE — GOWN POLY REINF BRTH SLV XL

## (undated) DEVICE — GLOVE SENSICARE PI ALOE 7.5

## (undated) DEVICE — STAPLER INTERNL CONTOR CV BLU

## (undated) DEVICE — SPONGE LAP 18X18 PREWASHED

## (undated) DEVICE — SUT 1 36IN PDS II

## (undated) DEVICE — TOWEL OR DISP STRL BLUE 4/PK

## (undated) DEVICE — SUC YANK POOLE TIP RIGID

## (undated) DEVICE — GAUZE DRAIN N WVN 6PLY 4X4IN

## (undated) DEVICE — CUTTER PROXIMATE BLUE 75MM

## (undated) DEVICE — SUT SILK 2-0 SH 18IN BLACK

## (undated) DEVICE — COVER PROXIMA MAYO STAND

## (undated) DEVICE — DRAPE CORETEMP FLD WRM 56X62IN

## (undated) DEVICE — SUT PROLENE 0 CT1 30IN BLUE

## (undated) DEVICE — RELOAD PROXIMATE CUT BLUE 75MM

## (undated) DEVICE — Device

## (undated) DEVICE — SUT 3-0 VICRYL SH CR/8 18

## (undated) DEVICE — SEALER LIGASURE IMPACT 18CM

## (undated) DEVICE — KIT COLLECTION E SWAB REGULAR

## (undated) DEVICE — CLOSURE SKIN STERI STRIP 1/2X4

## (undated) DEVICE — SUT PROLENE 2-0 SH 36IN BLU

## (undated) DEVICE — TRAY CATH 1-LYR URIMTR 16FR

## (undated) DEVICE — DRAPE LEGGINGS CUFF 33X51IN

## (undated) DEVICE — SUT SA85H SILK 2-0